# Patient Record
Sex: MALE | Race: WHITE | NOT HISPANIC OR LATINO | Employment: OTHER | URBAN - METROPOLITAN AREA
[De-identification: names, ages, dates, MRNs, and addresses within clinical notes are randomized per-mention and may not be internally consistent; named-entity substitution may affect disease eponyms.]

---

## 2017-01-30 ENCOUNTER — OFFICE VISIT (OUTPATIENT)
Dept: HEMATOLOGY/ONCOLOGY | Facility: CLINIC | Age: 47
End: 2017-01-30
Payer: OTHER GOVERNMENT

## 2017-01-30 ENCOUNTER — LAB VISIT (OUTPATIENT)
Dept: LAB | Facility: HOSPITAL | Age: 47
End: 2017-01-30
Attending: INTERNAL MEDICINE
Payer: OTHER GOVERNMENT

## 2017-01-30 VITALS
BODY MASS INDEX: 38.76 KG/M2 | HEIGHT: 71 IN | SYSTOLIC BLOOD PRESSURE: 121 MMHG | DIASTOLIC BLOOD PRESSURE: 75 MMHG | TEMPERATURE: 98 F | HEART RATE: 84 BPM | RESPIRATION RATE: 15 BRPM | WEIGHT: 276.88 LBS

## 2017-01-30 DIAGNOSIS — K76.0 FATTY LIVER: Primary | ICD-10-CM

## 2017-01-30 DIAGNOSIS — D69.6 THROMBOCYTOPENIA: ICD-10-CM

## 2017-01-30 DIAGNOSIS — D69.6 THROMBOCYTOPENIA: Primary | ICD-10-CM

## 2017-01-30 DIAGNOSIS — K76.0 FATTY LIVER: ICD-10-CM

## 2017-01-30 DIAGNOSIS — E06.3 HASHIMOTO'S THYROIDITIS: ICD-10-CM

## 2017-01-30 DIAGNOSIS — E03.9 HYPOTHYROIDISM (ACQUIRED): ICD-10-CM

## 2017-01-30 DIAGNOSIS — K51.211 CHRONIC ULCERATIVE PROCTITIS WITH RECTAL BLEEDING: ICD-10-CM

## 2017-01-30 LAB
ALBUMIN SERPL BCP-MCNC: 4.3 G/DL
ALP SERPL-CCNC: 62 U/L
ALT SERPL W/O P-5'-P-CCNC: 62 U/L
ANION GAP SERPL CALC-SCNC: 8 MMOL/L
AST SERPL-CCNC: 26 U/L
BASOPHILS # BLD AUTO: 0.1 K/UL
BASOPHILS NFR BLD: 1.1 %
BILIRUB SERPL-MCNC: 0.5 MG/DL
BUN SERPL-MCNC: 18 MG/DL
CALCIUM SERPL-MCNC: 9.5 MG/DL
CHLORIDE SERPL-SCNC: 107 MMOL/L
CO2 SERPL-SCNC: 23 MMOL/L
CREAT SERPL-MCNC: 1 MG/DL
DIFFERENTIAL METHOD: ABNORMAL
EOSINOPHIL # BLD AUTO: 0.1 K/UL
EOSINOPHIL NFR BLD: 2.1 %
ERYTHROCYTE [DISTWIDTH] IN BLOOD BY AUTOMATED COUNT: 13.6 %
EST. GFR  (AFRICAN AMERICAN): >60 ML/MIN/1.73 M^2
EST. GFR  (NON AFRICAN AMERICAN): >60 ML/MIN/1.73 M^2
GLUCOSE SERPL-MCNC: 137 MG/DL
HCT VFR BLD AUTO: 44.6 %
HGB BLD-MCNC: 15.1 G/DL
LYMPHOCYTES # BLD AUTO: 2.4 K/UL
LYMPHOCYTES NFR BLD: 40.8 %
MCH RBC QN AUTO: 31 PG
MCHC RBC AUTO-ENTMCNC: 33.7 %
MCV RBC AUTO: 92 FL
MONOCYTES # BLD AUTO: 0.6 K/UL
MONOCYTES NFR BLD: 9.5 %
NEUTROPHILS # BLD AUTO: 2.8 K/UL
NEUTROPHILS NFR BLD: 46.5 %
PLATELET # BLD AUTO: 110 K/UL
PMV BLD AUTO: 9.2 FL
POTASSIUM SERPL-SCNC: 4.5 MMOL/L
PROT SERPL-MCNC: 7.5 G/DL
RBC # BLD AUTO: 4.86 M/UL
SODIUM SERPL-SCNC: 138 MMOL/L
WBC # BLD AUTO: 6 K/UL

## 2017-01-30 PROCEDURE — 99213 OFFICE O/P EST LOW 20 MIN: CPT | Mod: PBBFAC,PO | Performed by: INTERNAL MEDICINE

## 2017-01-30 PROCEDURE — 85025 COMPLETE CBC W/AUTO DIFF WBC: CPT

## 2017-01-30 PROCEDURE — 99999 PR PBB SHADOW E&M-EST. PATIENT-LVL III: CPT | Mod: PBBFAC,,, | Performed by: INTERNAL MEDICINE

## 2017-01-30 PROCEDURE — 99213 OFFICE O/P EST LOW 20 MIN: CPT | Mod: S$PBB,,, | Performed by: INTERNAL MEDICINE

## 2017-01-30 PROCEDURE — 36415 COLL VENOUS BLD VENIPUNCTURE: CPT

## 2017-01-30 PROCEDURE — 80053 COMPREHEN METABOLIC PANEL: CPT

## 2017-01-30 RX ORDER — CHLORHEXIDINE GLUCONATE 1.2 MG/ML
RINSE BUCCAL
COMMUNITY
Start: 2016-11-17 | End: 2018-06-06

## 2017-01-30 NOTE — PROGRESS NOTES
A 45-year-old  male with chronic ITP, mild ITP, not treated previously,   coming in for followup.  The patient also has a fatty liver-induced   Thrombocytopenia.pt has been recently dx with hashimotos, sees DR. Ufaifo.   Has elevated LFT from fatty infilteration    PHYSICAL EXAM:  Wt Readings from Last 3 Encounters:   01/30/17 125.6 kg (276 lb 14.4 oz)   10/21/16 118.7 kg (261 lb 11 oz)   08/29/16 117.7 kg (259 lb 7.7 oz)     Temp Readings from Last 3 Encounters:   01/30/17 97.6 °F (36.4 °C)   08/29/16 98.3 °F (36.8 °C) (Oral)   07/27/16 97.9 °F (36.6 °C)     BP Readings from Last 3 Encounters:   01/30/17 121/75   10/21/16 113/76   08/29/16 128/72     Pulse Readings from Last 3 Encounters:   01/30/17 84   10/21/16 70   08/29/16 84     GENERAL:  No complaints.  Comfortable-looking patient.  Patient is in no   distress.  Awake, alert and oriented to time, person and place.  No anxiety or   agitation.    HEENT:  Normal conjunctivae and eyelids.  WNL.  PERRLA 3 to 4 mm.  No icterus,   no pallor, no congestion, and no discharge noted.     NECK:  Supple.  Trachea is central.  No crepitus.  No JVD or masses.    RESPIRATORY:  No intercostal retractions.  No dullness to percussion.  Chest is   clear to auscultation.  No rales, rhonchi or wheezes.  No crepitus.  Good air   entry bilaterally.    CARDIOVASCULAR:  S1 and S2 are normally heard without murmurs or gallops.  All   peripheral pulses are present.    ABDOMEN:  Normal abdomen.  No hepatosplenomegaly.  No free fluid.  Bowel sounds   are present.  No hernia noted. No masses.  No rebound or tenderness.  No   guarding or rigidity.  Umbilicus is midline.    LYMPHATICS:  No axillary, cervical, supraclavicular, submental, or inguinal   lymphadenopathy.    SKIN AND MUSCULOSKELETAL:  He had some blisters to his legs, but that does not   appear to be ecchymosis.  There is no evidence of excoriation marks or   ecchymosis.  No rashes.  No cyanosis.  No clubbing.  No joint  or skeletal   deformities noted.  Normal range of motion.    NEUROLOGIC:  Higher functions are appropriate.  No cranial nerve deficits.    Normal gait.  Normal strength.  Motor and sensory functions are normal.  Deep   tendon reflexes are normal.    GENITAL AND RECTAL:  Exams are deferred.  Lab Results   Component Value Date    WBC 6.00 01/30/2017    HGB 15.1 01/30/2017    HCT 44.6 01/30/2017    MCV 92 01/30/2017     (L) 01/30/2017     CMP  Sodium   Date Value Ref Range Status   01/30/2017 138 136 - 145 mmol/L Final     Potassium   Date Value Ref Range Status   01/30/2017 4.5 3.5 - 5.1 mmol/L Final     Chloride   Date Value Ref Range Status   01/30/2017 107 95 - 110 mmol/L Final     CO2   Date Value Ref Range Status   01/30/2017 23 23 - 29 mmol/L Final     Glucose   Date Value Ref Range Status   01/30/2017 137 (H) 70 - 110 mg/dL Final     BUN, Bld   Date Value Ref Range Status   01/30/2017 18 6 - 20 mg/dL Final     Creatinine   Date Value Ref Range Status   01/30/2017 1.0 0.5 - 1.4 mg/dL Final     Calcium   Date Value Ref Range Status   01/30/2017 9.5 8.7 - 10.5 mg/dL Final     Total Protein   Date Value Ref Range Status   01/30/2017 7.5 6.0 - 8.4 g/dL Final     Albumin   Date Value Ref Range Status   01/30/2017 4.3 3.5 - 5.2 g/dL Final     Total Bilirubin   Date Value Ref Range Status   01/30/2017 0.5 0.1 - 1.0 mg/dL Final     Comment:     For infants and newborns, interpretation of results should be based  on gestational age, weight and in agreement with clinical  observations.  Premature Infant recommended reference ranges:  Up to 24 hours.............<8.0 mg/dL  Up to 48 hours............<12.0 mg/dL  3-5 days..................<15.0 mg/dL  6-29 days.................<15.0 mg/dL       Alkaline Phosphatase   Date Value Ref Range Status   01/30/2017 62 55 - 135 U/L Final     AST   Date Value Ref Range Status   01/30/2017 26 10 - 40 U/L Final     ALT   Date Value Ref Range Status   01/30/2017 62 (H) 10 - 44 U/L  Final     Anion Gap   Date Value Ref Range Status   01/30/2017 8 8 - 16 mmol/L Final     eGFR if    Date Value Ref Range Status   01/30/2017 >60 >60 mL/min/1.73 m^2 Final     eGFR if non    Date Value Ref Range Status   01/30/2017 >60 >60 mL/min/1.73 m^2 Final     Comment:     Calculation used to obtain the estimated glomerular filtration  rate (eGFR) is the CKD-EPI equation. Since race is unknown   in our information system, the eGFR values for   -American and Non--American patients are given   for each creatinine result.       IMPRESSION:  ITP.  Ulcerative colitis          ITP:   rtc 4-5 months with cbc, cmp iron studies

## 2017-01-30 NOTE — MR AVS SNAPSHOT
[facility]  [address] [place] [address] Drive [address]  [place] [address]  Phone: [phone]                  Isidoro Singh   2017 10:00 AM   Office Visit    Description:  Male : 1970   Provider:  Gladys Mcintosh MD   Department:  [facility]           Reason for Visit     thrombocytopenia           Diagnoses this Visit        Comments    Fatty liver    -  Primary     Hashimoto's thyroiditis         Hypothyroidism (acquired)         Chronic ulcerative proctitis with rectal bleeding         Thrombocytopenia                To Do List           Future Appointments        Provider Department Dept Phone    2017 2:30 PM Valdo Lee MD [facility] [phone]    2017 10:00 AM LAB, [facility] [phone]    10/2/2017 10:00 AM Gladys Mcintosh MD [facility] [phone]      Goals (5 Years of Data)     None      [facility]     [facility] Nurse Care Line -  Assistance  Registered nurses in the [facility] provide clinical advisement, health education, appointment booking, and other advisory services.  Call for this free service at [phone].             Medications           Message regarding Medications     Verify the changes and/or additions to your medication regime listed below are the same as discussed with your clinician today.  If any of these changes or additions are incorrect, please notify your healthcare provider.             Verify that the below list of medications is an accurate representation of the medications you are currently taking.  If none reported, the list may be blank. If incorrect, please contact your healthcare provider. Carry this list with you in case of emergency.           Current Medications     citalopram (CELEXA) 20 MG tablet Take 20 mg by mouth once daily.    ibuprofen (ADVIL,MOTRIN) 200 MG tablet Take 400 mg by mouth every  "6 (six) hours as needed for Pain.    PERIOGARD 0.12 % solution     levothyroxine (SYNTHROID) 137 MCG Tab tablet Take 1 tablet (137 mcg total) by mouth once daily.    mesalamine (CANASA) 1000 MG Supp Place 1 suppository (1,000 mg total) rectally nightly.           Clinical Reference Information           Vital Signs - Last Recorded  Most recent update: 1/30/2017  9:57 AM by Kaitlin Mario LPN    BP Pulse Temp Resp Ht Wt    121/75 84 97.6 °F (36.4 °C) 15 5' 11" (1.803 m) 125.6 kg (276 lb 14.4 oz)    BMI                38.62 kg/m2          Blood Pressure          Most Recent Value    BP  121/75      Allergies as of 1/30/2017     No Known Allergies      Immunizations Administered on Date of Encounter - 1/30/2017     None      Orders Placed During Today's Visit     Future Labs/Procedures Expected by Expires    CBC w/ DIFF  1/30/2017 3/31/2018    FERRITIN  1/30/2017 3/31/2018    Iron and TIBC  1/30/2017 3/31/2018    Pathologist Interpretation Differential  1/30/2017 3/31/2018    STFR  1/30/2017 3/31/2018      "

## 2017-01-31 RX ORDER — LEVOTHYROXINE SODIUM 137 UG/1
137 TABLET ORAL DAILY
Qty: 90 TABLET | Refills: 3 | Status: SHIPPED | OUTPATIENT
Start: 2017-01-31 | End: 2017-04-08 | Stop reason: SDUPTHER

## 2017-04-07 ENCOUNTER — LAB VISIT (OUTPATIENT)
Dept: LAB | Facility: HOSPITAL | Age: 47
End: 2017-04-07
Attending: INTERNAL MEDICINE
Payer: OTHER GOVERNMENT

## 2017-04-07 ENCOUNTER — OFFICE VISIT (OUTPATIENT)
Dept: ENDOCRINOLOGY | Facility: CLINIC | Age: 47
End: 2017-04-07
Payer: OTHER GOVERNMENT

## 2017-04-07 VITALS
SYSTOLIC BLOOD PRESSURE: 125 MMHG | BODY MASS INDEX: 38.73 KG/M2 | RESPIRATION RATE: 18 BRPM | HEART RATE: 72 BPM | DIASTOLIC BLOOD PRESSURE: 80 MMHG | WEIGHT: 276.69 LBS | HEIGHT: 71 IN

## 2017-04-07 DIAGNOSIS — E04.9 GOITER: ICD-10-CM

## 2017-04-07 DIAGNOSIS — G47.33 OBSTRUCTIVE SLEEP APNEA: ICD-10-CM

## 2017-04-07 DIAGNOSIS — E88.810 DYSMETABOLIC SYNDROME: ICD-10-CM

## 2017-04-07 DIAGNOSIS — E03.9 HYPOTHYROIDISM (ACQUIRED): ICD-10-CM

## 2017-04-07 DIAGNOSIS — E06.3 HASHIMOTO'S THYROIDITIS: ICD-10-CM

## 2017-04-07 DIAGNOSIS — E66.9 OBESITY (BMI 35.0-39.9 WITHOUT COMORBIDITY): ICD-10-CM

## 2017-04-07 DIAGNOSIS — K76.0 NAFLD (NONALCOHOLIC FATTY LIVER DISEASE): ICD-10-CM

## 2017-04-07 DIAGNOSIS — D69.6 THROMBOCYTOPENIA: ICD-10-CM

## 2017-04-07 DIAGNOSIS — E06.3 HASHIMOTO'S THYROIDITIS: Primary | ICD-10-CM

## 2017-04-07 DIAGNOSIS — E04.1 NODULAR THYROID DISEASE: ICD-10-CM

## 2017-04-07 LAB
ALBUMIN SERPL BCP-MCNC: 4.3 G/DL
ALP SERPL-CCNC: 69 U/L
ALT SERPL W/O P-5'-P-CCNC: 95 U/L
ANION GAP SERPL CALC-SCNC: 9 MMOL/L
AST SERPL-CCNC: 41 U/L
BILIRUB SERPL-MCNC: 0.4 MG/DL
BUN SERPL-MCNC: 14 MG/DL
CALCIUM SERPL-MCNC: 9.7 MG/DL
CHLORIDE SERPL-SCNC: 104 MMOL/L
CHOLEST/HDLC SERPL: 5.5 {RATIO}
CHOLEST/HDLC SERPL: 5.5 {RATIO}
CO2 SERPL-SCNC: 28 MMOL/L
CREAT SERPL-MCNC: 1 MG/DL
EST. GFR  (AFRICAN AMERICAN): >60 ML/MIN/1.73 M^2
EST. GFR  (NON AFRICAN AMERICAN): >60 ML/MIN/1.73 M^2
GLUCOSE SERPL-MCNC: 85 MG/DL
HDL/CHOLESTEROL RATIO: 18 %
HDL/CHOLESTEROL RATIO: 18 %
HDLC SERPL-MCNC: 183 MG/DL
HDLC SERPL-MCNC: 183 MG/DL
HDLC SERPL-MCNC: 33 MG/DL
HDLC SERPL-MCNC: 33 MG/DL
IRON SERPL-MCNC: 63 UG/DL
LDLC SERPL CALC-MCNC: 96.6 MG/DL
LDLC SERPL CALC-MCNC: 96.6 MG/DL
NONHDLC SERPL-MCNC: 150 MG/DL
NONHDLC SERPL-MCNC: 150 MG/DL
POTASSIUM SERPL-SCNC: 4.5 MMOL/L
PROT SERPL-MCNC: 7.9 G/DL
SATURATED IRON: 17 %
SODIUM SERPL-SCNC: 141 MMOL/L
TOTAL IRON BINDING CAPACITY: 377 UG/DL
TRANSFERRIN SERPL-MCNC: 255 MG/DL
TRIGL SERPL-MCNC: 267 MG/DL
TRIGL SERPL-MCNC: 267 MG/DL
URATE SERPL-MCNC: 5.9 MG/DL

## 2017-04-07 PROCEDURE — 86800 THYROGLOBULIN ANTIBODY: CPT

## 2017-04-07 PROCEDURE — 82728 ASSAY OF FERRITIN: CPT

## 2017-04-07 PROCEDURE — 83018 HEAVY METAL QUAN EACH NES: CPT

## 2017-04-07 PROCEDURE — 84480 ASSAY TRIIODOTHYRONINE (T3): CPT

## 2017-04-07 PROCEDURE — 84439 ASSAY OF FREE THYROXINE: CPT

## 2017-04-07 PROCEDURE — 84443 ASSAY THYROID STIM HORMONE: CPT

## 2017-04-07 PROCEDURE — 80053 COMPREHEN METABOLIC PANEL: CPT

## 2017-04-07 PROCEDURE — 99999 PR PBB SHADOW E&M-EST. PATIENT-LVL III: CPT | Mod: PBBFAC,,, | Performed by: INTERNAL MEDICINE

## 2017-04-07 PROCEDURE — 99214 OFFICE O/P EST MOD 30 MIN: CPT | Mod: S$PBB,,, | Performed by: INTERNAL MEDICINE

## 2017-04-07 PROCEDURE — 80061 LIPID PANEL: CPT

## 2017-04-07 PROCEDURE — 84550 ASSAY OF BLOOD/URIC ACID: CPT

## 2017-04-07 PROCEDURE — 83036 HEMOGLOBIN GLYCOSYLATED A1C: CPT

## 2017-04-07 PROCEDURE — 83540 ASSAY OF IRON: CPT

## 2017-04-07 NOTE — PROGRESS NOTES
Subjective:      Patient ID: Isidoro Singh is a 47 y.o. male.    Chief Complaint:  Patient is a 47 yr old gentleman seen in [facility] on account of Hypothyroidism due to Hashimoto's thyroiditis and thyroid nodular disease.    History of Present Illness    Patient is a 46 yr old gentleman with Hypothyroidism due to Hashimotos thyroiditis seen in [facility] today. He is presently on LT4 137 mcg Daily. He has a background history of chronic ITP, ulcerative proctitis and NAFLD associated with grade 2 obesity. Screening thyroid USS from 07/16 showed single sub cm nodular thyroid disease for which he is to have follow up sonographic testing for ~ 07/17. He also has Grade 2 obesity with the metabolic syndrome.  Patients baseline [place] score is 11. Patient was diagnosed with OSAS last year and he uses a a CPAP mask.  Patient was apparently found to have an abnormal thyroid function test in the course of work up for symptoms suggestive of possible depression about 1 week ago.  Patient continues to have tiredness. And his weight continues to increase.  Patient has not had any local neck symptoms; no dysphagia, odynophagia not voice change.  No known family history of thyroid disease.  Mother of patient has type 2 diabetes but there dose not appear to be any family history of autoimmune disease as far as he knows.  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 He not sure of what environmental exposures he may have had in the course os these tours.  Patients diet does not include excess intake of soy products, sea food nor cabbage.  Patient stopped smoking ~ 2 yrs ago. He smoked 0.5PPD For ~ 25 yrs.  Patient drinks very occasionally; 2-3 drinks a week; beer.    Review of Systems   Constitutional: Positive for fatigue.   HENT: Negative for facial swelling and trouble swallowing.    Eyes: Negative for photophobia.   Respiratory: Negative for cough and shortness  "of breath.    Cardiovascular: Negative for chest pain and palpitations.   Gastrointestinal: Negative for abdominal distention, abdominal pain, nausea and vomiting.   Musculoskeletal: Negative for myalgias.   Skin: Negative for color change, pallor and rash.   Neurological: Negative for dizziness, numbness and headaches.   Hematological: Does not bruise/bleed easily.   Psychiatric/Behavioral: Negative for confusion and sleep disturbance.       Objective:  /80  Pulse 72  Resp 18  Ht 5' 11" (1.803 m)  Wt 125.5 kg (276 lb 10.8 oz)  BMI 38.59 kg/m2     Physical Exam   Constitutional: He is oriented to person, place, and time. He appears well-developed and well-nourished. No distress.   Pleasant middle aged gentleman. Clinically comfortable. Not in any apparent acute distress. Not pale, anicteric and afebrile. Well hydrated.   HENT:   Head: Normocephalic and atraumatic.   Eyes: Conjunctivae and EOM are normal. Pupils are equal, round, and reactive to light. No scleral icterus.   Neck: Normal range of motion. Neck supple. No JVD present.   Cardiovascular: Normal rate, regular rhythm and normal heart sounds.    Pulmonary/Chest: Effort normal and breath sounds normal. No respiratory distress. He has no wheezes.   Abdominal: Soft. There is no tenderness.   Musculoskeletal: Normal range of motion. He exhibits no tenderness.   Neurological: He is alert and oriented to person, place, and time. No cranial nerve deficit.   Skin: Skin is warm and dry. No rash noted. He is not diaphoretic. No erythema. No pallor.   Psychiatric: He has a normal mood and affect. His behavior is normal. Judgment and thought content normal.   Vitals reviewed.      Lab Review:     Results for ANNIE KOCH (MRN [id number]) as of 4/7/2017 14:55   Ref. Range 10/21/2016 12:57 1/30/2017 08:09   WBC Latest Ref Range: 3.90 - 12.70 K/uL  6.00   RBC Latest Ref Range: 4.60 - 6.20 M/uL  4.86   Hemoglobin Latest Ref Range: 14.0 - 18.0 g/dL  15.1 "   Hematocrit Latest Ref Range: 40.0 - 54.0 %  44.6   MCV Latest Ref Range: 82 - 98 fL  92   MCH Latest Ref Range: 27.0 - 31.0 pg  31.0   MCHC Latest Ref Range: 32.0 - 36.0 %  33.7   RDW Latest Ref Range: 11.5 - 14.5 %  13.6   Platelets Latest Ref Range: 150 - 350 K/uL  110 (L)   MPV Latest Ref Range: 9.2 - 12.9 fL  9.2   Gran% Latest Ref Range: 38.0 - 73.0 %  46.5   Gran # Latest Ref Range: 1.8 - 7.7 K/uL  2.8   Lymph% Latest Ref Range: 18.0 - 48.0 %  40.8   Lymph # Latest Ref Range: 1.0 - 4.8 K/uL  2.4   Mono% Latest Ref Range: 4.0 - 15.0 %  9.5   Mono # Latest Ref Range: 0.3 - 1.0 K/uL  0.6   Eosinophil% Latest Ref Range: 0.0 - 8.0 %  2.1   Eos # Latest Ref Range: 0.0 - 0.5 K/uL  0.1   Basophil% Latest Ref Range: 0.0 - 1.9 %  1.1   Baso # Latest Ref Range: 0.00 - 0.20 K/uL  0.10   Sodium Latest Ref Range: 136 - 145 mmol/L  138   Potassium Latest Ref Range: 3.5 - 5.1 mmol/L  4.5   Chloride Latest Ref Range: 95 - 110 mmol/L  107   CO2 Latest Ref Range: 23 - 29 mmol/L  23   Anion Gap Latest Ref Range: 8 - 16 mmol/L  8   BUN, Bld Latest Ref Range: 6 - 20 mg/dL  18   Creatinine Latest Ref Range: 0.5 - 1.4 mg/dL  1.0   eGFR if non African American Latest Ref Range: >60 mL/min/1.73 m^2  >60   eGFR if  Latest Ref Range: >60 mL/min/1.73 m^2  >60   Glucose Latest Ref Range: 70 - 110 mg/dL  137 (H)   Calcium Latest Ref Range: 8.7 - 10.5 mg/dL  9.5   Alkaline Phosphatase Latest Ref Range: 55 - 135 U/L  62   Total Protein Latest Ref Range: 6.0 - 8.4 g/dL  7.5   Albumin Latest Ref Range: 3.5 - 5.2 g/dL  4.3   Total Bilirubin Latest Ref Range: 0.1 - 1.0 mg/dL  0.5   AST Latest Ref Range: 10 - 40 U/L  26   ALT Latest Ref Range: 10 - 44 U/L  62 (H)   TSH Latest Ref Range: 0.400 - 4.000 uIU/mL 11.059 (H)    T3, Total Latest Ref Range: 60 - 180 ng/dL 95    Free T4 Latest Ref Range: 0.71 - 1.51 ng/dL 0.83        Assessment:     1. Hashimoto's thyroiditis  Uric acid   2. Hypothyroidism (acquired)  TSH    T4, free    T3     Lipid panel    Thyroglobulin    Iodine, Serum    Uric acid    Lipid panel    US Soft Tissue Head Neck Thyroid   3. NAFLD (nonalcoholic fatty liver disease)  Lipid panel    Iron and TIBC    Ferritin   4. Nodular thyroid disease  Thyroglobulin    US Soft Tissue Head Neck Thyroid   5. Obstructive sleep apnea     6. Dysmetabolic syndrome  Hemoglobin A1c    Comprehensive metabolic panel    Lipid panel   7. Goiter     8. Obesity (BMI 35.0-39.9 without comorbidity)  Ambulatory consult to Nutrition Services   9. Thrombocytopenia          1. Regarding hypothyroidism; likely due to Hashimotos disease. To obtain full TFTs as detailed above based on results may adjust LT4 dose..  2. Regarding thyroid nodular disease; for ffup thyroid USS for ~ 07/17.  2. Regarding OSAS; to continue CPAP therapy as before.  3. Regarding NAFLD; to track LFTserially and to limit ETOH intake to present levels.  4. Regarding Obesity and possible dysmetabolic syndrome; Encouraged efforts at calorie deficit plan along with increased daily physical activity and regular aerobic and resistance exercise routine. Referred to dietician for indepth dietary counselling    Plan:     FFup in ~ 4mths.

## 2017-04-08 PROBLEM — R73.03 PREDIABETES: Status: ACTIVE | Noted: 2017-04-08

## 2017-04-08 LAB
ESTIMATED AVG GLUCOSE: 117 MG/DL
FERRITIN SERPL-MCNC: 262 NG/ML
HBA1C MFR BLD HPLC: 5.7 %
T3 SERPL-MCNC: 104 NG/DL
T4 FREE SERPL-MCNC: 0.74 NG/DL
TSH SERPL DL<=0.005 MIU/L-ACNC: 7.85 UIU/ML

## 2017-04-08 RX ORDER — LEVOTHYROXINE SODIUM 150 UG/1
150 TABLET ORAL DAILY
Qty: 90 TABLET | Refills: 3 | Status: SHIPPED | OUTPATIENT
Start: 2017-04-08 | End: 2017-04-11 | Stop reason: SDUPTHER

## 2017-04-10 ENCOUNTER — PATIENT MESSAGE (OUTPATIENT)
Dept: ENDOCRINOLOGY | Facility: CLINIC | Age: 47
End: 2017-04-10

## 2017-04-10 LAB
THRYOGLOBULIN INTERPRETATION: ABNORMAL
THYROGLOB AB SERPL-ACNC: 12 IU/ML
THYROGLOB SERPL-MCNC: 3.7 NG/ML

## 2017-04-11 LAB — IODINE SERPL-MCNC: 47 NG/ML (ref 40–92)

## 2017-04-11 RX ORDER — LEVOTHYROXINE SODIUM 150 UG/1
150 TABLET ORAL DAILY
Qty: 90 TABLET | Refills: 3 | Status: SHIPPED | OUTPATIENT
Start: 2017-04-11 | End: 2017-07-06 | Stop reason: SDUPTHER

## 2017-07-06 ENCOUNTER — PATIENT MESSAGE (OUTPATIENT)
Dept: ENDOCRINOLOGY | Facility: CLINIC | Age: 47
End: 2017-07-06

## 2017-07-06 RX ORDER — LEVOTHYROXINE SODIUM 150 UG/1
150 TABLET ORAL DAILY
Qty: 90 TABLET | Refills: 3 | Status: SHIPPED | OUTPATIENT
Start: 2017-07-06 | End: 2017-10-07 | Stop reason: SDUPTHER

## 2017-07-07 ENCOUNTER — HOSPITAL ENCOUNTER (OUTPATIENT)
Dept: RADIOLOGY | Facility: CLINIC | Age: 47
Discharge: HOME | End: 2017-07-07
Attending: INTERNAL MEDICINE
Payer: OTHER GOVERNMENT

## 2017-07-07 DIAGNOSIS — E03.9 HYPOTHYROIDISM (ACQUIRED): ICD-10-CM

## 2017-07-07 DIAGNOSIS — E04.1 NODULAR THYROID DISEASE: ICD-10-CM

## 2017-07-07 PROCEDURE — 76536 US EXAM OF HEAD AND NECK: CPT | Mod: TC,PO

## 2017-07-07 PROCEDURE — 76536 US EXAM OF HEAD AND NECK: CPT | Mod: 26,,, | Performed by: RADIOLOGY

## 2017-09-27 ENCOUNTER — LAB VISIT (OUTPATIENT)
Dept: LAB | Facility: HOSPITAL | Age: 47
End: 2017-09-27
Attending: INTERNAL MEDICINE
Payer: OTHER GOVERNMENT

## 2017-09-27 DIAGNOSIS — K51.211 CHRONIC ULCERATIVE PROCTITIS WITH RECTAL BLEEDING: ICD-10-CM

## 2017-09-27 DIAGNOSIS — D69.6 THROMBOCYTOPENIA: ICD-10-CM

## 2017-09-27 LAB
ALBUMIN SERPL BCP-MCNC: 4.5 G/DL
ALP SERPL-CCNC: 71 U/L
ALT SERPL W/O P-5'-P-CCNC: 77 U/L
ANION GAP SERPL CALC-SCNC: 9 MMOL/L
AST SERPL-CCNC: 36 U/L
BASOPHILS # BLD AUTO: 0 K/UL
BASOPHILS NFR BLD: 0.2 %
BILIRUB SERPL-MCNC: 0.5 MG/DL
BUN SERPL-MCNC: 23 MG/DL
CALCIUM SERPL-MCNC: 10.1 MG/DL
CHLORIDE SERPL-SCNC: 103 MMOL/L
CO2 SERPL-SCNC: 25 MMOL/L
CREAT SERPL-MCNC: 1 MG/DL
DIFFERENTIAL METHOD: ABNORMAL
EOSINOPHIL # BLD AUTO: 0.1 K/UL
EOSINOPHIL NFR BLD: 1.7 %
ERYTHROCYTE [DISTWIDTH] IN BLOOD BY AUTOMATED COUNT: 13.2 %
EST. GFR  (AFRICAN AMERICAN): >60 ML/MIN/1.73 M^2
EST. GFR  (NON AFRICAN AMERICAN): >60 ML/MIN/1.73 M^2
FERRITIN SERPL-MCNC: 333 NG/ML
GLUCOSE SERPL-MCNC: 103 MG/DL
HCT VFR BLD AUTO: 44.8 %
HGB BLD-MCNC: 15.4 G/DL
IRON SERPL-MCNC: 80 UG/DL
LYMPHOCYTES # BLD AUTO: 2.3 K/UL
LYMPHOCYTES NFR BLD: 36.8 %
MCH RBC QN AUTO: 31.9 PG
MCHC RBC AUTO-ENTMCNC: 34.5 G/DL
MCV RBC AUTO: 92 FL
MONOCYTES # BLD AUTO: 0.5 K/UL
MONOCYTES NFR BLD: 8.4 %
NEUTROPHILS # BLD AUTO: 3.3 K/UL
NEUTROPHILS NFR BLD: 52.9 %
PATH REV BLD -IMP: NORMAL
PLATELET # BLD AUTO: 114 K/UL
PMV BLD AUTO: 9.9 FL
POTASSIUM SERPL-SCNC: 4.4 MMOL/L
PROT SERPL-MCNC: 8.2 G/DL
RBC # BLD AUTO: 4.85 M/UL
SATURATED IRON: 21 %
SODIUM SERPL-SCNC: 137 MMOL/L
TOTAL IRON BINDING CAPACITY: 379 UG/DL
TRANSFERRIN SERPL-MCNC: 256 MG/DL
WBC # BLD AUTO: 6.3 K/UL

## 2017-09-27 PROCEDURE — 85060 BLOOD SMEAR INTERPRETATION: CPT | Mod: ,,, | Performed by: PATHOLOGY

## 2017-09-27 PROCEDURE — 36415 COLL VENOUS BLD VENIPUNCTURE: CPT

## 2017-09-27 PROCEDURE — 85025 COMPLETE CBC W/AUTO DIFF WBC: CPT

## 2017-09-27 PROCEDURE — 82728 ASSAY OF FERRITIN: CPT

## 2017-09-27 PROCEDURE — 84238 ASSAY NONENDOCRINE RECEPTOR: CPT

## 2017-09-27 PROCEDURE — 83540 ASSAY OF IRON: CPT

## 2017-09-27 PROCEDURE — 80053 COMPREHEN METABOLIC PANEL: CPT

## 2017-09-28 LAB
PATH REV BLD -IMP: NORMAL
STFR SERPL-MCNC: 3.5 MG/L

## 2017-10-02 ENCOUNTER — OFFICE VISIT (OUTPATIENT)
Dept: HEMATOLOGY/ONCOLOGY | Facility: CLINIC | Age: 47
End: 2017-10-02
Payer: OTHER GOVERNMENT

## 2017-10-02 VITALS
SYSTOLIC BLOOD PRESSURE: 124 MMHG | BODY MASS INDEX: 38.7 KG/M2 | HEIGHT: 71 IN | RESPIRATION RATE: 20 BRPM | WEIGHT: 276.44 LBS | TEMPERATURE: 99 F | HEART RATE: 68 BPM | DIASTOLIC BLOOD PRESSURE: 76 MMHG

## 2017-10-02 DIAGNOSIS — D69.3 CHRONIC ITP (IDIOPATHIC THROMBOCYTOPENIA): Primary | ICD-10-CM

## 2017-10-02 DIAGNOSIS — E66.9 OBESITY (BMI 35.0-39.9 WITHOUT COMORBIDITY): ICD-10-CM

## 2017-10-02 DIAGNOSIS — K76.0 NAFLD (NONALCOHOLIC FATTY LIVER DISEASE): ICD-10-CM

## 2017-10-02 DIAGNOSIS — D69.6 THROMBOCYTOPENIA: ICD-10-CM

## 2017-10-02 DIAGNOSIS — E03.9 HYPOTHYROIDISM (ACQUIRED): ICD-10-CM

## 2017-10-02 PROCEDURE — 99213 OFFICE O/P EST LOW 20 MIN: CPT | Mod: PBBFAC,PO | Performed by: INTERNAL MEDICINE

## 2017-10-02 PROCEDURE — 99214 OFFICE O/P EST MOD 30 MIN: CPT | Mod: S$PBB,,, | Performed by: INTERNAL MEDICINE

## 2017-10-02 PROCEDURE — 99999 PR PBB SHADOW E&M-EST. PATIENT-LVL III: CPT | Mod: PBBFAC,,, | Performed by: INTERNAL MEDICINE

## 2017-10-02 PROCEDURE — 3008F BODY MASS INDEX DOCD: CPT | Mod: ,,, | Performed by: INTERNAL MEDICINE

## 2017-10-02 NOTE — PROGRESS NOTES
A 45-year-old  male with chronic ITP, mild ITP, not treated previously,   coming in for followup.  The patient also has a fatty liver-induced   Thrombocytopenia.pt has been recently dx with hashimotos, sees DR. Ufaifo.   Has elevated LFT from fatty infilteration    PHYSICAL EXAM:  Wt Readings from Last 3 Encounters:   04/07/17 125.5 kg (276 lb 10.8 oz)   01/30/17 125.6 kg (276 lb 14.4 oz)   10/21/16 118.7 kg (261 lb 11 oz)     Temp Readings from Last 3 Encounters:   01/30/17 97.6 °F (36.4 °C)   08/29/16 98.3 °F (36.8 °C) (Oral)   07/27/16 97.9 °F (36.6 °C)     BP Readings from Last 3 Encounters:   04/07/17 125/80   01/30/17 121/75   10/21/16 113/76     Pulse Readings from Last 3 Encounters:   04/07/17 72   01/30/17 84   10/21/16 70     GENERAL:  No complaints.  Comfortable-looking patient.  Patient is in no   distress.  Awake, alert and oriented to time, person and place.  No anxiety or   agitation.    HEENT:  Normal conjunctivae and eyelids.  WNL.  PERRLA 3 to 4 mm.  No icterus,   no pallor, no congestion, and no discharge noted.     NECK:  Supple.  Trachea is central.  No crepitus.  No JVD or masses.    RESPIRATORY:  No intercostal retractions.  No dullness to percussion.  Chest is   clear to auscultation.  No rales, rhonchi or wheezes.  No crepitus.  Good air   entry bilaterally.    CARDIOVASCULAR:  S1 and S2 are normally heard without murmurs or gallops.  All   peripheral pulses are present.    ABDOMEN:  Normal abdomen.  No hepatosplenomegaly.  No free fluid.  Bowel sounds   are present.  No hernia noted. No masses.  No rebound or tenderness.  No   guarding or rigidity.  Umbilicus is midline.    LYMPHATICS:  No axillary, cervical, supraclavicular, submental, or inguinal   lymphadenopathy.    SKIN AND MUSCULOSKELETAL:  He had some blisters to his legs, but that does not   appear to be ecchymosis.  There is no evidence of excoriation marks or   ecchymosis.  No rashes.  No cyanosis.  No clubbing.  No joint  or skeletal   deformities noted.  Normal range of motion.    NEUROLOGIC:  Higher functions are appropriate.  No cranial nerve deficits.    Normal gait.  Normal strength.  Motor and sensory functions are normal.  Deep   tendon reflexes are normal.    GENITAL AND RECTAL:  Exams are deferred.  Lab Results   Component Value Date    WBC 6.30 09/27/2017    HGB 15.4 09/27/2017    HCT 44.8 09/27/2017    MCV 92 09/27/2017     (L) 09/27/2017     CMP  Sodium   Date Value Ref Range Status   09/27/2017 137 136 - 145 mmol/L Final     Potassium   Date Value Ref Range Status   09/27/2017 4.4 3.5 - 5.1 mmol/L Final     Chloride   Date Value Ref Range Status   09/27/2017 103 95 - 110 mmol/L Final     CO2   Date Value Ref Range Status   09/27/2017 25 23 - 29 mmol/L Final     Glucose   Date Value Ref Range Status   09/27/2017 103 70 - 110 mg/dL Final     BUN, Bld   Date Value Ref Range Status   09/27/2017 23 (H) 6 - 20 mg/dL Final     Creatinine   Date Value Ref Range Status   09/27/2017 1.0 0.5 - 1.4 mg/dL Final     Calcium   Date Value Ref Range Status   09/27/2017 10.1 8.7 - 10.5 mg/dL Final     Total Protein   Date Value Ref Range Status   09/27/2017 8.2 6.0 - 8.4 g/dL Final     Albumin   Date Value Ref Range Status   09/27/2017 4.5 3.5 - 5.2 g/dL Final     Total Bilirubin   Date Value Ref Range Status   09/27/2017 0.5 0.1 - 1.0 mg/dL Final     Comment:     For infants and newborns, interpretation of results should be based  on gestational age, weight and in agreement with clinical  observations.  Premature Infant recommended reference ranges:  Up to 24 hours.............<8.0 mg/dL  Up to 48 hours............<12.0 mg/dL  3-5 days..................<15.0 mg/dL  6-29 days.................<15.0 mg/dL       Alkaline Phosphatase   Date Value Ref Range Status   09/27/2017 71 55 - 135 U/L Final     AST   Date Value Ref Range Status   09/27/2017 36 10 - 40 U/L Final     ALT   Date Value Ref Range Status   09/27/2017 77 (H) 10 - 44  U/L Final     Anion Gap   Date Value Ref Range Status   09/27/2017 9 8 - 16 mmol/L Final     eGFR if    Date Value Ref Range Status   09/27/2017 >60 >60 mL/min/1.73 m^2 Final     eGFR if non    Date Value Ref Range Status   09/27/2017 >60 >60 mL/min/1.73 m^2 Final     Comment:     Calculation used to obtain the estimated glomerular filtration  rate (eGFR) is the CKD-EPI equation. Since race is unknown   in our information system, the eGFR values for   -American and Non--American patients are given   for each creatinine result.       IMPRESSION:  ITP.  Ulcerative colitis          ITP:   rtc 8months with cbc, cmp iron studies   occ rectal bleeding from UC , cont to monito   cont fatty liver f/u with labs and pcpc

## 2017-10-06 ENCOUNTER — LAB VISIT (OUTPATIENT)
Dept: LAB | Facility: HOSPITAL | Age: 47
End: 2017-10-06
Attending: INTERNAL MEDICINE
Payer: OTHER GOVERNMENT

## 2017-10-06 ENCOUNTER — OFFICE VISIT (OUTPATIENT)
Dept: ENDOCRINOLOGY | Facility: CLINIC | Age: 47
End: 2017-10-06
Payer: OTHER GOVERNMENT

## 2017-10-06 VITALS
DIASTOLIC BLOOD PRESSURE: 76 MMHG | BODY MASS INDEX: 37.65 KG/M2 | WEIGHT: 268.94 LBS | HEIGHT: 71 IN | HEART RATE: 74 BPM | SYSTOLIC BLOOD PRESSURE: 115 MMHG

## 2017-10-06 DIAGNOSIS — E66.9 OBESITY (BMI 35.0-39.9 WITHOUT COMORBIDITY): ICD-10-CM

## 2017-10-06 DIAGNOSIS — E03.9 HYPOTHYROIDISM (ACQUIRED): Primary | ICD-10-CM

## 2017-10-06 DIAGNOSIS — E88.810 DYSMETABOLIC SYNDROME: ICD-10-CM

## 2017-10-06 DIAGNOSIS — R73.03 PREDIABETES: ICD-10-CM

## 2017-10-06 DIAGNOSIS — E04.9 GOITER: ICD-10-CM

## 2017-10-06 DIAGNOSIS — E04.1 NODULAR THYROID DISEASE: ICD-10-CM

## 2017-10-06 DIAGNOSIS — E06.3 HASHIMOTO'S THYROIDITIS: ICD-10-CM

## 2017-10-06 DIAGNOSIS — K76.0 NAFLD (NONALCOHOLIC FATTY LIVER DISEASE): ICD-10-CM

## 2017-10-06 DIAGNOSIS — E03.9 HYPOTHYROIDISM (ACQUIRED): ICD-10-CM

## 2017-10-06 DIAGNOSIS — G47.33 OBSTRUCTIVE SLEEP APNEA: ICD-10-CM

## 2017-10-06 LAB
25(OH)D3+25(OH)D2 SERPL-MCNC: 25 NG/ML
ESTIMATED AVG GLUCOSE: 111 MG/DL
HBA1C MFR BLD HPLC: 5.5 %

## 2017-10-06 PROCEDURE — 99999 PR PBB SHADOW E&M-EST. PATIENT-LVL III: CPT | Mod: PBBFAC,,, | Performed by: INTERNAL MEDICINE

## 2017-10-06 PROCEDURE — 82306 VITAMIN D 25 HYDROXY: CPT

## 2017-10-06 PROCEDURE — 84432 ASSAY OF THYROGLOBULIN: CPT

## 2017-10-06 PROCEDURE — 86316 IMMUNOASSAY TUMOR OTHER: CPT

## 2017-10-06 PROCEDURE — 82977 ASSAY OF GGT: CPT

## 2017-10-06 PROCEDURE — 84550 ASSAY OF BLOOD/URIC ACID: CPT

## 2017-10-06 PROCEDURE — 36415 COLL VENOUS BLD VENIPUNCTURE: CPT | Mod: PO

## 2017-10-06 PROCEDURE — 80053 COMPREHEN METABOLIC PANEL: CPT

## 2017-10-06 PROCEDURE — 84443 ASSAY THYROID STIM HORMONE: CPT

## 2017-10-06 PROCEDURE — 99214 OFFICE O/P EST MOD 30 MIN: CPT | Mod: S$PBB,,, | Performed by: INTERNAL MEDICINE

## 2017-10-06 PROCEDURE — 83690 ASSAY OF LIPASE: CPT

## 2017-10-06 PROCEDURE — 80061 LIPID PANEL: CPT

## 2017-10-06 PROCEDURE — 84480 ASSAY TRIIODOTHYRONINE (T3): CPT

## 2017-10-06 PROCEDURE — 83036 HEMOGLOBIN GLYCOSYLATED A1C: CPT

## 2017-10-06 PROCEDURE — 82308 ASSAY OF CALCITONIN: CPT

## 2017-10-06 PROCEDURE — 82150 ASSAY OF AMYLASE: CPT

## 2017-10-06 PROCEDURE — 84439 ASSAY OF FREE THYROXINE: CPT

## 2017-10-06 PROCEDURE — 99213 OFFICE O/P EST LOW 20 MIN: CPT | Mod: PBBFAC,PO | Performed by: INTERNAL MEDICINE

## 2017-10-06 NOTE — PROGRESS NOTES
Subjective:      Patient ID: Isidoro Singh is a 47 y.o. male.    Chief Complaint:      Patient is a 47 yr old gentleman seen in [facility] on account of Hypothyroidism due to Hashimoto's thyroiditis and thyroid nodular disease.    History of Present Illness    Patient is a 47 yr old gentleman with Hypothyroidism due to Hashimotos thyroiditis seen in [facility] today. He is presently on LT4 150 mcg Daily. He has a background history of chronic ITP, ulcerative proctitis and NAFLD associated with grade 2 obesity. Screening thyroid USS from 07/16 showed single sub cm nodular thyroid disease for which he had a  follow up sonographic test in 07/17 that showed no significant interval change of note.  His next scheduled thyroid ultrasound should be for ~ 07/18.    He also has Grade 2 obesity with the metabolic syndrome.  Patients baseline [place] score is 11. Patient was diagnosed with OSAS last year and he uses a a CPAP mask.  Patient was apparently found to have an abnormal thyroid function test in the course of work up for symptoms suggestive of possible depression about 1 week ago.  Patient continues to have tiredness and his weight continues to increase.  Patient has not had any local neck symptoms; no dysphagia, odynophagia not voice change.  No known family history of thyroid disease.  Mother of patient has type 2 diabetes but there dose not appear to be any family history of autoimmune disease as far as he knows.  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 He is not sure of what environmental exposures he may have had in the course os these tours.  Patients diet does not include excess intake of soy products, sea food nor cabbage.  Patient stopped smoking ~ 2 yrs ago. He smoked 0.5PPD For ~ 25 yrs.  Patient drinks very occasionally; 2-3 drinks a week; beer.    Vitals - 1 value per visit 1/30/2017 4/7/2017 10/2/2017   SYSTOLIC 121 125 124   DIASTOLIC  "75 80 76   PULSE 84 72 68   TEMPERATURE 97.6  98.7   RESPIRATIONS 15 18 20   SPO2      Weight (lb) 276.9 276.68 276.46   Weight (kg) 125.6 125.5 125.4   HEIGHT 5' 11" 5' 11" 5' 11"   BODY MASS INDEX 38.62 38.59 38.56     His weight has been stable essentially for this year thus far.    Review of Systems   Constitutional: Negative for diaphoresis and fatigue.   HENT: Negative for facial swelling and trouble swallowing.    Eyes: Negative for photophobia.   Respiratory: Negative for cough and shortness of breath.    Cardiovascular: Negative for chest pain and palpitations.   Gastrointestinal: Positive for diarrhea (intermittent with UC flares). Negative for abdominal distention, abdominal pain, nausea and vomiting.   Endocrine: Negative for polyphagia and polyuria.   Genitourinary: Negative for dysuria and frequency.   Musculoskeletal: Negative for myalgias.   Skin: Negative for color change, pallor and rash.   Neurological: Negative for dizziness, numbness and headaches.   Hematological: Does not bruise/bleed easily.   Psychiatric/Behavioral: Negative for confusion and sleep disturbance.       /76   Pulse 74   Ht 5' 11" (1.803 m)   Wt 122 kg (268 lb 15.4 oz)   BMI 37.51 kg/m²      Physical Exam   Constitutional: He is oriented to person, place, and time. He appears well-developed and well-nourished. No distress.   Pleasant middle aged gentleman. Clinically comfortable. Not in any apparent acute distress. Not pale, anicteric and afebrile. Well hydrated. Mesomorphic build.   HENT:   Head: Normocephalic and atraumatic.   Nose: Nose normal.   Eyes: Conjunctivae and EOM are normal. Pupils are equal, round, and reactive to light. No scleral icterus.   Neck: Normal range of motion. Neck supple. No JVD present. No thyromegaly present.   No visible nor palpable thyromegaly.   Cardiovascular: Normal rate, regular rhythm and normal heart sounds.    No murmur heard.  Pulmonary/Chest: Effort normal and breath sounds " normal. No respiratory distress. He has no wheezes.   Abdominal: Soft. There is no tenderness.   Musculoskeletal: Normal range of motion. He exhibits no tenderness.   Neurological: He is alert and oriented to person, place, and time. No cranial nerve deficit.   Skin: Skin is warm and dry. No rash noted. He is not diaphoretic. No erythema. No pallor.   Psychiatric: He has a normal mood and affect. His behavior is normal. Judgment and thought content normal.   Vitals reviewed.      Lab Review:     Results for ANNIE KOCH (MRN [id number]) as of 10/6/2017 14:33   Ref. Range 7/7/2017 15:47 9/27/2017 10:18   WBC Latest Ref Range: 3.90 - 12.70 K/uL  6.30   RBC Latest Ref Range: 4.60 - 6.20 M/uL  4.85   Hemoglobin Latest Ref Range: 14.0 - 18.0 g/dL  15.4   Hematocrit Latest Ref Range: 40.0 - 54.0 %  44.8   MCV Latest Ref Range: 82 - 98 fL  92   MCH Latest Ref Range: 27.0 - 31.0 pg  31.9 (H)   MCHC Latest Ref Range: 32.0 - 36.0 g/dL  34.5   RDW Latest Ref Range: 11.5 - 14.5 %  13.2   Platelets Latest Ref Range: 150 - 350 K/uL  114 (L)   MPV Latest Ref Range: 9.2 - 12.9 fL  9.9   Gran% Latest Ref Range: 38.0 - 73.0 %  52.9   Gran # Latest Ref Range: 1.8 - 7.7 K/uL  3.3   Lymph% Latest Ref Range: 18.0 - 48.0 %  36.8   Lymph # Latest Ref Range: 1.0 - 4.8 K/uL  2.3   Mono% Latest Ref Range: 4.0 - 15.0 %  8.4   Mono # Latest Ref Range: 0.3 - 1.0 K/uL  0.5   Eosinophil% Latest Ref Range: 0.0 - 8.0 %  1.7   Eos # Latest Ref Range: 0.0 - 0.5 K/uL  0.1   Basophil% Latest Ref Range: 0.0 - 1.9 %  0.2   Baso # Latest Ref Range: 0.00 - 0.20 K/uL  0.00   Iron Latest Ref Range: 45 - 160 ug/dL  80   TIBC Latest Ref Range: 250 - 450 ug/dL  379   Saturated Iron Latest Ref Range: 20 - 50 %  21   Transferrin Latest Ref Range: 200 - 375 mg/dL  256   Sol. Transferrin Receptor Latest Ref Range: 1.8 - 4.6 mg/L  3.5   Ferritin Latest Ref Range: 20.0 - 300.0 ng/mL  333 (H)   Sodium Latest Ref Range: 136 - 145 mmol/L  137   Potassium Latest Ref  Range: 3.5 - 5.1 mmol/L  4.4   Chloride Latest Ref Range: 95 - 110 mmol/L  103   CO2 Latest Ref Range: 23 - 29 mmol/L  25   Anion Gap Latest Ref Range: 8 - 16 mmol/L  9   BUN, Bld Latest Ref Range: 6 - 20 mg/dL  23 (H)   Creatinine Latest Ref Range: 0.5 - 1.4 mg/dL  1.0   eGFR if non African American Latest Ref Range: >60 mL/min/1.73 m^2  >60   eGFR if  Latest Ref Range: >60 mL/min/1.73 m^2  >60   Glucose Latest Ref Range: 70 - 110 mg/dL  103   Calcium Latest Ref Range: 8.7 - 10.5 mg/dL  10.1   Alkaline Phosphatase Latest Ref Range: 55 - 135 U/L  71   Total Protein Latest Ref Range: 6.0 - 8.4 g/dL  8.2   Albumin Latest Ref Range: 3.5 - 5.2 g/dL  4.5   Total Bilirubin Latest Ref Range: 0.1 - 1.0 mg/dL  0.5   AST Latest Ref Range: 10 - 40 U/L  36   ALT Latest Ref Range: 10 - 44 U/L  77 (H)   US SOFT TISSUE HEAD NECK THYROID Unknown Rpt    Differential Method Unknown  Automated   Pathologist Review Unknown  Review required   Pathologist Review Peripheral Smear Unknown  REVIEWED       Assessment:     1. Hypothyroidism (acquired)  Uric acid    Vitamin D    T4, free    T3    Thyroglobulin    TSH   2. Hashimoto's thyroiditis     3. Nodular thyroid disease  Calcitonin    Chromogranin A    TSH   4. Prediabetes  Hemoglobin A1c   5. Obesity (BMI 35.0-39.9 without comorbidity)  Hemoglobin A1c   6. Dysmetabolic syndrome  Hemoglobin A1c   7. Obstructive sleep apnea     8. NAFLD (nonalcoholic fatty liver disease)     9. Goiter          1. Regarding hypothyroidism; Due to Hashimotos disease. To obtain ffup TFTs and continue LT4 150mcg Qd.  2. Regarding thyroid nodular disease; for ffup thyroid USS for ~ 07/18.  2. Regarding OSAS; to continue CPAP therapy as before.  3. Regarding NAFLD; to track LFTserially and to limit ETOH intake to very low levels of low potency ETOH and to avoid use of tylenol unless neccesary.  4. Regarding Obesity and possible dysmetabolic syndrome; Encouraged to continue his efforts at calorie  deficit plan and  increased daily physical activity and regular aerobic and resistance exercise routine. Will check ffup HBA1c today.    Plan:     FFup in ~ 6mths

## 2017-10-07 DIAGNOSIS — E03.9 ACQUIRED HYPOTHYROIDISM: Primary | ICD-10-CM

## 2017-10-07 LAB
ALBUMIN SERPL BCP-MCNC: 4.4 G/DL
ALP SERPL-CCNC: 72 U/L
ALT SERPL W/O P-5'-P-CCNC: 56 U/L
AMYLASE SERPL-CCNC: 46 U/L
ANION GAP SERPL CALC-SCNC: 12 MMOL/L
AST SERPL-CCNC: 33 U/L
BILIRUB SERPL-MCNC: 0.4 MG/DL
BUN SERPL-MCNC: 19 MG/DL
CALCIUM SERPL-MCNC: 10.3 MG/DL
CHLORIDE SERPL-SCNC: 102 MMOL/L
CHOLEST SERPL-MCNC: 196 MG/DL
CHOLEST/HDLC SERPL: 6.5 {RATIO}
CO2 SERPL-SCNC: 26 MMOL/L
CREAT SERPL-MCNC: 1.1 MG/DL
EST. GFR  (AFRICAN AMERICAN): >60 ML/MIN/1.73 M^2
EST. GFR  (NON AFRICAN AMERICAN): >60 ML/MIN/1.73 M^2
GGT SERPL-CCNC: 36 U/L
GLUCOSE SERPL-MCNC: 87 MG/DL
HDLC SERPL-MCNC: 30 MG/DL
HDLC SERPL: 15.3 %
LDLC SERPL CALC-MCNC: 118.8 MG/DL
LIPASE SERPL-CCNC: 35 U/L
NONHDLC SERPL-MCNC: 166 MG/DL
POTASSIUM SERPL-SCNC: 4 MMOL/L
PROT SERPL-MCNC: 8.1 G/DL
SODIUM SERPL-SCNC: 140 MMOL/L
T3 SERPL-MCNC: 78 NG/DL
T4 FREE SERPL-MCNC: 0.97 NG/DL
TRIGL SERPL-MCNC: 236 MG/DL
TSH SERPL DL<=0.005 MIU/L-ACNC: 8.16 UIU/ML
URATE SERPL-MCNC: 7.2 MG/DL

## 2017-10-07 RX ORDER — LEVOTHYROXINE SODIUM 175 UG/1
175 TABLET ORAL DAILY
Qty: 90 TABLET | Refills: 3 | Status: SHIPPED | OUTPATIENT
Start: 2017-10-07 | End: 2018-04-16 | Stop reason: SDUPTHER

## 2017-10-09 LAB
THRYOGLOBULIN INTERPRETATION: ABNORMAL
THYROGLOB AB SERPL-ACNC: 20 IU/ML
THYROGLOB SERPL-MCNC: 1.6 NG/ML

## 2017-10-10 LAB
CALCIT SERPL-MCNC: <5 PG/ML
CGA SERPL-MCNC: 45 NG/ML (ref 0–95)

## 2018-04-16 ENCOUNTER — LAB VISIT (OUTPATIENT)
Dept: LAB | Facility: HOSPITAL | Age: 48
End: 2018-04-16
Attending: INTERNAL MEDICINE
Payer: OTHER GOVERNMENT

## 2018-04-16 ENCOUNTER — OFFICE VISIT (OUTPATIENT)
Dept: ENDOCRINOLOGY | Facility: CLINIC | Age: 48
End: 2018-04-16
Payer: OTHER GOVERNMENT

## 2018-04-16 VITALS
DIASTOLIC BLOOD PRESSURE: 73 MMHG | HEIGHT: 71 IN | SYSTOLIC BLOOD PRESSURE: 120 MMHG | WEIGHT: 282.63 LBS | HEART RATE: 84 BPM | BODY MASS INDEX: 39.57 KG/M2 | RESPIRATION RATE: 18 BRPM

## 2018-04-16 DIAGNOSIS — E04.9 GOITER: ICD-10-CM

## 2018-04-16 DIAGNOSIS — K76.0 NAFLD (NONALCOHOLIC FATTY LIVER DISEASE): ICD-10-CM

## 2018-04-16 DIAGNOSIS — E88.810 DYSMETABOLIC SYNDROME: ICD-10-CM

## 2018-04-16 DIAGNOSIS — E03.9 HYPOTHYROIDISM (ACQUIRED): Primary | ICD-10-CM

## 2018-04-16 DIAGNOSIS — G47.33 OBSTRUCTIVE SLEEP APNEA: ICD-10-CM

## 2018-04-16 DIAGNOSIS — E06.3 HASHIMOTO'S THYROIDITIS: ICD-10-CM

## 2018-04-16 DIAGNOSIS — E04.1 NODULAR THYROID DISEASE: ICD-10-CM

## 2018-04-16 DIAGNOSIS — R73.03 PREDIABETES: ICD-10-CM

## 2018-04-16 DIAGNOSIS — E03.9 HYPOTHYROIDISM (ACQUIRED): ICD-10-CM

## 2018-04-16 DIAGNOSIS — K51.211 CHRONIC ULCERATIVE PROCTITIS WITH RECTAL BLEEDING: ICD-10-CM

## 2018-04-16 LAB
25(OH)D3+25(OH)D2 SERPL-MCNC: 25 NG/ML
ALBUMIN SERPL BCP-MCNC: 4.2 G/DL
ALP SERPL-CCNC: 72 U/L
ALT SERPL W/O P-5'-P-CCNC: 66 U/L
ANION GAP SERPL CALC-SCNC: 9 MMOL/L
AST SERPL-CCNC: 34 U/L
BASOPHILS # BLD AUTO: 0.04 K/UL
BASOPHILS NFR BLD: 0.6 %
BILIRUB SERPL-MCNC: 0.2 MG/DL
BUN SERPL-MCNC: 21 MG/DL
CA-I BLDV-SCNC: 1.27 MMOL/L
CALCIUM SERPL-MCNC: 9.6 MG/DL
CHLORIDE SERPL-SCNC: 105 MMOL/L
CHOLEST SERPL-MCNC: 187 MG/DL
CHOLEST/HDLC SERPL: 6.9 {RATIO}
CO2 SERPL-SCNC: 25 MMOL/L
CREAT SERPL-MCNC: 1.1 MG/DL
DIFFERENTIAL METHOD: ABNORMAL
EOSINOPHIL # BLD AUTO: 0.2 K/UL
EOSINOPHIL NFR BLD: 2.3 %
ERYTHROCYTE [DISTWIDTH] IN BLOOD BY AUTOMATED COUNT: 12.9 %
EST. GFR  (AFRICAN AMERICAN): >60 ML/MIN/1.73 M^2
EST. GFR  (NON AFRICAN AMERICAN): >60 ML/MIN/1.73 M^2
GGT SERPL-CCNC: 46 U/L
GLUCOSE SERPL-MCNC: 89 MG/DL
HCT VFR BLD AUTO: 42.8 %
HDLC SERPL-MCNC: 27 MG/DL
HDLC SERPL: 14.4 %
HGB BLD-MCNC: 14.1 G/DL
IMM GRANULOCYTES # BLD AUTO: 0.02 K/UL
IMM GRANULOCYTES NFR BLD AUTO: 0.3 %
LDLC SERPL CALC-MCNC: ABNORMAL MG/DL
LYMPHOCYTES # BLD AUTO: 2.4 K/UL
LYMPHOCYTES NFR BLD: 32.4 %
MCH RBC QN AUTO: 31.8 PG
MCHC RBC AUTO-ENTMCNC: 32.9 G/DL
MCV RBC AUTO: 96 FL
MONOCYTES # BLD AUTO: 0.6 K/UL
MONOCYTES NFR BLD: 8 %
NEUTROPHILS # BLD AUTO: 4.1 K/UL
NEUTROPHILS NFR BLD: 56.4 %
NONHDLC SERPL-MCNC: 160 MG/DL
NRBC BLD-RTO: 0 /100 WBC
PLATELET # BLD AUTO: 155 K/UL
PMV BLD AUTO: 11.8 FL
POTASSIUM SERPL-SCNC: 4.1 MMOL/L
PROT SERPL-MCNC: 7.8 G/DL
RBC # BLD AUTO: 4.44 M/UL
SODIUM SERPL-SCNC: 139 MMOL/L
T3 SERPL-MCNC: 90 NG/DL
T4 FREE SERPL-MCNC: 0.91 NG/DL
TRIGL SERPL-MCNC: 566 MG/DL
TSH SERPL DL<=0.005 MIU/L-ACNC: 2.53 UIU/ML
URATE SERPL-MCNC: 4.8 MG/DL
WBC # BLD AUTO: 7.26 K/UL

## 2018-04-16 PROCEDURE — 82306 VITAMIN D 25 HYDROXY: CPT

## 2018-04-16 PROCEDURE — 99213 OFFICE O/P EST LOW 20 MIN: CPT | Mod: PBBFAC,PO | Performed by: INTERNAL MEDICINE

## 2018-04-16 PROCEDURE — 83036 HEMOGLOBIN GLYCOSYLATED A1C: CPT

## 2018-04-16 PROCEDURE — 80053 COMPREHEN METABOLIC PANEL: CPT

## 2018-04-16 PROCEDURE — 82308 ASSAY OF CALCITONIN: CPT

## 2018-04-16 PROCEDURE — 85025 COMPLETE CBC W/AUTO DIFF WBC: CPT

## 2018-04-16 PROCEDURE — 84550 ASSAY OF BLOOD/URIC ACID: CPT

## 2018-04-16 PROCEDURE — 36415 COLL VENOUS BLD VENIPUNCTURE: CPT | Mod: PO

## 2018-04-16 PROCEDURE — 82977 ASSAY OF GGT: CPT

## 2018-04-16 PROCEDURE — 82330 ASSAY OF CALCIUM: CPT

## 2018-04-16 PROCEDURE — 99214 OFFICE O/P EST MOD 30 MIN: CPT | Mod: S$PBB,,, | Performed by: INTERNAL MEDICINE

## 2018-04-16 PROCEDURE — 83970 ASSAY OF PARATHORMONE: CPT

## 2018-04-16 PROCEDURE — 99999 PR PBB SHADOW E&M-EST. PATIENT-LVL III: CPT | Mod: PBBFAC,,, | Performed by: INTERNAL MEDICINE

## 2018-04-16 PROCEDURE — 86316 IMMUNOASSAY TUMOR OTHER: CPT

## 2018-04-16 PROCEDURE — 82088 ASSAY OF ALDOSTERONE: CPT

## 2018-04-16 PROCEDURE — 84443 ASSAY THYROID STIM HORMONE: CPT

## 2018-04-16 PROCEDURE — 84244 ASSAY OF RENIN: CPT

## 2018-04-16 PROCEDURE — 80061 LIPID PANEL: CPT

## 2018-04-16 PROCEDURE — 84480 ASSAY TRIIODOTHYRONINE (T3): CPT

## 2018-04-16 PROCEDURE — 83018 HEAVY METAL QUAN EACH NES: CPT

## 2018-04-16 PROCEDURE — 84439 ASSAY OF FREE THYROXINE: CPT

## 2018-04-16 PROCEDURE — 84432 ASSAY OF THYROGLOBULIN: CPT

## 2018-04-16 RX ORDER — LEVOTHYROXINE SODIUM 175 UG/1
175 TABLET ORAL DAILY
Qty: 90 TABLET | Refills: 3 | Status: SHIPPED | OUTPATIENT
Start: 2018-04-16 | End: 2018-07-15

## 2018-04-16 NOTE — PROGRESS NOTES
Subjective:      Patient ID: Isidoro Singh is a 48 y.o. male.    Chief Complaint:      Patient is a 48 yr old gentleman seen in [facility] on account of Hypothyroidism due to Hashimoto's thyroiditis and thyroid nodular disease    History of Present Illness    Patient is a 48 yr old gentleman with Hypothyroidism due to Hashimotos thyroiditis seen in [facility] today. He is presently on LT4 175 mcg Daily. He has a background history of chronic ITP, ulcerative proctitis and NAFLD associated with grade 2 obesity. Screening thyroid USS from 07/16 showed single sub cm nodular thyroid disease for which he had a  follow up sonographic test in 07/17 that showed no significant interval change of note.  His next scheduled thyroid ultrasound should be for ~ 07/18.     He also has Grade 2 obesity with the metabolic syndrome.  Patients baseline [place] score is 11. Patient was diagnosed with OSAS last year and he uses a a CPAP mask.  Patient was apparently found to have an abnormal thyroid function test in the course of work up for symptoms suggestive of possible depression about 1 week ago.  Patient continues to have tiredness and his weight continues to increase.  Patient has not had any local neck symptoms; no dysphagia, odynophagia not voice change.  No known family history of thyroid disease.  Mother of patient has type 2 diabetes but there dose not appear to be any family history of autoimmune disease as far as he knows.  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 He is not sure of what environmental exposures he may have had in the course os these tours.  Patients diet does not include excess intake of soy products, sea food nor cabbage.  Patient stopped smoking ~ 2 yrs ago. He smoked 0.5PPD For ~ 25 yrs.  Patient drinks very occasionally; 2-3 drinks a week; beer.  Patient has recently had some domestic challenges recently with multiple family members  "having health problems and since he has been the primary care giver his dieta has been less than optimal.  The 10-year ASCVD risk score ([place] ZEUS Jr., et al., 2013) is: 4.3%    Values used to calculate the score:      Age: 48 years      Sex: Male      Is Non- : No      Diabetic: No      Tobacco smoker: No      Systolic Blood Pressure: 120 mmHg      Is BP treated: No      HDL Cholesterol: 30 mg/dL      Total Cholesterol: 196 mg/dL     Review of Systems   Constitutional: Negative for diaphoresis and fatigue.   HENT: Negative for facial swelling and trouble swallowing.    Eyes: Negative for photophobia.   Respiratory: Negative for cough and shortness of breath.    Cardiovascular: Negative for chest pain and palpitations.   Gastrointestinal: Positive for diarrhea (intermittent with UC flares). Negative for abdominal distention, abdominal pain, nausea and vomiting.   Endocrine: Negative for polyphagia and polyuria.   Genitourinary: Negative for dysuria and frequency.   Musculoskeletal: Negative for myalgias.   Skin: Negative for color change, pallor and rash.   Neurological: Negative for dizziness, numbness and headaches.   Hematological: Does not bruise/bleed easily.   Psychiatric/Behavioral: Negative for confusion and sleep disturbance.       Objective: Resp 18   Ht 5' 11" (1.803 m)   Wt 128.2 kg (282 lb 10.1 oz)   BMI 39.42 kg/m²    /73   Pulse 84   Resp 18   Ht 5' 11" (1.803 m)   Wt 128.2 kg (282 lb 10.1 oz)   BMI 39.42 kg/m²          Physical Exam   Constitutional: He is oriented to person, place, and time. He appears well-developed and well-nourished. No distress.   Pleasant middle aged gentleman. Clinically comfortable. Not in any apparent acute distress. Not pale, anicteric and afebrile. Well hydrated. Mesomorphic build.   HENT:   Head: Normocephalic and atraumatic.   Nose: Nose normal.   Eyes: Conjunctivae and EOM are normal. Pupils are equal, round, and reactive to light. No " scleral icterus.   Neck: Normal range of motion. Neck supple. No JVD present. No thyromegaly present.   No visible nor palpable thyromegaly. Has nuchal AN   Cardiovascular: Normal rate, regular rhythm and normal heart sounds.    No murmur heard.  Pulmonary/Chest: Effort normal and breath sounds normal. No respiratory distress. He has no wheezes.   Abdominal: Soft. There is no tenderness.   Musculoskeletal: Normal range of motion. He exhibits no tenderness.   Neurological: He is alert and oriented to person, place, and time. No cranial nerve deficit.   Skin: Skin is warm and dry. No rash noted. He is not diaphoretic. No erythema. No pallor.   Psychiatric: He has a normal mood and affect. His behavior is normal. Judgment and thought content normal.   Vitals reviewed.      Lab Review:     Results for ANNIE KOCH (MRN [id number]) as of 4/16/2018 16:00   Ref. Range 9/27/2017 10:18 10/6/2017 16:11   WBC Latest Ref Range: 3.90 - 12.70 K/uL 6.30    RBC Latest Ref Range: 4.60 - 6.20 M/uL 4.85    Hemoglobin Latest Ref Range: 14.0 - 18.0 g/dL 15.4    Hematocrit Latest Ref Range: 40.0 - 54.0 % 44.8    MCV Latest Ref Range: 82 - 98 fL 92    MCH Latest Ref Range: 27.0 - 31.0 pg 31.9 (H)    MCHC Latest Ref Range: 32.0 - 36.0 g/dL 34.5    RDW Latest Ref Range: 11.5 - 14.5 % 13.2    Platelets Latest Ref Range: 150 - 350 K/uL 114 (L)    MPV Latest Ref Range: 9.2 - 12.9 fL 9.9    Gran% Latest Ref Range: 38.0 - 73.0 % 52.9    Gran # (ANC) Latest Ref Range: 1.8 - 7.7 K/uL 3.3    Lymph% Latest Ref Range: 18.0 - 48.0 % 36.8    Lymph # Latest Ref Range: 1.0 - 4.8 K/uL 2.3    Mono% Latest Ref Range: 4.0 - 15.0 % 8.4    Mono # Latest Ref Range: 0.3 - 1.0 K/uL 0.5    Eosinophil% Latest Ref Range: 0.0 - 8.0 % 1.7    Eos # Latest Ref Range: 0.0 - 0.5 K/uL 0.1    Basophil% Latest Ref Range: 0.0 - 1.9 % 0.2    Baso # Latest Ref Range: 0.00 - 0.20 K/uL 0.00    Iron Latest Ref Range: 45 - 160 ug/dL 80    TIBC Latest Ref Range: 250 - 450 ug/dL  379    Saturated Iron Latest Ref Range: 20 - 50 % 21    Transferrin Latest Ref Range: 200 - 375 mg/dL 256    Sol. Transferrin Receptor Latest Ref Range: 1.8 - 4.6 mg/L 3.5    Ferritin Latest Ref Range: 20.0 - 300.0 ng/mL 333 (H)    Sodium Latest Ref Range: 136 - 145 mmol/L 137 140   Potassium Latest Ref Range: 3.5 - 5.1 mmol/L 4.4 4.0   Chloride Latest Ref Range: 95 - 110 mmol/L 103 102   CO2 Latest Ref Range: 23 - 29 mmol/L 25 26   Anion Gap Latest Ref Range: 8 - 16 mmol/L 9 12   BUN, Bld Latest Ref Range: 6 - 20 mg/dL 23 (H) 19   Creatinine Latest Ref Range: 0.5 - 1.4 mg/dL 1.0 1.1   eGFR if non African American Latest Ref Range: >60 mL/min/1.73 m^2 >60 >60.0   eGFR if African American Latest Ref Range: >60 mL/min/1.73 m^2 >60 >60.0   Glucose Latest Ref Range: 70 - 110 mg/dL 103 87   Calcium Latest Ref Range: 8.7 - 10.5 mg/dL 10.1 10.3   Alkaline Phosphatase Latest Ref Range: 55 - 135 U/L 71 72   Total Protein Latest Ref Range: 6.0 - 8.4 g/dL 8.2 8.1   Albumin Latest Ref Range: 3.5 - 5.2 g/dL 4.5 4.4   Uric Acid Latest Ref Range: 3.4 - 7.0 mg/dL  7.2 (H)   Total Bilirubin Latest Ref Range: 0.1 - 1.0 mg/dL 0.5 0.4   AST Latest Ref Range: 10 - 40 U/L 36 33   ALT Latest Ref Range: 10 - 44 U/L 77 (H) 56 (H)   GGT Latest Ref Range: 8 - 55 U/L  36   Amylase Latest Ref Range: 20 - 110 U/L  46   Lipase Latest Ref Range: 4 - 60 U/L  35   Triglycerides Latest Ref Range: 30 - 150 mg/dL  236 (H)   Cholesterol Latest Ref Range: 120 - 199 mg/dL  196   HDL Latest Ref Range: 40 - 75 mg/dL  30 (L)   LDL Cholesterol Latest Ref Range: 63.0 - 159.0 mg/dL  118.8   Total Cholesterol/HDL Ratio Latest Ref Range: 2.0 - 5.0   6.5 (H)   Vit D, 25-Hydroxy Latest Ref Range: 30 - 96 ng/mL  25 (L)   Hemoglobin A1C Latest Ref Range: 4.0 - 5.6 %  5.5   Estimated Avg Glucose Latest Ref Range: 68 - 131 mg/dL  111   TSH Latest Ref Range: 0.400 - 4.000 uIU/mL  8.165 (H)   T3, Total Latest Ref Range: 60 - 180 ng/dL  78   Free T4 Latest Ref Range: 0.71  - 1.51 ng/dL  0.97   Thyroglobulin Interpretation Unknown  SEE BELOW   Thyroglobulin Antibody Screen Latest Ref Range: <4.0 IU/mL  20 (H)   Thyroglobulin, Tumor Marker Latest Units: ng/mL  1.6 (H)   Calcitonin Latest Ref Range: <=14.3 pg/mL  <5.0   Chromogranin A Latest Ref Range: 0 - 95 ng/mL  45   Differential Method Unknown Automated    HDL/Chol Ratio Latest Ref Range: 20.0 - 50.0 %  15.3 (L)   Non-HDL Cholesterol Latest Units: mg/dL  166   Pathologist Review Unknown Review required    Pathologist Review Peripheral Smear Unknown REVIEWED        Assessment:     1. Hypothyroidism (acquired)  T4, free    Thyroglobulin    T3    TSH    CBC auto differential    Uric acid    Lipid panel   2. Hashimoto's thyroiditis  T4, free    Thyroglobulin    T3    TSH   3. Goiter  Iodine, Serum    Calcitonin   4. Dysmetabolic syndrome  Comprehensive metabolic panel    Urinalysis    Microalbumin/creatinine urine ratio   5. NAFLD (nonalcoholic fatty liver disease)  Comprehensive metabolic panel    Hemoglobin A1c    Gamma GT   6. Chronic ulcerative proctitis with rectal bleeding     7. Obstructive sleep apnea  Aldosterone    Renin   8. Prediabetes  Lipid panel    Microalbumin/creatinine urine ratio   9. Nodular thyroid disease  Calcitonin    Chromogranin A    Vitamin D    PTH, intact    Calcium, ionized        1. Regarding hypothyroidism; Due to Hashimotos disease. To obtain ffup TFTs and continue LT4 175mcg Qd.  2. Regarding thyroid nodular disease; for ffup thyroid USS for ~ 07/18.  2. Regarding OSAS; to continue CPAP therapy as before.  3. Regarding NAFLD; to track LFTserially and to limit ETOH intake to very low levels of low potency ETOH and to avoid use of tylenol unless neccesary.  4. Regarding Obesity and possible dysmetabolic syndrome; Encouraged to continue his efforts at calorie deficit plan and  increased daily physical activity and regular aerobic and resistance exercise routine. Will check ffup HBA1c today.    Plan:        FFup in ~ 4mths

## 2018-04-17 ENCOUNTER — PATIENT MESSAGE (OUTPATIENT)
Dept: ENDOCRINOLOGY | Facility: CLINIC | Age: 48
End: 2018-04-17

## 2018-04-17 ENCOUNTER — DOCUMENTATION ONLY (OUTPATIENT)
Dept: ENDOCRINOLOGY | Facility: CLINIC | Age: 48
End: 2018-04-17

## 2018-04-17 DIAGNOSIS — E78.5 DYSLIPIDEMIA: ICD-10-CM

## 2018-04-17 DIAGNOSIS — E88.810 METABOLIC SYNDROME: ICD-10-CM

## 2018-04-17 DIAGNOSIS — E78.1 HYPERTRIGLYCERIDEMIA: Primary | ICD-10-CM

## 2018-04-17 PROBLEM — E55.9 HYPOVITAMINOSIS D: Status: ACTIVE | Noted: 2018-04-17

## 2018-04-17 LAB
ESTIMATED AVG GLUCOSE: 111 MG/DL
HBA1C MFR BLD HPLC: 5.5 %
PTH-INTACT SERPL-MCNC: 38 PG/ML

## 2018-04-17 RX ORDER — OMEGA-3-ACID ETHYL ESTERS 1 G/1
1 CAPSULE, LIQUID FILLED ORAL 2 TIMES DAILY
Qty: 180 CAPSULE | Refills: 0 | Status: SHIPPED | OUTPATIENT
Start: 2018-04-17 | End: 2018-07-23 | Stop reason: SDUPTHER

## 2018-04-17 NOTE — PROGRESS NOTES
The 10-year ASCVD risk score ([place] SCHULZ Jr., et al., 2013) is: 4.5%    Values used to calculate the score:      Age: 48 years      Sex: Male      Is Non- : No      Diabetic: No      Tobacco smoker: No      Systolic Blood Pressure: 120 mmHg      Is BP treated: No      HDL Cholesterol: 27 mg/dL      Total Cholesterol: 187 mg/dL

## 2018-04-18 LAB
ALDOST SERPL-MCNC: 5.7 NG/DL
CALCIT SERPL-MCNC: <5 PG/ML
IODINE SERPL-MCNC: 53 NG/ML (ref 40–92)
THRYOGLOBULIN INTERPRETATION: ABNORMAL
THYROGLOB AB SERPL-ACNC: 20 IU/ML
THYROGLOB SERPL-MCNC: 0.7 NG/ML

## 2018-04-20 ENCOUNTER — LAB VISIT (OUTPATIENT)
Dept: LAB | Facility: HOSPITAL | Age: 48
End: 2018-04-20
Attending: INTERNAL MEDICINE
Payer: OTHER GOVERNMENT

## 2018-04-20 DIAGNOSIS — E78.1 HYPERTRIGLYCERIDEMIA: ICD-10-CM

## 2018-04-20 DIAGNOSIS — E78.5 DYSLIPIDEMIA: ICD-10-CM

## 2018-04-20 DIAGNOSIS — E88.810 METABOLIC SYNDROME: ICD-10-CM

## 2018-04-20 DIAGNOSIS — E03.9 ACQUIRED HYPOTHYROIDISM: ICD-10-CM

## 2018-04-20 LAB
CGA SERPL-MCNC: 45 NG/ML (ref 0–95)
CRP SERPL-MCNC: 3.17 MG/L
HCYS SERPL-SCNC: 8.7 UMOL/L
RENIN PLAS-CCNC: 2 NG/ML/H
T3 SERPL-MCNC: 92 NG/DL
T4 FREE SERPL-MCNC: 0.97 NG/DL
TSH SERPL DL<=0.005 MIU/L-ACNC: 3.35 UIU/ML

## 2018-04-20 PROCEDURE — 84443 ASSAY THYROID STIM HORMONE: CPT

## 2018-04-20 PROCEDURE — 84478 ASSAY OF TRIGLYCERIDES: CPT

## 2018-04-20 PROCEDURE — 84439 ASSAY OF FREE THYROXINE: CPT

## 2018-04-20 PROCEDURE — 86141 C-REACTIVE PROTEIN HS: CPT

## 2018-04-20 PROCEDURE — 84480 ASSAY TRIIODOTHYRONINE (T3): CPT

## 2018-04-20 PROCEDURE — 30000890 MISCELLANEOUS SENDOUT TEST: Mod: 91

## 2018-04-20 PROCEDURE — 83695 ASSAY OF LIPOPROTEIN(A): CPT

## 2018-04-20 PROCEDURE — 83090 ASSAY OF HOMOCYSTEINE: CPT

## 2018-04-20 PROCEDURE — 83698 ASSAY LIPOPROTEIN PLA2: CPT | Mod: 91

## 2018-04-20 PROCEDURE — 82172 ASSAY OF APOLIPOPROTEIN: CPT

## 2018-04-20 PROCEDURE — 83701 LIPOPROTEIN BLD HR FRACTION: CPT

## 2018-04-20 PROCEDURE — 36415 COLL VENOUS BLD VENIPUNCTURE: CPT | Mod: PO

## 2018-04-24 LAB
APO A-I SERPL-MCNC: 104 MG/DL
APO B SERPL-MCNC: 103 MG/DL
APOLIPOPROTEIN B/A1 RATIO: 1

## 2018-04-25 LAB — LDLC SERPL-MCNC: 119 MG/DL

## 2018-04-27 LAB
CHOLEST SERPL-MCNC: 177 MG/DL (ref 100–199)
HDL SERPL QN: 8.6 NM
HDL SERPL-SCNC: 19.2 UMOL/L
HDLC SERPL-MCNC: 28 MG/DL
HLD.LARGE SERPL-SCNC: <1.3 UMOL/L
LDL SERPL QN: 20.7 NM
LDL SERPL QN: 20.7 NM
LDL SERPL-SCNC: 1412 NMOL/L
LDL SMALL SERPL-SCNC: 474 NMOL/L
LDL SMALL SERPL-SCNC: 474 NMOL/L
LDLC SERPL CALC-MCNC: 107 MG/DL (ref 0–99)
LIPOPROTEIN (A): <5 MG/DL (ref 0–30)
LP-IR SCORE SERPL: 76
MISCELLANEOUS TEST NAME: NORMAL
REFERENCE LAB: NORMAL
SPECIMEN TYPE: NORMAL
TEST RESULT: NORMAL
TRIGL SERPL-MCNC: 208 MG/DL (ref 0–149)
VLDL LARGE SERPL-SCNC: 6.6 NMOL/L
VLDL SERPL QN: 49.3 NM

## 2018-05-06 ENCOUNTER — PATIENT MESSAGE (OUTPATIENT)
Dept: ENDOCRINOLOGY | Facility: CLINIC | Age: 48
End: 2018-05-06

## 2018-06-01 DIAGNOSIS — D69.3 CHRONIC ITP (IDIOPATHIC THROMBOCYTOPENIA): Primary | ICD-10-CM

## 2018-06-05 ENCOUNTER — LAB VISIT (OUTPATIENT)
Dept: LAB | Facility: HOSPITAL | Age: 48
End: 2018-06-05
Attending: INTERNAL MEDICINE
Payer: OTHER GOVERNMENT

## 2018-06-05 DIAGNOSIS — D69.3 CHRONIC ITP (IDIOPATHIC THROMBOCYTOPENIA): ICD-10-CM

## 2018-06-05 LAB
ALBUMIN SERPL BCP-MCNC: 4.4 G/DL
ALP SERPL-CCNC: 71 U/L
ALT SERPL W/O P-5'-P-CCNC: 37 U/L
ANION GAP SERPL CALC-SCNC: 9 MMOL/L
AST SERPL-CCNC: 23 U/L
BASOPHILS # BLD AUTO: 0 K/UL
BASOPHILS NFR BLD: 0.6 %
BILIRUB SERPL-MCNC: 0.4 MG/DL
BUN SERPL-MCNC: 21 MG/DL
CALCIUM SERPL-MCNC: 9.9 MG/DL
CHLORIDE SERPL-SCNC: 103 MMOL/L
CO2 SERPL-SCNC: 24 MMOL/L
CREAT SERPL-MCNC: 1.1 MG/DL
DIFFERENTIAL METHOD: ABNORMAL
EOSINOPHIL # BLD AUTO: 0.2 K/UL
EOSINOPHIL NFR BLD: 2.1 %
ERYTHROCYTE [DISTWIDTH] IN BLOOD BY AUTOMATED COUNT: 12.9 %
EST. GFR  (AFRICAN AMERICAN): >60 ML/MIN/1.73 M^2
EST. GFR  (NON AFRICAN AMERICAN): >60 ML/MIN/1.73 M^2
FERRITIN SERPL-MCNC: 217 NG/ML
GLUCOSE SERPL-MCNC: 99 MG/DL
HCT VFR BLD AUTO: 43 %
HGB BLD-MCNC: 14.8 G/DL
IRON SERPL-MCNC: 60 UG/DL
LYMPHOCYTES # BLD AUTO: 2.4 K/UL
LYMPHOCYTES NFR BLD: 31.4 %
MCH RBC QN AUTO: 31.5 PG
MCHC RBC AUTO-ENTMCNC: 34.3 G/DL
MCV RBC AUTO: 92 FL
MONOCYTES # BLD AUTO: 0.5 K/UL
MONOCYTES NFR BLD: 6.1 %
NEUTROPHILS # BLD AUTO: 4.6 K/UL
NEUTROPHILS NFR BLD: 59.8 %
PLATELET # BLD AUTO: 124 K/UL
PMV BLD AUTO: 9.4 FL
POTASSIUM SERPL-SCNC: 4.2 MMOL/L
PROT SERPL-MCNC: 7.9 G/DL
RBC # BLD AUTO: 4.69 M/UL
SATURATED IRON: 15 %
SODIUM SERPL-SCNC: 136 MMOL/L
TOTAL IRON BINDING CAPACITY: 397 UG/DL
TRANSFERRIN SERPL-MCNC: 268 MG/DL
WBC # BLD AUTO: 7.8 K/UL

## 2018-06-05 PROCEDURE — 82728 ASSAY OF FERRITIN: CPT

## 2018-06-05 PROCEDURE — 36415 COLL VENOUS BLD VENIPUNCTURE: CPT

## 2018-06-05 PROCEDURE — 84238 ASSAY NONENDOCRINE RECEPTOR: CPT

## 2018-06-05 PROCEDURE — 85025 COMPLETE CBC W/AUTO DIFF WBC: CPT

## 2018-06-05 PROCEDURE — 83540 ASSAY OF IRON: CPT

## 2018-06-05 PROCEDURE — 80053 COMPREHEN METABOLIC PANEL: CPT

## 2018-06-06 ENCOUNTER — OFFICE VISIT (OUTPATIENT)
Dept: HEMATOLOGY/ONCOLOGY | Facility: CLINIC | Age: 48
End: 2018-06-06
Payer: OTHER GOVERNMENT

## 2018-06-06 VITALS
TEMPERATURE: 98 F | WEIGHT: 284.81 LBS | HEART RATE: 66 BPM | HEIGHT: 71 IN | RESPIRATION RATE: 16 BRPM | SYSTOLIC BLOOD PRESSURE: 128 MMHG | BODY MASS INDEX: 39.87 KG/M2 | DIASTOLIC BLOOD PRESSURE: 72 MMHG

## 2018-06-06 DIAGNOSIS — D69.3 CHRONIC ITP (IDIOPATHIC THROMBOCYTOPENIC PURPURA): Primary | ICD-10-CM

## 2018-06-06 LAB — STFR SERPL-MCNC: 3.4 MG/L

## 2018-06-06 PROCEDURE — 99999 PR PBB SHADOW E&M-EST. PATIENT-LVL III: CPT | Mod: PBBFAC,,, | Performed by: INTERNAL MEDICINE

## 2018-06-06 PROCEDURE — 99214 OFFICE O/P EST MOD 30 MIN: CPT | Mod: S$PBB,,, | Performed by: INTERNAL MEDICINE

## 2018-06-06 PROCEDURE — 99213 OFFICE O/P EST LOW 20 MIN: CPT | Mod: PBBFAC,PO | Performed by: INTERNAL MEDICINE

## 2018-06-06 NOTE — PROGRESS NOTES
A 45-year-old  male with chronic ITP, mild ITP, not treated previously,   coming in for followup.  The patient also has a fatty liver-induced   Thrombocytopenia.pt has been recently dx with hashimotos, sees DR. Ufaifo.   Has elevated LFT from fatty infilteration    PHYSICAL EXAM:  Wt Readings from Last 3 Encounters:   04/16/18 128.2 kg (282 lb 10.1 oz)   10/06/17 122 kg (268 lb 15.4 oz)   10/02/17 125.4 kg (276 lb 7.3 oz)     Temp Readings from Last 3 Encounters:   10/02/17 98.7 °F (37.1 °C) (Oral)   01/30/17 97.6 °F (36.4 °C)   08/29/16 98.3 °F (36.8 °C) (Oral)     BP Readings from Last 3 Encounters:   04/16/18 120/73   10/06/17 115/76   10/02/17 124/76     Pulse Readings from Last 3 Encounters:   04/16/18 84   10/06/17 74   10/02/17 68     GENERAL:  No complaints.  Comfortable-looking patient.  Patient is in no   distress.  Awake, alert and oriented to time, person and place.  No anxiety or   agitation.    HEENT:  Normal conjunctivae and eyelids.  WNL.  PERRLA 3 to 4 mm.  No icterus,   no pallor, no congestion, and no discharge noted.     NECK:  Supple.  Trachea is central.  No crepitus.  No JVD or masses.    RESPIRATORY:  No intercostal retractions.  No dullness to percussion.  Chest is   clear to auscultation.  No rales, rhonchi or wheezes.  No crepitus.  Good air   entry bilaterally.    CARDIOVASCULAR:  S1 and S2 are normally heard without murmurs or gallops.  All   peripheral pulses are present.    ABDOMEN:  Normal abdomen.  No hepatosplenomegaly.  No free fluid.  Bowel sounds   are present.  No hernia noted. No masses.  No rebound or tenderness.  No   guarding or rigidity.  Umbilicus is midline.    LYMPHATICS:  No axillary, cervical, supraclavicular, submental, or inguinal   lymphadenopathy.    SKIN AND MUSCULOSKELETAL:  He had some blisters to his legs, but that does not   appear to be ecchymosis.  There is no evidence of excoriation marks or   ecchymosis.  No rashes.  No cyanosis.  No clubbing.  No  joint or skeletal   deformities noted.  Normal range of motion.    NEUROLOGIC:  Higher functions are appropriate.  No cranial nerve deficits.    Normal gait.  Normal strength.  Motor and sensory functions are normal.  Deep   tendon reflexes are normal.    GENITAL AND RECTAL:  Exams are deferred.  Lab Results   Component Value Date    WBC 7.80 06/05/2018    HGB 14.8 06/05/2018    HCT 43.0 06/05/2018    MCV 92 06/05/2018     (L) 06/05/2018     CMP  Sodium   Date Value Ref Range Status   06/05/2018 136 136 - 145 mmol/L Final     Potassium   Date Value Ref Range Status   06/05/2018 4.2 3.5 - 5.1 mmol/L Final     Chloride   Date Value Ref Range Status   06/05/2018 103 95 - 110 mmol/L Final     CO2   Date Value Ref Range Status   06/05/2018 24 23 - 29 mmol/L Final     Glucose   Date Value Ref Range Status   06/05/2018 99 70 - 110 mg/dL Final     BUN, Bld   Date Value Ref Range Status   06/05/2018 21 (H) 6 - 20 mg/dL Final     Creatinine   Date Value Ref Range Status   06/05/2018 1.1 0.5 - 1.4 mg/dL Final     Calcium   Date Value Ref Range Status   06/05/2018 9.9 8.7 - 10.5 mg/dL Final     Total Protein   Date Value Ref Range Status   06/05/2018 7.9 6.0 - 8.4 g/dL Final     Albumin   Date Value Ref Range Status   06/05/2018 4.4 3.5 - 5.2 g/dL Final     Total Bilirubin   Date Value Ref Range Status   06/05/2018 0.4 0.1 - 1.0 mg/dL Final     Comment:     For infants and newborns, interpretation of results should be based  on gestational age, weight and in agreement with clinical  observations.  Premature Infant recommended reference ranges:  Up to 24 hours.............<8.0 mg/dL  Up to 48 hours............<12.0 mg/dL  3-5 days..................<15.0 mg/dL  6-29 days.................<15.0 mg/dL       Alkaline Phosphatase   Date Value Ref Range Status   06/05/2018 71 55 - 135 U/L Final     AST   Date Value Ref Range Status   06/05/2018 23 10 - 40 U/L Final     ALT   Date Value Ref Range Status   06/05/2018 37 10 - 44  U/L Final     Anion Gap   Date Value Ref Range Status   06/05/2018 9 8 - 16 mmol/L Final     eGFR if    Date Value Ref Range Status   06/05/2018 >60 >60 mL/min/1.73 m^2 Final     eGFR if non    Date Value Ref Range Status   06/05/2018 >60 >60 mL/min/1.73 m^2 Final     Comment:     Calculation used to obtain the estimated glomerular filtration  rate (eGFR) is the CKD-EPI equation.        IMPRESSION:  ITP.  Ulcerative colitis          ITP:   rtc 8months-10 months with cbc, cmp iron studies   occ rectal bleeding from UC , cont to monito   cont fatty liver f/u with labs and pcpc

## 2018-07-23 DIAGNOSIS — E78.1 HYPERTRIGLYCERIDEMIA: ICD-10-CM

## 2018-07-23 RX ORDER — OMEGA-3-ACID ETHYL ESTERS 1 G/1
CAPSULE, LIQUID FILLED ORAL
Qty: 180 CAPSULE | Refills: 3 | Status: SHIPPED | OUTPATIENT
Start: 2018-07-23 | End: 2019-10-08 | Stop reason: SDUPTHER

## 2018-10-06 RX ORDER — LEVOTHYROXINE SODIUM 175 UG/1
TABLET ORAL
Qty: 90 TABLET | Refills: 3 | Status: SHIPPED | OUTPATIENT
Start: 2018-10-06 | End: 2019-10-11 | Stop reason: SDUPTHER

## 2019-03-04 ENCOUNTER — TELEPHONE (OUTPATIENT)
Dept: HEMATOLOGY/ONCOLOGY | Facility: CLINIC | Age: 49
End: 2019-03-04

## 2019-03-04 NOTE — TELEPHONE ENCOUNTER
Spoke with pt about rescheduling appt and labs. I was able to make new appts. F/u now at 3/25 and labs are at 3/21.Pt agreed with changes

## 2019-03-21 ENCOUNTER — LAB VISIT (OUTPATIENT)
Dept: LAB | Facility: HOSPITAL | Age: 49
End: 2019-03-21
Attending: INTERNAL MEDICINE
Payer: OTHER GOVERNMENT

## 2019-03-21 DIAGNOSIS — D69.3 CHRONIC ITP (IDIOPATHIC THROMBOCYTOPENIC PURPURA): ICD-10-CM

## 2019-03-21 LAB
ALBUMIN SERPL BCP-MCNC: 4.4 G/DL
ALP SERPL-CCNC: 59 U/L
ALT SERPL W/O P-5'-P-CCNC: 66 U/L
ANION GAP SERPL CALC-SCNC: 7 MMOL/L
AST SERPL-CCNC: 33 U/L
BASOPHILS # BLD AUTO: 0 K/UL
BASOPHILS NFR BLD: 0.4 %
BILIRUB SERPL-MCNC: 0.7 MG/DL
BUN SERPL-MCNC: 14 MG/DL
CALCIUM SERPL-MCNC: 9.6 MG/DL
CHLORIDE SERPL-SCNC: 102 MMOL/L
CO2 SERPL-SCNC: 27 MMOL/L
CREAT SERPL-MCNC: 1.1 MG/DL
DIFFERENTIAL METHOD: NORMAL
EOSINOPHIL # BLD AUTO: 0.1 K/UL
EOSINOPHIL NFR BLD: 1.8 %
ERYTHROCYTE [DISTWIDTH] IN BLOOD BY AUTOMATED COUNT: 13.6 %
EST. GFR  (AFRICAN AMERICAN): >60 ML/MIN/1.73 M^2
EST. GFR  (NON AFRICAN AMERICAN): >60 ML/MIN/1.73 M^2
GLUCOSE SERPL-MCNC: 127 MG/DL
HCT VFR BLD AUTO: 49.6 %
HGB BLD-MCNC: 16.5 G/DL
LYMPHOCYTES # BLD AUTO: 2.4 K/UL
LYMPHOCYTES NFR BLD: 35.8 %
MCH RBC QN AUTO: 30.8 PG
MCHC RBC AUTO-ENTMCNC: 33.4 G/DL
MCV RBC AUTO: 92 FL
MONOCYTES # BLD AUTO: 0.6 K/UL
MONOCYTES NFR BLD: 8.4 %
NEUTROPHILS # BLD AUTO: 3.5 K/UL
NEUTROPHILS NFR BLD: 53.6 %
PLATELET # BLD AUTO: 166 K/UL
PMV BLD AUTO: 9.6 FL
POTASSIUM SERPL-SCNC: 4 MMOL/L
PROT SERPL-MCNC: 7.9 G/DL
RBC # BLD AUTO: 5.37 M/UL
SODIUM SERPL-SCNC: 136 MMOL/L
WBC # BLD AUTO: 6.6 K/UL

## 2019-03-21 PROCEDURE — 85025 COMPLETE CBC W/AUTO DIFF WBC: CPT

## 2019-03-21 PROCEDURE — 36415 COLL VENOUS BLD VENIPUNCTURE: CPT

## 2019-03-21 PROCEDURE — 80053 COMPREHEN METABOLIC PANEL: CPT

## 2019-03-25 ENCOUNTER — OFFICE VISIT (OUTPATIENT)
Dept: HEMATOLOGY/ONCOLOGY | Facility: CLINIC | Age: 49
End: 2019-03-25
Payer: OTHER GOVERNMENT

## 2019-03-25 VITALS
DIASTOLIC BLOOD PRESSURE: 57 MMHG | WEIGHT: 291.44 LBS | RESPIRATION RATE: 20 BRPM | BODY MASS INDEX: 40.8 KG/M2 | SYSTOLIC BLOOD PRESSURE: 122 MMHG | OXYGEN SATURATION: 98 % | TEMPERATURE: 98 F | HEIGHT: 71 IN | HEART RATE: 73 BPM

## 2019-03-25 DIAGNOSIS — E78.00 PURE HYPERCHOLESTEROLEMIA: ICD-10-CM

## 2019-03-25 DIAGNOSIS — K76.0 NAFLD (NONALCOHOLIC FATTY LIVER DISEASE): ICD-10-CM

## 2019-03-25 DIAGNOSIS — E06.3 HASHIMOTO'S THYROIDITIS: ICD-10-CM

## 2019-03-25 DIAGNOSIS — D69.6 THROMBOCYTOPENIA: Primary | ICD-10-CM

## 2019-03-25 PROCEDURE — 99999 PR PBB SHADOW E&M-EST. PATIENT-LVL III: CPT | Mod: PBBFAC,,, | Performed by: INTERNAL MEDICINE

## 2019-03-25 PROCEDURE — 99213 OFFICE O/P EST LOW 20 MIN: CPT | Mod: S$PBB,,, | Performed by: INTERNAL MEDICINE

## 2019-03-25 PROCEDURE — 99213 OFFICE O/P EST LOW 20 MIN: CPT | Mod: PBBFAC,PO | Performed by: INTERNAL MEDICINE

## 2019-03-25 PROCEDURE — 99999 PR PBB SHADOW E&M-EST. PATIENT-LVL III: ICD-10-PCS | Mod: PBBFAC,,, | Performed by: INTERNAL MEDICINE

## 2019-03-25 PROCEDURE — 99213 PR OFFICE/OUTPT VISIT, EST, LEVL III, 20-29 MIN: ICD-10-PCS | Mod: S$PBB,,, | Performed by: INTERNAL MEDICINE

## 2019-03-25 NOTE — PROGRESS NOTES
A 49-year-old  male with chronic ITP, mild ITP, not treated previously,   coming in for followup.  The patient also has a fatty liver-induced   Thrombocytopenia. dx with hashortiz, sees DR. Valerio.   Has elevated LFT from fatty infilteration comes in for follow-up of ITP    PHYSICAL EXAM:  Wt Readings from Last 3 Encounters:   06/06/18 129.2 kg (284 lb 13.4 oz)   04/16/18 128.2 kg (282 lb 10.1 oz)   10/06/17 122 kg (268 lb 15.4 oz)     Temp Readings from Last 3 Encounters:   06/06/18 97.7 °F (36.5 °C) (Oral)   10/02/17 98.7 °F (37.1 °C) (Oral)   01/30/17 97.6 °F (36.4 °C)     BP Readings from Last 3 Encounters:   06/06/18 128/72   04/16/18 120/73   10/06/17 115/76     Pulse Readings from Last 3 Encounters:   06/06/18 66   04/16/18 84   10/06/17 74     GENERAL:  No complaints.  Comfortable-looking patient.  Patient is in no   distress.  Awake, alert and oriented to time, person and place.  No anxiety or   agitation.    HEENT:  Normal conjunctivae and eyelids.  WNL.  PERRLA 3 to 4 mm.  No icterus,   no pallor, no congestion, and no discharge noted.     NECK:  Supple.  Trachea is central.  No crepitus.  No JVD or masses.    RESPIRATORY:  No intercostal retractions.  No dullness to percussion.  Chest is   clear to auscultation.  No rales, rhonchi or wheezes.  No crepitus.  Good air   entry bilaterally.    CARDIOVASCULAR:  S1 and S2 are normally heard without murmurs or gallops.  All   peripheral pulses are present.    ABDOMEN:  Normal abdomen.  No hepatosplenomegaly.  No free fluid.  Bowel sounds   are present.  No hernia noted. No masses.  No rebound or tenderness.  No   guarding or rigidity.  Umbilicus is midline.    LYMPHATICS:  No axillary, cervical, supraclavicular, submental, or inguinal   lymphadenopathy.    SKIN AND MUSCULOSKELETAL:  He had some blisters to his legs, but that does not   appear to be ecchymosis.  There is no evidence of excoriation marks or   ecchymosis.  No rashes.  No cyanosis.  No  clubbing.  No joint or skeletal   deformities noted.  Normal range of motion.    NEUROLOGIC:  Higher functions are appropriate.  No cranial nerve deficits.    Normal gait.  Normal strength.  Motor and sensory functions are normal.  Deep   tendon reflexes are normal.    GENITAL AND RECTAL:  Exams are deferred.  Lab Results   Component Value Date    WBC 6.60 03/21/2019    HGB 16.5 03/21/2019    HCT 49.6 03/21/2019    MCV 92 03/21/2019     03/21/2019     CMP  Sodium   Date Value Ref Range Status   03/21/2019 136 136 - 145 mmol/L Final     Potassium   Date Value Ref Range Status   03/21/2019 4.0 3.5 - 5.1 mmol/L Final     Chloride   Date Value Ref Range Status   03/21/2019 102 95 - 110 mmol/L Final     CO2   Date Value Ref Range Status   03/21/2019 27 23 - 29 mmol/L Final     Glucose   Date Value Ref Range Status   03/21/2019 127 (H) 70 - 110 mg/dL Final     BUN, Bld   Date Value Ref Range Status   03/21/2019 14 6 - 20 mg/dL Final     Creatinine   Date Value Ref Range Status   03/21/2019 1.1 0.5 - 1.4 mg/dL Final     Calcium   Date Value Ref Range Status   03/21/2019 9.6 8.7 - 10.5 mg/dL Final     Total Protein   Date Value Ref Range Status   03/21/2019 7.9 6.0 - 8.4 g/dL Final     Albumin   Date Value Ref Range Status   03/21/2019 4.4 3.5 - 5.2 g/dL Final     Total Bilirubin   Date Value Ref Range Status   03/21/2019 0.7 0.1 - 1.0 mg/dL Final     Comment:     For infants and newborns, interpretation of results should be based  on gestational age, weight and in agreement with clinical  observations.  Premature Infant recommended reference ranges:  Up to 24 hours.............<8.0 mg/dL  Up to 48 hours............<12.0 mg/dL  3-5 days..................<15.0 mg/dL  6-29 days.................<15.0 mg/dL       Alkaline Phosphatase   Date Value Ref Range Status   03/21/2019 59 55 - 135 U/L Final     AST   Date Value Ref Range Status   03/21/2019 33 10 - 40 U/L Final     ALT   Date Value Ref Range Status   03/21/2019 66  (H) 10 - 44 U/L Final     Anion Gap   Date Value Ref Range Status   03/21/2019 7 (L) 8 - 16 mmol/L Final     eGFR if    Date Value Ref Range Status   03/21/2019 >60 >60 mL/min/1.73 m^2 Final     eGFR if non    Date Value Ref Range Status   03/21/2019 >60 >60 mL/min/1.73 m^2 Final     Comment:     Calculation used to obtain the estimated glomerular filtration  rate (eGFR) is the CKD-EPI equation.        IMPRESSION:  ITP.  Ulcerative colitis          ITP:   rtc 12 months with cbc, cmp iron studies   occ rectal bleeding from UC , cont to monito   cont fatty liver f/u with labs and pcpc

## 2019-10-02 ENCOUNTER — OFFICE VISIT (OUTPATIENT)
Dept: PHYSICAL MEDICINE AND REHAB | Facility: CLINIC | Age: 49
End: 2019-10-02
Payer: OTHER GOVERNMENT

## 2019-10-02 VITALS
BODY MASS INDEX: 34.92 KG/M2 | SYSTOLIC BLOOD PRESSURE: 141 MMHG | HEIGHT: 71 IN | WEIGHT: 249.44 LBS | HEART RATE: 65 BPM | DIASTOLIC BLOOD PRESSURE: 76 MMHG

## 2019-10-02 DIAGNOSIS — M17.0 PRIMARY OSTEOARTHRITIS OF BOTH KNEES: Primary | ICD-10-CM

## 2019-10-02 PROCEDURE — 99243 OFF/OP CNSLTJ NEW/EST LOW 30: CPT | Mod: S$PBB,,, | Performed by: PHYSICAL MEDICINE & REHABILITATION

## 2019-10-02 PROCEDURE — 99213 OFFICE O/P EST LOW 20 MIN: CPT | Mod: PBBFAC,PN | Performed by: PHYSICAL MEDICINE & REHABILITATION

## 2019-10-02 PROCEDURE — 99999 PR PBB SHADOW E&M-EST. PATIENT-LVL III: CPT | Mod: PBBFAC,,, | Performed by: PHYSICAL MEDICINE & REHABILITATION

## 2019-10-02 PROCEDURE — 99999 PR PBB SHADOW E&M-EST. PATIENT-LVL III: ICD-10-PCS | Mod: PBBFAC,,, | Performed by: PHYSICAL MEDICINE & REHABILITATION

## 2019-10-02 PROCEDURE — 99243 PR OFFICE CONSULTATION,LEVEL III: ICD-10-PCS | Mod: S$PBB,,, | Performed by: PHYSICAL MEDICINE & REHABILITATION

## 2019-10-02 RX ORDER — TESTOSTERONE CYPIONATE 200 MG/ML
100 INJECTION, SOLUTION INTRAMUSCULAR
COMMUNITY

## 2019-10-02 NOTE — LETTER
October 2, 2019      Julio Young MD  [address]  [address]  [facility] [facility] [place] [address]           [facility]  [address] [place] [address]  Phone: [phone]  Fax: [phone]          Patient: Isidoro Singh   MR Number: [id number]   YOB: 1970   Date of Visit: 10/2/2019       Dear Dr. Julio Young:    Thank you for referring Isidoro Singh to me for evaluation. Attached you will find relevant portions of my assessment and plan of care.    If you have questions, please do not hesitate to call me. I look forward to following Isidoro Singh along with you.    Sincerely,    Kai Fenton MD    Enclosure  CC:  No Recipients    If you would like to receive this communication electronically, please contact [email or web address] or [phone] to request more information on [facility] access.    For providers and/or their staff who would like to refer a patient to [facility], please contact us through our one-stop-shop provider referral line, [facility], at [phone].    If you feel you have received this communication in error or would no longer like to receive these types of communications, please e-mail [email or web address]

## 2019-10-08 DIAGNOSIS — E78.1 HYPERTRIGLYCERIDEMIA: ICD-10-CM

## 2019-10-08 RX ORDER — OMEGA-3-ACID ETHYL ESTERS 1 G/1
CAPSULE, LIQUID FILLED ORAL
Qty: 180 CAPSULE | Refills: 3 | Status: SHIPPED | OUTPATIENT
Start: 2019-10-08 | End: 2021-01-14 | Stop reason: SDUPTHER

## 2019-10-11 DIAGNOSIS — E03.9 HYPOTHYROIDISM (ACQUIRED): Primary | ICD-10-CM

## 2019-10-11 DIAGNOSIS — F41.9 ANXIETY: ICD-10-CM

## 2019-10-11 RX ORDER — CITALOPRAM 20 MG/1
20 TABLET, FILM COATED ORAL DAILY
Qty: 90 TABLET | Refills: 1 | Status: SHIPPED | OUTPATIENT
Start: 2019-10-11 | End: 2020-04-13 | Stop reason: SDUPTHER

## 2019-10-11 RX ORDER — LEVOTHYROXINE SODIUM 175 UG/1
175 TABLET ORAL DAILY
Qty: 90 TABLET | Refills: 1 | Status: SHIPPED | OUTPATIENT
Start: 2019-10-11 | End: 2020-04-13 | Stop reason: SDUPTHER

## 2019-10-11 NOTE — TELEPHONE ENCOUNTER
----- Message from Kevin Wharton sent at 10/11/2019 12:01 PM CDT -----  Levothyroxine and citalopram   [facility]   Pt [phone]

## 2020-02-05 ENCOUNTER — PATIENT MESSAGE (OUTPATIENT)
Dept: FAMILY MEDICINE | Facility: CLINIC | Age: 50
End: 2020-02-05

## 2020-02-05 DIAGNOSIS — E29.1 HYPOGONADISM IN MALE: Primary | ICD-10-CM

## 2020-02-06 ENCOUNTER — TELEPHONE (OUTPATIENT)
Dept: FAMILY MEDICINE | Facility: CLINIC | Age: 50
End: 2020-02-06

## 2020-02-06 NOTE — TELEPHONE ENCOUNTER
----- Message from Noy Gómez sent at 2/6/2020 11:49 AM CST -----  Contact: tyrel Singh  Pt needs a  referral for dr. Butts. Please give pt a call back   Pt# [phone]

## 2020-03-16 NOTE — MR AVS SNAPSHOT
[facility]  [address]  [place] [address]  Phone: [phone]                  Isidoro Singh   2017 2:30 PM   Office Visit    Description:  Male : 1970   Provider:  Valdo Lee MD   Department:  [facility]           Diagnoses this Visit        Comments    Hashimoto's thyroiditis    -  Primary     Hypothyroidism (acquired)         NAFLD (nonalcoholic fatty liver disease)         Nodular thyroid disease         Obstructive sleep apnea         Dysmetabolic syndrome         Goiter         Obesity (BMI 35.0-39.9 without comorbidity)         Thrombocytopenia                To Do List           Future Appointments        Provider Department Dept Phone    2017 3:30 PM [facility] US[facility] Ul[facility]ound [phone]    2017 10:00 AM LAB, [facility] [phone]    10/2/2017 10:00 AM Gladys Mcintosh MD [facility] Hematology Oncology [phone]      Goals (5 Years of Data)     None      [facility]     [facility] Nurse Care Line -  Assistance  Unless otherwise directed by your provider, please contact [facility], our nurse care line that is available for  assistance.     Registered nurses in the [facility] provide: appointment scheduling, clinical advisement, health education, and other advisory services.  Call: [phone] (toll free)               Medications           Message regarding Medications     Verify the changes and/or additions to your medication regime listed below are the same as discussed with your clinician today.  If any of these changes or additions are incorrect, please notify your healthcare provider.             Verify that the below list of medications is an accurate representation of the medications you are currently taking.  If none reported, the list may be blank. If incorrect, please contact your healthcare provider. Carry this list with  Patient continues on Sotalol 160 mg BID and warfarin  EF is stable at 67% by Echo in Feb 2019    He is in AF since 3:30 am this morning per device interrogation - rates are well controlled and patient is asymptomatic.   He feels well on current medications and denies side effects on sotalol  Continue with sotalol and warfarin  Follow up in 6 months    "you in case of emergency.           Current Medications     citalopram (CELEXA) 20 MG tablet Take 20 mg by mouth once daily.    ibuprofen (ADVIL,MOTRIN) 200 MG tablet Take 400 mg by mouth every 6 (six) hours as needed for Pain.    levothyroxine (SYNTHROID) 137 MCG Tab tablet Take 1 tablet (137 mcg total) by mouth once daily.    PERIOGARD 0.12 % solution     mesalamine (CANASA) 1000 MG Supp Place 1 suppository (1,000 mg total) rectally nightly.           Clinical Reference Information           Your Vitals Were     BP Pulse Resp Height Weight BMI    125/80 72 18 5' 11" (1.803 m) 125.5 kg (276 lb 10.8 oz) 38.59 kg/m2      Blood Pressure          Most Recent Value    BP  125/80      Allergies as of 4/7/2017     No Known Allergies      Immunizations Administered on Date of Encounter - 4/7/2017     None      Orders Placed During Today's Visit      Normal Orders This Visit    Ambulatory consult to Nutrition Services     Future Labs/Procedures Expected by Expires    Comprehensive metabolic panel  4/7/2017 6/6/2018    Ferritin  4/7/2017 6/6/2018    Hemoglobin A1c  4/7/2017 6/6/2018    Iodine, Serum  4/7/2017 6/6/2018    Iron and TIBC  4/7/2017 6/6/2018    Lipid panel  4/7/2017 6/6/2018    Lipid panel  4/7/2017 6/6/2018    T3  4/7/2017 6/6/2018    T4, free  4/7/2017 6/6/2018    Thyroglobulin  4/7/2017 6/6/2018    TSH  4/7/2017 6/6/2018    Uric acid  4/7/2017 6/6/2018    US Soft Tissue Head Neck Thyroid  7/7/2017 (Approximate) 4/7/2018      Language Assistance Services     ATTENTION: Language assistance services are available, free of charge. Please call [phone].      ATENCIÓN: Si adrianola desi, tiene a hodge disposición servicios gratuitos de asistencia lingüística. Llame al [phone].     CHÚ Ý: N?u b?n nói Ti?ng Vi?t, có các d?ch v? h? tr? ngôn ng? mi?n phí dành cho b?n. G?i s? [phone].         [facility] complies with applicable Federal civil rights laws and does not discriminate on the basis " of race, color, national origin, age, disability, or sex.

## 2020-03-23 ENCOUNTER — LAB VISIT (OUTPATIENT)
Dept: LAB | Facility: HOSPITAL | Age: 50
End: 2020-03-23
Attending: INTERNAL MEDICINE
Payer: OTHER GOVERNMENT

## 2020-03-23 ENCOUNTER — PATIENT MESSAGE (OUTPATIENT)
Dept: HEMATOLOGY/ONCOLOGY | Facility: CLINIC | Age: 50
End: 2020-03-23

## 2020-03-23 DIAGNOSIS — D69.6 THROMBOCYTOPENIA: ICD-10-CM

## 2020-03-23 LAB
ALBUMIN SERPL BCP-MCNC: 4.5 G/DL (ref 3.5–5.2)
ALP SERPL-CCNC: 54 U/L (ref 55–135)
ALT SERPL W/O P-5'-P-CCNC: 40 U/L (ref 10–44)
ANION GAP SERPL CALC-SCNC: 9 MMOL/L (ref 8–16)
AST SERPL-CCNC: 27 U/L (ref 10–40)
BASOPHILS # BLD AUTO: 0.04 K/UL (ref 0–0.2)
BASOPHILS NFR BLD: 0.7 % (ref 0–1.9)
BILIRUB SERPL-MCNC: 0.5 MG/DL (ref 0.1–1)
BUN SERPL-MCNC: 18 MG/DL (ref 6–20)
CALCIUM SERPL-MCNC: 9.4 MG/DL (ref 8.7–10.5)
CHLORIDE SERPL-SCNC: 102 MMOL/L (ref 95–110)
CO2 SERPL-SCNC: 25 MMOL/L (ref 23–29)
CREAT SERPL-MCNC: 1.1 MG/DL (ref 0.5–1.4)
DIFFERENTIAL METHOD: ABNORMAL
EOSINOPHIL # BLD AUTO: 0.2 K/UL (ref 0–0.5)
EOSINOPHIL NFR BLD: 3.2 % (ref 0–8)
ERYTHROCYTE [DISTWIDTH] IN BLOOD BY AUTOMATED COUNT: 12.6 % (ref 11.5–14.5)
EST. GFR  (AFRICAN AMERICAN): >60 ML/MIN/1.73 M^2
EST. GFR  (NON AFRICAN AMERICAN): >60 ML/MIN/1.73 M^2
GLUCOSE SERPL-MCNC: 91 MG/DL (ref 70–110)
HCT VFR BLD AUTO: 48.5 % (ref 40–54)
HGB BLD-MCNC: 16.1 G/DL (ref 14–18)
IMM GRANULOCYTES # BLD AUTO: 0.01 K/UL (ref 0–0.04)
IMM GRANULOCYTES NFR BLD AUTO: 0.2 % (ref 0–0.5)
LYMPHOCYTES # BLD AUTO: 2.4 K/UL (ref 1–4.8)
LYMPHOCYTES NFR BLD: 40.6 % (ref 18–48)
MCH RBC QN AUTO: 31.6 PG (ref 27–31)
MCHC RBC AUTO-ENTMCNC: 33.2 G/DL (ref 32–36)
MCV RBC AUTO: 95 FL (ref 82–98)
MONOCYTES # BLD AUTO: 0.5 K/UL (ref 0.3–1)
MONOCYTES NFR BLD: 7.6 % (ref 4–15)
NEUTROPHILS # BLD AUTO: 2.8 K/UL (ref 1.8–7.7)
NEUTROPHILS NFR BLD: 47.7 % (ref 38–73)
NRBC BLD-RTO: 0 /100 WBC
PLATELET # BLD AUTO: 152 K/UL (ref 150–350)
PMV BLD AUTO: 11.1 FL (ref 9.2–12.9)
POTASSIUM SERPL-SCNC: 4.5 MMOL/L (ref 3.5–5.1)
PROT SERPL-MCNC: 7.9 G/DL (ref 6–8.4)
RBC # BLD AUTO: 5.09 M/UL (ref 4.6–6.2)
SODIUM SERPL-SCNC: 136 MMOL/L (ref 136–145)
WBC # BLD AUTO: 5.91 K/UL (ref 3.9–12.7)

## 2020-03-23 PROCEDURE — 80053 COMPREHEN METABOLIC PANEL: CPT

## 2020-03-23 PROCEDURE — 36415 COLL VENOUS BLD VENIPUNCTURE: CPT

## 2020-03-23 PROCEDURE — 85025 COMPLETE CBC W/AUTO DIFF WBC: CPT

## 2020-03-24 ENCOUNTER — TELEPHONE (OUTPATIENT)
Dept: HEMATOLOGY/ONCOLOGY | Facility: CLINIC | Age: 50
End: 2020-03-24

## 2020-03-24 NOTE — TELEPHONE ENCOUNTER
Pt apt r/s to virtual visit due to covid19 precautions. Pt confirmed apt date and time of visit. Pt verbalized understanding.

## 2020-03-25 ENCOUNTER — OFFICE VISIT (OUTPATIENT)
Dept: HEMATOLOGY/ONCOLOGY | Facility: CLINIC | Age: 50
End: 2020-03-25
Payer: OTHER GOVERNMENT

## 2020-03-25 DIAGNOSIS — E78.00 PURE HYPERCHOLESTEROLEMIA: ICD-10-CM

## 2020-03-25 DIAGNOSIS — D69.6 THROMBOCYTOPENIA: ICD-10-CM

## 2020-03-25 DIAGNOSIS — K76.0 FATTY LIVER: ICD-10-CM

## 2020-03-25 DIAGNOSIS — G47.33 OBSTRUCTIVE SLEEP APNEA: ICD-10-CM

## 2020-03-25 DIAGNOSIS — K76.0 NAFLD (NONALCOHOLIC FATTY LIVER DISEASE): Primary | ICD-10-CM

## 2020-03-25 PROCEDURE — 99214 PR OFFICE/OUTPT VISIT, EST, LEVL IV, 30-39 MIN: ICD-10-PCS | Mod: 95,,, | Performed by: INTERNAL MEDICINE

## 2020-03-25 PROCEDURE — 99214 OFFICE O/P EST MOD 30 MIN: CPT | Mod: 95,,, | Performed by: INTERNAL MEDICINE

## 2020-03-25 NOTE — PROGRESS NOTES
FOLLOW-UP VISIT    Patient name: Isidoro Singh  Date: 3/25/20    TELEMEDICINE  The patient location is: home  The chief complaint leading to consultation is: chronic ITP  Visit type: Virtual visit with synchronous audio and video  Total time spent with patient: 20 minutes  Each patient to whom he or she provides medical services by telemedicine is:  (1) informed of the relationship between the physician and patient and the respective role of any other health care provider with respect to management of the patient; and (2) notified that he or she may decline to receive medical services by telemedicine and may withdraw from such care at any time.        Subjective:       Patient ID: Isidoro Singh is a 50 y.o. male.      HPI history of chronic ITP not treated previously also has history of Hashimoto's  Sees endocrinology  fatty liver infiltration      I have reviewed my initial consult note with detailed PMH/ROS from initial encounter and all following progress notes.   Past medical, surgical, family, and social history were reviewed today and there are no changes of note unless mentioned in HPI.     MEDS and ALLERGIES were reviewed with patient and meds reconciled.     Fatigue  none   Weight/appetite  Good    Constitutional  Denies F/C    Pain  Denies    Sleep  Ok    Mucositis  Denies    Cardiovascular  Denies CP    Respiratory  Denies SOB    Nausea  Denies    BMs  Normal    GI  Denies abdominal pain    Bleeding  Denies epistaxis, hemoptysis, BRBPR, melena, hematuria or any other bleeding    Extremities  Denies edema    Skin/nail/hair  Denies toxicity    Neuropathy  Denies    Psych  In good spirits    Misc  n/a    Exercise  Excellent    pt has lost a large amount of weight with diet and exercise         Objective:      There were no vitals taken for this visit.  Wt Readings from Last 30 Encounters:   10/02/19 113.2 kg (249 lb 7.2 oz)   03/25/19 132.2 kg (291 lb 7.2 oz)   06/06/18 129.2 kg (284 lb 13.4 oz)    04/16/18 128.2 kg (282 lb 10.1 oz)   10/06/17 122 kg (268 lb 15.4 oz)   10/02/17 125.4 kg (276 lb 7.3 oz)   04/07/17 125.5 kg (276 lb 10.8 oz)   01/30/17 125.6 kg (276 lb 14.4 oz)   10/21/16 118.7 kg (261 lb 11 oz)   08/29/16 117.7 kg (259 lb 7.7 oz)   07/27/16 117 kg (257 lb 15 oz)   07/05/16 117.1 kg (258 lb 2.5 oz)   01/28/16 115 kg (253 lb 8.5 oz)   07/22/15 111.4 kg (245 lb 8 oz)   04/22/15 110.1 kg (242 lb 11.2 oz)   03/18/15 112.7 kg (248 lb 8 oz)   03/11/15 111 kg (244 lb 11.2 oz)   12/08/14 115.3 kg (254 lb 4.8 oz)   11/24/14 116.3 kg (256 lb 8 oz)   11/03/14 115.8 kg (255 lb 4.8 oz)   09/08/14 117 kg (258 lb)   08/14/14 118.8 kg (261 lb 14.4 oz)       Constitutional: patient is oriented to person, place, and time. No distress.   Eyes: Conjunctivae and EOM are normal.   Pulmonary/Chest: normal respiratory rate and symmetrical chest movement inhalation and exhalation  Lymphadenopathy:   no visible LAD   Neurological: She is alert and oriented to person, place, and time. No cranial nerve deficit.  Skin:. No rash noted. No erythema.   Psychiatric: She has a normal mood and affect. Her speech is normal.       CBC, CMP reviewed, noted are   Lab Results   Component Value Date    WBC 5.91 03/23/2020    HGB 16.1 03/23/2020    HCT 48.5 03/23/2020    MCV 95 03/23/2020     03/23/2020     CMP  Sodium   Date Value Ref Range Status   03/23/2020 136 136 - 145 mmol/L Final     Potassium   Date Value Ref Range Status   03/23/2020 4.5 3.5 - 5.1 mmol/L Final     Chloride   Date Value Ref Range Status   03/23/2020 102 95 - 110 mmol/L Final     CO2   Date Value Ref Range Status   03/23/2020 25 23 - 29 mmol/L Final     Glucose   Date Value Ref Range Status   03/23/2020 91 70 - 110 mg/dL Final     BUN, Bld   Date Value Ref Range Status   03/23/2020 18 6 - 20 mg/dL Final     Creatinine   Date Value Ref Range Status   03/23/2020 1.1 0.5 - 1.4 mg/dL Final     Calcium   Date Value Ref Range Status   03/23/2020 9.4 8.7 - 10.5  mg/dL Final     Total Protein   Date Value Ref Range Status   03/23/2020 7.9 6.0 - 8.4 g/dL Final     Albumin   Date Value Ref Range Status   03/23/2020 4.5 3.5 - 5.2 g/dL Final     Total Bilirubin   Date Value Ref Range Status   03/23/2020 0.5 0.1 - 1.0 mg/dL Final     Comment:     For infants and newborns, interpretation of results should be based  on gestational age, weight and in agreement with clinical  observations.  Premature Infant recommended reference ranges:  Up to 24 hours.............<8.0 mg/dL  Up to 48 hours............<12.0 mg/dL  3-5 days..................<15.0 mg/dL  6-29 days.................<15.0 mg/dL       Alkaline Phosphatase   Date Value Ref Range Status   03/23/2020 54 (L) 55 - 135 U/L Final     AST   Date Value Ref Range Status   03/23/2020 27 10 - 40 U/L Final     ALT   Date Value Ref Range Status   03/23/2020 40 10 - 44 U/L Final     Anion Gap   Date Value Ref Range Status   03/23/2020 9 8 - 16 mmol/L Final     eGFR if    Date Value Ref Range Status   03/23/2020 >60 >60 mL/min/1.73 m^2 Final     eGFR if non    Date Value Ref Range Status   03/23/2020 >60 >60 mL/min/1.73 m^2 Final     Comment:     Calculation used to obtain the estimated glomerular filtration  rate (eGFR) is the CKD-EPI equation.        Assessment:     chronic ITP , stable and normal   rtc 1 year with cbc, cmp   cont with pcp for lipid issues and fatty liverwhich should be much better now that he has lost weight and is exercising regularly   still needs CPAP for sleep apnea   cotn with endocrinology for hashimotos  Discussed COVID-19 and social distancing in great detail, avoid all non-essential visits out of the home if possible and avoid sick contacts.      Continue current medications, reviewed.    Important to keep follow-up with PCP.   Discussed plan above in great detail with patient and all questions answered to their satisfaction. Proceed with plan above.      Gladys reyes

## 2020-04-10 ENCOUNTER — PATIENT MESSAGE (OUTPATIENT)
Dept: FAMILY MEDICINE | Facility: CLINIC | Age: 50
End: 2020-04-10

## 2020-04-10 DIAGNOSIS — F41.9 ANXIETY: ICD-10-CM

## 2020-04-10 DIAGNOSIS — E03.9 HYPOTHYROIDISM (ACQUIRED): ICD-10-CM

## 2020-04-14 RX ORDER — LEVOTHYROXINE SODIUM 175 UG/1
175 TABLET ORAL DAILY
Qty: 90 TABLET | Refills: 1 | Status: SHIPPED | OUTPATIENT
Start: 2020-04-14 | End: 2020-10-08 | Stop reason: SDUPTHER

## 2020-04-14 RX ORDER — CITALOPRAM 20 MG/1
20 TABLET, FILM COATED ORAL DAILY
Qty: 90 TABLET | Refills: 1 | Status: SHIPPED | OUTPATIENT
Start: 2020-04-14 | End: 2020-10-08 | Stop reason: SDUPTHER

## 2020-10-08 ENCOUNTER — PATIENT MESSAGE (OUTPATIENT)
Dept: FAMILY MEDICINE | Facility: CLINIC | Age: 50
End: 2020-10-08

## 2020-10-08 DIAGNOSIS — E03.9 HYPOTHYROIDISM (ACQUIRED): ICD-10-CM

## 2020-10-08 DIAGNOSIS — F41.9 ANXIETY: ICD-10-CM

## 2020-10-08 RX ORDER — CITALOPRAM 20 MG/1
20 TABLET, FILM COATED ORAL DAILY
Qty: 30 TABLET | Refills: 1 | Status: SHIPPED | OUTPATIENT
Start: 2020-10-08 | End: 2020-11-02 | Stop reason: SDUPTHER

## 2020-10-08 RX ORDER — LEVOTHYROXINE SODIUM 175 UG/1
175 TABLET ORAL DAILY
Qty: 30 TABLET | Refills: 1 | Status: SHIPPED | OUTPATIENT
Start: 2020-10-08 | End: 2020-11-02 | Stop reason: SDUPTHER

## 2020-10-08 NOTE — TELEPHONE ENCOUNTER
Short supply can be set up if okay to send. Pt refused to schedule, says he will schedule next week, cancelled last appt, has not been seen in over a year.

## 2020-11-02 ENCOUNTER — OFFICE VISIT (OUTPATIENT)
Dept: FAMILY MEDICINE | Facility: CLINIC | Age: 50
End: 2020-11-02
Payer: OTHER GOVERNMENT

## 2020-11-02 ENCOUNTER — HOSPITAL ENCOUNTER (OUTPATIENT)
Dept: RADIOLOGY | Facility: HOSPITAL | Age: 50
Discharge: HOME | End: 2020-11-02
Attending: PHYSICIAN ASSISTANT
Payer: OTHER GOVERNMENT

## 2020-11-02 VITALS
DIASTOLIC BLOOD PRESSURE: 82 MMHG | SYSTOLIC BLOOD PRESSURE: 118 MMHG | HEART RATE: 77 BPM | WEIGHT: 281.13 LBS | TEMPERATURE: 98 F | BODY MASS INDEX: 39.36 KG/M2 | HEIGHT: 71 IN

## 2020-11-02 DIAGNOSIS — M54.9 DORSALGIA, UNSPECIFIED: ICD-10-CM

## 2020-11-02 DIAGNOSIS — F41.9 ANXIETY: ICD-10-CM

## 2020-11-02 DIAGNOSIS — Z12.11 SCREENING FOR COLON CANCER: ICD-10-CM

## 2020-11-02 DIAGNOSIS — Z23 NEED FOR INFLUENZA VACCINATION: Primary | ICD-10-CM

## 2020-11-02 DIAGNOSIS — Z79.899 ENCOUNTER FOR LONG-TERM (CURRENT) USE OF OTHER MEDICATIONS: ICD-10-CM

## 2020-11-02 DIAGNOSIS — E03.9 HYPOTHYROIDISM (ACQUIRED): ICD-10-CM

## 2020-11-02 DIAGNOSIS — M54.41 RIGHT-SIDED LOW BACK PAIN WITH RIGHT-SIDED SCIATICA, UNSPECIFIED CHRONICITY: ICD-10-CM

## 2020-11-02 PROCEDURE — 72100 X-RAY EXAM L-S SPINE 2/3 VWS: CPT | Mod: TC,PO

## 2020-11-02 PROCEDURE — 90682 RIV4 VACC RECOMBINANT DNA IM: CPT | Mod: S$GLB,,, | Performed by: PHYSICIAN ASSISTANT

## 2020-11-02 PROCEDURE — 90682 FLU VACCINE - QUADRIVALENT (RECOMBINANT) PRESERVATIVE FREE: ICD-10-PCS | Mod: S$GLB,,, | Performed by: PHYSICIAN ASSISTANT

## 2020-11-02 PROCEDURE — 99214 PR OFFICE/OUTPT VISIT, EST, LEVL IV, 30-39 MIN: ICD-10-PCS | Mod: 25,S$GLB,, | Performed by: PHYSICIAN ASSISTANT

## 2020-11-02 PROCEDURE — 99214 OFFICE O/P EST MOD 30 MIN: CPT | Mod: 25,S$GLB,, | Performed by: PHYSICIAN ASSISTANT

## 2020-11-02 PROCEDURE — 90471 IMMUNIZATION ADMIN: CPT | Mod: S$GLB,,, | Performed by: PHYSICIAN ASSISTANT

## 2020-11-02 PROCEDURE — 90471 FLU VACCINE - QUADRIVALENT (RECOMBINANT) PRESERVATIVE FREE: ICD-10-PCS | Mod: S$GLB,,, | Performed by: PHYSICIAN ASSISTANT

## 2020-11-02 RX ORDER — CITALOPRAM 20 MG/1
20 TABLET, FILM COATED ORAL DAILY
Qty: 90 TABLET | Refills: 1 | Status: SHIPPED | OUTPATIENT
Start: 2020-11-02 | End: 2021-05-05 | Stop reason: SDUPTHER

## 2020-11-02 RX ORDER — LEVOTHYROXINE SODIUM 175 UG/1
175 TABLET ORAL DAILY
Qty: 90 TABLET | Refills: 1 | Status: SHIPPED | OUTPATIENT
Start: 2020-11-02 | End: 2021-05-05 | Stop reason: SDUPTHER

## 2020-11-02 NOTE — PROGRESS NOTES
SUBJECTIVE:    Patient ID: Isidoro Singh is a 50 y.o. male.    Chief Complaint: Annual Exam (medication refills, wants flu shot. brought bottles. -MJ) and Pain (lower right side pain that will radiate to right testicle. happens when tieign shoe or sittling. Happening for a month. )    49 y/o male who presents c/o of pain in lower right back that radiates to right testicle that suddenly began about 1.5 month ago. The pain is throbbing and with intermittent episodes of sharp pain. He reports worsening pain with sitting and bending forward.  He has gotten temporary relief with lidocaine patches. Denies radiation down right leg. Otherwise doing well on current medication regimen.      No visits with results within 6 Month(s) from this visit.   Latest known visit with results is:   Lab Visit on 03/23/2020   Component Date Value Ref Range Status    WBC 03/23/2020 5.91  3.90 - 12.70 K/uL Final    RBC 03/23/2020 5.09  4.60 - 6.20 M/uL Final    Hemoglobin 03/23/2020 16.1  14.0 - 18.0 g/dL Final    Hematocrit 03/23/2020 48.5  40.0 - 54.0 % Final    MCV 03/23/2020 95  82 - 98 fL Final    MCH 03/23/2020 31.6* 27.0 - 31.0 pg Final    MCHC 03/23/2020 33.2  32.0 - 36.0 g/dL Final    RDW 03/23/2020 12.6  11.5 - 14.5 % Final    Platelets 03/23/2020 152  150 - 350 K/uL Final    MPV 03/23/2020 11.1  9.2 - 12.9 fL Final    Immature Granulocytes 03/23/2020 0.2  0.0 - 0.5 % Final    Gran # (ANC) 03/23/2020 2.8  1.8 - 7.7 K/uL Final    Immature Grans (Abs) 03/23/2020 0.01  0.00 - 0.04 K/uL Final    Lymph # 03/23/2020 2.4  1.0 - 4.8 K/uL Final    Mono # 03/23/2020 0.5  0.3 - 1.0 K/uL Final    Eos # 03/23/2020 0.2  0.0 - 0.5 K/uL Final    Baso # 03/23/2020 0.04  0.00 - 0.20 K/uL Final    nRBC 03/23/2020 0  0 /100 WBC Final    Gran % 03/23/2020 47.7  38.0 - 73.0 % Final    Lymph % 03/23/2020 40.6  18.0 - 48.0 % Final    Mono % 03/23/2020 7.6  4.0 - 15.0 % Final    Eosinophil % 03/23/2020 3.2  0.0 - 8.0 % Final     Basophil % 03/23/2020 0.7  0.0 - 1.9 % Final    Differential Method 03/23/2020 Automated   Final    Sodium 03/23/2020 136  136 - 145 mmol/L Final    Potassium 03/23/2020 4.5  3.5 - 5.1 mmol/L Final    Chloride 03/23/2020 102  95 - 110 mmol/L Final    CO2 03/23/2020 25  23 - 29 mmol/L Final    Glucose 03/23/2020 91  70 - 110 mg/dL Final    BUN 03/23/2020 18  6 - 20 mg/dL Final    Creatinine 03/23/2020 1.1  0.5 - 1.4 mg/dL Final    Calcium 03/23/2020 9.4  8.7 - 10.5 mg/dL Final    Total Protein 03/23/2020 7.9  6.0 - 8.4 g/dL Final    Albumin 03/23/2020 4.5  3.5 - 5.2 g/dL Final    Total Bilirubin 03/23/2020 0.5  0.1 - 1.0 mg/dL Final    Alkaline Phosphatase 03/23/2020 54* 55 - 135 U/L Final    AST 03/23/2020 27  10 - 40 U/L Final    ALT 03/23/2020 40  10 - 44 U/L Final    Anion Gap 03/23/2020 9  8 - 16 mmol/L Final    eGFR if African American 03/23/2020 >60  >60 mL/min/1.73 m^2 Final    eGFR if non African American 03/23/2020 >60  >60 mL/min/1.73 m^2 Final       Past Medical History:   Diagnosis Date    Hypothyroid 07/05/2016    Thrombocytopenia      Past Surgical History:   Procedure Laterality Date    FOOT SURGERY      left   cyst removed    HAND SURGERY      right   tendon repair     History reviewed. No pertinent family history.    Marital Status:   Alcohol History:  reports current alcohol use.  Tobacco History:  reports that he has quit smoking. His smoking use included cigarettes. He has a 30.00 pack-year smoking history. His smokeless tobacco use includes chew.  Drug History:  reports no history of drug use.    Review of patient's allergies indicates:  No Known Allergies    Current Outpatient Medications:     citalopram (CELEXA) 20 MG tablet, Take 1 tablet (20 mg total) by mouth once daily., Disp: 90 tablet, Rfl: 1    ibuprofen (ADVIL,MOTRIN) 200 MG tablet, Take 400 mg by mouth every 6 (six) hours as needed for Pain., Disp: , Rfl:     levothyroxine (SYNTHROID, LEVOTHROID) 175  "MCG tablet, Take 1 tablet (175 mcg total) by mouth once daily., Disp: 90 tablet, Rfl: 1    omega-3 acid ethyl esters (LOVAZA) 1 gram capsule, TAKE 1 CAPSULE BY MOUTH TWICE DAILY, Disp: 180 capsule, Rfl: 3    testosterone cypionate (DEPOTESTOTERONE CYPIONATE) 200 mg/mL injection, Inject into the muscle every 14 (fourteen) days., Disp: , Rfl:     Review of Systems   Constitutional: Negative for activity change, fatigue, fever and unexpected weight change.   HENT: Negative for congestion.    Respiratory: Negative for apnea, cough, chest tightness and shortness of breath.    Cardiovascular: Negative for chest pain and palpitations.   Gastrointestinal: Negative for abdominal distention and abdominal pain.   Genitourinary: Positive for testicular pain. Negative for difficulty urinating and dysuria.   Musculoskeletal: Positive for back pain and myalgias. Negative for arthralgias.   Neurological: Negative for dizziness and weakness.          Objective:      Vitals:    11/02/20 0838   BP: 118/82   Pulse: 77   Temp: 97.7 °F (36.5 °C)   Weight: 127.5 kg (281 lb 1.6 oz)   Height: 5' 11" (1.803 m)     Physical Exam  Constitutional:       General: He is not in acute distress.     Appearance: He is well-developed.   HENT:      Head: Normocephalic and atraumatic.   Eyes:      Pupils: Pupils are equal, round, and reactive to light.   Neck:      Musculoskeletal: Normal range of motion and neck supple.      Thyroid: No thyromegaly.   Cardiovascular:      Rate and Rhythm: Normal rate and regular rhythm.      Heart sounds: Normal heart sounds.   Pulmonary:      Effort: Pulmonary effort is normal.      Breath sounds: Normal breath sounds.   Abdominal:      General: Bowel sounds are normal. There is no distension.      Palpations: Abdomen is soft.      Tenderness: There is no abdominal tenderness.   Musculoskeletal:      Lumbar back: He exhibits pain. He exhibits normal range of motion, no tenderness, no bony tenderness and no swelling. "      Comments: Positive straight leg test on right side   Skin:     General: Skin is warm and dry.      Findings: No erythema or rash.   Neurological:      Mental Status: He is alert and oriented to person, place, and time.      Cranial Nerves: No cranial nerve deficit.           Assessment:       1. Need for influenza vaccination    2. Anxiety    3. Hypothyroidism (acquired)    4. Right-sided low back pain with right-sided sciatica, unspecified chronicity    5. Encounter for long-term (current) use of other medications    6. Dorsalgia, unspecified    7. Screening for colon cancer         Plan:       Need for influenza vaccination  -     Influenza (FLUBLOK) - Quadrivalent (Recombinant) *Preferred* (PF) - egg allergy    Anxiety  Comments:  Doing well on current regimen. continue as is  Orders:  -     citalopram (CELEXA) 20 MG tablet; Take 1 tablet (20 mg total) by mouth once daily.  Dispense: 90 tablet; Refill: 1    Hypothyroidism (acquired)  Comments:  continue on current medication regimen. check TSH  Orders:  -     levothyroxine (SYNTHROID, LEVOTHROID) 175 MCG tablet; Take 1 tablet (175 mcg total) by mouth once daily.  Dispense: 90 tablet; Refill: 1  -     TSH w/reflex to FT4; Future; Expected date: 11/02/2020    Right-sided low back pain with right-sided sciatica, unspecified chronicity  Comments:  xray lumbar spine.  Consider physical therapy/conservative measures depending on x-ray results    Encounter for long-term (current) use of other medications  -     PSA, Screening; Future; Expected date: 11/02/2020  -     Lipid Panel; Future; Expected date: 11/02/2020  -     CBC Auto Differential; Future; Expected date: 11/02/2020  -     Comprehensive Metabolic Panel; Future; Expected date: 11/02/2020    Dorsalgia, unspecified  -     X-Ray Lumbar Spine 2 Or 3 Views; Future; Expected date: 11/02/2020    Screening for colon cancer  -     Ambulatory referral/consult to Gastroenterology; Future; Expected date:  11/09/2020      Follow up in about 6 months (around 5/2/2021), or if symptoms worsen or fail to improve, for Pending imaging.        11/2/2020 Vinh Noyola PA-C

## 2020-11-03 ENCOUNTER — TELEPHONE (OUTPATIENT)
Dept: GASTROENTEROLOGY | Facility: CLINIC | Age: 50
End: 2020-11-03

## 2020-11-03 ENCOUNTER — PATIENT MESSAGE (OUTPATIENT)
Dept: GASTROENTEROLOGY | Facility: CLINIC | Age: 50
End: 2020-11-03

## 2020-11-03 DIAGNOSIS — Z86.010 HISTORY OF COLON POLYPS: Primary | ICD-10-CM

## 2020-11-03 DIAGNOSIS — Z01.818 PREOP TESTING: ICD-10-CM

## 2020-11-03 LAB
ALBUMIN SERPL-MCNC: 4.7 G/DL (ref 3.6–5.1)
ALBUMIN/GLOB SERPL: 1.7 (CALC) (ref 1–2.5)
ALP SERPL-CCNC: 51 U/L (ref 35–144)
ALT SERPL-CCNC: 64 U/L (ref 9–46)
AST SERPL-CCNC: 26 U/L (ref 10–35)
BASOPHILS # BLD AUTO: 51 CELLS/UL (ref 0–200)
BASOPHILS NFR BLD AUTO: 0.9 %
BILIRUB SERPL-MCNC: 0.6 MG/DL (ref 0.2–1.2)
BUN SERPL-MCNC: 14 MG/DL (ref 7–25)
BUN/CREAT SERPL: ABNORMAL (CALC) (ref 6–22)
CALCIUM SERPL-MCNC: 10 MG/DL (ref 8.6–10.3)
CHLORIDE SERPL-SCNC: 100 MMOL/L (ref 98–110)
CHOLEST SERPL-MCNC: 223 MG/DL
CHOLEST/HDLC SERPL: 7 (CALC)
CO2 SERPL-SCNC: 29 MMOL/L (ref 20–32)
CREAT SERPL-MCNC: 1.01 MG/DL (ref 0.7–1.33)
EOSINOPHIL # BLD AUTO: 148 CELLS/UL (ref 15–500)
EOSINOPHIL NFR BLD AUTO: 2.6 %
ERYTHROCYTE [DISTWIDTH] IN BLOOD BY AUTOMATED COUNT: 13 % (ref 11–15)
GFRSERPLBLD MDRD-ARVRAT: 86 ML/MIN/1.73M2
GLOBULIN SER CALC-MCNC: 2.8 G/DL (CALC) (ref 1.9–3.7)
GLUCOSE SERPL-MCNC: 101 MG/DL (ref 65–99)
HCT VFR BLD AUTO: 51.3 % (ref 38.5–50)
HDLC SERPL-MCNC: 32 MG/DL
HGB BLD-MCNC: 17.1 G/DL (ref 13.2–17.1)
LDLC SERPL CALC-MCNC: 157 MG/DL (CALC)
LYMPHOCYTES # BLD AUTO: 2086 CELLS/UL (ref 850–3900)
LYMPHOCYTES NFR BLD AUTO: 36.6 %
MCH RBC QN AUTO: 31.5 PG (ref 27–33)
MCHC RBC AUTO-ENTMCNC: 33.3 G/DL (ref 32–36)
MCV RBC AUTO: 94.6 FL (ref 80–100)
MONOCYTES # BLD AUTO: 433 CELLS/UL (ref 200–950)
MONOCYTES NFR BLD AUTO: 7.6 %
NEUTROPHILS # BLD AUTO: 2981 CELLS/UL (ref 1500–7800)
NEUTROPHILS NFR BLD AUTO: 52.3 %
NONHDLC SERPL-MCNC: 191 MG/DL (CALC)
PLATELET # BLD AUTO: 167 THOUSAND/UL (ref 140–400)
PMV BLD REES-ECKER: 11.4 FL (ref 7.5–12.5)
POTASSIUM SERPL-SCNC: 5.4 MMOL/L (ref 3.5–5.3)
PROT SERPL-MCNC: 7.5 G/DL (ref 6.1–8.1)
PSA SERPL-MCNC: 0.6 NG/ML
RBC # BLD AUTO: 5.42 MILLION/UL (ref 4.2–5.8)
SODIUM SERPL-SCNC: 136 MMOL/L (ref 135–146)
T4 FREE SERPL-MCNC: 1.1 NG/DL (ref 0.8–1.8)
TRIGL SERPL-MCNC: 188 MG/DL
TSH SERPL-ACNC: 5.86 MIU/L (ref 0.4–4.5)
WBC # BLD AUTO: 5.7 THOUSAND/UL (ref 3.8–10.8)

## 2020-11-03 NOTE — TELEPHONE ENCOUNTER
Colonoscopy scheduled 12/4 at 1pm arrive for 12pm with covid screening 12/1 instructions reviewed and patient states understanding. Copy to portal

## 2020-12-01 ENCOUNTER — LAB VISIT (OUTPATIENT)
Dept: PRIMARY CARE CLINIC | Facility: CLINIC | Age: 50
End: 2020-12-01
Payer: OTHER GOVERNMENT

## 2020-12-01 DIAGNOSIS — Z01.818 PREOP TESTING: ICD-10-CM

## 2020-12-01 PROCEDURE — U0003 INFECTIOUS AGENT DETECTION BY NUCLEIC ACID (DNA OR RNA); SEVERE ACUTE RESPIRATORY SYNDROME CORONAVIRUS 2 (SARS-COV-2) (CORONAVIRUS DISEASE [COVID-19]), AMPLIFIED PROBE TECHNIQUE, MAKING USE OF HIGH THROUGHPUT TECHNOLOGIES AS DESCRIBED BY CMS-2020-01-R: HCPCS

## 2020-12-02 LAB — SARS-COV-2 RNA RESP QL NAA+PROBE: NOT DETECTED

## 2020-12-04 ENCOUNTER — HOSPITAL ENCOUNTER (OUTPATIENT)
Facility: HOSPITAL | Age: 50
Discharge: HOME | End: 2020-12-04
Attending: INTERNAL MEDICINE | Admitting: INTERNAL MEDICINE
Payer: OTHER GOVERNMENT

## 2020-12-04 ENCOUNTER — ANESTHESIA (OUTPATIENT)
Dept: ENDOSCOPY | Facility: HOSPITAL | Age: 50
End: 2020-12-04
Payer: OTHER GOVERNMENT

## 2020-12-04 ENCOUNTER — ANESTHESIA EVENT (OUTPATIENT)
Dept: ENDOSCOPY | Facility: HOSPITAL | Age: 50
End: 2020-12-04
Payer: OTHER GOVERNMENT

## 2020-12-04 DIAGNOSIS — Z86.010 HISTORY OF COLON POLYPS: ICD-10-CM

## 2020-12-04 PROBLEM — Z86.0100 HISTORY OF COLON POLYPS: Status: ACTIVE | Noted: 2020-12-04

## 2020-12-04 PROCEDURE — D9220A PRA ANESTHESIA: ICD-10-PCS | Mod: 33,ANES,, | Performed by: ANESTHESIOLOGY

## 2020-12-04 PROCEDURE — D9220A PRA ANESTHESIA: Mod: 33,ANES,, | Performed by: ANESTHESIOLOGY

## 2020-12-04 PROCEDURE — 45380 COLONOSCOPY AND BIOPSY: CPT | Mod: 33,,, | Performed by: INTERNAL MEDICINE

## 2020-12-04 PROCEDURE — 45380 COLONOSCOPY AND BIOPSY: CPT | Performed by: INTERNAL MEDICINE

## 2020-12-04 PROCEDURE — 63600175 PHARM REV CODE 636 W HCPCS: Performed by: NURSE ANESTHETIST, CERTIFIED REGISTERED

## 2020-12-04 PROCEDURE — 00811 ANES LWR INTST NDSC NOS: CPT | Performed by: INTERNAL MEDICINE

## 2020-12-04 PROCEDURE — 88305 TISSUE EXAM BY PATHOLOGIST: CPT | Performed by: PATHOLOGY

## 2020-12-04 PROCEDURE — 27201012 HC FORCEPS, HOT/COLD, DISP: Performed by: INTERNAL MEDICINE

## 2020-12-04 PROCEDURE — D9220A PRA ANESTHESIA: Mod: 33,CRNA,, | Performed by: NURSE ANESTHETIST, CERTIFIED REGISTERED

## 2020-12-04 PROCEDURE — D9220A PRA ANESTHESIA: ICD-10-PCS | Mod: 33,CRNA,, | Performed by: NURSE ANESTHETIST, CERTIFIED REGISTERED

## 2020-12-04 PROCEDURE — 25000003 PHARM REV CODE 250: Performed by: INTERNAL MEDICINE

## 2020-12-04 PROCEDURE — 45380 PR COLONOSCOPY,BIOPSY: ICD-10-PCS | Mod: 33,,, | Performed by: INTERNAL MEDICINE

## 2020-12-04 PROCEDURE — 25000003 PHARM REV CODE 250: Performed by: NURSE ANESTHETIST, CERTIFIED REGISTERED

## 2020-12-04 PROCEDURE — 88305 TISSUE EXAM BY PATHOLOGIST: ICD-10-PCS | Mod: 26,,, | Performed by: PATHOLOGY

## 2020-12-04 PROCEDURE — 37000008 HC ANESTHESIA 1ST 15 MINUTES: Performed by: INTERNAL MEDICINE

## 2020-12-04 PROCEDURE — 37000009 HC ANESTHESIA EA ADD 15 MINS: Performed by: INTERNAL MEDICINE

## 2020-12-04 PROCEDURE — 88305 TISSUE EXAM BY PATHOLOGIST: CPT | Mod: 26,,, | Performed by: PATHOLOGY

## 2020-12-04 RX ORDER — MESALAMINE 1000 MG/1
1000 SUPPOSITORY RECTAL NIGHTLY
Qty: 90 SUPPOSITORY | Refills: 3 | Status: SHIPPED | OUTPATIENT
Start: 2020-12-04 | End: 2021-12-04

## 2020-12-04 RX ORDER — LIDOCAINE HYDROCHLORIDE 20 MG/ML
INJECTION INTRAVENOUS
Status: DISCONTINUED | OUTPATIENT
Start: 2020-12-04 | End: 2020-12-04

## 2020-12-04 RX ORDER — PROPOFOL 10 MG/ML
VIAL (ML) INTRAVENOUS
Status: DISCONTINUED | OUTPATIENT
Start: 2020-12-04 | End: 2020-12-04

## 2020-12-04 RX ORDER — SODIUM CHLORIDE 9 MG/ML
INJECTION, SOLUTION INTRAVENOUS CONTINUOUS
Status: DISCONTINUED | OUTPATIENT
Start: 2020-12-04 | End: 2020-12-04 | Stop reason: HOSPADM

## 2020-12-04 RX ADMIN — PROPOFOL 50 MG: 10 INJECTION, EMULSION INTRAVENOUS at 11:12

## 2020-12-04 RX ADMIN — SODIUM CHLORIDE 10 ML/HR: 0.9 INJECTION, SOLUTION INTRAVENOUS at 10:12

## 2020-12-04 RX ADMIN — PROPOFOL 100 MG: 10 INJECTION, EMULSION INTRAVENOUS at 11:12

## 2020-12-04 RX ADMIN — LIDOCAINE HYDROCHLORIDE 50 MG: 20 INJECTION, SOLUTION INTRAVENOUS at 11:12

## 2020-12-04 NOTE — DISCHARGE INSTRUCTIONS

## 2020-12-04 NOTE — TRANSFER OF CARE
"Anesthesia Transfer of Care Note    Patient: Isidoro Singh    Procedure(s) Performed: Procedure(s) (LRB):  COLONOSCOPY (N/A)    Patient location: PACU    Anesthesia Type: general    Transport from OR: Transported from OR on 2-3 L/min O2 by NC with adequate spontaneous ventilation    Post pain: adequate analgesia    Post assessment: no apparent anesthetic complications and tolerated procedure well    Post vital signs: stable    Level of consciousness: sedated    Nausea/Vomiting: no nausea/vomiting    Complications: none    Transfer of care protocol was followed      Last vitals:   Visit Vitals  BP (!) 148/85 (BP Location: Left arm, Patient Position: Lying)   Pulse 71   Temp 36.8 °C (98.2 °F) (Skin)   Resp 16   Ht 5' 11" (1.803 m)   Wt 129.3 kg (285 lb)   SpO2 96%   BMI 39.75 kg/m²     "

## 2020-12-04 NOTE — ANESTHESIA PREPROCEDURE EVALUATION
12/04/2020  Isidoro Singh is a 50 y.o., male.    Anesthesia Evaluation    I have reviewed the Patient Summary Reports.    I have reviewed the Nursing Notes. I have reviewed the NPO Status.   I have reviewed the Medications.     Review of Systems  Anesthesia Hx:  No problems with previous Anesthesia Denies Hx of Anesthetic complications    Social:  Non-Smoker    Cardiovascular:   Denies Hypertension.  Denies MI.  Denies CAD.    Denies CABG/stent.   Denies Angina.    Pulmonary:   Denies COPD.  Denies Asthma.  Denies Recent URI.    Renal/:   Denies Chronic Renal Disease.     Hepatic/GI:   Denies GERD. Denies Liver Disease.    Neurological:   Denies TIA. Denies CVA. Denies Seizures.    Endocrine:   Denies Diabetes. Denies Hypothyroidism.    Psych:   Denies Psychiatric History.          Physical Exam  General:  Well nourished    Airway/Jaw/Neck:  Airway Findings: Mouth Opening: Normal Tongue: Normal  General Airway Assessment: Adult, Good  Mallampati: II  Improves to II with phonation.  TM Distance: 4-6 cm      Dental:  Dental Findings: In tact   Chest/Lungs:  Chest/Lungs Findings: Clear to auscultation, Normal Respiratory Rate     Heart/Vascular:  Heart Findings: Rate: Normal  Rhythm: Regular Rhythm  Sounds: Normal  Heart murmur: negative       Mental Status:  Mental Status Findings:  Cooperative, Alert and Oriented         Anesthesia Plan  Type of Anesthesia, risks & benefits discussed:  Anesthesia Type:  general  Patient's Preference:   Intra-op Monitoring Plan: standard ASA monitors  Intra-op Monitoring Plan Comments:   Post Op Pain Control Plan:   Post Op Pain Control Plan Comments:   Induction:   IV  Beta Blocker:  Patient is not currently on a Beta-Blocker (No further documentation required).       Informed Consent: Patient understands risks and agrees with Anesthesia plan.  Questions answered.  Anesthesia consent signed with patient.  ASA Score: 2     Day of Surgery Review of History & Physical: I have interviewed and examined the patient. I have reviewed the patient's H&P dated:  There are no significant changes.  H&P update referred to the surgeon.  H&P completed by Anesthesiologist.       Ready For Surgery From Anesthesia Perspective.

## 2020-12-04 NOTE — ANESTHESIA POSTPROCEDURE EVALUATION
Anesthesia Post Evaluation    Patient: Isidoro Singh    Procedure(s) Performed: Procedure(s) (LRB):  COLONOSCOPY (N/A)    Final Anesthesia Type: general    Patient location during evaluation: PACU  Patient participation: Yes- Able to Participate  Level of consciousness: awake and alert and oriented  Post-procedure vital signs: reviewed and stable  Pain management: adequate  Airway patency: patent    PONV status at discharge: No PONV  Anesthetic complications: no      Cardiovascular status: blood pressure returned to baseline  Respiratory status: unassisted, spontaneous ventilation and room air  Hydration status: euvolemic  Follow-up not needed.          Vitals Value Taken Time   /85 12/04/20 1041   Temp 36.8 °C (98.2 °F) 12/04/20 1041   Pulse 71 12/04/20 1041   Resp 16 12/04/20 1041   SpO2 96 % 12/04/20 1041         No case tracking events are documented in the log.      Pain/Flores Score: No data recorded

## 2020-12-04 NOTE — H&P
CC: History of colon polyps - last scope 2014    50 year old male with above. States that symptoms are absent, no alleviating/exacerbating factors. No family history of CA. Positive personal history of polyps. No bleeding or weight loss.     ROS:  No headache, no fever/chills, no chest pain/SOB, no nausea/vomiting/diarrhea/constipation/GI bleeding/abdominal pain, no dysuria/hematuria.    VSSAF   Exam:   Alert and oriented x 3; no apparent distress   PERRLA, sclera anicteric  CV: Regular rate/rhythm, normal PMI   Lungs: Clear bilaterally with no wheeze/rales   Abdomen: Soft, NT/ND, normal bowel sounds   Ext: No cyanosis, clubbing     Impression:   As above    Plan:   Proceed with endoscopy. Further recs to follow.

## 2020-12-04 NOTE — [employer] PATIENT INSTRUCTIONS
Discharge Summary/Instructions after an Endoscopic Procedure  Patient Name: Isidoro Singh  Patient MRN: [id number]  Patient YOB: 1970  Friday, December 4, 2020  Ventura Parra MD  RESTRICTIONS:  During your procedure today, you received medications for sedation.  These   medications may affect your judgment, balance and coordination.  Therefore,   for 24 hours, you have the following restrictions:   - DO NOT drive a car, operate machinery, make legal/financial decisions,   sign important papers or drink alcohol.    ACTIVITY:  Today: no heavy lifting, straining or running due to procedural   sedation/anesthesia.  The following day: return to full activity including work.  DIET:  Eat and drink normally unless instructed otherwise.     TREATMENT FOR COMMON SIDE EFFECTS:  - Mild abdominal pain, nausea, belching, bloating or excessive gas:  rest,   eat lightly and use a heating pad.  - Sore Throat: treat with throat lozenges and/or gargle with warm salt   water.  - Because air was used during the procedure, expelling large amounts of air   from your rectum or belching is normal.  - If a bowel prep was taken, you may not have a bowel movement for 1-3 days.    This is normal.  SYMPTOMS TO WATCH FOR AND REPORT TO YOUR PHYSICIAN:  1. Abdominal pain or bloating, other than gas cramps.  2. Chest pain.  3. Back pain.  4. Signs of infection such as: chills or fever occurring within 24 hours   after the procedure.  5. Rectal bleeding, which would show as bright red, maroon, or black stools.   (A tablespoon of blood from the rectum is not serious, especially if   hemorrhoids are present.)  6. Vomiting.  7. Weakness or dizziness.  GO DIRECTLY TO THE NEAREST EMERGENCY ROOM IF YOU HAVE ANY OF THE FOLLOWING:      Difficulty breathing              Chills and/or fever over 101 F   Persistent vomiting and/or vomiting blood   Severe abdominal pain   Severe chest pain   Black, tarry stools   Bleeding- more than one  tablespoon   Any other symptom or condition that you feel may need urgent attention  Your doctor recommends these additional instructions:  If any biopsies were taken, your doctors clinic will contact you in 1 to 2   weeks with any results.  - Patient has a contact number available for emergencies.  The signs and   symptoms of potential delayed complications were discussed with the   patient.  Return to normal activities tomorrow.  Written discharge   instructions were provided to the patient.   - High fiber diet.   - Continue present medications.   - Await pathology results.   - Repeat colonoscopy in 5 years for surveillance.   - Discharge patient to home (ambulatory).   - Return to my office PRN.  For questions, problems or results please call your physician - Ventura Parra MD at Work:  [phone].  [facility], EMERGENCY ROOM PHONE NUMBER: [phone]  IF A COMPLICATION OR EMERGENCY SITUATION ARISES AND YOU ARE UNABLE TO REACH   YOUR PHYSICIAN - GO DIRECTLY TO THE EMERGENCY ROOM.  Ventura Parra MD  12/4/2020 11:48:41 AM  This report has been verified and signed electronically.  [employer]

## 2020-12-07 VITALS
OXYGEN SATURATION: 95 % | TEMPERATURE: 98 F | HEART RATE: 68 BPM | RESPIRATION RATE: 16 BRPM | HEIGHT: 71 IN | WEIGHT: 285 LBS | DIASTOLIC BLOOD PRESSURE: 70 MMHG | SYSTOLIC BLOOD PRESSURE: 123 MMHG | BODY MASS INDEX: 39.9 KG/M2

## 2020-12-10 LAB
FINAL PATHOLOGIC DIAGNOSIS: NORMAL
GROSS: NORMAL
Lab: NORMAL

## 2020-12-14 ENCOUNTER — PATIENT MESSAGE (OUTPATIENT)
Dept: GASTROENTEROLOGY | Facility: CLINIC | Age: 50
End: 2020-12-14

## 2020-12-14 DIAGNOSIS — K51.919 ULCERATIVE COLITIS WITH COMPLICATION, UNSPECIFIED LOCATION: Primary | ICD-10-CM

## 2021-01-14 ENCOUNTER — PATIENT MESSAGE (OUTPATIENT)
Dept: FAMILY MEDICINE | Facility: CLINIC | Age: 51
End: 2021-01-14

## 2021-01-14 DIAGNOSIS — E78.1 HYPERTRIGLYCERIDEMIA: ICD-10-CM

## 2021-01-14 RX ORDER — OMEGA-3-ACID ETHYL ESTERS 1 G/1
1 CAPSULE, LIQUID FILLED ORAL 2 TIMES DAILY
Qty: 180 CAPSULE | Refills: 3 | Status: SHIPPED | OUTPATIENT
Start: 2021-01-14 | End: 2022-02-07 | Stop reason: SDUPTHER

## 2021-01-25 ENCOUNTER — OFFICE VISIT (OUTPATIENT)
Dept: GASTROENTEROLOGY | Facility: CLINIC | Age: 51
End: 2021-01-25
Payer: OTHER GOVERNMENT

## 2021-01-25 VITALS — HEIGHT: 71 IN | BODY MASS INDEX: 40.09 KG/M2 | WEIGHT: 286.38 LBS

## 2021-01-25 DIAGNOSIS — K51.919 ULCERATIVE COLITIS WITH COMPLICATION, UNSPECIFIED LOCATION: Primary | ICD-10-CM

## 2021-01-25 DIAGNOSIS — K62.89 PROCTITIS: ICD-10-CM

## 2021-01-25 PROCEDURE — 99999 PR PBB SHADOW E&M-EST. PATIENT-LVL III: ICD-10-PCS | Mod: PBBFAC,,, | Performed by: INTERNAL MEDICINE

## 2021-01-25 PROCEDURE — 99214 PR OFFICE/OUTPT VISIT, EST, LEVL IV, 30-39 MIN: ICD-10-PCS | Mod: S$PBB,,, | Performed by: INTERNAL MEDICINE

## 2021-01-25 PROCEDURE — 99999 PR PBB SHADOW E&M-EST. PATIENT-LVL III: CPT | Mod: PBBFAC,,, | Performed by: INTERNAL MEDICINE

## 2021-01-25 PROCEDURE — 99214 OFFICE O/P EST MOD 30 MIN: CPT | Mod: S$PBB,,, | Performed by: INTERNAL MEDICINE

## 2021-01-25 PROCEDURE — 99213 OFFICE O/P EST LOW 20 MIN: CPT | Mod: PBBFAC,PN | Performed by: INTERNAL MEDICINE

## 2021-01-26 ENCOUNTER — LAB VISIT (OUTPATIENT)
Dept: LAB | Facility: HOSPITAL | Age: 51
End: 2021-01-26
Attending: INTERNAL MEDICINE
Payer: OTHER GOVERNMENT

## 2021-01-26 DIAGNOSIS — K62.89 PROCTITIS: ICD-10-CM

## 2021-01-26 DIAGNOSIS — K51.919 ULCERATIVE COLITIS WITH COMPLICATION, UNSPECIFIED LOCATION: ICD-10-CM

## 2021-01-26 LAB
25(OH)D3+25(OH)D2 SERPL-MCNC: 25 NG/ML (ref 30–96)
ALBUMIN SERPL BCP-MCNC: 4.3 G/DL (ref 3.5–5.2)
ALP SERPL-CCNC: 53 U/L (ref 55–135)
ALT SERPL W/O P-5'-P-CCNC: 58 U/L (ref 10–44)
ANION GAP SERPL CALC-SCNC: 8 MMOL/L (ref 8–16)
AST SERPL-CCNC: 29 U/L (ref 10–40)
BASOPHILS # BLD AUTO: 0.05 K/UL (ref 0–0.2)
BASOPHILS NFR BLD: 0.9 % (ref 0–1.9)
BILIRUB SERPL-MCNC: 0.5 MG/DL (ref 0.1–1)
BUN SERPL-MCNC: 14 MG/DL (ref 6–20)
CALCIUM SERPL-MCNC: 9 MG/DL (ref 8.7–10.5)
CHLORIDE SERPL-SCNC: 103 MMOL/L (ref 95–110)
CO2 SERPL-SCNC: 25 MMOL/L (ref 23–29)
CREAT SERPL-MCNC: 1 MG/DL (ref 0.5–1.4)
CRP SERPL-MCNC: 1.3 MG/L (ref 0–8.2)
DIFFERENTIAL METHOD: ABNORMAL
EOSINOPHIL # BLD AUTO: 0.2 K/UL (ref 0–0.5)
EOSINOPHIL NFR BLD: 3.4 % (ref 0–8)
ERYTHROCYTE [DISTWIDTH] IN BLOOD BY AUTOMATED COUNT: 13.1 % (ref 11.5–14.5)
EST. GFR  (AFRICAN AMERICAN): >60 ML/MIN/1.73 M^2
EST. GFR  (NON AFRICAN AMERICAN): >60 ML/MIN/1.73 M^2
FERRITIN SERPL-MCNC: 301 NG/ML (ref 20–300)
FOLATE SERPL-MCNC: 6.2 NG/ML (ref 4–24)
GLUCOSE SERPL-MCNC: 128 MG/DL (ref 70–110)
HCT VFR BLD AUTO: 47.3 % (ref 40–54)
HGB BLD-MCNC: 15.5 G/DL (ref 14–18)
IMM GRANULOCYTES # BLD AUTO: 0.01 K/UL (ref 0–0.04)
IMM GRANULOCYTES NFR BLD AUTO: 0.2 % (ref 0–0.5)
IRON SERPL-MCNC: 85 UG/DL (ref 45–160)
LYMPHOCYTES # BLD AUTO: 2.3 K/UL (ref 1–4.8)
LYMPHOCYTES NFR BLD: 40.1 % (ref 18–48)
MCH RBC QN AUTO: 31.4 PG (ref 27–31)
MCHC RBC AUTO-ENTMCNC: 32.8 G/DL (ref 32–36)
MCV RBC AUTO: 96 FL (ref 82–98)
MONOCYTES # BLD AUTO: 0.6 K/UL (ref 0.3–1)
MONOCYTES NFR BLD: 10.3 % (ref 4–15)
NEUTROPHILS # BLD AUTO: 2.5 K/UL (ref 1.8–7.7)
NEUTROPHILS NFR BLD: 45.1 % (ref 38–73)
NRBC BLD-RTO: 0 /100 WBC
PLATELET # BLD AUTO: 168 K/UL (ref 150–350)
PMV BLD AUTO: 10.9 FL (ref 9.2–12.9)
POTASSIUM SERPL-SCNC: 4.5 MMOL/L (ref 3.5–5.1)
PROT SERPL-MCNC: 7.3 G/DL (ref 6–8.4)
RBC # BLD AUTO: 4.93 M/UL (ref 4.6–6.2)
SATURATED IRON: 22 % (ref 20–50)
SODIUM SERPL-SCNC: 136 MMOL/L (ref 136–145)
TOTAL IRON BINDING CAPACITY: 383 UG/DL (ref 250–450)
TRANSFERRIN SERPL-MCNC: 259 MG/DL (ref 200–375)
VIT B12 SERPL-MCNC: 419 PG/ML (ref 210–950)
WBC # BLD AUTO: 5.63 K/UL (ref 3.9–12.7)

## 2021-01-26 PROCEDURE — 86140 C-REACTIVE PROTEIN: CPT

## 2021-01-26 PROCEDURE — 86790 VIRUS ANTIBODY NOS: CPT

## 2021-01-26 PROCEDURE — 82746 ASSAY OF FOLIC ACID SERUM: CPT

## 2021-01-26 PROCEDURE — 87340 HEPATITIS B SURFACE AG IA: CPT

## 2021-01-26 PROCEDURE — 82728 ASSAY OF FERRITIN: CPT

## 2021-01-26 PROCEDURE — 80053 COMPREHEN METABOLIC PANEL: CPT

## 2021-01-26 PROCEDURE — 36415 COLL VENOUS BLD VENIPUNCTURE: CPT

## 2021-01-26 PROCEDURE — 83540 ASSAY OF IRON: CPT

## 2021-01-26 PROCEDURE — 82306 VITAMIN D 25 HYDROXY: CPT

## 2021-01-26 PROCEDURE — 85025 COMPLETE CBC W/AUTO DIFF WBC: CPT

## 2021-01-26 PROCEDURE — 83993 ASSAY FOR CALPROTECTIN FECAL: CPT

## 2021-01-26 PROCEDURE — 86803 HEPATITIS C AB TEST: CPT

## 2021-01-26 PROCEDURE — 82607 VITAMIN B-12: CPT

## 2021-01-26 PROCEDURE — 86706 HEP B SURFACE ANTIBODY: CPT

## 2021-01-27 LAB
HBV SURFACE AB SER-ACNC: POSITIVE M[IU]/ML
HBV SURFACE AG SERPL QL IA: NEGATIVE
HCV AB SERPL QL IA: NEGATIVE
HEPATITIS A ANTIBODY, IGG: POSITIVE

## 2021-01-29 ENCOUNTER — PATIENT MESSAGE (OUTPATIENT)
Dept: GASTROENTEROLOGY | Facility: CLINIC | Age: 51
End: 2021-01-29

## 2021-01-29 LAB — CALPROTECTIN STL-MCNT: 121.9 MCG/G

## 2021-02-09 RX ORDER — FOLIC ACID 1 MG/1
1 TABLET ORAL DAILY
Qty: 30 TABLET | Refills: 3 | Status: SHIPPED | OUTPATIENT
Start: 2021-02-09 | End: 2021-06-13

## 2021-02-09 RX ORDER — ERGOCALCIFEROL 1.25 MG/1
50000 CAPSULE ORAL
Qty: 12 CAPSULE | Refills: 3 | Status: SHIPPED | OUTPATIENT
Start: 2021-02-09 | End: 2021-04-24 | Stop reason: SDUPTHER

## 2021-03-01 ENCOUNTER — PATIENT MESSAGE (OUTPATIENT)
Dept: FAMILY MEDICINE | Facility: CLINIC | Age: 51
End: 2021-03-01

## 2021-03-01 DIAGNOSIS — R79.89 LOW TESTOSTERONE IN MALE: Primary | ICD-10-CM

## 2021-04-16 ENCOUNTER — PATIENT MESSAGE (OUTPATIENT)
Dept: FAMILY MEDICINE | Facility: CLINIC | Age: 51
End: 2021-04-16

## 2021-04-19 ENCOUNTER — PATIENT MESSAGE (OUTPATIENT)
Dept: FAMILY MEDICINE | Facility: CLINIC | Age: 51
End: 2021-04-19

## 2021-04-19 DIAGNOSIS — R79.89 LOW TESTOSTERONE IN MALE: Primary | ICD-10-CM

## 2021-04-20 ENCOUNTER — PATIENT MESSAGE (OUTPATIENT)
Dept: GASTROENTEROLOGY | Facility: CLINIC | Age: 51
End: 2021-04-20

## 2021-04-29 RX ORDER — ERGOCALCIFEROL 1.25 MG/1
50000 CAPSULE ORAL
Qty: 12 CAPSULE | Refills: 3 | Status: SHIPPED | OUTPATIENT
Start: 2021-04-29 | End: 2022-04-04

## 2021-05-03 ENCOUNTER — OFFICE VISIT (OUTPATIENT)
Dept: FAMILY MEDICINE | Facility: CLINIC | Age: 51
End: 2021-05-03
Payer: OTHER GOVERNMENT

## 2021-05-03 VITALS
WEIGHT: 280 LBS | BODY MASS INDEX: 39.2 KG/M2 | HEIGHT: 71 IN | SYSTOLIC BLOOD PRESSURE: 128 MMHG | HEART RATE: 62 BPM | DIASTOLIC BLOOD PRESSURE: 86 MMHG

## 2021-05-03 DIAGNOSIS — K76.0 NAFLD (NONALCOHOLIC FATTY LIVER DISEASE): ICD-10-CM

## 2021-05-03 DIAGNOSIS — K51.211 CHRONIC ULCERATIVE PROCTITIS WITH RECTAL BLEEDING: Primary | ICD-10-CM

## 2021-05-03 DIAGNOSIS — E03.9 HYPOTHYROIDISM (ACQUIRED): ICD-10-CM

## 2021-05-03 PROCEDURE — 99214 OFFICE O/P EST MOD 30 MIN: CPT | Mod: S$GLB,,, | Performed by: PHYSICIAN ASSISTANT

## 2021-05-03 PROCEDURE — 99214 PR OFFICE/OUTPT VISIT, EST, LEVL IV, 30-39 MIN: ICD-10-PCS | Mod: S$GLB,,, | Performed by: PHYSICIAN ASSISTANT

## 2021-05-04 LAB
ALBUMIN SERPL-MCNC: 4.9 G/DL (ref 3.6–5.1)
ALBUMIN/GLOB SERPL: 1.6 (CALC) (ref 1–2.5)
ALP SERPL-CCNC: 53 U/L (ref 35–144)
ALT SERPL-CCNC: 45 U/L (ref 9–46)
AST SERPL-CCNC: 23 U/L (ref 10–35)
BASOPHILS # BLD AUTO: 49 CELLS/UL (ref 0–200)
BASOPHILS NFR BLD AUTO: 0.9 %
BILIRUB SERPL-MCNC: 0.6 MG/DL (ref 0.2–1.2)
BUN SERPL-MCNC: 14 MG/DL (ref 7–25)
BUN/CREAT SERPL: ABNORMAL (CALC) (ref 6–22)
CALCIUM SERPL-MCNC: 9.6 MG/DL (ref 8.6–10.3)
CHLORIDE SERPL-SCNC: 99 MMOL/L (ref 98–110)
CHOLEST SERPL-MCNC: 206 MG/DL
CHOLEST/HDLC SERPL: 6.1 (CALC)
CO2 SERPL-SCNC: 28 MMOL/L (ref 20–32)
CREAT SERPL-MCNC: 0.89 MG/DL (ref 0.7–1.33)
EOSINOPHIL # BLD AUTO: 119 CELLS/UL (ref 15–500)
EOSINOPHIL NFR BLD AUTO: 2.2 %
ERYTHROCYTE [DISTWIDTH] IN BLOOD BY AUTOMATED COUNT: 12.9 % (ref 11–15)
GLOBULIN SER CALC-MCNC: 3.1 G/DL (CALC) (ref 1.9–3.7)
GLUCOSE SERPL-MCNC: 113 MG/DL (ref 65–99)
HCT VFR BLD AUTO: 50.4 % (ref 38.5–50)
HDLC SERPL-MCNC: 34 MG/DL
HGB BLD-MCNC: 16.9 G/DL (ref 13.2–17.1)
LDLC SERPL CALC-MCNC: 138 MG/DL (CALC)
LYMPHOCYTES # BLD AUTO: 1858 CELLS/UL (ref 850–3900)
LYMPHOCYTES NFR BLD AUTO: 34.4 %
MCH RBC QN AUTO: 31.9 PG (ref 27–33)
MCHC RBC AUTO-ENTMCNC: 33.5 G/DL (ref 32–36)
MCV RBC AUTO: 95.1 FL (ref 80–100)
MONOCYTES # BLD AUTO: 448 CELLS/UL (ref 200–950)
MONOCYTES NFR BLD AUTO: 8.3 %
NEUTROPHILS # BLD AUTO: 2927 CELLS/UL (ref 1500–7800)
NEUTROPHILS NFR BLD AUTO: 54.2 %
NONHDLC SERPL-MCNC: 172 MG/DL (CALC)
PLATELET # BLD AUTO: 167 THOUSAND/UL (ref 140–400)
PMV BLD REES-ECKER: 11.9 FL (ref 7.5–12.5)
POTASSIUM SERPL-SCNC: 4.2 MMOL/L (ref 3.5–5.3)
PROT SERPL-MCNC: 8 G/DL (ref 6.1–8.1)
RBC # BLD AUTO: 5.3 MILLION/UL (ref 4.2–5.8)
SODIUM SERPL-SCNC: 138 MMOL/L (ref 135–146)
TRIGL SERPL-MCNC: 205 MG/DL
TSH SERPL-ACNC: 4.07 MIU/L (ref 0.4–4.5)
WBC # BLD AUTO: 5.4 THOUSAND/UL (ref 3.8–10.8)

## 2021-05-05 DIAGNOSIS — E03.9 HYPOTHYROIDISM (ACQUIRED): ICD-10-CM

## 2021-05-05 DIAGNOSIS — F41.9 ANXIETY: ICD-10-CM

## 2021-05-05 RX ORDER — LEVOTHYROXINE SODIUM 175 UG/1
175 TABLET ORAL DAILY
Qty: 90 TABLET | Refills: 1 | Status: SHIPPED | OUTPATIENT
Start: 2021-05-05 | End: 2021-11-09 | Stop reason: SDUPTHER

## 2021-05-05 RX ORDER — CITALOPRAM 20 MG/1
20 TABLET, FILM COATED ORAL DAILY
Qty: 90 TABLET | Refills: 1 | Status: SHIPPED | OUTPATIENT
Start: 2021-05-05 | End: 2021-11-09 | Stop reason: SDUPTHER

## 2021-05-10 ENCOUNTER — OFFICE VISIT (OUTPATIENT)
Dept: GASTROENTEROLOGY | Facility: CLINIC | Age: 51
End: 2021-05-10
Payer: OTHER GOVERNMENT

## 2021-05-10 ENCOUNTER — PATIENT MESSAGE (OUTPATIENT)
Dept: GASTROENTEROLOGY | Facility: CLINIC | Age: 51
End: 2021-05-10

## 2021-05-10 ENCOUNTER — TELEPHONE (OUTPATIENT)
Dept: GASTROENTEROLOGY | Facility: CLINIC | Age: 51
End: 2021-05-10

## 2021-05-10 DIAGNOSIS — K51.211 ULCERATIVE PROCTITIS WITH RECTAL BLEEDING: Primary | ICD-10-CM

## 2021-05-10 DIAGNOSIS — Z01.818 PRE-OP TESTING: ICD-10-CM

## 2021-05-10 PROCEDURE — 99214 PR OFFICE/OUTPT VISIT, EST, LEVL IV, 30-39 MIN: ICD-10-PCS | Mod: 95,,, | Performed by: INTERNAL MEDICINE

## 2021-05-10 PROCEDURE — 99214 OFFICE O/P EST MOD 30 MIN: CPT | Mod: 95,,, | Performed by: INTERNAL MEDICINE

## 2021-05-26 ENCOUNTER — TELEPHONE (OUTPATIENT)
Dept: GASTROENTEROLOGY | Facility: CLINIC | Age: 51
End: 2021-05-26

## 2021-05-27 ENCOUNTER — PATIENT MESSAGE (OUTPATIENT)
Dept: ENDOSCOPY | Facility: HOSPITAL | Age: 51
End: 2021-05-27

## 2021-05-27 DIAGNOSIS — Z01.818 PRE-OP TESTING: ICD-10-CM

## 2021-07-16 ENCOUNTER — LAB VISIT (OUTPATIENT)
Dept: PRIMARY CARE CLINIC | Facility: CLINIC | Age: 51
End: 2021-07-16
Payer: OTHER GOVERNMENT

## 2021-07-16 DIAGNOSIS — Z01.818 PRE-OP TESTING: ICD-10-CM

## 2021-07-16 PROCEDURE — U0003 INFECTIOUS AGENT DETECTION BY NUCLEIC ACID (DNA OR RNA); SEVERE ACUTE RESPIRATORY SYNDROME CORONAVIRUS 2 (SARS-COV-2) (CORONAVIRUS DISEASE [COVID-19]), AMPLIFIED PROBE TECHNIQUE, MAKING USE OF HIGH THROUGHPUT TECHNOLOGIES AS DESCRIBED BY CMS-2020-01-R: HCPCS | Performed by: INTERNAL MEDICINE

## 2021-07-16 PROCEDURE — U0005 INFEC AGEN DETEC AMPLI PROBE: HCPCS | Performed by: INTERNAL MEDICINE

## 2021-07-17 LAB
SARS-COV-2 RNA RESP QL NAA+PROBE: NOT DETECTED
SARS-COV-2- CYCLE NUMBER: -1

## 2021-07-19 ENCOUNTER — ANESTHESIA (OUTPATIENT)
Dept: ENDOSCOPY | Facility: HOSPITAL | Age: 51
End: 2021-07-19
Payer: OTHER GOVERNMENT

## 2021-07-19 ENCOUNTER — ANESTHESIA EVENT (OUTPATIENT)
Dept: ENDOSCOPY | Facility: HOSPITAL | Age: 51
End: 2021-07-19
Payer: OTHER GOVERNMENT

## 2021-07-19 ENCOUNTER — HOSPITAL ENCOUNTER (OUTPATIENT)
Facility: HOSPITAL | Age: 51
Discharge: HOME | End: 2021-07-19
Attending: INTERNAL MEDICINE | Admitting: INTERNAL MEDICINE
Payer: OTHER GOVERNMENT

## 2021-07-19 VITALS
SYSTOLIC BLOOD PRESSURE: 138 MMHG | TEMPERATURE: 98 F | HEART RATE: 66 BPM | RESPIRATION RATE: 18 BRPM | BODY MASS INDEX: 39.05 KG/M2 | WEIGHT: 280 LBS | DIASTOLIC BLOOD PRESSURE: 86 MMHG | OXYGEN SATURATION: 100 %

## 2021-07-19 DIAGNOSIS — Z86.010 HISTORY OF COLON POLYPS: ICD-10-CM

## 2021-07-19 DIAGNOSIS — K51.90 ULCERATIVE COLITIS: Primary | ICD-10-CM

## 2021-07-19 PROCEDURE — D9220A PRA ANESTHESIA: Mod: ANES,,, | Performed by: ANESTHESIOLOGY

## 2021-07-19 PROCEDURE — 88305 TISSUE EXAM BY PATHOLOGIST: CPT | Mod: 59 | Performed by: PATHOLOGY

## 2021-07-19 PROCEDURE — 88305 TISSUE EXAM BY PATHOLOGIST: ICD-10-PCS | Mod: 26,,, | Performed by: PATHOLOGY

## 2021-07-19 PROCEDURE — 27201089 HC SNARE, DISP (ANY): Performed by: INTERNAL MEDICINE

## 2021-07-19 PROCEDURE — 88342 IMHCHEM/IMCYTCHM 1ST ANTB: CPT | Performed by: PATHOLOGY

## 2021-07-19 PROCEDURE — 88305 TISSUE EXAM BY PATHOLOGIST: CPT | Mod: 26,,, | Performed by: PATHOLOGY

## 2021-07-19 PROCEDURE — 37000009 HC ANESTHESIA EA ADD 15 MINS: Performed by: INTERNAL MEDICINE

## 2021-07-19 PROCEDURE — 00813 ANES UPR LWR GI NDSC PX: CPT | Performed by: INTERNAL MEDICINE

## 2021-07-19 PROCEDURE — 45380 COLONOSCOPY AND BIOPSY: CPT | Mod: 59,,, | Performed by: INTERNAL MEDICINE

## 2021-07-19 PROCEDURE — D9220A PRA ANESTHESIA: ICD-10-PCS | Mod: CRNA,,, | Performed by: NURSE ANESTHETIST, CERTIFIED REGISTERED

## 2021-07-19 PROCEDURE — D9220A PRA ANESTHESIA: Mod: CRNA,,, | Performed by: NURSE ANESTHETIST, CERTIFIED REGISTERED

## 2021-07-19 PROCEDURE — 25000003 PHARM REV CODE 250: Performed by: NURSE ANESTHETIST, CERTIFIED REGISTERED

## 2021-07-19 PROCEDURE — 25000003 PHARM REV CODE 250: Performed by: INTERNAL MEDICINE

## 2021-07-19 PROCEDURE — 45385 COLONOSCOPY W/LESION REMOVAL: CPT | Performed by: INTERNAL MEDICINE

## 2021-07-19 PROCEDURE — D9220A PRA ANESTHESIA: ICD-10-PCS | Mod: ANES,,, | Performed by: ANESTHESIOLOGY

## 2021-07-19 PROCEDURE — 45385 COLONOSCOPY W/LESION REMOVAL: CPT | Mod: ,,, | Performed by: INTERNAL MEDICINE

## 2021-07-19 PROCEDURE — 63600175 PHARM REV CODE 636 W HCPCS: Performed by: NURSE ANESTHETIST, CERTIFIED REGISTERED

## 2021-07-19 PROCEDURE — 45380 COLONOSCOPY AND BIOPSY: CPT | Performed by: INTERNAL MEDICINE

## 2021-07-19 PROCEDURE — 45385 PR COLONOSCOPY,REMV LESN,SNARE: ICD-10-PCS | Mod: ,,, | Performed by: INTERNAL MEDICINE

## 2021-07-19 PROCEDURE — 37000008 HC ANESTHESIA 1ST 15 MINUTES: Performed by: INTERNAL MEDICINE

## 2021-07-19 PROCEDURE — 88342 IMHCHEM/IMCYTCHM 1ST ANTB: CPT | Mod: 26,,, | Performed by: PATHOLOGY

## 2021-07-19 PROCEDURE — 45380 PR COLONOSCOPY,BIOPSY: ICD-10-PCS | Mod: 59,,, | Performed by: INTERNAL MEDICINE

## 2021-07-19 PROCEDURE — 88342 CHG IMMUNOCYTOCHEMISTRY: ICD-10-PCS | Mod: 26,,, | Performed by: PATHOLOGY

## 2021-07-19 PROCEDURE — 27201012 HC FORCEPS, HOT/COLD, DISP: Performed by: INTERNAL MEDICINE

## 2021-07-19 RX ORDER — SODIUM CHLORIDE 9 MG/ML
INJECTION, SOLUTION INTRAVENOUS CONTINUOUS
Status: DISCONTINUED | OUTPATIENT
Start: 2021-07-19 | End: 2021-07-19 | Stop reason: HOSPADM

## 2021-07-19 RX ORDER — LIDOCAINE HCL/PF 100 MG/5ML
SYRINGE (ML) INTRAVENOUS
Status: DISCONTINUED | OUTPATIENT
Start: 2021-07-19 | End: 2021-07-19

## 2021-07-19 RX ORDER — PROPOFOL 10 MG/ML
VIAL (ML) INTRAVENOUS
Status: DISCONTINUED | OUTPATIENT
Start: 2021-07-19 | End: 2021-07-19

## 2021-07-19 RX ADMIN — PROPOFOL 40 MG: 10 INJECTION, EMULSION INTRAVENOUS at 08:07

## 2021-07-19 RX ADMIN — PROPOFOL 80 MG: 10 INJECTION, EMULSION INTRAVENOUS at 08:07

## 2021-07-19 RX ADMIN — LIDOCAINE HYDROCHLORIDE 50 MG: 20 INJECTION INTRAVENOUS at 08:07

## 2021-07-19 RX ADMIN — SODIUM CHLORIDE: 0.9 INJECTION, SOLUTION INTRAVENOUS at 08:07

## 2021-07-26 LAB
FINAL PATHOLOGIC DIAGNOSIS: NORMAL
GROSS: NORMAL
Lab: NORMAL
SUPPLEMENTAL DIAGNOSIS: NORMAL

## 2021-08-03 ENCOUNTER — PATIENT MESSAGE (OUTPATIENT)
Dept: FAMILY MEDICINE | Facility: CLINIC | Age: 51
End: 2021-08-03

## 2021-08-12 RX ORDER — MESALAMINE 0.38 G/1
1.5 CAPSULE, EXTENDED RELEASE ORAL DAILY
Qty: 120 CAPSULE | Refills: 11 | Status: SHIPPED | OUTPATIENT
Start: 2021-08-12 | End: 2021-11-17 | Stop reason: SDUPTHER

## 2021-11-08 ENCOUNTER — PATIENT MESSAGE (OUTPATIENT)
Dept: FAMILY MEDICINE | Facility: CLINIC | Age: 51
End: 2021-11-08
Payer: OTHER GOVERNMENT

## 2021-11-09 ENCOUNTER — OFFICE VISIT (OUTPATIENT)
Dept: FAMILY MEDICINE | Facility: CLINIC | Age: 51
End: 2021-11-09
Payer: OTHER GOVERNMENT

## 2021-11-09 VITALS
WEIGHT: 286 LBS | HEIGHT: 71 IN | TEMPERATURE: 98 F | SYSTOLIC BLOOD PRESSURE: 120 MMHG | HEART RATE: 78 BPM | DIASTOLIC BLOOD PRESSURE: 78 MMHG | BODY MASS INDEX: 40.04 KG/M2

## 2021-11-09 DIAGNOSIS — Z23 NEED FOR DIPHTHERIA-TETANUS-PERTUSSIS (TDAP) VACCINE: ICD-10-CM

## 2021-11-09 DIAGNOSIS — E78.1 HYPERTRIGLYCERIDEMIA: ICD-10-CM

## 2021-11-09 DIAGNOSIS — F41.9 ANXIETY: ICD-10-CM

## 2021-11-09 DIAGNOSIS — K76.0 NAFLD (NONALCOHOLIC FATTY LIVER DISEASE): ICD-10-CM

## 2021-11-09 DIAGNOSIS — E03.9 HYPOTHYROIDISM (ACQUIRED): Primary | ICD-10-CM

## 2021-11-09 DIAGNOSIS — E03.9 HYPOTHYROIDISM (ACQUIRED): ICD-10-CM

## 2021-11-09 DIAGNOSIS — E29.1 HYPOGONADISM IN MALE: ICD-10-CM

## 2021-11-09 PROCEDURE — 99214 PR OFFICE/OUTPT VISIT, EST, LEVL IV, 30-39 MIN: ICD-10-PCS | Mod: 25,S$GLB,, | Performed by: PHYSICIAN ASSISTANT

## 2021-11-09 PROCEDURE — 90471 TDAP VACCINE GREATER THAN OR EQUAL TO 7YO IM: ICD-10-PCS | Mod: S$GLB,,, | Performed by: PHYSICIAN ASSISTANT

## 2021-11-09 PROCEDURE — 90471 IMMUNIZATION ADMIN: CPT | Mod: S$GLB,,, | Performed by: PHYSICIAN ASSISTANT

## 2021-11-09 PROCEDURE — 90715 TDAP VACCINE GREATER THAN OR EQUAL TO 7YO IM: ICD-10-PCS | Mod: S$GLB,,, | Performed by: PHYSICIAN ASSISTANT

## 2021-11-09 PROCEDURE — 90715 TDAP VACCINE 7 YRS/> IM: CPT | Mod: S$GLB,,, | Performed by: PHYSICIAN ASSISTANT

## 2021-11-09 PROCEDURE — 99214 OFFICE O/P EST MOD 30 MIN: CPT | Mod: 25,S$GLB,, | Performed by: PHYSICIAN ASSISTANT

## 2021-11-09 RX ORDER — LEVOTHYROXINE SODIUM 175 UG/1
175 TABLET ORAL DAILY
Qty: 90 TABLET | Refills: 1 | Status: SHIPPED | OUTPATIENT
Start: 2021-11-09 | End: 2022-05-03 | Stop reason: SDUPTHER

## 2021-11-09 RX ORDER — CITALOPRAM 40 MG/1
40 TABLET, FILM COATED ORAL DAILY
Qty: 90 TABLET | Refills: 1 | Status: SHIPPED | OUTPATIENT
Start: 2021-11-09 | End: 2022-05-03 | Stop reason: SDUPTHER

## 2021-11-09 RX ORDER — SILDENAFIL 100 MG/1
100 TABLET, FILM COATED ORAL DAILY PRN
Qty: 30 TABLET | Refills: 0 | Status: SHIPPED | OUTPATIENT
Start: 2021-11-09 | End: 2022-11-10 | Stop reason: SDUPTHER

## 2021-11-10 LAB
ALBUMIN SERPL-MCNC: 4.6 G/DL (ref 3.6–5.1)
ALBUMIN/GLOB SERPL: 1.6 (CALC) (ref 1–2.5)
ALP SERPL-CCNC: 54 U/L (ref 35–144)
ALT SERPL-CCNC: 47 U/L (ref 9–46)
AST SERPL-CCNC: 25 U/L (ref 10–35)
BASOPHILS # BLD AUTO: 48 CELLS/UL (ref 0–200)
BASOPHILS NFR BLD AUTO: 0.9 %
BILIRUB SERPL-MCNC: 0.5 MG/DL (ref 0.2–1.2)
BUN SERPL-MCNC: 18 MG/DL (ref 7–25)
BUN/CREAT SERPL: ABNORMAL (CALC) (ref 6–22)
CALCIUM SERPL-MCNC: 9.5 MG/DL (ref 8.6–10.3)
CHLORIDE SERPL-SCNC: 102 MMOL/L (ref 98–110)
CHOLEST SERPL-MCNC: 206 MG/DL
CHOLEST/HDLC SERPL: 6.9 (CALC)
CO2 SERPL-SCNC: 28 MMOL/L (ref 20–32)
CREAT SERPL-MCNC: 1.04 MG/DL (ref 0.7–1.33)
EOSINOPHIL # BLD AUTO: 138 CELLS/UL (ref 15–500)
EOSINOPHIL NFR BLD AUTO: 2.6 %
ERYTHROCYTE [DISTWIDTH] IN BLOOD BY AUTOMATED COUNT: 13.2 % (ref 11–15)
GLOBULIN SER CALC-MCNC: 2.9 G/DL (CALC) (ref 1.9–3.7)
GLUCOSE SERPL-MCNC: 124 MG/DL (ref 65–99)
HCT VFR BLD AUTO: 50.2 % (ref 38.5–50)
HDLC SERPL-MCNC: 30 MG/DL
HGB BLD-MCNC: 17 G/DL (ref 13.2–17.1)
LDLC SERPL CALC-MCNC: 145 MG/DL (CALC)
LYMPHOCYTES # BLD AUTO: 1744 CELLS/UL (ref 850–3900)
LYMPHOCYTES NFR BLD AUTO: 32.9 %
MCH RBC QN AUTO: 32.7 PG (ref 27–33)
MCHC RBC AUTO-ENTMCNC: 33.9 G/DL (ref 32–36)
MCV RBC AUTO: 96.5 FL (ref 80–100)
MONOCYTES # BLD AUTO: 472 CELLS/UL (ref 200–950)
MONOCYTES NFR BLD AUTO: 8.9 %
NEUTROPHILS # BLD AUTO: 2899 CELLS/UL (ref 1500–7800)
NEUTROPHILS NFR BLD AUTO: 54.7 %
NONHDLC SERPL-MCNC: 176 MG/DL (CALC)
PLATELET # BLD AUTO: 159 THOUSAND/UL (ref 140–400)
PMV BLD REES-ECKER: 11.1 FL (ref 7.5–12.5)
POTASSIUM SERPL-SCNC: 5 MMOL/L (ref 3.5–5.3)
PROT SERPL-MCNC: 7.5 G/DL (ref 6.1–8.1)
RBC # BLD AUTO: 5.2 MILLION/UL (ref 4.2–5.8)
SODIUM SERPL-SCNC: 137 MMOL/L (ref 135–146)
T4 FREE SERPL-MCNC: 1.3 NG/DL (ref 0.8–1.8)
TRIGL SERPL-MCNC: 170 MG/DL
TSH SERPL-ACNC: 4.51 MIU/L (ref 0.4–4.5)
WBC # BLD AUTO: 5.3 THOUSAND/UL (ref 3.8–10.8)

## 2021-11-18 RX ORDER — MESALAMINE 0.38 G/1
1.5 CAPSULE, EXTENDED RELEASE ORAL DAILY
Qty: 120 CAPSULE | Refills: 11 | Status: SHIPPED | OUTPATIENT
Start: 2021-11-18 | End: 2022-11-08 | Stop reason: SDUPTHER

## 2022-02-03 ENCOUNTER — PATIENT MESSAGE (OUTPATIENT)
Dept: FAMILY MEDICINE | Facility: CLINIC | Age: 52
End: 2022-02-03
Payer: OTHER GOVERNMENT

## 2022-02-03 DIAGNOSIS — H53.8 BLURRED VISION: Primary | ICD-10-CM

## 2022-03-25 ENCOUNTER — OFFICE VISIT (OUTPATIENT)
Dept: FAMILY MEDICINE | Facility: CLINIC | Age: 52
End: 2022-03-25
Payer: OTHER GOVERNMENT

## 2022-03-25 VITALS
HEIGHT: 71 IN | BODY MASS INDEX: 39.62 KG/M2 | TEMPERATURE: 98 F | SYSTOLIC BLOOD PRESSURE: 120 MMHG | HEART RATE: 84 BPM | DIASTOLIC BLOOD PRESSURE: 66 MMHG | OXYGEN SATURATION: 95 % | WEIGHT: 283 LBS

## 2022-03-25 DIAGNOSIS — J01.40 ACUTE NON-RECURRENT PANSINUSITIS: Primary | ICD-10-CM

## 2022-03-25 PROCEDURE — 99213 OFFICE O/P EST LOW 20 MIN: CPT | Mod: 25,S$GLB,, | Performed by: NURSE PRACTITIONER

## 2022-03-25 PROCEDURE — 99213 PR OFFICE/OUTPT VISIT, EST, LEVL III, 20-29 MIN: ICD-10-PCS | Mod: 25,S$GLB,, | Performed by: NURSE PRACTITIONER

## 2022-03-25 PROCEDURE — 96372 THER/PROPH/DIAG INJ SC/IM: CPT | Mod: S$GLB,,, | Performed by: NURSE PRACTITIONER

## 2022-03-25 PROCEDURE — 96372 PR INJECTION,THERAP/PROPH/DIAG2ST, IM OR SUBCUT: ICD-10-PCS | Mod: S$GLB,,, | Performed by: NURSE PRACTITIONER

## 2022-03-25 RX ORDER — DEXAMETHASONE SODIUM PHOSPHATE 4 MG/ML
8 INJECTION, SOLUTION INTRA-ARTICULAR; INTRALESIONAL; INTRAMUSCULAR; INTRAVENOUS; SOFT TISSUE ONCE
Status: COMPLETED | OUTPATIENT
Start: 2022-03-25 | End: 2022-03-25

## 2022-03-25 RX ORDER — AZITHROMYCIN 250 MG/1
TABLET, FILM COATED ORAL
Qty: 6 TABLET | Refills: 0 | Status: SHIPPED | OUTPATIENT
Start: 2022-03-25 | End: 2022-03-30

## 2022-03-25 RX ADMIN — DEXAMETHASONE SODIUM PHOSPHATE 8 MG: 4 INJECTION, SOLUTION INTRA-ARTICULAR; INTRALESIONAL; INTRAMUSCULAR; INTRAVENOUS; SOFT TISSUE at 11:03

## 2022-03-25 NOTE — PROGRESS NOTES
SUBJECTIVE:    Patient ID: Isidoro Singh is a 52 y.o. male.    Chief Complaint: Chest Congestion (Nasal, cough all clear mucus and sputum since Wednesday, muscle aches and chiolls last night no fever, has taken mucinex and sudafed, no bottles// SW)    Pt presents with c/o sinus symptoms. Started on Wednesday with cinus congestion, sore throat, chills, body aches, and cough that is worse at night. No fever. Taking Sudafed during the day and Mucinex at night. Using Flonase in the morning. Wife also has similar illness.         Office Visit on 11/09/2021   Component Date Value Ref Range Status    TSH w/reflex to FT4 11/09/2021 4.51 (A) 0.40 - 4.50 mIU/L Final    T4, Free 11/09/2021 1.3  0.8 - 1.8 ng/dL Final    WBC 11/09/2021 5.3  3.8 - 10.8 Thousand/uL Final    RBC 11/09/2021 5.20  4.20 - 5.80 Million/uL Final    Hemoglobin 11/09/2021 17.0  13.2 - 17.1 g/dL Final    Hematocrit 11/09/2021 50.2 (A) 38.5 - 50.0 % Final    MCV 11/09/2021 96.5  80.0 - 100.0 fL Final    MCH 11/09/2021 32.7  27.0 - 33.0 pg Final    MCHC 11/09/2021 33.9  32.0 - 36.0 g/dL Final    RDW 11/09/2021 13.2  11.0 - 15.0 % Final    Platelets 11/09/2021 159  140 - 400 Thousand/uL Final    MPV 11/09/2021 11.1  7.5 - 12.5 fL Final    Neutrophils, Abs 11/09/2021 2,899  1,500 - 7,800 cells/uL Final    Lymph # 11/09/2021 1,744  850 - 3,900 cells/uL Final    Mono # 11/09/2021 472  200 - 950 cells/uL Final    Eos # 11/09/2021 138  15 - 500 cells/uL Final    Baso # 11/09/2021 48  0 - 200 cells/uL Final    Neutrophils Relative 11/09/2021 54.7  % Final    Lymph % 11/09/2021 32.9  % Final    Mono % 11/09/2021 8.9  % Final    Eosinophil % 11/09/2021 2.6  % Final    Basophil % 11/09/2021 0.9  % Final    Glucose 11/09/2021 124 (A) 65 - 99 mg/dL Final    BUN 11/09/2021 18  7 - 25 mg/dL Final    Creatinine 11/09/2021 1.04  0.70 - 1.33 mg/dL Final    eGFR if non  11/09/2021 83  > OR = 60 mL/min/1.73m2 Final    eGFR if   11/09/2021 96  > OR = 60 mL/min/1.73m2 Final    BUN/Creatinine Ratio 11/09/2021 NOT APPLICABLE  6 - 22 (calc) Final    Sodium 11/09/2021 137  135 - 146 mmol/L Final    Potassium 11/09/2021 5.0  3.5 - 5.3 mmol/L Final    Chloride 11/09/2021 102  98 - 110 mmol/L Final    CO2 11/09/2021 28  20 - 32 mmol/L Final    Calcium 11/09/2021 9.5  8.6 - 10.3 mg/dL Final    Total Protein 11/09/2021 7.5  6.1 - 8.1 g/dL Final    Albumin 11/09/2021 4.6  3.6 - 5.1 g/dL Final    Globulin, Total 11/09/2021 2.9  1.9 - 3.7 g/dL (calc) Final    Albumin/Globulin Ratio 11/09/2021 1.6  1.0 - 2.5 (calc) Final    Total Bilirubin 11/09/2021 0.5  0.2 - 1.2 mg/dL Final    Alkaline Phosphatase 11/09/2021 54  35 - 144 U/L Final    AST 11/09/2021 25  10 - 35 U/L Final    ALT 11/09/2021 47 (A) 9 - 46 U/L Final    Cholesterol 11/09/2021 206 (A) <200 mg/dL Final    HDL 11/09/2021 30 (A) > OR = 40 mg/dL Final    Triglycerides 11/09/2021 170 (A) <150 mg/dL Final    LDL Cholesterol 11/09/2021 145 (A) mg/dL (calc) Final    HDL/Cholesterol Ratio 11/09/2021 6.9 (A) <5.0 (calc) Final    Non HDL Chol. (LDL+VLDL) 11/09/2021 176 (A) <130 mg/dL (calc) Final       Past Medical History:   Diagnosis Date    Hypothyroid 07/05/2016    Sleep apnea     Thrombocytopenia     Ulcerative colitis      Past Surgical History:   Procedure Laterality Date    COLONOSCOPY N/A 12/4/2020    Procedure: COLONOSCOPY;  Surgeon: Ventura Warren MD;  Location: [facility];  Service: Endoscopy;  Laterality: N/A;    COLONOSCOPY N/A 7/19/2021    Procedure: COLONOSCOPY;  Surgeon: Clarence Ogden MD;  Location: [facility];  Service: Endoscopy;  Laterality: N/A;    FOOT SURGERY      left   cyst removed    HAND SURGERY      right   tendon repair     History reviewed. No pertinent family history.    Marital Status:   Alcohol History:  reports current alcohol use.  Tobacco History:  reports that he has quit smoking. His smoking use included  "cigarettes. He has a 30.00 pack-year smoking history. His smokeless tobacco use includes snuff.  Drug History:  reports no history of drug use.    Review of patient's allergies indicates:  No Known Allergies    Current Outpatient Medications:     azithromycin (Z-KIM) 250 MG tablet, Take 2 tablets by mouth on day 1; Take 1 tablet by mouth on days 2-5, Disp: 6 tablet, Rfl: 0    citalopram (CELEXA) 40 MG tablet, Take 1 tablet (40 mg total) by mouth once daily., Disp: 90 tablet, Rfl: 1    folic acid (FOLVITE) 1 MG tablet, TAKE 1 TABLET(1 MG) BY MOUTH EVERY DAY, Disp: 90 tablet, Rfl: 3    levothyroxine (SYNTHROID, LEVOTHROID) 175 MCG tablet, Take 1 tablet (175 mcg total) by mouth once daily., Disp: 90 tablet, Rfl: 1    mesalamine (APRISO) 0.375 gram Cp24, Take 4 capsules (1.5 g total) by mouth once daily., Disp: 120 capsule, Rfl: 11    omega-3 acid ethyl esters (LOVAZA) 1 gram capsule, Take 1 capsule (1 g total) by mouth 2 (two) times daily., Disp: 180 capsule, Rfl: 3    sildenafiL (VIAGRA) 100 MG tablet, Take 1 tablet (100 mg total) by mouth daily as needed for Erectile Dysfunction., Disp: 30 tablet, Rfl: 0    testosterone cypionate (DEPOTESTOTERONE CYPIONATE) 200 mg/mL injection, Inject into the muscle every 14 (fourteen) days., Disp: , Rfl:     Review of Systems   Constitutional: Positive for chills and fatigue. Negative for activity change and fever.   HENT: Positive for congestion, postnasal drip, sinus pressure and sore throat.    Respiratory: Positive for cough. Negative for chest tightness and shortness of breath.    Cardiovascular: Negative for chest pain and palpitations.   Musculoskeletal: Positive for myalgias.   Neurological: Positive for headaches.          Objective:      Vitals:    03/25/22 1038   BP: 120/66   Pulse: 84   Temp: 98.4 °F (36.9 °C)   SpO2: 95%   Weight: 128.4 kg (283 lb)   Height: 5' 11" (1.803 m)     Body mass index is 39.47 kg/m².  Physical Exam  Constitutional:       Appearance: " Normal appearance.   HENT:      Head: Normocephalic and atraumatic.      Nose: Mucosal edema and congestion present.      Mouth/Throat:      Pharynx: Posterior oropharyngeal erythema present.   Cardiovascular:      Rate and Rhythm: Normal rate.      Heart sounds: No murmur heard.  Pulmonary:      Effort: Pulmonary effort is normal. No respiratory distress.      Breath sounds: Normal breath sounds.   Skin:     General: Skin is warm.   Neurological:      Mental Status: He is alert and oriented to person, place, and time.   Psychiatric:         Mood and Affect: Mood normal.           Assessment:       1. Acute non-recurrent pansinusitis         Plan:       Acute non-recurrent pansinusitis  -     dexamethasone injection 8 mg  -     azithromycin (Z-KIM) 250 MG tablet; Take 2 tablets by mouth on day 1; Take 1 tablet by mouth on days 2-5  Dispense: 6 tablet; Refill: 0    Rest, push fluids. Recommend Robitussin DM at night.    Follow up if symptoms worsen or fail to improve.

## 2022-05-03 ENCOUNTER — PATIENT MESSAGE (OUTPATIENT)
Dept: FAMILY MEDICINE | Facility: CLINIC | Age: 52
End: 2022-05-03

## 2022-05-03 DIAGNOSIS — E29.1 HYPOGONADISM IN MALE: Primary | ICD-10-CM

## 2022-05-10 ENCOUNTER — OFFICE VISIT (OUTPATIENT)
Dept: FAMILY MEDICINE | Facility: CLINIC | Age: 52
End: 2022-05-10
Payer: OTHER GOVERNMENT

## 2022-05-10 VITALS
BODY MASS INDEX: 40.88 KG/M2 | HEIGHT: 71 IN | OXYGEN SATURATION: 96 % | HEART RATE: 80 BPM | SYSTOLIC BLOOD PRESSURE: 130 MMHG | WEIGHT: 292 LBS | DIASTOLIC BLOOD PRESSURE: 80 MMHG

## 2022-05-10 DIAGNOSIS — F41.9 ANXIETY: ICD-10-CM

## 2022-05-10 DIAGNOSIS — E29.1 HYPOGONADISM IN MALE: Primary | ICD-10-CM

## 2022-05-10 DIAGNOSIS — E03.9 HYPOTHYROIDISM (ACQUIRED): ICD-10-CM

## 2022-05-10 DIAGNOSIS — Z79.899 ENCOUNTER FOR LONG-TERM (CURRENT) USE OF OTHER MEDICATIONS: ICD-10-CM

## 2022-05-10 PROCEDURE — 99213 PR OFFICE/OUTPT VISIT, EST, LEVL III, 20-29 MIN: ICD-10-PCS | Mod: S$GLB,,, | Performed by: PHYSICIAN ASSISTANT

## 2022-05-10 PROCEDURE — 99213 OFFICE O/P EST LOW 20 MIN: CPT | Mod: S$GLB,,, | Performed by: PHYSICIAN ASSISTANT

## 2022-05-10 RX ORDER — FINASTERIDE 5 MG/1
5 TABLET, FILM COATED ORAL DAILY
COMMUNITY
End: 2023-05-08

## 2022-05-10 NOTE — PROGRESS NOTES
SUBJECTIVE:    Patient ID: Isidoro Singh is a 52 y.o. male.    Chief Complaint: Follow-up (6 mo f/u//no med bottles//tc)    This is a 52-year-old male who presents today for six-month follow-up. Reports that he has been doing pretty well overall. Does report having to see retina specialist recently. Following up in 3 mos and will recheck. Dr. Townsend follows with the hypogonadal tx. He does report improvement since trying the sildenafil for ED. SE at the 100mg dose. We discussed using the lower dose of 50mg and seeing how he does. Had labs completed in Nov. Will plan to get them again this fall.      No visits with results within 6 Month(s) from this visit.   Latest known visit with results is:   Office Visit on 11/09/2021   Component Date Value Ref Range Status    TSH w/reflex to FT4 11/09/2021 4.51 (A) 0.40 - 4.50 mIU/L Final    T4, Free 11/09/2021 1.3  0.8 - 1.8 ng/dL Final    WBC 11/09/2021 5.3  3.8 - 10.8 Thousand/uL Final    RBC 11/09/2021 5.20  4.20 - 5.80 Million/uL Final    Hemoglobin 11/09/2021 17.0  13.2 - 17.1 g/dL Final    Hematocrit 11/09/2021 50.2 (A) 38.5 - 50.0 % Final    MCV 11/09/2021 96.5  80.0 - 100.0 fL Final    MCH 11/09/2021 32.7  27.0 - 33.0 pg Final    MCHC 11/09/2021 33.9  32.0 - 36.0 g/dL Final    RDW 11/09/2021 13.2  11.0 - 15.0 % Final    Platelets 11/09/2021 159  140 - 400 Thousand/uL Final    MPV 11/09/2021 11.1  7.5 - 12.5 fL Final    Neutrophils, Abs 11/09/2021 2,899  1,500 - 7,800 cells/uL Final    Lymph # 11/09/2021 1,744  850 - 3,900 cells/uL Final    Mono # 11/09/2021 472  200 - 950 cells/uL Final    Eos # 11/09/2021 138  15 - 500 cells/uL Final    Baso # 11/09/2021 48  0 - 200 cells/uL Final    Neutrophils Relative 11/09/2021 54.7  % Final    Lymph % 11/09/2021 32.9  % Final    Mono % 11/09/2021 8.9  % Final    Eosinophil % 11/09/2021 2.6  % Final    Basophil % 11/09/2021 0.9  % Final    Glucose 11/09/2021 124 (A) 65 - 99 mg/dL Final    BUN  11/09/2021 18  7 - 25 mg/dL Final    Creatinine 11/09/2021 1.04  0.70 - 1.33 mg/dL Final    eGFR if non  11/09/2021 83  > OR = 60 mL/min/1.73m2 Final    eGFR if  11/09/2021 96  > OR = 60 mL/min/1.73m2 Final    BUN/Creatinine Ratio 11/09/2021 NOT APPLICABLE  6 - 22 (calc) Final    Sodium 11/09/2021 137  135 - 146 mmol/L Final    Potassium 11/09/2021 5.0  3.5 - 5.3 mmol/L Final    Chloride 11/09/2021 102  98 - 110 mmol/L Final    CO2 11/09/2021 28  20 - 32 mmol/L Final    Calcium 11/09/2021 9.5  8.6 - 10.3 mg/dL Final    Total Protein 11/09/2021 7.5  6.1 - 8.1 g/dL Final    Albumin 11/09/2021 4.6  3.6 - 5.1 g/dL Final    Globulin, Total 11/09/2021 2.9  1.9 - 3.7 g/dL (calc) Final    Albumin/Globulin Ratio 11/09/2021 1.6  1.0 - 2.5 (calc) Final    Total Bilirubin 11/09/2021 0.5  0.2 - 1.2 mg/dL Final    Alkaline Phosphatase 11/09/2021 54  35 - 144 U/L Final    AST 11/09/2021 25  10 - 35 U/L Final    ALT 11/09/2021 47 (A) 9 - 46 U/L Final    Cholesterol 11/09/2021 206 (A) <200 mg/dL Final    HDL 11/09/2021 30 (A) > OR = 40 mg/dL Final    Triglycerides 11/09/2021 170 (A) <150 mg/dL Final    LDL Cholesterol 11/09/2021 145 (A) mg/dL (calc) Final    HDL/Cholesterol Ratio 11/09/2021 6.9 (A) <5.0 (calc) Final    Non HDL Chol. (LDL+VLDL) 11/09/2021 176 (A) <130 mg/dL (calc) Final       Past Medical History:   Diagnosis Date    Hypothyroid 07/05/2016    Sleep apnea     Thrombocytopenia     Ulcerative colitis      Past Surgical History:   Procedure Laterality Date    COLONOSCOPY N/A 12/4/2020    Procedure: COLONOSCOPY;  Surgeon: Ventura Warren MD;  Location: [facility] ENDO;  Service: Endoscopy;  Laterality: N/A;    COLONOSCOPY N/A 7/19/2021    Procedure: COLONOSCOPY;  Surgeon: Clarence Ogden MD;  Location: [facility] ENDO;  Service: Endoscopy;  Laterality: N/A;    FOOT SURGERY      left   cyst removed    HAND SURGERY      right   tendon repair     No family history on  file.    Marital Status:   Alcohol History:  reports current alcohol use.  Tobacco History:  reports that he has quit smoking. His smoking use included cigarettes. He has a 30.00 pack-year smoking history. His smokeless tobacco use includes snuff.  Drug History:  reports no history of drug use.    Review of patient's allergies indicates:  No Known Allergies    Current Outpatient Medications:     citalopram (CELEXA) 40 MG tablet, Take 1 tablet (40 mg total) by mouth once daily., Disp: 90 tablet, Rfl: 1    ergocalciferol (ERGOCALCIFEROL) 50,000 unit Cap, TAKE 1 CAPSULE BY MOUTH EVERY 7 DAYS, Disp: 12 capsule, Rfl: 3    finasteride (PROSCAR) 5 mg tablet, Take 5 mg by mouth once daily., Disp: , Rfl:     folic acid (FOLVITE) 1 MG tablet, TAKE 1 TABLET(1 MG) BY MOUTH EVERY DAY, Disp: 90 tablet, Rfl: 3    levothyroxine (SYNTHROID, LEVOTHROID) 175 MCG tablet, Take 1 tablet (175 mcg total) by mouth once daily., Disp: 90 tablet, Rfl: 1    mesalamine (APRISO) 0.375 gram Cp24, Take 4 capsules (1.5 g total) by mouth once daily., Disp: 120 capsule, Rfl: 11    omega-3 acid ethyl esters (LOVAZA) 1 gram capsule, Take 1 capsule (1 g total) by mouth 2 (two) times daily., Disp: 180 capsule, Rfl: 3    sildenafiL (VIAGRA) 100 MG tablet, Take 1 tablet (100 mg total) by mouth daily as needed for Erectile Dysfunction., Disp: 30 tablet, Rfl: 0    testosterone cypionate (DEPOTESTOTERONE CYPIONATE) 200 mg/mL injection, Inject into the muscle every 14 (fourteen) days., Disp: , Rfl:     Review of Systems   Constitutional: Negative for activity change, fatigue, fever and unexpected weight change.   HENT: Negative for congestion.    Respiratory: Negative for apnea, cough, chest tightness and shortness of breath.    Cardiovascular: Negative for chest pain and palpitations.   Gastrointestinal: Negative for abdominal distention and abdominal pain.   Genitourinary: Negative for difficulty urinating and dysuria.   Musculoskeletal:  "Negative for arthralgias and back pain.   Neurological: Negative for dizziness and weakness.          Objective:      Vitals:    05/10/22 1523   BP: 130/80   Pulse: 80   SpO2: 96%   Weight: 132.5 kg (292 lb)   Height: 5' 11" (1.803 m)     Physical Exam  Vitals and nursing note reviewed.   Constitutional:       General: He is awake. He is not in acute distress.     Appearance: Normal appearance. He is overweight. He is not ill-appearing.   HENT:      Head: Normocephalic and atraumatic.      Right Ear: External ear normal.      Left Ear: External ear normal.      Nose: Nose normal.      Mouth/Throat:      Lips: Pink.      Mouth: Mucous membranes are moist.      Dentition: Normal dentition.      Pharynx: Oropharynx is clear. Uvula midline.      Tonsils: No tonsillar exudate.   Eyes:      General: Vision grossly intact. Gaze aligned appropriately.      Extraocular Movements: Extraocular movements intact.      Conjunctiva/sclera: Conjunctivae normal.   Neck:      Thyroid: No thyroid mass.   Cardiovascular:      Rate and Rhythm: Normal rate and regular rhythm.      Pulses: Normal pulses.           Radial pulses are 2+ on the right side and 2+ on the left side.        Dorsalis pedis pulses are 2+ on the right side and 2+ on the left side.      Heart sounds: Normal heart sounds, S1 normal and S2 normal. No murmur heard.  Pulmonary:      Effort: Pulmonary effort is normal.      Breath sounds: Normal breath sounds and air entry. No wheezing or rales.   Abdominal:      General: Abdomen is flat. Bowel sounds are normal. There is no distension.      Tenderness: There is no abdominal tenderness.   Musculoskeletal:      Right lower leg: No edema.      Left lower leg: No edema.   Skin:     General: Skin is warm and dry.      Capillary Refill: Capillary refill takes less than 2 seconds.   Neurological:      General: No focal deficit present.      Mental Status: He is alert and oriented to person, place, and time.   Psychiatric:    "      Attention and Perception: Attention normal.         Mood and Affect: Mood normal.         Behavior: Behavior is cooperative.           Assessment:       1. Hypogonadism in male    2. Hypothyroidism (acquired)    3. Anxiety    4. Encounter for long-term (current) use of other medications         Plan:       Hypogonadism in male  Comments:  monitored by Dr. Townsend. Checks labs for him annually. Reports that the trestosterone and PSA has been within range. Doing well with sildenafil    Hypothyroidism (acquired)  Comments:  Stable tsh. maintain synthroid dose as is.    Anxiety  Comments:  mood and anxiety all stable at this time. continue as is.    Encounter for long-term (current) use of other medications  -     CBC Auto Differential; Future; Expected date: 05/10/2022  -     Comprehensive Metabolic Panel; Future; Expected date: 05/10/2022  -     Lipid Panel; Future; Expected date: 05/10/2022  -     TSH w/reflex to FT4; Future; Expected date: 05/10/2022  -     Urinalysis, Reflex to Urine Culture Urine, Clean Catch  -     HIV 1/2 Ag/Ab (4th Gen); Future; Expected date: 05/10/2022      Follow up in about 6 months (around 11/10/2022) for labs prior to visit.        5/10/2022 Vinh Noyola PA-C

## 2022-08-22 ENCOUNTER — OCCUPATIONAL HEALTH (OUTPATIENT)
Dept: URGENT CARE | Facility: CLINIC | Age: 52
End: 2022-08-22

## 2022-08-22 DIAGNOSIS — Z02.1 PRE-EMPLOYMENT EXAMINATION: Primary | ICD-10-CM

## 2022-08-22 LAB
COLLECTION ONLY: NORMAL
COLLECTION ONLY: NORMAL
CTP QC/QA: YES
POC 10 PANEL DRUG SCREEN: NEGATIVE

## 2022-08-22 PROCEDURE — 80305 POCT RAPID DRUG SCREEN 10 PANEL: ICD-10-PCS | Mod: S$GLB,,, | Performed by: NURSE PRACTITIONER

## 2022-08-22 PROCEDURE — 99499 UNLISTED E&M SERVICE: CPT | Mod: S$GLB,,, | Performed by: NURSE PRACTITIONER

## 2022-08-22 PROCEDURE — 99499 PHYSICAL, BASIC COMPLEXITY: ICD-10-PCS | Mod: S$GLB,,, | Performed by: NURSE PRACTITIONER

## 2022-08-22 PROCEDURE — 80305 DRUG TEST PRSMV DIR OPT OBS: CPT | Mod: S$GLB,,, | Performed by: NURSE PRACTITIONER

## 2022-10-25 ENCOUNTER — PATIENT MESSAGE (OUTPATIENT)
Dept: FAMILY MEDICINE | Facility: CLINIC | Age: 52
End: 2022-10-25

## 2022-10-25 ENCOUNTER — PATIENT MESSAGE (OUTPATIENT)
Dept: GASTROENTEROLOGY | Facility: CLINIC | Age: 52
End: 2022-10-25
Payer: OTHER GOVERNMENT

## 2022-10-25 RX ORDER — MESALAMINE 1000 MG/1
1000 SUPPOSITORY RECTAL NIGHTLY
Qty: 90 SUPPOSITORY | Refills: 1 | OUTPATIENT
Start: 2022-10-25 | End: 2023-10-25

## 2022-10-26 ENCOUNTER — TELEPHONE (OUTPATIENT)
Dept: FAMILY MEDICINE | Facility: CLINIC | Age: 52
End: 2022-10-26

## 2022-10-26 RX ORDER — FOLIC ACID 1 MG/1
1 TABLET ORAL DAILY
Qty: 90 TABLET | Refills: 3 | Status: SHIPPED | OUTPATIENT
Start: 2022-10-26 | End: 2023-05-08 | Stop reason: SDUPTHER

## 2022-10-26 NOTE — TELEPHONE ENCOUNTER
Call placed to Mr. Singh, offered him an appt on 11/8 at 1030 with CONSTANTINO Velasquez NP. He accepted. No further issues noted.

## 2022-10-26 NOTE — TELEPHONE ENCOUNTER
----- Message from Clarence Ogden MD sent at 10/26/2022  9:46 AM CDT -----  Needs to be seen for routine f/u and refills

## 2022-11-08 ENCOUNTER — LAB VISIT (OUTPATIENT)
Dept: LAB | Facility: HOSPITAL | Age: 52
End: 2022-11-08
Payer: OTHER GOVERNMENT

## 2022-11-08 ENCOUNTER — OFFICE VISIT (OUTPATIENT)
Dept: GASTROENTEROLOGY | Facility: CLINIC | Age: 52
End: 2022-11-08
Payer: OTHER GOVERNMENT

## 2022-11-08 ENCOUNTER — PATIENT MESSAGE (OUTPATIENT)
Dept: GASTROENTEROLOGY | Facility: CLINIC | Age: 52
End: 2022-11-08

## 2022-11-08 VITALS
BODY MASS INDEX: 39.35 KG/M2 | SYSTOLIC BLOOD PRESSURE: 133 MMHG | WEIGHT: 281.06 LBS | HEIGHT: 71 IN | DIASTOLIC BLOOD PRESSURE: 94 MMHG

## 2022-11-08 DIAGNOSIS — K51.911 ULCERATIVE COLITIS WITH RECTAL BLEEDING, UNSPECIFIED LOCATION: ICD-10-CM

## 2022-11-08 DIAGNOSIS — K51.211 ULCERATIVE PROCTITIS WITH RECTAL BLEEDING: Primary | ICD-10-CM

## 2022-11-08 DIAGNOSIS — R14.1 ABDOMINAL GAS PAIN: ICD-10-CM

## 2022-11-08 DIAGNOSIS — R14.0 BLOATING: ICD-10-CM

## 2022-11-08 DIAGNOSIS — K51.911 ULCERATIVE COLITIS WITH RECTAL BLEEDING, UNSPECIFIED LOCATION: Primary | ICD-10-CM

## 2022-11-08 DIAGNOSIS — Z86.010 HISTORY OF COLON POLYPS: ICD-10-CM

## 2022-11-08 DIAGNOSIS — R53.83 FATIGUE, UNSPECIFIED TYPE: ICD-10-CM

## 2022-11-08 DIAGNOSIS — K92.1 HEMATOCHEZIA: ICD-10-CM

## 2022-11-08 DIAGNOSIS — R19.8 IRREGULAR BOWEL HABITS: ICD-10-CM

## 2022-11-08 DIAGNOSIS — K59.00 CONSTIPATION, UNSPECIFIED CONSTIPATION TYPE: ICD-10-CM

## 2022-11-08 DIAGNOSIS — K63.5 DYSPLASTIC POLYP OF COLON: ICD-10-CM

## 2022-11-08 LAB
CRP SERPL-MCNC: 1.8 MG/L (ref 0–8.2)
IGA SERPL-MCNC: 150 MG/DL (ref 40–350)

## 2022-11-08 PROCEDURE — 82784 ASSAY IGA/IGD/IGG/IGM EACH: CPT

## 2022-11-08 PROCEDURE — 99999 PR PBB SHADOW E&M-EST. PATIENT-LVL IV: CPT | Mod: PBBFAC,,,

## 2022-11-08 PROCEDURE — 86140 C-REACTIVE PROTEIN: CPT

## 2022-11-08 PROCEDURE — 86364 TISS TRNSGLTMNASE EA IG CLAS: CPT

## 2022-11-08 PROCEDURE — 99214 OFFICE O/P EST MOD 30 MIN: CPT | Mod: PBBFAC,PN

## 2022-11-08 PROCEDURE — 99999 PR PBB SHADOW E&M-EST. PATIENT-LVL IV: ICD-10-PCS | Mod: PBBFAC,,,

## 2022-11-08 PROCEDURE — 99214 PR OFFICE/OUTPT VISIT, EST, LEVL IV, 30-39 MIN: ICD-10-PCS | Mod: S$PBB,,,

## 2022-11-08 PROCEDURE — 99214 OFFICE O/P EST MOD 30 MIN: CPT | Mod: S$PBB,,,

## 2022-11-08 RX ORDER — MESALAMINE 0.38 G/1
1.5 CAPSULE, EXTENDED RELEASE ORAL DAILY
Qty: 120 CAPSULE | Refills: 2 | Status: SHIPPED | OUTPATIENT
Start: 2022-11-08 | End: 2023-02-06

## 2022-11-08 RX ORDER — MESALAMINE 4 G/60ML
4 SUSPENSION RECTAL NIGHTLY
Qty: 1800 ML | Refills: 2 | Status: SHIPPED | OUTPATIENT
Start: 2022-11-08 | End: 2023-05-08

## 2022-11-08 NOTE — PROGRESS NOTES
Subjective:       Patient ID: Isidoro Singh is a 52 y.o. male Body mass index is 39.2 kg/m².    Chief Complaint: Follow-up    This patient is new to me.  Established patient of Dr. Ogden.     GI Problem  The primary symptoms include weight loss (intentional; reports he has been regularly exercising), fatigue (chronic), abdominal pain (denies currently), diarrhea (resolved) and hematochezia. Primary symptoms do not include fever, nausea, vomiting, melena, hematemesis, jaundice or dysuria.   The abdominal pain began more than 2 days ago (chronic - attributes to gas pain). The abdominal pain has been resolved since its onset. The abdominal pain is located in the RLQ. The abdominal pain does not radiate. The severity of the abdominal pain is 0/10 (denies pain currently; denies specific trigger). The abdominal pain is relieved by passing flatus.   The hematochezia began more than 1 week ago (chronic). Frequency: occurs daily if he does not use Canasa suppositories - small to moderate amount of BRBPR on tissue paper, in toilet bowel, on/in stool with BMs; denies rectal pain or bleeding between BMs. The hematochezia is a chronic problem.   The illness is also significant for bloating and constipation (reports having 3-4 small volume BMs daily to feel empty; rated stool 5 on bristol scale - taking fiber with mild improvement; reports stool changes occur intermittently after eating certain foods like bread and pasta). The illness does not include chills, anorexia, dysphagia or odynophagia. Associated symptoms comments: Colonoscopy 07/19/21 - The examined portion of the ileum was normal. The descending colon, transverse colon and ascending colon are normal. Biopsied. One 5 mm polyp in the sigmoid colon, removed with a cold snare. Erythematous mucosa in the sigmoid colon. Ulcerated mucosa in the rectum. Ulcerated mucosa in the rectum consistent with Banks 3 endoscopic disease activity. History of ulcerative proctitis  "on canasa suppositories nightly for a few months here for repeat colonoscopy but with persistent disease activity; segmental biopsies taken for evaluation; patho: polypoid low-grade dysplasia. Significant associated medical issues include inflammatory bowel disease (H/O ulcerative proctitis originally diagnosed in 2014 - treated with mesalamine orally (discontinued because he thought the medication was ineffective) & Canasa; restarted Canasa suppositories daily to every few days ~2 months ago after "flare"). Associated medical issues do not include GERD, gallstones, liver disease, alcohol abuse, PUD, gastric bypass, bowel resection, irritable bowel syndrome, hemorrhoids or diverticulitis.     Review of Systems   Constitutional:  Positive for fatigue (chronic) and weight loss (intentional; reports he has been regularly exercising). Negative for activity change, appetite change, chills, diaphoresis, fever and unexpected weight change.   HENT:  Negative for sore throat and trouble swallowing.    Respiratory:  Negative for cough, choking and shortness of breath.    Cardiovascular:  Negative for chest pain.   Gastrointestinal:  Positive for abdominal pain (denies currently), anal bleeding, bloating, blood in stool, constipation (reports having 3-4 small volume BMs daily to feel empty; rated stool 5 on bristol scale - taking fiber with mild improvement; reports stool changes occur intermittently after eating certain foods like bread and pasta), diarrhea (resolved) and hematochezia. Negative for abdominal distention, anorexia, dysphagia, hematemesis, jaundice, melena, nausea, rectal pain and vomiting.   Genitourinary:  Negative for dysuria.       No LMP for male patient.  Past Medical History:   Diagnosis Date    Colon polyp     Hypothyroid 07/05/2016    Sleep apnea     Thrombocytopenia     Ulcerative colitis      Past Surgical History:   Procedure Laterality Date    COLONOSCOPY N/A 12/04/2020    Procedure: COLONOSCOPY;  " Surgeon: Ventura Warren MD;  Location: [facility] ENDO;  Service: Endoscopy;  Laterality: N/A;    COLONOSCOPY N/A 07/19/2021    Procedure: COLONOSCOPY;  Surgeon: Clarence Ogden MD;  Location: [facility] ENDO;  Service: Endoscopy;  Laterality: N/A;    FOOT SURGERY      left   cyst removed    HAND SURGERY      right   tendon repair     Family History   Problem Relation Age of Onset    Ulcerative colitis Mother         in 70s    Colon cancer Neg Hx     Colon polyps Neg Hx     Crohn's disease Neg Hx      Social History     Tobacco Use    Smoking status: Former     Packs/day: 1.00     Years: 30.00     Pack years: 30.00     Types: Cigarettes    Smokeless tobacco: Former     Types: Snuff    Tobacco comments:     quit 2013   Substance Use Topics    Alcohol use: Yes     Comment: occasional    Drug use: No     Wt Readings from Last 10 Encounters:   11/08/22 127.5 kg (281 lb 1.4 oz)   05/10/22 132.5 kg (292 lb)   03/25/22 128.4 kg (283 lb)   11/09/21 129.7 kg (286 lb)   07/19/21 127 kg (280 lb)   05/03/21 127 kg (280 lb)   01/25/21 129.9 kg (286 lb 6 oz)   12/04/20 129.3 kg (285 lb)   11/02/20 127.5 kg (281 lb 1.6 oz)   10/02/19 113.2 kg (249 lb 7.2 oz)     Lab Results   Component Value Date    WBC 5.3 11/09/2021    HGB 17.0 11/09/2021    HCT 50.2 (H) 11/09/2021    MCV 96.5 11/09/2021     11/09/2021     CMP  Sodium   Date Value Ref Range Status   11/08/2022 138 135 - 146 mmol/L Final     Potassium   Date Value Ref Range Status   11/08/2022 4.4 3.5 - 5.3 mmol/L Final     Chloride   Date Value Ref Range Status   11/08/2022 101 98 - 110 mmol/L Final     CO2   Date Value Ref Range Status   11/08/2022 28 20 - 32 mmol/L Final     Glucose   Date Value Ref Range Status   11/08/2022 91 65 - 99 mg/dL Final     Comment:                   Fasting reference interval          BUN   Date Value Ref Range Status   11/08/2022 13 7 - 25 mg/dL Final     Creatinine   Date Value Ref Range Status   11/08/2022 0.89 0.70 - 1.30 mg/dL Final      Calcium   Date Value Ref Range Status   11/08/2022 9.7 8.6 - 10.3 mg/dL Final     Total Protein   Date Value Ref Range Status   11/08/2022 7.5 6.1 - 8.1 g/dL Final     Albumin   Date Value Ref Range Status   11/08/2022 4.5 3.6 - 5.1 g/dL Final     Total Bilirubin   Date Value Ref Range Status   11/08/2022 0.6 0.2 - 1.2 mg/dL Final     Alkaline Phosphatase   Date Value Ref Range Status   01/26/2021 53 (L) 55 - 135 U/L Final     AST   Date Value Ref Range Status   11/08/2022 25 10 - 35 U/L Final     ALT   Date Value Ref Range Status   11/08/2022 49 (H) 9 - 46 U/L Final     Anion Gap   Date Value Ref Range Status   01/26/2021 8 8 - 16 mmol/L Final     eGFR if    Date Value Ref Range Status   11/09/2021 96 > OR = 60 mL/min/1.73m2 Final     eGFR if non    Date Value Ref Range Status   11/09/2021 83 > OR = 60 mL/min/1.73m2 Final     Lab Results   Component Value Date    AMYLASE 46 10/06/2017     Lab Results   Component Value Date    LIPASE 35 10/06/2017     Lab Results   Component Value Date    TSH 3.347 04/20/2018       Reviewed prior medical records including radiology report of abdominal US 11/25/14 & endoscopy history (see surgical history).    Objective:      Physical Exam  Vitals and nursing note reviewed.   Constitutional:       General: He is not in acute distress.     Appearance: Normal appearance. He is obese. He is not ill-appearing.   HENT:      Mouth/Throat:      Lips: Pink. No lesions.   Cardiovascular:      Rate and Rhythm: Normal rate and regular rhythm.      Pulses: Normal pulses.   Pulmonary:      Effort: Pulmonary effort is normal. No respiratory distress.      Breath sounds: Normal breath sounds.   Abdominal:      General: Abdomen is protuberant. Bowel sounds are normal. There is no distension or abdominal bruit. There are no signs of injury.      Palpations: Abdomen is soft. There is no shifting dullness, fluid wave, hepatomegaly, splenomegaly or mass.       Tenderness: There is no abdominal tenderness. There is no guarding or rebound. Negative signs include Ruiz's sign, Rovsing's sign and McBurney's sign.      Hernia: No hernia is present.   Skin:     General: Skin is warm and dry.      Coloration: Skin is not jaundiced or pale.   Neurological:      Mental Status: He is alert and oriented to person, place, and time.   Psychiatric:         Attention and Perception: Attention normal.         Mood and Affect: Mood normal.         Speech: Speech normal.         Behavior: Behavior normal.       Assessment:       1. Ulcerative colitis with rectal bleeding, unspecified location    2. Hematochezia    3. Constipation, unspecified constipation type    4. Irregular bowel habits    5. Bloating    6. Abdominal gas pain    7. Fatigue, unspecified type    8. Dysplastic polyp of colon    9. History of colon polyps        Plan:       Discussed plan and case with Dr. Ogden.    Ulcerative colitis with rectal bleeding, unspecified location  - schedule Colonoscopy, discussed procedure with the patient, including risks and benefits, patient verbalized understanding  -CBC & CMP will be completed at [facility] - patient agreed to send results from blood work after collection  -     C-reactive protein; Future; Expected date: 11/08/2022  -     Calprotectin, Stool; Future; Expected date: 11/08/2022  -     TISSUE TRANSGLUTAMINASE (TTG), IGA; Future; Expected date: 11/08/2022  -     IGA; Future; Expected date: 11/08/2022  - START: mesalamine (APRISO) 0.375 gram Cp24; Take 4 capsules (1.5 g total) by mouth once daily.  Dispense: 120 capsule; Refill: 2  -START: mesalamine (ROWASA) 4 gram/60 mL Enem; Place 60 mLs (4 g total) rectally every evening.  Dispense: 1800 mL; Refill: 2  -Stop canasa suppositories after starting rowasa enema    Hematochezia  - schedule Colonoscopy, discussed procedure with the patient, including risks and benefits, patient verbalized understanding  - discussed about  different etiologies that can cause rectal bleeding, such as but not limited to diverticulosis, polyps, colon mass, colon inflammation or infection, anal fissure or hemorrhoids.   - You may resume normal activity as long as you feel well.  - Avoid/minimize NSAIDs such as ibuprofen (Advil, Motrin) and naproxen (Aleve and Naprosyn).  - Avoid alcohol.    Constipation, unspecified constipation type, Bloating, & Abdominal gas pain & Irregular bowel habits  - schedule Colonoscopy, discussed procedure with the patient, including risks and benefits, patient verbalized understanding  -Recommend daily exercise as tolerated, adequate water intake (six 8-oz glasses of water daily), and high fiber diet. OTC fiber supplements are recommended if diet does not reach daily fiber goal (20-30 grams daily), such as Metamucil, Citrucel, or FiberCon (take as directed, separate from other oral medications by >2 hours).  -Recommend taking an OTC stool softener such as Colace as directed to avoid hard stools and straining with bowel movements PRN  -Recommend trying OTC MiraLax once daily (17g PO) as directed  - recommend OTC simethicone as directed, such as Phazyme or Gas-x  - recommend low gas diet: Reduce or eliminate these foods from your diet: Broccoli, Cauliflower, [place] sprouts, Cabbage, Cooked dried beans, Carbonated beverages (sparkling water, soda, beer, champagne)    Fatigue, unspecified type  -Continue with blood work including CBC  -Follow-up with PCP for continued evaluation and management    Dysplastic polyp of colon & History of colon polyps  - schedule Colonoscopy, discussed procedure with the patient, including risks and benefits, patient verbalized understanding    Follow up in about 4 weeks (around 12/6/2022), or if symptoms worsen or fail to improve.      If no improvement in symptoms or symptoms worsen, call/follow-up at clinic or go to ER.        45 minutes of total time spent on the encounter, which includes face  to face time and non-face to face time preparing to see the patient (eg, review of tests), Obtaining and/or reviewing separately obtained history, Documenting clinical information in the electronic or other health record, Independently interpreting results (not separately reported) and communicating results to the patient/family/caregiver, or Care coordination (not separately reported).

## 2022-11-08 NOTE — H&P (VIEW-ONLY)
Subjective:       Patient ID: Isidoro Singh is a 52 y.o. male Body mass index is 39.2 kg/m².    Chief Complaint: Follow-up    This patient is new to me.  Established patient of Dr. Ogden.     GI Problem  The primary symptoms include weight loss (intentional; reports he has been regularly exercising), fatigue (chronic), abdominal pain (denies currently), diarrhea (resolved) and hematochezia. Primary symptoms do not include fever, nausea, vomiting, melena, hematemesis, jaundice or dysuria.   The abdominal pain began more than 2 days ago (chronic - attributes to gas pain). The abdominal pain has been resolved since its onset. The abdominal pain is located in the RLQ. The abdominal pain does not radiate. The severity of the abdominal pain is 0/10 (denies pain currently; denies specific trigger). The abdominal pain is relieved by passing flatus.   The hematochezia began more than 1 week ago (chronic). Frequency: occurs daily if he does not use Canasa suppositories - small to moderate amount of BRBPR on tissue paper, in toilet bowel, on/in stool with BMs; denies rectal pain or bleeding between BMs. The hematochezia is a chronic problem.   The illness is also significant for bloating and constipation (reports having 3-4 small volume BMs daily to feel empty; rated stool 5 on bristol scale - taking fiber with mild improvement; reports stool changes occur intermittently after eating certain foods like bread and pasta). The illness does not include chills, anorexia, dysphagia or odynophagia. Associated symptoms comments: Colonoscopy 07/19/21 - The examined portion of the ileum was normal. The descending colon, transverse colon and ascending colon are normal. Biopsied. One 5 mm polyp in the sigmoid colon, removed with a cold snare. Erythematous mucosa in the sigmoid colon. Ulcerated mucosa in the rectum. Ulcerated mucosa in the rectum consistent with Banks 3 endoscopic disease activity. History of ulcerative proctitis  "on canasa suppositories nightly for a few months here for repeat colonoscopy but with persistent disease activity; segmental biopsies taken for evaluation; patho: polypoid low-grade dysplasia. Significant associated medical issues include inflammatory bowel disease (H/O ulcerative proctitis originally diagnosed in 2014 - treated with mesalamine orally (discontinued because he thought the medication was ineffective) & Canasa; restarted Canasa suppositories daily to every few days ~2 months ago after "flare"). Associated medical issues do not include GERD, gallstones, liver disease, alcohol abuse, PUD, gastric bypass, bowel resection, irritable bowel syndrome, hemorrhoids or diverticulitis.     Review of Systems   Constitutional:  Positive for fatigue (chronic) and weight loss (intentional; reports he has been regularly exercising). Negative for activity change, appetite change, chills, diaphoresis, fever and unexpected weight change.   HENT:  Negative for sore throat and trouble swallowing.    Respiratory:  Negative for cough, choking and shortness of breath.    Cardiovascular:  Negative for chest pain.   Gastrointestinal:  Positive for abdominal pain (denies currently), anal bleeding, bloating, blood in stool, constipation (reports having 3-4 small volume BMs daily to feel empty; rated stool 5 on bristol scale - taking fiber with mild improvement; reports stool changes occur intermittently after eating certain foods like bread and pasta), diarrhea (resolved) and hematochezia. Negative for abdominal distention, anorexia, dysphagia, hematemesis, jaundice, melena, nausea, rectal pain and vomiting.   Genitourinary:  Negative for dysuria.       No LMP for male patient.  Past Medical History:   Diagnosis Date    Colon polyp     Hypothyroid 07/05/2016    Sleep apnea     Thrombocytopenia     Ulcerative colitis      Past Surgical History:   Procedure Laterality Date    COLONOSCOPY N/A 12/04/2020    Procedure: COLONOSCOPY;  " Surgeon: Ventura Warren MD;  Location: [facility] ENDO;  Service: Endoscopy;  Laterality: N/A;    COLONOSCOPY N/A 07/19/2021    Procedure: COLONOSCOPY;  Surgeon: Clarence Ogden MD;  Location: [facility] ENDO;  Service: Endoscopy;  Laterality: N/A;    FOOT SURGERY      left   cyst removed    HAND SURGERY      right   tendon repair     Family History   Problem Relation Age of Onset    Ulcerative colitis Mother         in 70s    Colon cancer Neg Hx     Colon polyps Neg Hx     Crohn's disease Neg Hx      Social History     Tobacco Use    Smoking status: Former     Packs/day: 1.00     Years: 30.00     Pack years: 30.00     Types: Cigarettes    Smokeless tobacco: Former     Types: Snuff    Tobacco comments:     quit 2013   Substance Use Topics    Alcohol use: Yes     Comment: occasional    Drug use: No     Wt Readings from Last 10 Encounters:   11/08/22 127.5 kg (281 lb 1.4 oz)   05/10/22 132.5 kg (292 lb)   03/25/22 128.4 kg (283 lb)   11/09/21 129.7 kg (286 lb)   07/19/21 127 kg (280 lb)   05/03/21 127 kg (280 lb)   01/25/21 129.9 kg (286 lb 6 oz)   12/04/20 129.3 kg (285 lb)   11/02/20 127.5 kg (281 lb 1.6 oz)   10/02/19 113.2 kg (249 lb 7.2 oz)     Lab Results   Component Value Date    WBC 5.3 11/09/2021    HGB 17.0 11/09/2021    HCT 50.2 (H) 11/09/2021    MCV 96.5 11/09/2021     11/09/2021     CMP  Sodium   Date Value Ref Range Status   11/08/2022 138 135 - 146 mmol/L Final     Potassium   Date Value Ref Range Status   11/08/2022 4.4 3.5 - 5.3 mmol/L Final     Chloride   Date Value Ref Range Status   11/08/2022 101 98 - 110 mmol/L Final     CO2   Date Value Ref Range Status   11/08/2022 28 20 - 32 mmol/L Final     Glucose   Date Value Ref Range Status   11/08/2022 91 65 - 99 mg/dL Final     Comment:                   Fasting reference interval          BUN   Date Value Ref Range Status   11/08/2022 13 7 - 25 mg/dL Final     Creatinine   Date Value Ref Range Status   11/08/2022 0.89 0.70 - 1.30 mg/dL Final      Calcium   Date Value Ref Range Status   11/08/2022 9.7 8.6 - 10.3 mg/dL Final     Total Protein   Date Value Ref Range Status   11/08/2022 7.5 6.1 - 8.1 g/dL Final     Albumin   Date Value Ref Range Status   11/08/2022 4.5 3.6 - 5.1 g/dL Final     Total Bilirubin   Date Value Ref Range Status   11/08/2022 0.6 0.2 - 1.2 mg/dL Final     Alkaline Phosphatase   Date Value Ref Range Status   01/26/2021 53 (L) 55 - 135 U/L Final     AST   Date Value Ref Range Status   11/08/2022 25 10 - 35 U/L Final     ALT   Date Value Ref Range Status   11/08/2022 49 (H) 9 - 46 U/L Final     Anion Gap   Date Value Ref Range Status   01/26/2021 8 8 - 16 mmol/L Final     eGFR if    Date Value Ref Range Status   11/09/2021 96 > OR = 60 mL/min/1.73m2 Final     eGFR if non    Date Value Ref Range Status   11/09/2021 83 > OR = 60 mL/min/1.73m2 Final     Lab Results   Component Value Date    AMYLASE 46 10/06/2017     Lab Results   Component Value Date    LIPASE 35 10/06/2017     Lab Results   Component Value Date    TSH 3.347 04/20/2018       Reviewed prior medical records including radiology report of abdominal US 11/25/14 & endoscopy history (see surgical history).    Objective:      Physical Exam  Vitals and nursing note reviewed.   Constitutional:       General: He is not in acute distress.     Appearance: Normal appearance. He is obese. He is not ill-appearing.   HENT:      Mouth/Throat:      Lips: Pink. No lesions.   Cardiovascular:      Rate and Rhythm: Normal rate and regular rhythm.      Pulses: Normal pulses.   Pulmonary:      Effort: Pulmonary effort is normal. No respiratory distress.      Breath sounds: Normal breath sounds.   Abdominal:      General: Abdomen is protuberant. Bowel sounds are normal. There is no distension or abdominal bruit. There are no signs of injury.      Palpations: Abdomen is soft. There is no shifting dullness, fluid wave, hepatomegaly, splenomegaly or mass.       Tenderness: There is no abdominal tenderness. There is no guarding or rebound. Negative signs include Ruiz's sign, Rovsing's sign and McBurney's sign.      Hernia: No hernia is present.   Skin:     General: Skin is warm and dry.      Coloration: Skin is not jaundiced or pale.   Neurological:      Mental Status: He is alert and oriented to person, place, and time.   Psychiatric:         Attention and Perception: Attention normal.         Mood and Affect: Mood normal.         Speech: Speech normal.         Behavior: Behavior normal.       Assessment:       1. Ulcerative colitis with rectal bleeding, unspecified location    2. Hematochezia    3. Constipation, unspecified constipation type    4. Irregular bowel habits    5. Bloating    6. Abdominal gas pain    7. Fatigue, unspecified type    8. Dysplastic polyp of colon    9. History of colon polyps        Plan:       Discussed plan and case with Dr. Ogden.    Ulcerative colitis with rectal bleeding, unspecified location  - schedule Colonoscopy, discussed procedure with the patient, including risks and benefits, patient verbalized understanding  -CBC & CMP will be completed at [facility] - patient agreed to send results from blood work after collection  -     C-reactive protein; Future; Expected date: 11/08/2022  -     Calprotectin, Stool; Future; Expected date: 11/08/2022  -     TISSUE TRANSGLUTAMINASE (TTG), IGA; Future; Expected date: 11/08/2022  -     IGA; Future; Expected date: 11/08/2022  - START: mesalamine (APRISO) 0.375 gram Cp24; Take 4 capsules (1.5 g total) by mouth once daily.  Dispense: 120 capsule; Refill: 2  -START: mesalamine (ROWASA) 4 gram/60 mL Enem; Place 60 mLs (4 g total) rectally every evening.  Dispense: 1800 mL; Refill: 2  -Stop canasa suppositories after starting rowasa enema    Hematochezia  - schedule Colonoscopy, discussed procedure with the patient, including risks and benefits, patient verbalized understanding  - discussed about  different etiologies that can cause rectal bleeding, such as but not limited to diverticulosis, polyps, colon mass, colon inflammation or infection, anal fissure or hemorrhoids.   - You may resume normal activity as long as you feel well.  - Avoid/minimize NSAIDs such as ibuprofen (Advil, Motrin) and naproxen (Aleve and Naprosyn).  - Avoid alcohol.    Constipation, unspecified constipation type, Bloating, & Abdominal gas pain & Irregular bowel habits  - schedule Colonoscopy, discussed procedure with the patient, including risks and benefits, patient verbalized understanding  -Recommend daily exercise as tolerated, adequate water intake (six 8-oz glasses of water daily), and high fiber diet. OTC fiber supplements are recommended if diet does not reach daily fiber goal (20-30 grams daily), such as Metamucil, Citrucel, or FiberCon (take as directed, separate from other oral medications by >2 hours).  -Recommend taking an OTC stool softener such as Colace as directed to avoid hard stools and straining with bowel movements PRN  -Recommend trying OTC MiraLax once daily (17g PO) as directed  - recommend OTC simethicone as directed, such as Phazyme or Gas-x  - recommend low gas diet: Reduce or eliminate these foods from your diet: Broccoli, Cauliflower, [place] sprouts, Cabbage, Cooked dried beans, Carbonated beverages (sparkling water, soda, beer, champagne)    Fatigue, unspecified type  -Continue with blood work including CBC  -Follow-up with PCP for continued evaluation and management    Dysplastic polyp of colon & History of colon polyps  - schedule Colonoscopy, discussed procedure with the patient, including risks and benefits, patient verbalized understanding    Follow up in about 4 weeks (around 12/6/2022), or if symptoms worsen or fail to improve.      If no improvement in symptoms or symptoms worsen, call/follow-up at clinic or go to ER.        45 minutes of total time spent on the encounter, which includes face  to face time and non-face to face time preparing to see the patient (eg, review of tests), Obtaining and/or reviewing separately obtained history, Documenting clinical information in the electronic or other health record, Independently interpreting results (not separately reported) and communicating results to the patient/family/caregiver, or Care coordination (not separately reported).

## 2022-11-09 ENCOUNTER — LAB VISIT (OUTPATIENT)
Dept: LAB | Facility: HOSPITAL | Age: 52
End: 2022-11-09
Payer: OTHER GOVERNMENT

## 2022-11-09 ENCOUNTER — PATIENT MESSAGE (OUTPATIENT)
Dept: GASTROENTEROLOGY | Facility: CLINIC | Age: 52
End: 2022-11-09
Payer: OTHER GOVERNMENT

## 2022-11-09 DIAGNOSIS — K51.911 ULCERATIVE COLITIS WITH RECTAL BLEEDING, UNSPECIFIED LOCATION: ICD-10-CM

## 2022-11-09 LAB
ALBUMIN SERPL-MCNC: 4.5 G/DL (ref 3.6–5.1)
ALBUMIN/GLOB SERPL: 1.5 (CALC) (ref 1–2.5)
ALP SERPL-CCNC: 53 U/L (ref 35–144)
ALT SERPL-CCNC: 49 U/L (ref 9–46)
AST SERPL-CCNC: 25 U/L (ref 10–35)
BASOPHILS # BLD AUTO: 51 CELLS/UL (ref 0–200)
BASOPHILS NFR BLD AUTO: 0.9 %
BILIRUB SERPL-MCNC: 0.6 MG/DL (ref 0.2–1.2)
BUN SERPL-MCNC: 13 MG/DL (ref 7–25)
BUN/CREAT SERPL: ABNORMAL (CALC) (ref 6–22)
CALCIUM SERPL-MCNC: 9.7 MG/DL (ref 8.6–10.3)
CHLORIDE SERPL-SCNC: 101 MMOL/L (ref 98–110)
CHOLEST SERPL-MCNC: 187 MG/DL
CHOLEST/HDLC SERPL: 5.5 (CALC)
CO2 SERPL-SCNC: 28 MMOL/L (ref 20–32)
CREAT SERPL-MCNC: 0.89 MG/DL (ref 0.7–1.3)
EGFR: 103 ML/MIN/1.73M2
EOSINOPHIL # BLD AUTO: 103 CELLS/UL (ref 15–500)
EOSINOPHIL NFR BLD AUTO: 1.8 %
ERYTHROCYTE [DISTWIDTH] IN BLOOD BY AUTOMATED COUNT: 12.4 % (ref 11–15)
GLOBULIN SER CALC-MCNC: 3 G/DL (CALC) (ref 1.9–3.7)
GLUCOSE SERPL-MCNC: 91 MG/DL (ref 65–99)
HCT VFR BLD AUTO: 47.6 % (ref 38.5–50)
HDLC SERPL-MCNC: 34 MG/DL
HGB BLD-MCNC: 16.3 G/DL (ref 13.2–17.1)
HIV 1+2 AB+HIV1 P24 AG SERPL QL IA: NORMAL
LDLC SERPL CALC-MCNC: 127 MG/DL (CALC)
LYMPHOCYTES # BLD AUTO: 2246 CELLS/UL (ref 850–3900)
LYMPHOCYTES NFR BLD AUTO: 39.4 %
MCH RBC QN AUTO: 32.5 PG (ref 27–33)
MCHC RBC AUTO-ENTMCNC: 34.2 G/DL (ref 32–36)
MCV RBC AUTO: 94.8 FL (ref 80–100)
MONOCYTES # BLD AUTO: 422 CELLS/UL (ref 200–950)
MONOCYTES NFR BLD AUTO: 7.4 %
NEUTROPHILS # BLD AUTO: 2879 CELLS/UL (ref 1500–7800)
NEUTROPHILS NFR BLD AUTO: 50.5 %
NONHDLC SERPL-MCNC: 153 MG/DL (CALC)
PLATELET # BLD AUTO: 165 THOUSAND/UL (ref 140–400)
PMV BLD REES-ECKER: 11.2 FL (ref 7.5–12.5)
POTASSIUM SERPL-SCNC: 4.4 MMOL/L (ref 3.5–5.3)
PROT SERPL-MCNC: 7.5 G/DL (ref 6.1–8.1)
RBC # BLD AUTO: 5.02 MILLION/UL (ref 4.2–5.8)
SODIUM SERPL-SCNC: 138 MMOL/L (ref 135–146)
TRIGL SERPL-MCNC: 138 MG/DL
TSH SERPL-ACNC: 3.2 MIU/L (ref 0.4–4.5)
WBC # BLD AUTO: 5.7 THOUSAND/UL (ref 3.8–10.8)

## 2022-11-09 PROCEDURE — 83993 ASSAY FOR CALPROTECTIN FECAL: CPT

## 2022-11-10 ENCOUNTER — ANESTHESIA (OUTPATIENT)
Dept: ENDOSCOPY | Facility: HOSPITAL | Age: 52
End: 2022-11-10
Payer: OTHER GOVERNMENT

## 2022-11-10 ENCOUNTER — HOSPITAL ENCOUNTER (OUTPATIENT)
Facility: HOSPITAL | Age: 52
Discharge: HOME | End: 2022-11-10
Attending: INTERNAL MEDICINE | Admitting: INTERNAL MEDICINE
Payer: OTHER GOVERNMENT

## 2022-11-10 ENCOUNTER — ANESTHESIA EVENT (OUTPATIENT)
Dept: ENDOSCOPY | Facility: HOSPITAL | Age: 52
End: 2022-11-10
Payer: OTHER GOVERNMENT

## 2022-11-10 ENCOUNTER — OFFICE VISIT (OUTPATIENT)
Dept: FAMILY MEDICINE | Facility: CLINIC | Age: 52
End: 2022-11-10
Payer: OTHER GOVERNMENT

## 2022-11-10 VITALS
HEIGHT: 71 IN | DIASTOLIC BLOOD PRESSURE: 77 MMHG | HEART RATE: 65 BPM | BODY MASS INDEX: 39.48 KG/M2 | WEIGHT: 282 LBS | SYSTOLIC BLOOD PRESSURE: 125 MMHG

## 2022-11-10 VITALS
BODY MASS INDEX: 39.48 KG/M2 | RESPIRATION RATE: 16 BRPM | HEIGHT: 71 IN | TEMPERATURE: 98 F | DIASTOLIC BLOOD PRESSURE: 82 MMHG | SYSTOLIC BLOOD PRESSURE: 121 MMHG | OXYGEN SATURATION: 93 % | HEART RATE: 64 BPM | WEIGHT: 282 LBS

## 2022-11-10 DIAGNOSIS — K51.20 ULCERATIVE PROCTITIS: ICD-10-CM

## 2022-11-10 DIAGNOSIS — E78.1 HYPERTRIGLYCERIDEMIA: ICD-10-CM

## 2022-11-10 DIAGNOSIS — F41.9 ANXIETY: ICD-10-CM

## 2022-11-10 DIAGNOSIS — E29.1 HYPOGONADISM IN MALE: ICD-10-CM

## 2022-11-10 DIAGNOSIS — K51.911 ULCERATIVE COLITIS WITH RECTAL BLEEDING, UNSPECIFIED LOCATION: Primary | ICD-10-CM

## 2022-11-10 DIAGNOSIS — K51.211 ULCERATIVE PROCTITIS WITH RECTAL BLEEDING: Primary | ICD-10-CM

## 2022-11-10 PROCEDURE — 99396 PR PREVENTIVE VISIT,EST,40-64: ICD-10-PCS | Mod: S$GLB,,, | Performed by: PHYSICIAN ASSISTANT

## 2022-11-10 PROCEDURE — 88342 CHG IMMUNOCYTOCHEMISTRY: ICD-10-PCS | Mod: 26,,, | Performed by: PATHOLOGY

## 2022-11-10 PROCEDURE — 88305 TISSUE EXAM BY PATHOLOGIST: CPT | Mod: 26,,, | Performed by: PATHOLOGY

## 2022-11-10 PROCEDURE — D9220A PRA ANESTHESIA: ICD-10-PCS | Mod: CRNA,,, | Performed by: STUDENT IN AN ORGANIZED HEALTH CARE EDUCATION/TRAINING PROGRAM

## 2022-11-10 PROCEDURE — 37000008 HC ANESTHESIA 1ST 15 MINUTES: Performed by: INTERNAL MEDICINE

## 2022-11-10 PROCEDURE — 45380 COLONOSCOPY AND BIOPSY: CPT | Performed by: INTERNAL MEDICINE

## 2022-11-10 PROCEDURE — D9220A PRA ANESTHESIA: Mod: ANES,,, | Performed by: ANESTHESIOLOGY

## 2022-11-10 PROCEDURE — 99396 PREV VISIT EST AGE 40-64: CPT | Mod: S$GLB,,, | Performed by: PHYSICIAN ASSISTANT

## 2022-11-10 PROCEDURE — 45380 COLONOSCOPY AND BIOPSY: CPT | Mod: ,,, | Performed by: INTERNAL MEDICINE

## 2022-11-10 PROCEDURE — 88305 TISSUE EXAM BY PATHOLOGIST: CPT | Performed by: PATHOLOGY

## 2022-11-10 PROCEDURE — 37000009 HC ANESTHESIA EA ADD 15 MINS: Performed by: INTERNAL MEDICINE

## 2022-11-10 PROCEDURE — 27201012 HC FORCEPS, HOT/COLD, DISP: Performed by: INTERNAL MEDICINE

## 2022-11-10 PROCEDURE — 63600175 PHARM REV CODE 636 W HCPCS: Performed by: STUDENT IN AN ORGANIZED HEALTH CARE EDUCATION/TRAINING PROGRAM

## 2022-11-10 PROCEDURE — 25000003 PHARM REV CODE 250: Performed by: STUDENT IN AN ORGANIZED HEALTH CARE EDUCATION/TRAINING PROGRAM

## 2022-11-10 PROCEDURE — 25000003 PHARM REV CODE 250: Performed by: INTERNAL MEDICINE

## 2022-11-10 PROCEDURE — 88342 IMHCHEM/IMCYTCHM 1ST ANTB: CPT | Mod: 26,,, | Performed by: PATHOLOGY

## 2022-11-10 PROCEDURE — D9220A PRA ANESTHESIA: Mod: CRNA,,, | Performed by: STUDENT IN AN ORGANIZED HEALTH CARE EDUCATION/TRAINING PROGRAM

## 2022-11-10 PROCEDURE — 88305 TISSUE EXAM BY PATHOLOGIST: ICD-10-PCS | Mod: 26,,, | Performed by: PATHOLOGY

## 2022-11-10 PROCEDURE — 45380 PR COLONOSCOPY,BIOPSY: ICD-10-PCS | Mod: ,,, | Performed by: INTERNAL MEDICINE

## 2022-11-10 PROCEDURE — 88342 IMHCHEM/IMCYTCHM 1ST ANTB: CPT | Performed by: PATHOLOGY

## 2022-11-10 PROCEDURE — D9220A PRA ANESTHESIA: ICD-10-PCS | Mod: ANES,,, | Performed by: ANESTHESIOLOGY

## 2022-11-10 RX ORDER — OMEGA-3-ACID ETHYL ESTERS 1 G/1
1 CAPSULE, LIQUID FILLED ORAL 2 TIMES DAILY
Qty: 180 CAPSULE | Refills: 3 | Status: SHIPPED | OUTPATIENT
Start: 2022-11-10 | End: 2023-05-08 | Stop reason: SDUPTHER

## 2022-11-10 RX ORDER — SODIUM CHLORIDE 9 MG/ML
INJECTION, SOLUTION INTRAVENOUS CONTINUOUS
Status: DISCONTINUED | OUTPATIENT
Start: 2022-11-10 | End: 2022-11-10 | Stop reason: HOSPADM

## 2022-11-10 RX ORDER — CITALOPRAM 40 MG/1
40 TABLET, FILM COATED ORAL DAILY
Qty: 90 TABLET | Refills: 1 | Status: SHIPPED | OUTPATIENT
Start: 2022-11-10 | End: 2023-05-08 | Stop reason: SDUPTHER

## 2022-11-10 RX ORDER — PROPOFOL 10 MG/ML
VIAL (ML) INTRAVENOUS
Status: DISCONTINUED | OUTPATIENT
Start: 2022-11-10 | End: 2022-11-10

## 2022-11-10 RX ORDER — SILDENAFIL 100 MG/1
100 TABLET, FILM COATED ORAL DAILY PRN
Qty: 10 TABLET | Refills: 6 | Status: SHIPPED | OUTPATIENT
Start: 2022-11-10 | End: 2023-04-14 | Stop reason: SDUPTHER

## 2022-11-10 RX ORDER — LIDOCAINE HYDROCHLORIDE 20 MG/ML
INJECTION INTRAVENOUS
Status: DISCONTINUED | OUTPATIENT
Start: 2022-11-10 | End: 2022-11-10

## 2022-11-10 RX ADMIN — SODIUM CHLORIDE: 0.9 INJECTION, SOLUTION INTRAVENOUS at 10:11

## 2022-11-10 RX ADMIN — PROPOFOL 20 MG: 10 INJECTION, EMULSION INTRAVENOUS at 10:11

## 2022-11-10 RX ADMIN — PROPOFOL 100 MG: 10 INJECTION, EMULSION INTRAVENOUS at 10:11

## 2022-11-10 RX ADMIN — LIDOCAINE HYDROCHLORIDE 100 MG: 20 INJECTION, SOLUTION INTRAVENOUS at 10:11

## 2022-11-10 NOTE — ANESTHESIA POSTPROCEDURE EVALUATION
Anesthesia Post Evaluation    Patient: Isidoro Singh    Procedure(s) Performed: Procedure(s) (LRB):  COLONOSCOPY (N/A)    Final Anesthesia Type: general      Patient location during evaluation: PACU  Patient participation: Yes- Able to Participate  Level of consciousness: awake and alert  Post-procedure vital signs: reviewed and stable  Pain management: adequate  Airway patency: patent    PONV status at discharge: No PONV  Anesthetic complications: no      Cardiovascular status: hemodynamically stable  Respiratory status: unassisted and room air  Hydration status: euvolemic  Follow-up not needed.          Vitals Value Taken Time   /82 11/10/22 1126     11/10/22 1217   Pulse 64 11/10/22 1126   Resp 20 11/10/22 1217   SpO2 93 % 11/10/22 1126         Event Time   Out of Recovery 11:54:00         Pain/Flores Score: No data recorded

## 2022-11-10 NOTE — INTERVAL H&P NOTE
3 try for prior auth    I did let pt know about free resources but that is more effort to access. so we can try that if still denied after prior auth    Thank you  Eliane Quinn MD   The patient has been examined and the H&P has been reviewed:    I concur with the findings and no changes have occurred since H&P was written.    Anesthesia/Surgery risks, benefits and alternative options discussed and understood by patient/family.          There are no hospital problems to display for this patient.

## 2022-11-10 NOTE — PLAN OF CARE
Discharged home with spouse post explanation by Dr. Hernandez. All valuables were returned, IV removed, denies any needs.

## 2022-11-10 NOTE — PROGRESS NOTES
SUBJECTIVE:    Patient ID: Isidoro Singh is a 52 y.o. male.    Chief Complaint: Follow-up (6 mo f/u//no med bottles//declined flu vac//tc)    This is a 52-year-old male who presents today for 6 month follow-up.  Treated for hypothyroid, sleep apnea and ulcerative colitis.  Does have colonoscopy scheduled for today.  Recent labs completed for me are all stable.  Patient reports Bad flare up of UC about a month ago. Still on mesalamine. Was considering a medicated enema. Just hoping to get better control.      Lab Visit on 11/08/2022   Component Date Value Ref Range Status    CRP 11/08/2022 1.8  0.0 - 8.2 mg/L Final    IgA 11/08/2022 150  40 - 350 mg/dL Final   Occupational Health on 08/22/2022   Component Date Value Ref Range Status    POC 10 Panel Drug Screen 08/22/2022 Negative  Negative Final     Acceptable 08/22/2022 Yes   Final       Past Medical History:   Diagnosis Date    Colon polyp     Hypothyroid 07/05/2016    Sleep apnea     Thrombocytopenia     Ulcerative colitis      Past Surgical History:   Procedure Laterality Date    COLONOSCOPY N/A 12/04/2020    Procedure: COLONOSCOPY;  Surgeon: Ventura Warren MD;  Location: [facility] ENDO;  Service: Endoscopy;  Laterality: N/A;    COLONOSCOPY N/A 07/19/2021    Procedure: COLONOSCOPY;  Surgeon: Clarence Ogden MD;  Location: [facility];  Service: Endoscopy;  Laterality: N/A;    FOOT SURGERY      left   cyst removed    HAND SURGERY      right   tendon repair     Family History   Problem Relation Age of Onset    Ulcerative colitis Mother         in 70s    Colon cancer Neg Hx     Colon polyps Neg Hx     Crohn's disease Neg Hx        Marital Status:   Alcohol History:  reports current alcohol use.  Tobacco History:  reports that he has quit smoking. His smoking use included cigarettes. He has a 30.00 pack-year smoking history. He has quit using smokeless tobacco.  His smokeless tobacco use included snuff.  Drug History:  reports no history of  drug use.    Review of patient's allergies indicates:  No Known Allergies    Current Outpatient Medications:     citalopram (CELEXA) 40 MG tablet, Take 1 tablet (40 mg total) by mouth once daily., Disp: 90 tablet, Rfl: 1    ergocalciferol (ERGOCALCIFEROL) 50,000 unit Cap, TAKE 1 CAPSULE BY MOUTH EVERY 7 DAYS, Disp: 12 capsule, Rfl: 3    finasteride (PROSCAR) 5 mg tablet, Take 5 mg by mouth once daily., Disp: , Rfl:     folic acid (FOLVITE) 1 MG tablet, Take 1 tablet (1 mg total) by mouth once daily., Disp: 90 tablet, Rfl: 3    levothyroxine (SYNTHROID, LEVOTHROID) 175 MCG tablet, Take 1 tablet (175 mcg total) by mouth once daily., Disp: 90 tablet, Rfl: 1    mesalamine (APRISO) 0.375 gram Cp24, Take 4 capsules (1.5 g total) by mouth once daily., Disp: 120 capsule, Rfl: 2    mesalamine (ROWASA) 4 gram/60 mL Enem, Place 60 mLs (4 g total) rectally every evening., Disp: 1800 mL, Rfl: 2    omega-3 acid ethyl esters (LOVAZA) 1 gram capsule, Take 1 capsule (1 g total) by mouth 2 (two) times daily., Disp: 180 capsule, Rfl: 3    sildenafiL (VIAGRA) 100 MG tablet, Take 1 tablet (100 mg total) by mouth daily as needed for Erectile Dysfunction., Disp: 10 tablet, Rfl: 6    testosterone cypionate (DEPOTESTOTERONE CYPIONATE) 200 mg/mL injection, Inject into the muscle every 14 (fourteen) days., Disp: , Rfl:     Review of Systems   Constitutional:  Negative for activity change, fatigue, fever and unexpected weight change.   HENT:  Negative for congestion.    Respiratory:  Negative for apnea, cough, chest tightness and shortness of breath.    Cardiovascular:  Negative for chest pain and palpitations.   Gastrointestinal:  Positive for abdominal pain, blood in stool (with UC flares) and diarrhea. Negative for abdominal distention.   Genitourinary:  Negative for difficulty urinating and dysuria.   Musculoskeletal:  Negative for arthralgias and back pain.   Neurological:  Negative for dizziness and weakness.        Objective:     "  Vitals:    11/10/22 0831   BP: 125/77   Pulse: 65   Weight: 127.9 kg (282 lb)   Height: 5' 11" (1.803 m)     Physical Exam  Vitals and nursing note reviewed.   Constitutional:       General: He is awake. He is not in acute distress.     Appearance: Normal appearance. He is overweight. He is not ill-appearing.   HENT:      Head: Normocephalic and atraumatic.      Right Ear: External ear normal.      Left Ear: External ear normal.      Nose: Nose normal.      Mouth/Throat:      Lips: Pink.      Mouth: Mucous membranes are moist.      Dentition: Normal dentition.      Pharynx: Oropharynx is clear. Uvula midline.      Tonsils: No tonsillar exudate.   Eyes:      General: Vision grossly intact. Gaze aligned appropriately.      Extraocular Movements: Extraocular movements intact.      Conjunctiva/sclera: Conjunctivae normal.   Neck:      Thyroid: No thyroid mass.   Cardiovascular:      Rate and Rhythm: Normal rate and regular rhythm.      Pulses: Normal pulses.           Radial pulses are 2+ on the right side and 2+ on the left side.        Dorsalis pedis pulses are 2+ on the right side and 2+ on the left side.      Heart sounds: Normal heart sounds, S1 normal and S2 normal. No murmur heard.  Pulmonary:      Effort: Pulmonary effort is normal.      Breath sounds: Normal breath sounds and air entry. No wheezing or rales.   Abdominal:      General: Abdomen is flat. Bowel sounds are normal. There is no distension.      Tenderness: There is no abdominal tenderness.   Musculoskeletal:      Right lower leg: No edema.      Left lower leg: No edema.   Skin:     General: Skin is warm and dry.      Capillary Refill: Capillary refill takes less than 2 seconds.   Neurological:      General: No focal deficit present.      Mental Status: He is alert and oriented to person, place, and time.   Psychiatric:         Attention and Perception: Attention normal.         Mood and Affect: Mood normal.         Behavior: Behavior is cooperative. "         Assessment:       1. Ulcerative colitis with rectal bleeding, unspecified location    2. Hypogonadism in male    3. Anxiety    4. Hypertriglyceridemia         Plan:       Ulcerative colitis with rectal bleeding, unspecified location  Comments:  planning for c-scope today. Hope to get better control.    Hypogonadism in male  -     sildenafiL (VIAGRA) 100 MG tablet; Take 1 tablet (100 mg total) by mouth daily as needed for Erectile Dysfunction.  Dispense: 10 tablet; Refill: 6    Anxiety  Comments:  Increase from 20 to 40 mg daily, mood and anxiety is doing better. maintain as is  Orders:  -     citalopram (CELEXA) 40 MG tablet; Take 1 tablet (40 mg total) by mouth once daily.  Dispense: 90 tablet; Refill: 1    Hypertriglyceridemia  Comments:  refills as needed. lipid panel looks good.  Orders:  -     omega-3 acid ethyl esters (LOVAZA) 1 gram capsule; Take 1 capsule (1 g total) by mouth 2 (two) times daily.  Dispense: 180 capsule; Refill: 3    Follow up in about 6 months (around 5/10/2023) for followup.        11/10/2022 Vinh Noyola PA-C

## 2022-11-10 NOTE — PROVATION PATIENT INSTRUCTIONS
Discharge Summary/Instructions after an Endoscopic Procedure  Patient Name: Isidoro Singh  Patient MRN: [id number]  Patient YOB: 1970  Thursday, November 10, 2022  Kath Hernandez MD  Dear patient,  As a result of recent federal legislation (The Federal Cures Act), you may   receive lab or pathology results from your procedure in your [facility]   account before your physician is able to contact you. Your physician or   their representative will relay the results to you with their   recommendations at their soonest availability.  Thank you,  RESTRICTIONS:  During your procedure today, you received medications for sedation.  These   medications may affect your judgment, balance and coordination.  Therefore,   for 24 hours, you have the following restrictions:   - DO NOT drive a car, operate machinery, make legal/financial decisions,   sign important papers or drink alcohol.    ACTIVITY:  Today: no heavy lifting, straining or running due to procedural   sedation/anesthesia.  The following day: return to full activity including work.  DIET:  Eat and drink normally unless instructed otherwise.     TREATMENT FOR COMMON SIDE EFFECTS:  - Mild abdominal pain, nausea, belching, bloating or excessive gas:  rest,   eat lightly and use a heating pad.  - Sore Throat: treat with throat lozenges and/or gargle with warm salt   water.  - Because air was used during the procedure, expelling large amounts of air   from your rectum or belching is normal.  - If a bowel prep was taken, you may not have a bowel movement for 1-3 days.    This is normal.  SYMPTOMS TO WATCH FOR AND REPORT TO YOUR PHYSICIAN:  1. Abdominal pain or bloating, other than gas cramps.  2. Chest pain.  3. Back pain.  4. Signs of infection such as: chills or fever occurring within 24 hours   after the procedure.  5. Rectal bleeding, which would show as bright red, maroon, or black stools.   (A tablespoon of blood from the rectum is not serious,  especially if   hemorrhoids are present.)  6. Vomiting.  7. Weakness or dizziness.  GO DIRECTLY TO THE NEAREST EMERGENCY ROOM IF YOU HAVE ANY OF THE FOLLOWING:      Difficulty breathing              Chills and/or fever over 101 F   Persistent vomiting and/or vomiting blood   Severe abdominal pain   Severe chest pain   Black, tarry stools   Bleeding- more than one tablespoon   Any other symptom or condition that you feel may need urgent attention  Your doctor recommends these additional instructions:  If any biopsies were taken, your doctors clinic will contact you in 1 to 2   weeks with any results.  - Discharge patient to home (with escort).   - Patient has a contact number available for emergencies.  The signs and   symptoms of potential delayed complications were discussed with the   patient.  Return to normal activities tomorrow.  Written discharge   instructions were provided to the patient.   - Resume previous diet.   - Continue present medications.   - Await pathology results.   - Flexible sigmoidoscopy in 3 months to evaluate the response to therapy.   -Restart oral Mesalamine and begin nightly Rowasa enemas   - Return to my office PRN.  For questions, problems or results please call your physician - Kath Hernandez MD at Work:  [phone].  [facility], EMERGENCY ROOM PHONE NUMBER: [phone]  IF A COMPLICATION OR EMERGENCY SITUATION ARISES AND YOU ARE UNABLE TO REACH   YOUR PHYSICIAN - GO DIRECTLY TO THE EMERGENCY ROOM.  Kath Hernandez MD  11/10/2022 11:19:01 AM  This report has been verified and signed electronically.  Dear patient,  As a result of recent federal legislation (The Federal Cures Act), you may   receive lab or pathology results from your procedure in your [facility]   account before your physician is able to contact you. Your physician or   their representative will relay the results to you with their   recommendations at their soonest  availability.  Thank you,  PROVATION

## 2022-11-10 NOTE — TRANSFER OF CARE
"Anesthesia Transfer of Care Note    Patient: Isidoro Singh    Procedure(s) Performed: Procedure(s) (LRB):  COLONOSCOPY (N/A)    Patient location: GI    Anesthesia Type: general    Transport from OR: Transported from OR on room air with adequate spontaneous ventilation    Post pain: adequate analgesia    Post assessment: no apparent anesthetic complications and tolerated procedure well    Post vital signs: stable    Level of consciousness: awake    Nausea/Vomiting: no nausea/vomiting    Complications: none    Transfer of care protocol was followed      Last vitals:   Visit Vitals  /81 (BP Location: Left arm, Patient Position: Lying)   Pulse 62   Temp 36.8 °C (98.2 °F) (Skin)   Resp 16   Ht 5' 11" (1.803 m)   Wt 127.9 kg (282 lb)   SpO2 98%   BMI 39.33 kg/m²     "

## 2022-11-10 NOTE — ANESTHESIA PREPROCEDURE EVALUATION
11/10/2022  Isidoro Singh is a 52 y.o., male.      Pre-op Assessment    I have reviewed the Patient Summary Reports.     I have reviewed the Nursing Notes. I have reviewed the NPO Status.   I have reviewed the Medications.     Review of Systems  Anesthesia Hx:  No problems with previous Anesthesia    Social:  Former Smoker    Hematology/Oncology:  Hematology Normal   Oncology Normal     EENT/Dental:EENT/Dental Normal   Cardiovascular:   hyperlipidemia    Pulmonary:   Sleep Apnea    Hepatic/GI:   PUD, Liver Disease,    Musculoskeletal:  Musculoskeletal Normal    Neurological:  Neurology Normal    Endocrine:   Hypothyroidism  Obesity / BMI > 30  Dermatological:  Skin Normal    Psych:  Psychiatric Normal           Physical Exam  General: Well nourished, Cooperative, Alert and Oriented    Airway:  Mallampati: II   Mouth Opening: Normal  TM Distance: Normal  Neck ROM: Normal ROM    Dental:  Intact        Anesthesia Plan  Type of Anesthesia, risks & benefits discussed:    Anesthesia Type: Gen Natural Airway  Intra-op Monitoring Plan: Standard ASA Monitors  Induction:  IV  Informed Consent: Informed consent signed with the Patient and all parties understand the risks and agree with anesthesia plan.  All questions answered.   ASA Score: 3  Day of Surgery Review of History & Physical: H&P Update referred to the surgeon/provider.    Ready For Surgery From Anesthesia Perspective.     .

## 2022-11-11 LAB — TTG IGA SER-ACNC: 8 UNITS

## 2022-11-14 LAB — CALPROTECTIN STL-MCNT: 762.1 MCG/G

## 2022-11-18 LAB
FINAL PATHOLOGIC DIAGNOSIS: NORMAL
GROSS: NORMAL
Lab: NORMAL

## 2023-04-14 ENCOUNTER — PATIENT MESSAGE (OUTPATIENT)
Dept: GASTROENTEROLOGY | Facility: CLINIC | Age: 53
End: 2023-04-14
Payer: OTHER GOVERNMENT

## 2023-04-14 ENCOUNTER — PATIENT MESSAGE (OUTPATIENT)
Dept: FAMILY MEDICINE | Facility: CLINIC | Age: 53
End: 2023-04-14

## 2023-04-14 DIAGNOSIS — E29.1 HYPOGONADISM IN MALE: ICD-10-CM

## 2023-04-14 DIAGNOSIS — E03.9 HYPOTHYROIDISM (ACQUIRED): ICD-10-CM

## 2023-04-14 RX ORDER — LEVOTHYROXINE SODIUM 175 UG/1
175 TABLET ORAL DAILY
Qty: 90 TABLET | Refills: 1 | Status: SHIPPED | OUTPATIENT
Start: 2023-04-14 | End: 2023-05-08 | Stop reason: SDUPTHER

## 2023-04-14 RX ORDER — SILDENAFIL 100 MG/1
100 TABLET, FILM COATED ORAL DAILY PRN
Qty: 10 TABLET | Refills: 6 | Status: SHIPPED | OUTPATIENT
Start: 2023-04-14 | End: 2023-05-08 | Stop reason: SDUPTHER

## 2023-05-08 ENCOUNTER — OFFICE VISIT (OUTPATIENT)
Dept: FAMILY MEDICINE | Facility: CLINIC | Age: 53
End: 2023-05-08
Payer: OTHER GOVERNMENT

## 2023-05-08 VITALS
HEIGHT: 71 IN | HEART RATE: 84 BPM | WEIGHT: 295 LBS | DIASTOLIC BLOOD PRESSURE: 70 MMHG | BODY MASS INDEX: 41.3 KG/M2 | SYSTOLIC BLOOD PRESSURE: 128 MMHG

## 2023-05-08 DIAGNOSIS — F41.9 ANXIETY: ICD-10-CM

## 2023-05-08 DIAGNOSIS — E03.9 HYPOTHYROIDISM (ACQUIRED): ICD-10-CM

## 2023-05-08 DIAGNOSIS — G47.33 OSA (OBSTRUCTIVE SLEEP APNEA): Primary | ICD-10-CM

## 2023-05-08 DIAGNOSIS — E29.1 HYPOGONADISM IN MALE: ICD-10-CM

## 2023-05-08 DIAGNOSIS — E78.1 HYPERTRIGLYCERIDEMIA: ICD-10-CM

## 2023-05-08 DIAGNOSIS — K51.911 ULCERATIVE COLITIS WITH RECTAL BLEEDING, UNSPECIFIED LOCATION: ICD-10-CM

## 2023-05-08 PROCEDURE — 99214 OFFICE O/P EST MOD 30 MIN: CPT | Mod: S$GLB,,, | Performed by: PHYSICIAN ASSISTANT

## 2023-05-08 PROCEDURE — 99214 PR OFFICE/OUTPT VISIT, EST, LEVL IV, 30-39 MIN: ICD-10-PCS | Mod: S$GLB,,, | Performed by: PHYSICIAN ASSISTANT

## 2023-05-08 RX ORDER — FOLIC ACID 1 MG/1
1 TABLET ORAL DAILY
Qty: 90 TABLET | Refills: 3 | Status: SHIPPED | OUTPATIENT
Start: 2023-05-08

## 2023-05-08 RX ORDER — MESALAMINE 4 G/60ML
4 SUSPENSION RECTAL NIGHTLY
Qty: 1800 ML | Refills: 2 | Status: CANCELLED | OUTPATIENT
Start: 2023-05-08 | End: 2023-08-06

## 2023-05-08 RX ORDER — CITALOPRAM 40 MG/1
40 TABLET, FILM COATED ORAL DAILY
Qty: 90 TABLET | Refills: 1 | Status: SHIPPED | OUTPATIENT
Start: 2023-05-08 | End: 2023-11-13 | Stop reason: SDUPTHER

## 2023-05-08 RX ORDER — SILDENAFIL 100 MG/1
100 TABLET, FILM COATED ORAL DAILY PRN
Qty: 10 TABLET | Refills: 6 | Status: SHIPPED | OUTPATIENT
Start: 2023-05-08 | End: 2024-05-07

## 2023-05-08 RX ORDER — LEVOTHYROXINE SODIUM 175 UG/1
175 TABLET ORAL DAILY
Qty: 90 TABLET | Refills: 1 | Status: SHIPPED | OUTPATIENT
Start: 2023-05-08 | End: 2023-11-13 | Stop reason: SDUPTHER

## 2023-05-08 RX ORDER — OMEGA-3-ACID ETHYL ESTERS 1 G/1
1 CAPSULE, LIQUID FILLED ORAL 2 TIMES DAILY
Qty: 180 CAPSULE | Refills: 3 | Status: SHIPPED | OUTPATIENT
Start: 2023-05-08

## 2023-05-08 NOTE — PROGRESS NOTES
"  SUBJECTIVE:    Patient ID: Isidoro Singh is a 53 y.o. male.    Chief Complaint: Follow-up (6mth, went over meds verbally// SW)    This is a 53-year-old male who presents today for checkup. Reports that he has still had some difficulty controlling the UC. PO mesalamine helps but still has some good days and bad. Still dealing with stress. Changing jobs has been somewhat challenging. Lots of issues to deal with now..Finanical too. Pre teens.     He does bring up heavy snoring here lately. Cpap machine not as effective as it once was. Reports prior sleep study was through the [employer] years ago. Would like to update this. Wakes up unrefreshed, sleepy throughout the day. Lacking motivation. Denies any morning headaches.      Admission on 11/10/2022, Discharged on 11/10/2022   Component Date Value Ref Range Status    Final Pathologic Diagnosis 11/10/2022    Final                    Value:1. Colon, ascending, biopsy:  - Colonic mucosa with no significant histologic abnormality.  - Negative for activity and dysplasia.  2. Colon, transverse, biopsy:  - Colonic mucosa with no significant histologic abnormality.  - Negative for activity and dysplasia.  3. Colon, descending, biopsy:  - Colonic mucosa with no significant histologic abnormality.  - Negative for activity and dysplasia.  4. Colon, sigmoid, biopsy:  - Colonic mucosa with no significant histologic abnormality.  - Negative for activity and dysplasia.  5. Rectum, biopsy:  - Moderate active chronic proctitis.  - Negative for dysplasia.  - Immunostain for cytomegalovirus is negative, see comment.  COMMENT:  Immunostain for cytomegalovirus reviewed by Dr. BUCK Chamberlain who  agrees negative.  Appropriate positive controls are examined.      Gross 11/10/2022    Final                    Value:Number of containers:  5  Part 1  Container Label: Clinic Number/AP Number:  [id number] /  [id number], and "  ascending colon biopsy evaluate UC"  Received in formalin are 4 soft, tan-pink " "tissue fragments ranging from 4-10  mm in greatest dimension.  Entirely submitted in ECO--1-A.  Part 2  Container Label: Clinic Number/AP Number:  [id number] /  [id number], and "  transverse colon biopsy evaluate UC"  Received in formalin is an 8 mm soft, tan tissue fragment.  Entirely  submitted in ZND--2-A.  Part 3  Container Label: Clinic Number/AP Number:  [id number] /  [id number], and "  descending colon biopsy evaluate UC"  Received in formalin is an 8 x 6 x 1 mm aggregate of soft, tan tissue  fragments.  Entirely submitted in KDJ--3-A.  Part 4  Container Label: Clinic Number/AP Number:  [id number] / [id number], and "sigmoid  colon biopsy evaluate UC"  Received in formalin are 4 soft, tan-pink tissue fragments ranging from 3-9  mm in greatest dimension.  Entirely submitted in SAZ--4-A.  Par                          t 5  Container Label: Clinic Number/AP Number:  [id number] /  [id number], and " rectal  biopsy evaluate UC"  Received in formalin are 5 soft, tan-pink tissue fragments ranging from 2-6  mm in greatest dimension.  Entirely submitted in KAN--5-A.  CAROLYN Valentine.      Disclaimer 11/10/2022    Final                    Value:Unless the case is a 'gross only' or additional testing only, the final  diagnosis for each specimen is based on a microscopic examination of  appropriate tissue sections.  This test was developed and its performance characteristics determined by  [facility], Department of Pathology and Laboratory Medicine. It  has not been cleared or approved by the US Food and Drug Administration. The  FDA has determined that such clearance or approval is not necessary. This  test is used for clinical purposes. It should not be regarded as  investigational or for research. This laboratory is certified under the  Clinical Laboratory Improvement Admendments (CLIA) as qualified to perform  such high complexity clinical laboratory testing     Lab Visit on 11/09/2022   Component " Date Value Ref Range Status    Calprotectin 11/09/2022 762.1 (H)  <50 mcg/g Final       Past Medical History:   Diagnosis Date    Colon polyp     Hypothyroid 07/05/2016    Sleep apnea     Thrombocytopenia     Ulcerative colitis      Past Surgical History:   Procedure Laterality Date    COLONOSCOPY N/A 12/04/2020    Procedure: COLONOSCOPY;  Surgeon: Ventura Warren MD;  Location: [facility] ENDO;  Service: Endoscopy;  Laterality: N/A;    COLONOSCOPY N/A 07/19/2021    Procedure: COLONOSCOPY;  Surgeon: Clarence Ogden MD;  Location: [facility] ENDO;  Service: Endoscopy;  Laterality: N/A;    COLONOSCOPY N/A 11/10/2022    Procedure: COLONOSCOPY;  Surgeon: Kath Hernandez MD;  Location: [facility] ENDO;  Service: Endoscopy;  Laterality: N/A;    FOOT SURGERY      left   cyst removed    HAND SURGERY      right   tendon repair     Family History   Problem Relation Age of Onset    Ulcerative colitis Mother         in 70s    Colon cancer Neg Hx     Colon polyps Neg Hx     Crohn's disease Neg Hx        Marital Status:   Alcohol History:  reports current alcohol use.  Tobacco History:  reports that he quit smoking about 9 years ago. His smoking use included cigarettes. He has a 30.00 pack-year smoking history. He has been exposed to tobacco smoke. He has quit using smokeless tobacco.  His smokeless tobacco use included snuff.  Drug History:  reports no history of drug use.    Review of patient's allergies indicates:  No Known Allergies    Current Outpatient Medications:     ergocalciferol (ERGOCALCIFEROL) 50,000 unit Cap, TAKE 1 CAPSULE BY MOUTH EVERY 7 DAYS, Disp: 12 capsule, Rfl: 3    mesalamine (ROWASA) 4 gram/60 mL Enem, Place 60 mLs (4 g total) rectally every evening., Disp: 1800 mL, Rfl: 2    testosterone cypionate (DEPOTESTOTERONE CYPIONATE) 200 mg/mL injection, Inject into the muscle every 14 (fourteen) days., Disp: , Rfl:     citalopram (CELEXA) 40 MG tablet, Take 1 tablet (40 mg total) by mouth once daily., Disp: 90  "tablet, Rfl: 1    folic acid (FOLVITE) 1 MG tablet, Take 1 tablet (1 mg total) by mouth once daily., Disp: 90 tablet, Rfl: 3    levothyroxine (SYNTHROID, LEVOTHROID) 175 MCG tablet, Take 1 tablet (175 mcg total) by mouth once daily., Disp: 90 tablet, Rfl: 1    omega-3 acid ethyl esters (LOVAZA) 1 gram capsule, Take 1 capsule (1 g total) by mouth 2 (two) times daily., Disp: 180 capsule, Rfl: 3    sildenafiL (VIAGRA) 100 MG tablet, Take 1 tablet (100 mg total) by mouth daily as needed for Erectile Dysfunction., Disp: 10 tablet, Rfl: 6    Review of Systems   Constitutional:  Negative for activity change, fatigue, fever and unexpected weight change.   HENT:  Negative for congestion.    Respiratory:  Negative for apnea, cough, chest tightness and shortness of breath.    Cardiovascular:  Negative for chest pain and palpitations.   Gastrointestinal:  Positive for abdominal pain, blood in stool (with UC flares) and diarrhea. Negative for abdominal distention.   Genitourinary:  Negative for difficulty urinating and dysuria.   Musculoskeletal:  Negative for arthralgias and back pain.   Neurological:  Negative for dizziness and weakness.        Objective:      Vitals:    05/08/23 1516   BP: 128/70   Pulse: 84   Weight: 133.8 kg (295 lb)   Height: 5' 11" (1.803 m)     Physical Exam  Vitals and nursing note reviewed.   Constitutional:       General: He is awake. He is not in acute distress.     Appearance: Normal appearance. He is overweight. He is not ill-appearing.   HENT:      Head: Normocephalic and atraumatic.      Right Ear: External ear normal.      Left Ear: External ear normal.      Nose: Nose normal.      Mouth/Throat:      Lips: Pink.      Mouth: Mucous membranes are moist.      Dentition: Normal dentition.      Pharynx: Oropharynx is clear. Uvula midline.      Tonsils: No tonsillar exudate.   Eyes:      General: Vision grossly intact. Gaze aligned appropriately.      Extraocular Movements: Extraocular movements " intact.      Conjunctiva/sclera: Conjunctivae normal.   Neck:      Thyroid: No thyroid mass.   Cardiovascular:      Rate and Rhythm: Normal rate and regular rhythm.      Pulses: Normal pulses.           Radial pulses are 2+ on the right side and 2+ on the left side.        Dorsalis pedis pulses are 2+ on the right side and 2+ on the left side.      Heart sounds: Normal heart sounds, S1 normal and S2 normal. No murmur heard.  Pulmonary:      Effort: Pulmonary effort is normal.      Breath sounds: Normal breath sounds and air entry. No wheezing or rales.   Abdominal:      General: Abdomen is flat. Bowel sounds are normal. There is no distension.      Tenderness: There is no abdominal tenderness.   Musculoskeletal:      Right lower leg: No edema.      Left lower leg: No edema.   Skin:     General: Skin is warm and dry.      Capillary Refill: Capillary refill takes less than 2 seconds.   Neurological:      General: No focal deficit present.      Mental Status: He is alert and oriented to person, place, and time.   Psychiatric:         Attention and Perception: Attention normal.         Mood and Affect: Mood normal.         Behavior: Behavior is cooperative.         Assessment:       1. ANTONY (obstructive sleep apnea)    2. Anxiety    3. Hypothyroidism (acquired)    4. Ulcerative colitis with rectal bleeding, unspecified location    5. Hypertriglyceridemia    6. Hypogonadism in male         Plan:       ANTONY (obstructive sleep apnea)  Comments:  wishes to updated sleep study and then possibly new equipment.   Orders:  -     Ambulatory referral/consult to Pulmonology; Future; Expected date: 05/08/2023    Anxiety  Comments:  Some stress now with new job. wishes to keep dose of celexa the same now and consider intensifying at the next visit if still not at goal.  Orders:  -     citalopram (CELEXA) 40 MG tablet; Take 1 tablet (40 mg total) by mouth once daily.  Dispense: 90 tablet; Refill: 1    Hypothyroidism  (acquired)  Comments:  TSH previously at goal. continue as is.  Orders:  -     levothyroxine (SYNTHROID, LEVOTHROID) 175 MCG tablet; Take 1 tablet (175 mcg total) by mouth once daily.  Dispense: 90 tablet; Refill: 1    Ulcerative colitis with rectal bleeding, unspecified location  Comments:  still follows with GI. maintain mesalamine as is.    Hypertriglyceridemia  Comments:  refills as needed. lipid panel looks good.  Orders:  -     omega-3 acid ethyl esters (LOVAZA) 1 gram capsule; Take 1 capsule (1 g total) by mouth 2 (two) times daily.  Dispense: 180 capsule; Refill: 3    Hypogonadism in male  -     sildenafiL (VIAGRA) 100 MG tablet; Take 1 tablet (100 mg total) by mouth daily as needed for Erectile Dysfunction.  Dispense: 10 tablet; Refill: 6    Other orders  -     folic acid (FOLVITE) 1 MG tablet; Take 1 tablet (1 mg total) by mouth once daily.  Dispense: 90 tablet; Refill: 3      Follow up in about 6 months (around 11/8/2023).        5/8/2023 Vinh Noyola PA-C

## 2023-05-26 ENCOUNTER — PATIENT MESSAGE (OUTPATIENT)
Dept: FAMILY MEDICINE | Facility: CLINIC | Age: 53
End: 2023-05-26

## 2023-05-26 DIAGNOSIS — E29.1 HYPOGONADISM IN MALE: Primary | ICD-10-CM

## 2023-06-12 ENCOUNTER — OFFICE VISIT (OUTPATIENT)
Dept: GASTROENTEROLOGY | Facility: CLINIC | Age: 53
End: 2023-06-12
Payer: OTHER GOVERNMENT

## 2023-06-12 ENCOUNTER — PATIENT MESSAGE (OUTPATIENT)
Dept: GASTROENTEROLOGY | Facility: CLINIC | Age: 53
End: 2023-06-12

## 2023-06-12 ENCOUNTER — PATIENT MESSAGE (OUTPATIENT)
Dept: GASTROENTEROLOGY | Facility: CLINIC | Age: 53
End: 2023-06-12
Payer: OTHER GOVERNMENT

## 2023-06-12 ENCOUNTER — TELEPHONE (OUTPATIENT)
Dept: GASTROENTEROLOGY | Facility: CLINIC | Age: 53
End: 2023-06-12
Payer: OTHER GOVERNMENT

## 2023-06-12 VITALS — WEIGHT: 288.38 LBS | BODY MASS INDEX: 40.37 KG/M2 | HEIGHT: 71 IN

## 2023-06-12 DIAGNOSIS — T50.905A ADVERSE EFFECT OF DRUG, INITIAL ENCOUNTER: Primary | ICD-10-CM

## 2023-06-12 PROCEDURE — 99215 OFFICE O/P EST HI 40 MIN: CPT | Mod: S$PBB,,, | Performed by: INTERNAL MEDICINE

## 2023-06-12 PROCEDURE — 99999 PR PBB SHADOW E&M-EST. PATIENT-LVL III: ICD-10-PCS | Mod: PBBFAC,,, | Performed by: INTERNAL MEDICINE

## 2023-06-12 PROCEDURE — 99215 PR OFFICE/OUTPT VISIT, EST, LEVL V, 40-54 MIN: ICD-10-PCS | Mod: S$PBB,,, | Performed by: INTERNAL MEDICINE

## 2023-06-12 PROCEDURE — 99999 PR PBB SHADOW E&M-EST. PATIENT-LVL III: CPT | Mod: PBBFAC,,, | Performed by: INTERNAL MEDICINE

## 2023-06-12 PROCEDURE — 99213 OFFICE O/P EST LOW 20 MIN: CPT | Mod: PBBFAC,PN | Performed by: INTERNAL MEDICINE

## 2023-06-12 RX ORDER — MESALAMINE 1000 MG/1
1000 SUPPOSITORY RECTAL NIGHTLY
Qty: 90 SUPPOSITORY | Refills: 3 | Status: SHIPPED | OUTPATIENT
Start: 2023-06-12 | End: 2024-06-11

## 2023-06-12 RX ORDER — MESALAMINE 0.38 G/1
1.5 CAPSULE, EXTENDED RELEASE ORAL DAILY
Qty: 120 CAPSULE | Refills: 11 | Status: SHIPPED | OUTPATIENT
Start: 2023-06-12 | End: 2023-06-27 | Stop reason: SDUPTHER

## 2023-06-12 NOTE — PROGRESS NOTES
Chief Complaint: ulcerative proctitis    HPI: 53 y.o. male h/o ulcerative proctitis who was originally diagnosed in 2014 when he was complaining of bright red blood in stool associated with increased frequency of bowel movements and mucus in stool. At that time he had colonoscopy that showed a segmental area of severely congested, erythematous, friable (with contact bleeding), granular, hemorrhagic and inflamed mucosa in the rectum. This extended from 0-5 cm from the anal verge. Biopsies were taken with a cold forceps for histology and showed mild chronic active colitis in rectum. No evidence of microscopic colitis. Focal acute colitis was also reported on pathology in transverse colon. Two polyps removed were adenomatous.     Repeat colonoscopy performed in 2020 for surveillance of polyps showed localized congested, erythematous, eroded, granular mucosa in distal rectum. Pathology was consistent with rectal mucosa with focal crypt architectural distortion, increased lamina propria chronic inflammation and focal acute cryptitis consistent with chronic mildly active proctitis.     Colonoscopy in 2022 with severe (Banks Score 3) proctitis ulcerative colitis, unchanged since the last examination. Segmental biopsies taken. Ileum normal. Otherwise unchanged severe UC proctitis. At the time he denied taking oral or rectal mesalamine.    Interval hx:  He ran out of canasa and oral mesalamine. Intermittently has mucous and rectal bleeding.    IBD History:   IBD disease: Ulcerative proctitis  Disease location: Rectum (pathology with transverse colon inflammation)  Disease behavior: Inflammatory  Current therapy: Canasa nightly and Apriso 1.5 g daily  Prior therapy: Canasa intermmittently  Surgeries: None  Complications:None  Extraintestinal manifestations: None    Review of Systems:   General ROS: negative for chills, fever, or weight loss  Ophthalmic ROS: negative for blurry vision, photophobia, or eye pain  ENT ROS:  negative for oral ulcers, sore throat or rhinorrhea  Respiratory ROS: no cough, shortness of breath, or wheezing  Cardiovascular ROS: no chest pain, palpitations, or dyspnea on exertion  Gastrointestinal ROS: per HPI  Musculoskeletal ROS: no arthralgias, myalgias, joint swelling or erythema  Dermatological ROS: negative for pruritis, rash, ulcers, nodules    Past Medical History:  NAFLD  Obesity  Hashimoto's thyroiditis  ANTONY    Allergies and Medications reviewed    Physical Examination:  Ht: 5'11 , Wt: 130.8 kg  General appearance: alert, cooperative, no distress  HENT: Normocephalic, atraumatic, neck symmetrical  Eyes: conjunctivae clear  Lungs: No labored breathing  Skin: No jaundice  Neurologic: Alert and oriented X 3    Most recent Labs/Stool studies:    Lab Results   Component Value Date    WBC 5.7 11/08/2022    HGB 16.3 11/08/2022    HCT 47.6 11/08/2022    MCV 94.8 11/08/2022     11/08/2022     CMP  Sodium   Date Value Ref Range Status   11/08/2022 138 135 - 146 mmol/L Final     Potassium   Date Value Ref Range Status   11/08/2022 4.4 3.5 - 5.3 mmol/L Final     Chloride   Date Value Ref Range Status   11/08/2022 101 98 - 110 mmol/L Final     CO2   Date Value Ref Range Status   11/08/2022 28 20 - 32 mmol/L Final     Glucose   Date Value Ref Range Status   11/08/2022 91 65 - 99 mg/dL Final     Comment:                   Fasting reference interval          BUN   Date Value Ref Range Status   11/08/2022 13 7 - 25 mg/dL Final     Creatinine   Date Value Ref Range Status   11/08/2022 0.89 0.70 - 1.30 mg/dL Final     Calcium   Date Value Ref Range Status   11/08/2022 9.7 8.6 - 10.3 mg/dL Final     Total Protein   Date Value Ref Range Status   11/08/2022 7.5 6.1 - 8.1 g/dL Final     Albumin   Date Value Ref Range Status   11/08/2022 4.5 3.6 - 5.1 g/dL Final     Total Bilirubin   Date Value Ref Range Status   11/08/2022 0.6 0.2 - 1.2 mg/dL Final     Alkaline Phosphatase   Date Value Ref Range Status   01/26/2021  53 (L) 55 - 135 U/L Final     AST   Date Value Ref Range Status   11/08/2022 25 10 - 35 U/L Final     ALT   Date Value Ref Range Status   11/08/2022 49 (H) 9 - 46 U/L Final     Anion Gap   Date Value Ref Range Status   01/26/2021 8 8 - 16 mmol/L Final     eGFR   Date Value Ref Range Status   11/08/2022 103 > OR = 60 mL/min/1.73m2 Final     Comment:     The eGFR is based on the CKD-EPI 2021 equation. To calculate   the new eGFR from a previous Creatinine or Cystatin C  result, go to https://www.kidney.org/professionals/  kdoqi/[email or web address]         Most recent Endoscopy:     Colonoscopy in 2022 with severe (Banks Score 3) proctitis ulcerative colitis, unchanged since the last examination. Segmental biopsies taken. Ileum normal. Otherwise unchanged severe UC proctitis.     Path:  1. Colon, ascending, biopsy:   - Colonic mucosa with no significant histologic abnormality.   - Negative for activity and dysplasia.   2. Colon, transverse, biopsy:   - Colonic mucosa with no significant histologic abnormality.   - Negative for activity and dysplasia.   3. Colon, descending, biopsy:   - Colonic mucosa with no significant histologic abnormality.   - Negative for activity and dysplasia.   4. Colon, sigmoid, biopsy:   - Colonic mucosa with no significant histologic abnormality.   - Negative for activity and dysplasia.   5. Rectum, biopsy:   - Moderate active chronic proctitis.   - Negative for dysplasia.   - Immunostain for cytomegalovirus is negative, see comment.      Colonoscopy: 12/4/20:  - Congested, erythematous, eroded and granular mucosa in the distal rectum. Biopsied.   - The sigmoid colon, descending colon, transverse colon, ascending colon, cecum, appendiceal orifice and ileocecal valve are normal.   - The examined portion of the ileum was normal.     PATH:  1. Rectum, biopsy:   Rectal mucosa with focal crypt architectural distortion, increased lamina   propria chronic inflammation, and focal acute cryptitis,  consistent with   chronic mildly active proctitis.   See Note:   Note: The rectal biopsy shows mild features of chronicity and rare acute   cryptitis, consistent with a chronic mildly active proctitis. The   differential includes medication effect, infection, and inflammatory bowel   disease. Clinical correlation is suggested.     Colonoscopy 9/8/14:  The perianal examination was normal. A segmental area of severely congested, erythematous, friable (with contact bleeding), granular, hemorrhagic and inflamed mucosa was found in the rectum. This extended from 0-5 cm from the anal verge. Biopsies were taken with a cold forceps for histology. Localized severe inflammation characterized by congestion (edema), erosions, erythema, friability, granularity, mucus and shallow ulcerations was found in the rectum. Biopsies were taken with a cold forceps for histology. Four sessile polyps were found in the transverse colon, in the distal transverse colon and in the cecum. The polyps were 4 to 6 mm in size. These polyps were removed with a cold snare. Resection and retrieval were complete. A sessile polyp was found in the distal transverse colon. The polyp was 8 mm in size. The polyp was removed with a hot snare. The polyp was removed with a saline injection-lift technique using a hot snare. Resection and retrieval were complete. The sigmoid colon, descending colon, ascending colon, appendiceal orifice and ileocecal valve appeared normal. Biopsies were taken with a cold forceps from the cecum, ascending colon, transverse colon, descending colon and sigmoid colon for evaluation of microscopic colitis. The terminal ileum appeared normal.    PATH:  1. Cecum; biopsy:  Tubular adenoma  2. Cecum and ascending colon; biopsy:  Unremarkable colonic mucosa  Negative for adenoma  3. Transverse; biopsy:  Hyperplastic polyp  4. Transverse random biopsy colon  Very focal acute colitis  Considerations include self-limited infectious cause  versus bowel prep effects  Negative for microscopic colitis  5. Distal transverse; biopsy:  Tubular adenoma  6. Descending and sigmoid random colon biopsy:  Unremarkable colonic mucosa  No evidence of microscopic colitis  7. Rectal biopsy:  Mild chronic active colitis  Negative for adenoma  Considerations include ulcerative proctitis  versus subacute infectious cause.  Correlate with clinical and endoscopic findings  Assessment and Plan: 53 y.o. male with    1. Ulcerative proctitis  2. On Canasa suppositories intermittently and Apriso 1.5 g daily  3. Health maintenance    Mr. Singh has ulcerative proctitis and has intermittent compliance with oral and rectal mesalamine. Every colonoscopy in past has shown torres 3 proctitis, likely from non-compliance.   Will plan to try tacrolimus compounded suppositories nightly for 3 months. He will submit fecal calprotectin in 3 months to ensure it is coming down. Then will re-start canasa suppositories nightly.    Repeat colonoscopy to be performed in 2024.      We discussed health maintenance.  ----------------------------------------------------------------------------------------------------------------------  Health Maintenance:   -Prevnar 13: Recommend   -Pneumovax 23: Recommend 8 weeks after Prevnar 13  -Flu Shot: Recommend annually  -Shingrix: Recommend    -Hepatitis A/Hepatitis B: Immune  -Annual skin exam: Recommend   -Colon cancer screening: does not involve greater than 1/3 of colon, Distribution of colonic disease: rectum   -DEXA scan: Recommend  -Tobacco: Avoid  -Should avoid NSAIDs and narcotics, use only Tylenol for pain relief.     Follow up: 6 months in  clinic    Clarence Ogden MD  [facility]  [address] [facility]  [place] [address]  Office: [phone]  Fax: [phone]

## 2023-06-13 ENCOUNTER — PATIENT MESSAGE (OUTPATIENT)
Dept: GASTROENTEROLOGY | Facility: CLINIC | Age: 53
End: 2023-06-13
Payer: OTHER GOVERNMENT

## 2023-06-14 ENCOUNTER — TELEPHONE (OUTPATIENT)
Dept: GASTROENTEROLOGY | Facility: CLINIC | Age: 53
End: 2023-06-14
Payer: OTHER GOVERNMENT

## 2023-06-14 NOTE — TELEPHONE ENCOUNTER
Per Dr. Ogden:  Tacrolimus suppository inset 1 rectally every night dispense 30 with 3 refills called into [facility].

## 2023-06-14 NOTE — TELEPHONE ENCOUNTER
----- Message from Brisa Lee sent at 6/14/2023  2:06 PM CDT -----  Contact: maciej  Type: Needs Medical Advice  Who Called:  maciej         Best Call Back Number: [phone]  Additional Information: maciej states you all forgot the strength for Tacrolimus suppository . Please advise.

## 2023-06-22 ENCOUNTER — OFFICE VISIT (OUTPATIENT)
Dept: PULMONOLOGY | Facility: CLINIC | Age: 53
End: 2023-06-22
Payer: OTHER GOVERNMENT

## 2023-06-22 VITALS
SYSTOLIC BLOOD PRESSURE: 130 MMHG | OXYGEN SATURATION: 96 % | DIASTOLIC BLOOD PRESSURE: 80 MMHG | HEART RATE: 73 BPM | HEIGHT: 71 IN | WEIGHT: 291 LBS | BODY MASS INDEX: 40.74 KG/M2

## 2023-06-22 DIAGNOSIS — G47.33 OSA (OBSTRUCTIVE SLEEP APNEA): ICD-10-CM

## 2023-06-22 PROCEDURE — 99214 OFFICE O/P EST MOD 30 MIN: CPT | Mod: S$GLB,,, | Performed by: NURSE PRACTITIONER

## 2023-06-22 PROCEDURE — 99214 PR OFFICE/OUTPT VISIT, EST, LEVL IV, 30-39 MIN: ICD-10-PCS | Mod: S$GLB,,, | Performed by: NURSE PRACTITIONER

## 2023-06-22 NOTE — PATIENT INSTRUCTIONS
Send me your old sleep study  Will order a cpap titration  Will order new machine after   Follow up in about 3 months (around 9/22/2023).

## 2023-06-22 NOTE — PROGRESS NOTES
SUBJECTIVE:    Patient ID: Isidoro Singh is a 53 y.o. male.    Chief Complaint: Apnea and Establish Care    HPI  Patient here today to establish care for his ANTONY. He states he has had his machine for approximately 7 years. The machine does not blow as well as it used to. He feels he is having more and more sleep apnea symptoms despite sleeping on his CPAP faithfully. He is having more headaches, brain fog and forgetfulness as well as fatigued during the day. His compliance report shows he is 99% compliant sleeps an average of 7 hours a day on his device with an AHI of 2.4.  He will send me a copy of his old sleep study. He has gained 20-30 pounds since his last study.   Past Medical History:   Diagnosis Date    Colon polyp     Hypothyroid 07/05/2016    Sleep apnea     Thrombocytopenia     Ulcerative colitis      Past Surgical History:   Procedure Laterality Date    COLONOSCOPY N/A 12/04/2020    Procedure: COLONOSCOPY;  Surgeon: Ventura Warren MD;  Location: [facility] ENDO;  Service: Endoscopy;  Laterality: N/A;    COLONOSCOPY N/A 07/19/2021    Procedure: COLONOSCOPY;  Surgeon: Clarence Ogden MD;  Location: [facility] ENDO;  Service: Endoscopy;  Laterality: N/A;    COLONOSCOPY N/A 11/10/2022    Procedure: COLONOSCOPY;  Surgeon: Kath Hernandez MD;  Location: [facility] ENDO;  Service: Endoscopy;  Laterality: N/A;    FOOT SURGERY      left   cyst removed    HAND SURGERY      right   tendon repair     Family History   Problem Relation Age of Onset    Ulcerative colitis Mother         in 70s    Colon cancer Neg Hx     Colon polyps Neg Hx     Crohn's disease Neg Hx         Social History:   Marital Status:   Occupation:    Alcohol History:  reports current alcohol use.  Tobacco History:  reports that he quit smoking about 9 years ago. His smoking use included cigarettes. He started smoking about 39 years ago. He has a 30.00 pack-year smoking history. He has been exposed to tobacco smoke. He quit  smokeless tobacco use about 3 years ago.  His smokeless tobacco use included snuff.  Drug History:  reports no history of drug use.    Review of patient's allergies indicates:  No Known Allergies    Current Outpatient Medications   Medication Sig Dispense Refill    citalopram (CELEXA) 40 MG tablet Take 1 tablet (40 mg total) by mouth once daily. 90 tablet 1    ergocalciferol (ERGOCALCIFEROL) 50,000 unit Cap TAKE 1 CAPSULE BY MOUTH EVERY 7 DAYS 12 capsule 3    folic acid (FOLVITE) 1 MG tablet Take 1 tablet (1 mg total) by mouth once daily. 90 tablet 3    levothyroxine (SYNTHROID, LEVOTHROID) 175 MCG tablet Take 1 tablet (175 mcg total) by mouth once daily. 90 tablet 1    mesalamine (APRISO) 0.375 gram Cp24 Take 4 capsules (1.5 g total) by mouth once daily. 120 capsule 11    mesalamine (CANASA) 1000 MG Supp Place 1 suppository (1,000 mg total) rectally nightly. 90 suppository 3    omega-3 acid ethyl esters (LOVAZA) 1 gram capsule Take 1 capsule (1 g total) by mouth 2 (two) times daily. 180 capsule 3    sildenafiL (VIAGRA) 100 MG tablet Take 1 tablet (100 mg total) by mouth daily as needed for Erectile Dysfunction. 10 tablet 6    testosterone cypionate (DEPOTESTOTERONE CYPIONATE) 200 mg/mL injection Inject into the muscle every 14 (fourteen) days.       No current facility-administered medications for this visit.           Review of Systems  General: Feeling Well. Chronically fatigued, naps  Eyes: Vision is good.  ENT:  No sinusitis or pharyngitis.   Heart:: No chest pain or palpitations.  Lungs: No cough, sputum, or wheezing.  GI: No Nausea, vomiting, constipation, diarrhea, or reflux.  : No dysuria, hesitancy, or nocturia.  Musculoskeletal: No joint pain or myalgias.  Skin: No lesions or rashes.  Neuro: headaches, brain fog and forgetfulness for a  couple of months .  Lymph: No edema or adenopathy.  Psych: No anxiety or depression.  Endo: weight gain 20-30 pounds since last study     OBJECTIVE:      /80 (BP  "Location: Left arm, Patient Position: Sitting, BP Method: Large (Manual))   Pulse 73   Ht 5' 11" (1.803 m)   Wt 132 kg (291 lb)   SpO2 96%   BMI 40.59 kg/m²     Physical Exam  GENERAL:middle aged patient in no distress.  HEENT: Pupils equal and reactive. Extraocular movements intact. Nose intact.      Pharynx moist. Malampatti 3   NECK: Supple. 18 inches   HEART: Regular rate and rhythm. No murmur or gallop auscultated.  LUNGS: Clear to auscultation and percussion. Lung excursion symmetrical. No change in fremitus. No adventitial noises.  ABDOMEN: Bowel sounds present. Non-tender, no masses palpated.  EXTREMITIES: Normal muscle tone and joint movement, no cyanosis or clubbing.   LYMPHATICS: No adenopathy palpated, no edema.  SKIN: Dry, intact, no lesions.   NEURO: Cranial nerves II-XII intact. Motor strength 5/5 bilaterally, upper and lower extremities.  PSYCH: Appropriate affect.    Assessment:       1. ANTONY (obstructive sleep apnea)        [place] score is 12  Plan:       Send me your old sleep study  Will order a cpap titration  Will order new machine after   Follow up in about 3 months (around 9/22/2023).          "

## 2023-07-02 ENCOUNTER — PATIENT MESSAGE (OUTPATIENT)
Dept: PULMONOLOGY | Facility: CLINIC | Age: 53
End: 2023-07-02

## 2023-07-03 RX ORDER — MESALAMINE 0.38 G/1
1.5 CAPSULE, EXTENDED RELEASE ORAL DAILY
Qty: 120 CAPSULE | Refills: 5 | Status: SHIPPED | OUTPATIENT
Start: 2023-07-03 | End: 2023-11-13

## 2023-07-26 ENCOUNTER — TELEPHONE (OUTPATIENT)
Dept: FAMILY MEDICINE | Facility: CLINIC | Age: 53
End: 2023-07-26

## 2023-07-26 DIAGNOSIS — H35.713 CENTRAL SEROUS CHORIORETINOPATHY OF BOTH EYES: Primary | ICD-10-CM

## 2023-07-27 ENCOUNTER — PROCEDURE VISIT (OUTPATIENT)
Dept: SLEEP MEDICINE | Facility: HOSPITAL | Age: 53
End: 2023-07-27
Attending: NURSE PRACTITIONER
Payer: OTHER GOVERNMENT

## 2023-07-27 DIAGNOSIS — G47.33 OSA (OBSTRUCTIVE SLEEP APNEA): ICD-10-CM

## 2023-07-27 PROCEDURE — 95811 POLYSOM 6/>YRS CPAP 4/> PARM: CPT

## 2023-08-01 ENCOUNTER — TELEPHONE (OUTPATIENT)
Dept: PULMONOLOGY | Facility: CLINIC | Age: 53
End: 2023-08-01

## 2023-08-01 DIAGNOSIS — G47.33 OSA (OBSTRUCTIVE SLEEP APNEA): Primary | ICD-10-CM

## 2023-08-01 NOTE — TELEPHONE ENCOUNTER
Called patient told him [facility] If you do not hear from [facility] within the next two weeks please give us a call @ [phone].  He verbalized an understanding.

## 2023-08-02 ENCOUNTER — TELEPHONE (OUTPATIENT)
Dept: PULMONOLOGY | Facility: CLINIC | Age: 53
End: 2023-08-02

## 2023-08-02 NOTE — TELEPHONE ENCOUNTER
----- Message from Jessica Harris sent at 8/2/2023  4:12 PM CDT -----  Regarding: Cpap  Contact: [phone]  Good afternoon! Order is pending a copy of patient's PSG sleep study, looks like which was done back in 2016. Thanks! Gabby

## 2023-08-03 ENCOUNTER — PATIENT MESSAGE (OUTPATIENT)
Dept: PULMONOLOGY | Facility: CLINIC | Age: 53
End: 2023-08-03

## 2023-09-21 ENCOUNTER — OFFICE VISIT (OUTPATIENT)
Dept: PULMONOLOGY | Facility: CLINIC | Age: 53
End: 2023-09-21
Payer: OTHER GOVERNMENT

## 2023-09-21 VITALS
WEIGHT: 299 LBS | HEIGHT: 71 IN | DIASTOLIC BLOOD PRESSURE: 75 MMHG | HEART RATE: 73 BPM | OXYGEN SATURATION: 95 % | SYSTOLIC BLOOD PRESSURE: 140 MMHG | BODY MASS INDEX: 41.86 KG/M2

## 2023-09-21 DIAGNOSIS — G47.33 OSA (OBSTRUCTIVE SLEEP APNEA): Primary | ICD-10-CM

## 2023-09-21 PROCEDURE — 99213 OFFICE O/P EST LOW 20 MIN: CPT | Mod: S$GLB,,, | Performed by: NURSE PRACTITIONER

## 2023-09-21 PROCEDURE — 99213 PR OFFICE/OUTPT VISIT, EST, LEVL III, 20-29 MIN: ICD-10-PCS | Mod: S$GLB,,, | Performed by: NURSE PRACTITIONER

## 2023-09-21 NOTE — PROGRESS NOTES
SUBJECTIVE:    Patient ID: Isidoro Singh is a 53 y.o. male.    Chief Complaint: Apnea    HPI    Patient here today feeling well.  He is sleeping on his CPAP every night. His compliance report shows he is 93% compliant, sleeps an average of 7 hours and 26 minutes with an AHI of 1.2.   Past Medical History:   Diagnosis Date    Colon polyp     Hypothyroid 07/05/2016    Sleep apnea     Thrombocytopenia     Ulcerative colitis      Past Surgical History:   Procedure Laterality Date    COLONOSCOPY N/A 12/04/2020    Procedure: COLONOSCOPY;  Surgeon: Ventura Warren MD;  Location: [facility] ENDO;  Service: Endoscopy;  Laterality: N/A;    COLONOSCOPY N/A 07/19/2021    Procedure: COLONOSCOPY;  Surgeon: Clarence Ogden MD;  Location: [facility] ENDO;  Service: Endoscopy;  Laterality: N/A;    COLONOSCOPY N/A 11/10/2022    Procedure: COLONOSCOPY;  Surgeon: Kath Hernandez MD;  Location: [facility] ENDO;  Service: Endoscopy;  Laterality: N/A;    FOOT SURGERY      left   cyst removed    HAND SURGERY      right   tendon repair     Family History   Problem Relation Age of Onset    Ulcerative colitis Mother         in 70s    Colon cancer Neg Hx     Colon polyps Neg Hx     Crohn's disease Neg Hx         Social History:   Marital Status:   Occupation:    Alcohol History:  reports current alcohol use.  Tobacco History:  reports that he quit smoking about 9 years ago. His smoking use included cigarettes. He started smoking about 39 years ago. He has a 30.0 pack-year smoking history. He has been exposed to tobacco smoke. He quit smokeless tobacco use about 3 years ago.  His smokeless tobacco use included snuff.  Drug History:  reports no history of drug use.    Review of patient's allergies indicates:  No Known Allergies    Current Outpatient Medications   Medication Sig Dispense Refill    citalopram (CELEXA) 40 MG tablet Take 1 tablet (40 mg total) by mouth once daily. 90 tablet 1    ergocalciferol (ERGOCALCIFEROL)  "50,000 unit Cap TAKE 1 CAPSULE BY MOUTH EVERY 7 DAYS 12 capsule 3    folic acid (FOLVITE) 1 MG tablet Take 1 tablet (1 mg total) by mouth once daily. 90 tablet 3    levothyroxine (SYNTHROID, LEVOTHROID) 175 MCG tablet Take 1 tablet (175 mcg total) by mouth once daily. 90 tablet 1    mesalamine (APRISO) 0.375 gram Cp24 Take 4 capsules (1.5 g total) by mouth once daily. 120 capsule 5    mesalamine (CANASA) 1000 MG Supp Place 1 suppository (1,000 mg total) rectally nightly. 90 suppository 3    omega-3 acid ethyl esters (LOVAZA) 1 gram capsule Take 1 capsule (1 g total) by mouth 2 (two) times daily. 180 capsule 3    sildenafiL (VIAGRA) 100 MG tablet Take 1 tablet (100 mg total) by mouth daily as needed for Erectile Dysfunction. 10 tablet 6    testosterone cypionate (DEPOTESTOTERONE CYPIONATE) 200 mg/mL injection Inject into the muscle every 14 (fourteen) days.       No current facility-administered medications for this visit.           Review of Systems  General: Feeling Well. No fatigue, no napping   Eyes: Vision is good.  ENT:  No sinusitis or pharyngitis.   Heart:: No chest pain or palpitations.  Lungs: No cough, sputum, or wheezing.  GI: No Nausea, vomiting, constipation, diarrhea, or reflux.  : No dysuria, hesitancy, or nocturia.  Musculoskeletal: No joint pain or myalgias.  Skin: No lesions or rashes.  Neuro:brain fog and headaches gone, .  Lymph: No edema or adenopathy.  Psych: No anxiety or depression.  Endo: weight gain    OBJECTIVE:      BP (!) 140/75 (BP Location: Left arm, Patient Position: Sitting, BP Method: Large (Manual))   Pulse 73   Ht 5' 11" (1.803 m)   Wt 135.6 kg (299 lb)   SpO2 (!) 94%   BMI 41.70 kg/m²     Physical Exam  GENERAL:middle aged patient in no distress.  HEENT: Pupils equal and reactive. Extraocular movements intact. Nose intact.      Pharynx moist.   NECK: Supple.   HEART: Regular rate and rhythm. No murmur or gallop auscultated.  LUNGS: Clear to auscultation and percussion. " Lung excursion symmetrical. No change in fremitus. No adventitial noises.  ABDOMEN: Bowel sounds present. Non-tender, no masses palpated.  EXTREMITIES: Normal muscle tone and joint movement, no cyanosis or clubbing.   LYMPHATICS: No adenopathy palpated, no edema.  SKIN: Dry, intact, no lesions.   NEURO: Cranial nerves II-XII intact. Motor strength 5/5 bilaterally, upper and lower extremities.  PSYCH: Appropriate affect.    Assessment:       1. ANTONY (obstructive sleep apnea)            Plan:       Keep sleeping on your cpap  Call if you need anything  Follow up in about 1 year (around 9/21/2024).

## 2023-11-13 ENCOUNTER — OFFICE VISIT (OUTPATIENT)
Dept: FAMILY MEDICINE | Facility: CLINIC | Age: 53
End: 2023-11-13
Payer: OTHER GOVERNMENT

## 2023-11-13 VITALS
DIASTOLIC BLOOD PRESSURE: 84 MMHG | SYSTOLIC BLOOD PRESSURE: 136 MMHG | WEIGHT: 301 LBS | HEART RATE: 76 BPM | OXYGEN SATURATION: 95 % | BODY MASS INDEX: 41.98 KG/M2

## 2023-11-13 DIAGNOSIS — F41.9 ANXIETY: ICD-10-CM

## 2023-11-13 DIAGNOSIS — E03.9 HYPOTHYROIDISM (ACQUIRED): ICD-10-CM

## 2023-11-13 DIAGNOSIS — K51.911 ULCERATIVE COLITIS WITH RECTAL BLEEDING, UNSPECIFIED LOCATION: Primary | ICD-10-CM

## 2023-11-13 DIAGNOSIS — R07.9 CHEST PAIN, UNSPECIFIED TYPE: ICD-10-CM

## 2023-11-13 PROCEDURE — 99214 OFFICE O/P EST MOD 30 MIN: CPT | Mod: S$GLB,,, | Performed by: PHYSICIAN ASSISTANT

## 2023-11-13 PROCEDURE — 99214 PR OFFICE/OUTPT VISIT, EST, LEVL IV, 30-39 MIN: ICD-10-PCS | Mod: S$GLB,,, | Performed by: PHYSICIAN ASSISTANT

## 2023-11-13 RX ORDER — CITALOPRAM 40 MG/1
40 TABLET, FILM COATED ORAL DAILY
Qty: 90 TABLET | Refills: 1 | Status: SHIPPED | OUTPATIENT
Start: 2023-11-13 | End: 2024-05-11

## 2023-11-13 RX ORDER — LEVOTHYROXINE SODIUM 175 UG/1
175 TABLET ORAL DAILY
Qty: 90 TABLET | Refills: 1 | Status: SHIPPED | OUTPATIENT
Start: 2023-11-13 | End: 2023-11-16 | Stop reason: ALTCHOICE

## 2023-11-13 NOTE — PROGRESS NOTES
SUBJECTIVE:    Patient ID: Isidoro Singh is a 53 y.o. male.    Chief Complaint: Follow-up (No bottles, Pt stated he sister was diagnosed with MS years ago and would like to talk about it, Aches and pain in right abdomen started 2 months ago, weight gain, declined flu  //BA )    Isidoro Singh is a 53 y.o. male presenting for a 6 month follow up. He reports that he has been ok. He works as a  which he reports has been stressful. He states his anxiety and depression has been ok, with some days better than others. He feels stable on the Celexa he his on now, uninterested in changing medications. He has noticed he has gained weight. Causing him to have more back and knee pain. States that his diet is not the best. He has started going to the gym to do yoga recently with his wife.     He is currently suffering for a UC flare. Is experiencing right sided abdominal pain for the past 3 months. Still following with GI, has an appointment 12/4. Reports still taking PO Mesalamine, which has been helpful.    He sporadic stabbing chest pain that happens every now and then, lasting a few seconds. Denies associated shortness of breath, palpitations, or lightheadedness.       Follow-up  Associated symptoms include abdominal pain, arthralgias, chest pain and headaches. Pertinent negatives include no chills, congestion, coughing, fever, sore throat or vomiting.       No visits with results within 6 Month(s) from this visit.   Latest known visit with results is:   Admission on 11/10/2022, Discharged on 11/10/2022   Component Date Value Ref Range Status    Final Pathologic Diagnosis 11/10/2022    Final                    Value:1. Colon, ascending, biopsy:  - Colonic mucosa with no significant histologic abnormality.  - Negative for activity and dysplasia.  2. Colon, transverse, biopsy:  - Colonic mucosa with no significant histologic abnormality.  - Negative for activity and dysplasia.  3. Colon, descending,  "biopsy:  - Colonic mucosa with no significant histologic abnormality.  - Negative for activity and dysplasia.  4. Colon, sigmoid, biopsy:  - Colonic mucosa with no significant histologic abnormality.  - Negative for activity and dysplasia.  5. Rectum, biopsy:  - Moderate active chronic proctitis.  - Negative for dysplasia.  - Immunostain for cytomegalovirus is negative, see comment.  COMMENT:  Immunostain for cytomegalovirus reviewed by Dr. BUCK Chamberlain who  agrees negative.  Appropriate positive controls are examined.      Gross 11/10/2022    Final                    Value:Number of containers:  5  Part 1  Container Label: Clinic Number/AP Number:  [id number] /  [id number], and "  ascending colon biopsy evaluate UC"  Received in formalin are 4 soft, tan-pink tissue fragments ranging from 4-10  mm in greatest dimension.  Entirely submitted in YQF--1-A.  Part 2  Container Label: Clinic Number/AP Number:  [id number] /  [id number], and "  transverse colon biopsy evaluate UC"  Received in formalin is an 8 mm soft, tan tissue fragment.  Entirely  submitted in VRW--2-A.  Part 3  Container Label: Clinic Number/AP Number:  [id number] /  [id number], and "  descending colon biopsy evaluate UC"  Received in formalin is an 8 x 6 x 1 mm aggregate of soft, tan tissue  fragments.  Entirely submitted in SKR--3-A.  Part 4  Container Label: Clinic Number/AP Number:  [id number] / [id number], and "sigmoid  colon biopsy evaluate UC"  Received in formalin are 4 soft, tan-pink tissue fragments ranging from 3-9  mm in greatest dimension.  Entirely submitted in AFF--4-A.  Par                          t 5  Container Label: Clinic Number/AP Number:  [id number] /  [id number], and " rectal  biopsy evaluate UC"  Received in formalin are 5 soft, tan-pink tissue fragments ranging from 2-6  mm in greatest dimension.  Entirely submitted in TYX--5-A.  CAROLYN Valentine.      Disclaimer 11/10/2022    Final                    Value:Unless the case is a " 'gross only' or additional testing only, the final  diagnosis for each specimen is based on a microscopic examination of  appropriate tissue sections.  This test was developed and its performance characteristics determined by  [facility], Department of Pathology and Laboratory Medicine. It  has not been cleared or approved by the US Food and Drug Administration. The  FDA has determined that such clearance or approval is not necessary. This  test is used for clinical purposes. It should not be regarded as  investigational or for research. This laboratory is certified under the  Clinical Laboratory Improvement Admendments (CLIA) as qualified to perform  such high complexity clinical laboratory testing         Past Medical History:   Diagnosis Date    Colon polyp     Hypothyroid 07/05/2016    Sleep apnea     Thrombocytopenia     Ulcerative colitis      Past Surgical History:   Procedure Laterality Date    COLONOSCOPY N/A 12/04/2020    Procedure: COLONOSCOPY;  Surgeon: Ventura Warren MD;  Location: [facility] ENDO;  Service: Endoscopy;  Laterality: N/A;    COLONOSCOPY N/A 07/19/2021    Procedure: COLONOSCOPY;  Surgeon: Clarence Ogden MD;  Location: [facility] ENDO;  Service: Endoscopy;  Laterality: N/A;    COLONOSCOPY N/A 11/10/2022    Procedure: COLONOSCOPY;  Surgeon: Kath Hernandez MD;  Location: [facility] ENDO;  Service: Endoscopy;  Laterality: N/A;    FOOT SURGERY      left   cyst removed    HAND SURGERY      right   tendon repair     Family History   Problem Relation Age of Onset    Ulcerative colitis Mother         in 70s    Colon cancer Neg Hx     Colon polyps Neg Hx     Crohn's disease Neg Hx        Marital Status:   Alcohol History:  reports current alcohol use.  Tobacco History:  reports that he quit smoking about 9 years ago. His smoking use included cigarettes. He started smoking about 39 years ago. He has a 30.0 pack-year smoking history. He has been exposed to tobacco smoke. He quit smokeless  tobacco use about 3 years ago.  His smokeless tobacco use included snuff.  Drug History:  reports no history of drug use.    Review of patient's allergies indicates:  No Known Allergies    Current Outpatient Medications:     ergocalciferol (ERGOCALCIFEROL) 50,000 unit Cap, TAKE 1 CAPSULE BY MOUTH EVERY 7 DAYS, Disp: 12 capsule, Rfl: 3    folic acid (FOLVITE) 1 MG tablet, Take 1 tablet (1 mg total) by mouth once daily., Disp: 90 tablet, Rfl: 3    mesalamine (CANASA) 1000 MG Supp, Place 1 suppository (1,000 mg total) rectally nightly., Disp: 90 suppository, Rfl: 3    omega-3 acid ethyl esters (LOVAZA) 1 gram capsule, Take 1 capsule (1 g total) by mouth 2 (two) times daily., Disp: 180 capsule, Rfl: 3    sildenafiL (VIAGRA) 100 MG tablet, Take 1 tablet (100 mg total) by mouth daily as needed for Erectile Dysfunction., Disp: 10 tablet, Rfl: 6    testosterone cypionate (DEPOTESTOTERONE CYPIONATE) 200 mg/mL injection, Inject into the muscle every 14 (fourteen) days., Disp: , Rfl:     citalopram (CELEXA) 40 MG tablet, Take 1 tablet (40 mg total) by mouth once daily., Disp: 90 tablet, Rfl: 1    levothyroxine (SYNTHROID, LEVOTHROID) 175 MCG tablet, Take 1 tablet (175 mcg total) by mouth once daily., Disp: 90 tablet, Rfl: 1    Review of Systems   Constitutional:  Negative for chills and fever.   HENT:  Negative for congestion and sore throat.    Eyes:  Negative for discharge and redness.   Respiratory:  Negative for cough and shortness of breath.    Cardiovascular:  Positive for chest pain. Negative for palpitations and leg swelling.   Gastrointestinal:  Positive for abdominal pain. Negative for diarrhea and vomiting.   Genitourinary:  Negative for dysuria.   Musculoskeletal:  Positive for arthralgias and back pain.   Skin:  Negative for color change.   Neurological:  Positive for headaches. Negative for dizziness and light-headedness.   Psychiatric/Behavioral:  Negative for confusion.           Objective:      Vitals:     11/13/23 1549 11/13/23 1646   BP: (!) 142/94 136/84   Pulse: 77 76   SpO2: 95%    Weight: (!) 136.5 kg (301 lb)      Physical Exam  Constitutional:       General: He is not in acute distress.     Appearance: He is not toxic-appearing.   HENT:      Head: Normocephalic and atraumatic.      Nose: Nose normal.      Mouth/Throat:      Mouth: Mucous membranes are moist.      Pharynx: Oropharynx is clear.   Eyes:      Extraocular Movements: Extraocular movements intact.      Conjunctiva/sclera: Conjunctivae normal.      Pupils: Pupils are equal, round, and reactive to light.   Cardiovascular:      Rate and Rhythm: Normal rate and regular rhythm.      Pulses: Normal pulses.      Heart sounds: Normal heart sounds.   Pulmonary:      Effort: Pulmonary effort is normal. No respiratory distress.      Breath sounds: Normal breath sounds. No stridor. No wheezing, rhonchi or rales.   Abdominal:      General: There is no distension.      Palpations: Abdomen is soft.   Musculoskeletal:      Right lower leg: No edema.      Left lower leg: No edema.   Skin:     General: Skin is warm and dry.   Neurological:      General: No focal deficit present.      Mental Status: He is alert and oriented to person, place, and time.           Assessment:       1. Ulcerative colitis with rectal bleeding, unspecified location    2. Hypothyroidism (acquired)    3. Anxiety    4. Hypothyroidism (acquired)    5. Chest pain, unspecified type         Plan:       Ulcerative colitis with rectal bleeding, unspecified location  Comments:  Currently following with GI. Continue as is    Hypothyroidism (acquired)  Comments:  Labs ordered. Continue as is.  Orders:  -     TSH w/reflex to FT4; Future; Expected date: 11/13/2023  -     levothyroxine (SYNTHROID, LEVOTHROID) 175 MCG tablet; Take 1 tablet (175 mcg total) by mouth once daily.  Dispense: 90 tablet; Refill: 1    Anxiety  Comments:  Some stress now with new job. wishes to keep dose of celexa the same now and  consider intensifying at the next visit if still not at goal.  Orders:  -     citalopram (CELEXA) 40 MG tablet; Take 1 tablet (40 mg total) by mouth once daily.  Dispense: 90 tablet; Refill: 1    Hypothyroidism (acquired)  Comments:  TSH previously at goal. continue as is.  Orders:  -     TSH w/reflex to FT4; Future; Expected date: 11/13/2023  -     levothyroxine (SYNTHROID, LEVOTHROID) 175 MCG tablet; Take 1 tablet (175 mcg total) by mouth once daily.  Dispense: 90 tablet; Refill: 1    Chest pain, unspecified type  Comments:  unclear etiology. possibly GI related. will check Stress test now for further eval  Orders:  -     Hemoglobin A1C; Future; Expected date: 11/13/2023  -     CBC Auto Differential; Future; Expected date: 11/13/2023  -     Comprehensive Metabolic Panel; Future; Expected date: 11/13/2023  -     Exercise Stress - EKG; Future      No follow-ups on file.        11/13/2023 Vinh Noyola PA-C

## 2023-11-15 ENCOUNTER — TELEPHONE (OUTPATIENT)
Dept: FAMILY MEDICINE | Facility: CLINIC | Age: 53
End: 2023-11-15

## 2023-11-15 DIAGNOSIS — E03.9 HYPOTHYROIDISM (ACQUIRED): ICD-10-CM

## 2023-11-15 DIAGNOSIS — E78.1 HYPERTRIGLYCERIDEMIA: ICD-10-CM

## 2023-11-15 DIAGNOSIS — E29.1 HYPOGONADISM IN MALE: ICD-10-CM

## 2023-11-15 DIAGNOSIS — Z79.899 ENCOUNTER FOR LONG-TERM (CURRENT) USE OF OTHER MEDICATIONS: ICD-10-CM

## 2023-11-15 DIAGNOSIS — Z12.5 SCREENING FOR PROSTATE CANCER: ICD-10-CM

## 2023-11-15 DIAGNOSIS — Z00.00 GENERAL MEDICAL EXAM: Primary | ICD-10-CM

## 2023-11-16 ENCOUNTER — TELEPHONE (OUTPATIENT)
Dept: FAMILY MEDICINE | Facility: CLINIC | Age: 53
End: 2023-11-16

## 2023-11-16 ENCOUNTER — TELEPHONE (OUTPATIENT)
Dept: CARDIOLOGY | Facility: HOSPITAL | Age: 53
End: 2023-11-16

## 2023-11-16 DIAGNOSIS — E03.9 HYPOTHYROIDISM (ACQUIRED): Primary | ICD-10-CM

## 2023-11-16 LAB
ALBUMIN SERPL-MCNC: 4.7 G/DL (ref 3.6–5.1)
ALBUMIN/GLOB SERPL: 1.6 (CALC) (ref 1–2.5)
ALP SERPL-CCNC: 51 U/L (ref 35–144)
ALT SERPL-CCNC: 41 U/L (ref 9–46)
AST SERPL-CCNC: 24 U/L (ref 10–35)
BASOPHILS # BLD AUTO: 84 CELLS/UL (ref 0–200)
BASOPHILS NFR BLD AUTO: 1.1 %
BILIRUB SERPL-MCNC: 0.4 MG/DL (ref 0.2–1.2)
BUN SERPL-MCNC: 13 MG/DL (ref 7–25)
BUN/CREAT SERPL: ABNORMAL (CALC) (ref 6–22)
CALCIUM SERPL-MCNC: 9.4 MG/DL (ref 8.6–10.3)
CHLORIDE SERPL-SCNC: 102 MMOL/L (ref 98–110)
CHOLEST SERPL-MCNC: 217 MG/DL
CHOLEST/HDLC SERPL: 7 (CALC)
CO2 SERPL-SCNC: 25 MMOL/L (ref 20–32)
CREAT SERPL-MCNC: 1.05 MG/DL (ref 0.7–1.3)
EGFR: 85 ML/MIN/1.73M2
EOSINOPHIL # BLD AUTO: 327 CELLS/UL (ref 15–500)
EOSINOPHIL NFR BLD AUTO: 4.3 %
ERYTHROCYTE [DISTWIDTH] IN BLOOD BY AUTOMATED COUNT: 14.3 % (ref 11–15)
GLOBULIN SER CALC-MCNC: 3 G/DL (CALC) (ref 1.9–3.7)
GLUCOSE SERPL-MCNC: 108 MG/DL (ref 65–99)
HBA1C MFR BLD: 7.1 % OF TOTAL HGB
HCT VFR BLD AUTO: 40.6 % (ref 38.5–50)
HDLC SERPL-MCNC: 31 MG/DL
HGB BLD-MCNC: 12.7 G/DL (ref 13.2–17.1)
LDLC SERPL CALC-MCNC: 152 MG/DL (CALC)
LYMPHOCYTES # BLD AUTO: 2067 CELLS/UL (ref 850–3900)
LYMPHOCYTES NFR BLD AUTO: 27.2 %
MCH RBC QN AUTO: 27.5 PG (ref 27–33)
MCHC RBC AUTO-ENTMCNC: 31.3 G/DL (ref 32–36)
MCV RBC AUTO: 88.1 FL (ref 80–100)
MONOCYTES # BLD AUTO: 631 CELLS/UL (ref 200–950)
MONOCYTES NFR BLD AUTO: 8.3 %
NEUTROPHILS # BLD AUTO: 4492 CELLS/UL (ref 1500–7800)
NEUTROPHILS NFR BLD AUTO: 59.1 %
NONHDLC SERPL-MCNC: 186 MG/DL (CALC)
PLATELET # BLD AUTO: 242 THOUSAND/UL (ref 140–400)
PMV BLD REES-ECKER: 10.9 FL (ref 7.5–12.5)
POTASSIUM SERPL-SCNC: 4.6 MMOL/L (ref 3.5–5.3)
PROT SERPL-MCNC: 7.7 G/DL (ref 6.1–8.1)
PSA SERPL-MCNC: 0.59 NG/ML
RBC # BLD AUTO: 4.61 MILLION/UL (ref 4.2–5.8)
SODIUM SERPL-SCNC: 137 MMOL/L (ref 135–146)
T4 FREE SERPL-MCNC: 0.9 NG/DL (ref 0.8–1.8)
TRIGL SERPL-MCNC: 196 MG/DL
TSH SERPL-ACNC: 22.71 MIU/L (ref 0.4–4.5)
WBC # BLD AUTO: 7.6 THOUSAND/UL (ref 3.8–10.8)

## 2023-11-16 RX ORDER — LEVOTHYROXINE SODIUM 200 UG/1
200 TABLET ORAL
Qty: 30 TABLET | Refills: 11 | Status: SHIPPED | OUTPATIENT
Start: 2023-11-16 | End: 2024-11-15

## 2023-11-16 NOTE — TELEPHONE ENCOUNTER
Need to confirm that he has been taking 175 mcg daily 1st thing in the morning on an empty stomach for absorptive purposes.  His thyroid is still very low.  Need to increase to 200 mcg daily and recheck in 6 weeks, TSH

## 2023-11-16 NOTE — TELEPHONE ENCOUNTER
Patient advised, test will be at [facility] ([address]   Will need to register on the first floor at the main entrance.   Patient advised that arrival time is 8:45am.  Patient advised, may take his medications prior to testing if you need to.     Advised if he needs to eat to take his medications, please keep it light, like toast and juice.    Patient advised to avoid all caffeine 12 hours prior to testing.  This includes decaf tea and coffee.    Wear comfortable clothing.   No lotions, oils, or powders to the upper chest area. May wear deodorant.    Patient verbalizes understanding of instructions.

## 2023-11-16 NOTE — TELEPHONE ENCOUNTER
Spoke with pt verbatim below per Mendoza. Pt voiced understanding.     Rx order set up. Remind me created.

## 2023-11-17 ENCOUNTER — HOSPITAL ENCOUNTER (OUTPATIENT)
Dept: CARDIOLOGY | Facility: HOSPITAL | Age: 53
Discharge: HOME | End: 2023-11-17
Attending: PHYSICIAN ASSISTANT
Payer: OTHER GOVERNMENT

## 2023-11-17 DIAGNOSIS — R07.9 CHEST PAIN, UNSPECIFIED TYPE: ICD-10-CM

## 2023-11-17 LAB
CV STRESS BASE HR: 74 BPM
DIASTOLIC BLOOD PRESSURE: 70 MMHG
OHS CV CPX 1 MINUTE RECOVERY HEART RATE: 125 BPM
OHS CV CPX 85 PERCENT MAX PREDICTED HEART RATE MALE: 142
OHS CV CPX ESTIMATED METS: 9
OHS CV CPX MAX PREDICTED HEART RATE: 167
OHS CV CPX PATIENT IS FEMALE: 0
OHS CV CPX PATIENT IS MALE: 1
OHS CV CPX PEAK DIASTOLIC BLOOD PRESSURE: 82 MMHG
OHS CV CPX PEAK HEAR RATE: 142 BPM
OHS CV CPX PEAK RATE PRESSURE PRODUCT: NORMAL
OHS CV CPX PEAK SYSTOLIC BLOOD PRESSURE: 164 MMHG
OHS CV CPX PERCENT MAX PREDICTED HEART RATE ACHIEVED: 85
OHS CV CPX RATE PRESSURE PRODUCT PRESENTING: 9768
STRESS ECHO POST EXERCISE DUR MIN: 6 MINUTES
STRESS ECHO POST EXERCISE DUR SEC: 45 SECONDS
SYSTOLIC BLOOD PRESSURE: 132 MMHG

## 2023-11-17 PROCEDURE — 93016 CV STRESS TEST SUPVJ ONLY: CPT | Mod: ,,,

## 2023-11-17 PROCEDURE — 93018 EXERCISE STRESS - EKG (CUPID ONLY): ICD-10-PCS | Mod: ,,, | Performed by: INTERNAL MEDICINE

## 2023-11-17 PROCEDURE — 93018 CV STRESS TEST I&R ONLY: CPT | Mod: ,,, | Performed by: INTERNAL MEDICINE

## 2023-11-17 PROCEDURE — 93017 CV STRESS TEST TRACING ONLY: CPT

## 2023-11-17 PROCEDURE — 93016 EXERCISE STRESS - EKG (CUPID ONLY): ICD-10-PCS | Mod: ,,,

## 2023-11-29 NOTE — TELEPHONE ENCOUNTER
Spoke to pt and let pt know per vibha verbatim below. Pt verbalized understanding . Pt stated he takes levothyroxine around 11 pm every night and eats dinner around 6 pm. Pt is okay with starting jardiance. Pt appointment rescheduled for 1/5     Remind me created

## 2023-11-29 NOTE — TELEPHONE ENCOUNTER
----- Message from Vinh Noyola PA-C sent at 11/29/2023  3:58 PM CST -----  A few things here and we really need to get him back in sooner to discuss some of this. A1c is now fully diabetic at 7.1%  previously was 5.5. We need to get started on medication for this now and really watch the diet closely in regards to carbs, sugars, etc. Given ulcerative colitis I dont want to use metformin first and will start med called jardiance 25mg once daily. Disp # 30 with 3 refills.want to recheck A1c in 3 mos.    Also, TSH markedly elevated indicating underactive thyroid. You are already on such a high dose need to confirm that you are taking this daily on empty stomach and not missing doses. May need endocrine to weight in on this if we need to go higher on this. Cholesterol should improve some with diet and once we get the thyroid under better control.     Nurses please load medication if he agrees.

## 2023-12-12 ENCOUNTER — OFFICE VISIT (OUTPATIENT)
Dept: GASTROENTEROLOGY | Facility: CLINIC | Age: 53
End: 2023-12-12
Payer: OTHER GOVERNMENT

## 2023-12-12 VITALS
WEIGHT: 286.19 LBS | HEART RATE: 73 BPM | SYSTOLIC BLOOD PRESSURE: 113 MMHG | BODY MASS INDEX: 40.06 KG/M2 | DIASTOLIC BLOOD PRESSURE: 72 MMHG | HEIGHT: 71 IN

## 2023-12-12 DIAGNOSIS — R19.8 ALTERNATING CONSTIPATION AND DIARRHEA: ICD-10-CM

## 2023-12-12 DIAGNOSIS — K63.5 DYSPLASTIC POLYP OF COLON: ICD-10-CM

## 2023-12-12 DIAGNOSIS — K51.218 ULCERATIVE PROCTITIS WITH OTHER COMPLICATION: Primary | ICD-10-CM

## 2023-12-12 DIAGNOSIS — R10.11 RUQ PAIN: ICD-10-CM

## 2023-12-12 DIAGNOSIS — Z86.010 HISTORY OF COLON POLYPS: ICD-10-CM

## 2023-12-12 DIAGNOSIS — R19.5 STOOL MUCUS: ICD-10-CM

## 2023-12-12 DIAGNOSIS — R14.0 ABDOMINAL BLOATING: ICD-10-CM

## 2023-12-12 PROCEDURE — 99214 PR OFFICE/OUTPT VISIT, EST, LEVL IV, 30-39 MIN: ICD-10-PCS | Mod: S$PBB,,,

## 2023-12-12 PROCEDURE — 99999 PR PBB SHADOW E&M-EST. PATIENT-LVL IV: ICD-10-PCS | Mod: PBBFAC,,,

## 2023-12-12 PROCEDURE — 99214 OFFICE O/P EST MOD 30 MIN: CPT | Mod: S$PBB,,,

## 2023-12-12 PROCEDURE — 99214 OFFICE O/P EST MOD 30 MIN: CPT | Mod: PBBFAC,PN

## 2023-12-12 PROCEDURE — 99999 PR PBB SHADOW E&M-EST. PATIENT-LVL IV: CPT | Mod: PBBFAC,,,

## 2023-12-12 NOTE — PROGRESS NOTES
Subjective:       Patient ID: Isidoro Singh is a 53 y.o. male Body mass index is 39.91 kg/m².    Chief Complaint: Follow-up    Established patient of Dr. Ogden, Dr. Warren, Dr. Hernandez, and myself.     GI Problem  The primary symptoms include weight loss (Documented weight loss of 15 lb since 11/13/2023; he attributes weight loss eating smaller portions & watching carbohydrate and sugar intake; recently diagnosed with diabetes and was started on Jardiance), fatigue (chronic), abdominal pain and diarrhea. Primary symptoms do not include fever, nausea, vomiting, melena, hematemesis, jaundice, hematochezia or dysuria.   The abdominal pain began more than 2 days ago (chronic; reports the past and item hit him in the RUQ area and is concerned as to whether or not injury is a cause of intermittent abdominal pain). The abdominal pain has been unchanged (waxes and wanes) since its onset. The abdominal pain is located in the RUQ. The abdominal pain does not radiate. The severity of the abdominal pain is 0/10 (Denies pain currently; reports pain typically occurs when he is bloated about once monthly and lasts 1-2 days; he describes pain as an ache; denies any specific trigger). The abdominal pain is relieved by passing flatus.   The diarrhea began more than 1 week ago. The diarrhea is semi-solid (rated 6 on [place] scale intermittently; diet dependent). Daily occurrences: Denies daily BMs; reports having small stools every 3 days. Risk factors for illness producing diarrhea include new medications (Recently started on Jardiance for diagnosis of diabetes and increase Synthroid due to elevated TSH of recent antibiotics or foreign travel).   The illness is also significant for bloating (Improves after BMs; passing gas/mucus when he gets the urge to have BM intermittently) and constipation (Currently having multiple small BM every 3 days rated stool 4-6 on [place] scale; triggers of bowel fluctuations: certain foods like  bread and pasta (TTG IgA normal 11/08/22); fiber supplements improve constipation when present). The illness does not include chills, anorexia, dysphagia or odynophagia. Significant associated medical issues include inflammatory bowel disease (H/o ulcerative proctitis diagnosed in 2014; past treatments: Apriso 1.5 g daily (ineffective) & tacrolimus compounded suppositories nightly for 3 months; currently using Canasa suppositories 2-3 times a week if he sees mucus or rectal bleeding). Associated medical issues do not include GERD, gallstones, liver disease, alcohol abuse, PUD, gastric bypass, bowel resection, irritable bowel syndrome, hemorrhoids or diverticulitis.     IBD History:   IBD disease: Ulcerative proctitis  Disease location: Rectum (pathology with transverse colon inflammation)  Disease behavior: Inflammatory  Current therapy: Canasa nightly and Apriso 1.5 g daily  Prior therapy: Canasa intermmittently  Surgeries: None  Complications:None  Extraintestinal manifestations: None    Review of Systems   Constitutional:  Positive for fatigue (chronic) and weight loss (Documented weight loss of 15 lb since 11/13/2023; he attributes weight loss eating smaller portions & watching carbohydrate and sugar intake; recently diagnosed with diabetes and was started on Jardiance). Negative for activity change, appetite change, chills, diaphoresis, fever and unexpected weight change.   HENT:  Negative for sore throat and trouble swallowing.    Respiratory:  Negative for cough, choking and shortness of breath.    Cardiovascular:  Negative for chest pain.   Gastrointestinal:  Positive for abdominal pain, bloating (Improves after BMs; passing gas/mucus when he gets the urge to have BM intermittently), constipation (Currently having multiple small BM every 3 days rated stool 4-6 on [place] scale; triggers of bowel fluctuations: certain foods like bread and pasta (TTG IgA normal 11/08/22); fiber supplements improve constipation  when present) and diarrhea. Negative for abdominal distention, anal bleeding, anorexia, blood in stool, dysphagia, hematemesis, hematochezia, jaundice, melena, nausea, rectal pain and vomiting.   Genitourinary:  Negative for dysuria.         No LMP for male patient.  Past Medical History:   Diagnosis Date    Colon polyp     Hypothyroid 07/05/2016    Sleep apnea     Thrombocytopenia     Ulcerative colitis      Past Surgical History:   Procedure Laterality Date    COLONOSCOPY N/A 12/04/2020    Procedure: COLONOSCOPY;  Surgeon: Ventura Warren MD;  Location: [facility];  Service: Endoscopy;  Laterality: N/A;    COLONOSCOPY N/A 07/19/2021    Procedure: COLONOSCOPY;  Surgeon: Clarence Ogden MD;  Location: [facility];  Service: Endoscopy;  Laterality: N/A;    COLONOSCOPY N/A 11/10/2022    Procedure: COLONOSCOPY;  Surgeon: Kath Hernandez MD;  Location: [facility];  Service: Endoscopy;  Laterality: N/A;    FOOT SURGERY      left   cyst removed    HAND SURGERY      right   tendon repair     Family History   Problem Relation Age of Onset    Ulcerative colitis Mother         in 70s    Colon cancer Neg Hx     Colon polyps Neg Hx     Crohn's disease Neg Hx      Social History     Tobacco Use    Smoking status: Former     Current packs/day: 0.00     Average packs/day: 1 pack/day for 30.0 years (30.0 ttl pk-yrs)     Types: Cigarettes     Start date: 1984     Quit date: 12/2013     Years since quitting: 10.0     Passive exposure: Past    Smokeless tobacco: Former     Types: Snuff     Quit date: 2020   Substance Use Topics    Alcohol use: Yes     Comment: occasional    Drug use: No     Wt Readings from Last 10 Encounters:   12/12/23 129.8 kg (286 lb 2.5 oz)   11/13/23 (!) 136.5 kg (301 lb)   09/21/23 135.6 kg (299 lb)   06/22/23 132 kg (291 lb)   06/12/23 130.8 kg (288 lb 5.8 oz)   05/08/23 133.8 kg (295 lb)   11/10/22 127.9 kg (282 lb)   11/10/22 127.9 kg (282 lb)   11/08/22 127.5 kg (281 lb 1.4 oz)   05/10/22 132.5 kg (292  lb)     Lab Results   Component Value Date    WBC 7.6 11/15/2023    HGB 12.7 (L) 11/15/2023    HCT 40.6 11/15/2023    MCV 88.1 11/15/2023     11/15/2023     CMP  Sodium   Date Value Ref Range Status   11/15/2023 137 135 - 146 mmol/L Final     Potassium   Date Value Ref Range Status   11/15/2023 4.6 3.5 - 5.3 mmol/L Final     Chloride   Date Value Ref Range Status   11/15/2023 102 98 - 110 mmol/L Final     CO2   Date Value Ref Range Status   11/15/2023 25 20 - 32 mmol/L Final     Glucose   Date Value Ref Range Status   11/15/2023 108 (H) 65 - 99 mg/dL Final     Comment:                   Fasting reference interval     For someone without known diabetes, a glucose value  between 100 and 125 mg/dL is consistent with  prediabetes and should be confirmed with a  follow-up test.          BUN   Date Value Ref Range Status   11/15/2023 13 7 - 25 mg/dL Final     Creatinine   Date Value Ref Range Status   11/15/2023 1.05 0.70 - 1.30 mg/dL Final     Calcium   Date Value Ref Range Status   11/15/2023 9.4 8.6 - 10.3 mg/dL Final     Total Protein   Date Value Ref Range Status   11/15/2023 7.7 6.1 - 8.1 g/dL Final     Albumin   Date Value Ref Range Status   11/15/2023 4.7 3.6 - 5.1 g/dL Final     Total Bilirubin   Date Value Ref Range Status   11/15/2023 0.4 0.2 - 1.2 mg/dL Final     Alkaline Phosphatase   Date Value Ref Range Status   01/26/2021 53 (L) 55 - 135 U/L Final     AST   Date Value Ref Range Status   11/15/2023 24 10 - 35 U/L Final     ALT   Date Value Ref Range Status   11/15/2023 41 9 - 46 U/L Final     Anion Gap   Date Value Ref Range Status   01/26/2021 8 8 - 16 mmol/L Final     eGFR if    Date Value Ref Range Status   11/09/2021 96 > OR = 60 mL/min/1.73m2 Final     eGFR if non    Date Value Ref Range Status   11/09/2021 83 > OR = 60 mL/min/1.73m2 Final     Lab Results   Component Value Date    AMYLASE 46 10/06/2017     Lab Results   Component Value Date    LIPASE 35 10/06/2017      Lab Results   Component Value Date    TSH 3.347 04/20/2018     Reviewed prior medical records including office visit with Dr. Ogden 06/12/2023, office visit Dr. Ogden 05/10/2021 & radiology report of abdominal US 11/25/14 & endoscopy history (see surgical history).    Objective:      Physical Exam  Vitals and nursing note reviewed.   Constitutional:       General: He is not in acute distress.     Appearance: Normal appearance. He is obese. He is not ill-appearing.   HENT:      Mouth/Throat:      Lips: Pink. No lesions.   Cardiovascular:      Rate and Rhythm: Normal rate and regular rhythm.      Pulses: Normal pulses.   Pulmonary:      Effort: Pulmonary effort is normal. No respiratory distress.      Breath sounds: Normal breath sounds.   Abdominal:      General: Abdomen is protuberant. Bowel sounds are normal. There is no distension or abdominal bruit. There are no signs of injury.      Palpations: Abdomen is soft. There is no shifting dullness, fluid wave, hepatomegaly, splenomegaly or mass.      Tenderness: There is no abdominal tenderness. There is no guarding or rebound. Negative signs include Ruiz's sign, Rovsing's sign and McBurney's sign.      Hernia: No hernia is present.   Skin:     General: Skin is warm and dry.      Coloration: Skin is not jaundiced or pale.   Neurological:      Mental Status: He is alert and oriented to person, place, and time.   Psychiatric:         Attention and Perception: Attention normal.         Mood and Affect: Mood normal.         Speech: Speech normal.         Behavior: Behavior normal.         Assessment:       1. Ulcerative proctitis with other complication    2. Alternating constipation and diarrhea    3. Abdominal bloating    4. RUQ pain    5. Stool mucus        Plan:       Addendum: Discussed case with Dr. Warren - he did not recommend any changes in plan, but did recommend patient take Canasa suppositories as prescribed nightly.    Will discuss case with  Dr. Hernandez and will notify patient of any changes in plan after discussion.    Ulcerative proctitis with other complication  - schedule Colonoscopy, discussed procedure with the patient, including risks and benefits, patient verbalized understanding  -     Calprotectin, Stool; Future; Expected date: 12/12/2023  -     H. pylori antigen, stool; Future; Expected date: 12/12/2023  -     C-reactive protein; Future; Expected date: 12/12/2023  -     US Abdomen Limited (LIVER); Future; Expected date: 12/12/2023  -     Lipase; Future; Expected date: 12/12/2023  -     Clostridium difficile EIA; Future; Expected date: 12/12/2023  -     Case Request Endoscopy: COLONOSCOPY  -continue Canasa suppositories as prescribed    Alternating constipation and diarrhea  - schedule Colonoscopy, discussed procedure with the patient, including risks and benefits, patient verbalized understanding  -     Calprotectin, Stool; Future; Expected date: 12/12/2023  -     H. pylori antigen, stool; Future; Expected date: 12/12/2023  -     Clostridium difficile EIA; Future; Expected date: 12/12/2023  -     Case Request Endoscopy: COLONOSCOPY  -Recommend daily exercise as tolerated, adequate water intake (six 8-oz glasses of water daily), and high fiber diet. OTC fiber supplements are recommended if diet does not reach daily fiber goal (20-30 grams daily), such as Metamucil, Citrucel, or FiberCon (take as directed, separate from other oral medications by >2 hours).  -Recommend taking an OTC stool softener such as Colace as directed to avoid hard stools and straining with bowel movements PRN  -If still no improvement with these measures, call/follow-up    Abdominal bloating  - recommend OTC simethicone as directed, such as Phazyme or Gas-x  - recommend low gas diet: Reduce or eliminate these foods from your diet: Broccoli, Cauliflower, [place] sprouts, Cabbage, Cooked dried beans, Carbonated beverages (sparkling water, soda, beer, champagne)  Other  Causes Of Excess Gas Include:   1) EATING TOO FAST or TALKING WHILE YOU CHEW may cause you to swallow air. This increases the amount of gas in the stomach and may worsen your symptoms.  --> Chew each mouthful completely before swallowing. Take your time.  2) OVEREATING may increase the feeling of being bloated and cause more gas.  --> When you are full, stop eating.  3) CONSTIPATION can increase the amount of normal intestinal gas.  --> Avoid constipation by increasing the amount of fiber in your diet by including whole cereal grains, fresh vegetables (except those in the above list) and fresh fruits. High-fiber foods absorb water and carry it out of the body. When increasing the amount of fiber in your diet, you also need to increase the amount of water that you drink. You should drink at least eight 8-ounce glasses of water (two quarts) per day.    RUQ pain  -     H. pylori antigen, stool; Future; Expected date: 12/12/2023  -     C-reactive protein; Future; Expected date: 12/12/2023  -     US Abdomen Limited (LIVER); Future; Expected date: 12/12/2023  -     Lipase; Future; Expected date: 12/12/2023  -consider EGD    Stool mucus  - schedule Colonoscopy, discussed procedure with the patient, including risks and benefits, patient verbalized understanding    Dysplastic polyp of colon & History of colon polyps  - schedule Colonoscopy, discussed procedure with the patient, including risks and benefits, patient verbalized understanding    Health Maintenance:   -Prevnar 13: Recommend   -Pneumovax 23: Recommend 8 weeks after Prevnar 13  -Flu Shot: Recommend annually  -Shingrix: Recommend    -Hepatitis A/Hepatitis B: Immune  -Annual skin exam: Recommend   -Colon cancer screening: does not involve greater than 1/3 of colon, Distribution of colonic disease: rectum   -DEXA scan: Recommend  -Tobacco: Avoid  -Should avoid NSAIDs and narcotics, use only Tylenol for pain relief.     Follow up in about 4 weeks (around 1/9/2024), or  if symptoms worsen or fail to improve.      If no improvement in symptoms or symptoms worsen, call/follow-up at clinic or go to ER.        Total time spent on the encounter includes face to face time and non-face to face time preparing to see the patient (eg, review of tests), Obtaining and/or reviewing separately obtained history, Documenting clinical information in the electronic or other health record, Independently interpreting results (not separately reported) and communicating results to the patient/family/caregiver, or Care coordination (not separately reported).

## 2023-12-13 ENCOUNTER — LAB VISIT (OUTPATIENT)
Dept: LAB | Facility: HOSPITAL | Age: 53
End: 2023-12-13
Payer: OTHER GOVERNMENT

## 2023-12-13 DIAGNOSIS — K51.218 ULCERATIVE PROCTITIS WITH OTHER COMPLICATION: ICD-10-CM

## 2023-12-13 PROCEDURE — 83993 ASSAY FOR CALPROTECTIN FECAL: CPT

## 2023-12-13 PROCEDURE — 87338 HPYLORI STOOL AG IA: CPT

## 2023-12-19 LAB — CALPROTECTIN STL-MCNT: 537.1 MCG/G

## 2023-12-22 LAB — H PYLORI AG STL QL IA: NOT DETECTED

## 2023-12-27 ENCOUNTER — HOSPITAL ENCOUNTER (OUTPATIENT)
Dept: RADIOLOGY | Facility: HOSPITAL | Age: 53
Discharge: HOME | End: 2023-12-27
Payer: OTHER GOVERNMENT

## 2023-12-27 ENCOUNTER — PATIENT MESSAGE (OUTPATIENT)
Dept: FAMILY MEDICINE | Facility: CLINIC | Age: 53
End: 2023-12-27
Payer: OTHER GOVERNMENT

## 2023-12-27 DIAGNOSIS — R10.11 RUQ PAIN: ICD-10-CM

## 2023-12-27 PROCEDURE — 76705 US ABDOMEN LIMITED: ICD-10-PCS | Mod: 26,,, | Performed by: RADIOLOGY

## 2023-12-27 PROCEDURE — 76705 ECHO EXAM OF ABDOMEN: CPT | Mod: TC

## 2023-12-27 PROCEDURE — 76705 ECHO EXAM OF ABDOMEN: CPT | Mod: 26,,, | Performed by: RADIOLOGY

## 2023-12-27 NOTE — TELEPHONE ENCOUNTER
Started ginny 12/1, the abdominal cramps and diarrhea have been since then, tried to give it time to subside but it hasn't. I told him to hold off taking another dose until response.

## 2023-12-28 ENCOUNTER — TELEPHONE (OUTPATIENT)
Dept: FAMILY MEDICINE | Facility: CLINIC | Age: 53
End: 2023-12-28
Payer: OTHER GOVERNMENT

## 2023-12-28 DIAGNOSIS — Z79.899 ENCOUNTER FOR LONG-TERM (CURRENT) USE OF OTHER MEDICATIONS: ICD-10-CM

## 2023-12-28 DIAGNOSIS — E03.9 HYPOTHYROIDISM (ACQUIRED): Primary | ICD-10-CM

## 2023-12-28 NOTE — TELEPHONE ENCOUNTER
Spoke to patient that thyroid lab is due and he does not need to fast. Order pended. Updated remind me.

## 2023-12-28 NOTE — TELEPHONE ENCOUNTER
----- Message from RT Patricia sent at 12/28/2023  9:02 AM CST -----  Regarding: FW: Repeat TSH    ----- Message -----  From: Leidy Winkler LPN  Sent: 12/28/2023  12:00 AM CST  To: [facility] Staff  Subject: Repeat TSH                                       Please ensure pt has repeat TSH

## 2024-01-04 ENCOUNTER — OFFICE VISIT (OUTPATIENT)
Dept: FAMILY MEDICINE | Facility: CLINIC | Age: 54
End: 2024-01-04
Payer: OTHER GOVERNMENT

## 2024-01-04 VITALS
DIASTOLIC BLOOD PRESSURE: 80 MMHG | WEIGHT: 283 LBS | BODY MASS INDEX: 39.62 KG/M2 | SYSTOLIC BLOOD PRESSURE: 122 MMHG | HEIGHT: 71 IN

## 2024-01-04 DIAGNOSIS — E11.9 TYPE 2 DIABETES MELLITUS WITHOUT COMPLICATION, WITHOUT LONG-TERM CURRENT USE OF INSULIN: Primary | ICD-10-CM

## 2024-01-04 DIAGNOSIS — K51.211 CHRONIC ULCERATIVE PROCTITIS WITH RECTAL BLEEDING: ICD-10-CM

## 2024-01-04 DIAGNOSIS — E03.9 HYPOTHYROIDISM (ACQUIRED): ICD-10-CM

## 2024-01-04 LAB
T4 FREE SERPL-MCNC: 0.9 NG/DL (ref 0.8–1.8)
TSH SERPL-ACNC: 7.01 MIU/L (ref 0.4–4.5)

## 2024-01-04 PROCEDURE — 99214 OFFICE O/P EST MOD 30 MIN: CPT | Mod: S$GLB,,, | Performed by: PHYSICIAN ASSISTANT

## 2024-01-04 RX ORDER — LANCETS
EACH MISCELLANEOUS
Qty: 90 EACH | Refills: 1 | Status: SHIPPED | OUTPATIENT
Start: 2024-01-04

## 2024-01-04 RX ORDER — GLIPIZIDE 5 MG/1
5 TABLET ORAL
Qty: 60 TABLET | Refills: 5 | Status: ON HOLD | OUTPATIENT
Start: 2024-01-04 | End: 2024-02-24 | Stop reason: HOSPADM

## 2024-01-04 RX ORDER — INSULIN PUMP SYRINGE, 3 ML
EACH MISCELLANEOUS
Qty: 1 EACH | Refills: 0 | Status: SHIPPED | OUTPATIENT
Start: 2024-01-04 | End: 2025-01-03

## 2024-01-04 NOTE — PROGRESS NOTES
SUBJECTIVE:    Patient ID: Isidoro Singh is a 53 y.o. male.    Chief Complaint: Follow-up (No bottles//DM follow up after having rxn to jardiance)    This is a 53-year-old male who presents today for regular follow-up. Was found to have A1c at 7.1 and was started on jardiance. [place] to have hive like eruption and caused GI issues, cramping. Just was not able to tolerate. Has been off since before Marek. Since being off he is feeling back to his baseline. Interested in another oral option but would like to avoid anything that may mess with GI tract.    Follow-up  Associated symptoms include arthralgias. Pertinent negatives include no chest pain, headaches, joint swelling, neck pain, vomiting or weakness.       Telephone on 12/28/2023   Component Date Value Ref Range Status    TSH w/reflex to FT4 01/03/2024 7.01 (H)  0.40 - 4.50 mIU/L Final    T4, Free 01/03/2024 0.9  0.8 - 1.8 ng/dL Final   Lab Visit on 12/13/2023   Component Date Value Ref Range Status    Calprotectin 12/13/2023 537.1 (H)  <50 mcg/g Final    H. Pylori Antigen, Stool 12/13/2023 NOT DETECTED   Final   Hospital Outpatient Visit on 11/17/2023   Component Date Value Ref Range Status    85% Max Predicted HR 11/17/2023 142   Final    Max Predicted HR 11/17/2023 167   Final    [facility] CV CPX PATIENT IS MALE 11/17/2023 1.0   Final    [facility] CV CPX PATIENT IS FEMALE 11/17/2023 0.0   Final    HR at rest 11/17/2023 74  bpm Final    Systolic blood pressure 11/17/2023 132  mmHg Final    Diastolic blood pressure 11/17/2023 70  mmHg Final    RPP 11/17/2023 9,768   Final    Exercise duration (min) 11/17/2023 6  minutes Final    Exercise duration (sec) 11/17/2023 45  seconds Final    Peak HR 11/17/2023 142  bpm Final    Peak Systolic BP 11/17/2023 164  mmHg Final    Peak Diatolic BP 11/17/2023 82  mmHg Final    Peak RPP 11/17/2023 23,288   Final    Estimated METs 11/17/2023 9   Final    % Max HR Achieved 11/17/2023 85   Final    1 Minute Recovery HR 11/17/2023 125   bpm Final   Telephone on 11/15/2023   Component Date Value Ref Range Status    Glucose 11/15/2023 108 (H)  65 - 99 mg/dL Final    BUN 11/15/2023 13  7 - 25 mg/dL Final    Creatinine 11/15/2023 1.05  0.70 - 1.30 mg/dL Final    eGFR 11/15/2023 85  > OR = 60 mL/min/1.73m2 Final    BUN/Creatinine Ratio 11/15/2023 SEE NOTE:  6 - 22 (calc) Final    Sodium 11/15/2023 137  135 - 146 mmol/L Final    Potassium 11/15/2023 4.6  3.5 - 5.3 mmol/L Final    Chloride 11/15/2023 102  98 - 110 mmol/L Final    CO2 11/15/2023 25  20 - 32 mmol/L Final    Calcium 11/15/2023 9.4  8.6 - 10.3 mg/dL Final    Total Protein 11/15/2023 7.7  6.1 - 8.1 g/dL Final    Albumin 11/15/2023 4.7  3.6 - 5.1 g/dL Final    Globulin, Total 11/15/2023 3.0  1.9 - 3.7 g/dL (calc) Final    Albumin/Globulin Ratio 11/15/2023 1.6  1.0 - 2.5 (calc) Final    Total Bilirubin 11/15/2023 0.4  0.2 - 1.2 mg/dL Final    Alkaline Phosphatase 11/15/2023 51  35 - 144 U/L Final    AST 11/15/2023 24  10 - 35 U/L Final    ALT 11/15/2023 41  9 - 46 U/L Final    Hemoglobin A1C 11/15/2023 7.1 (H)  <5.7 % of total Hgb Final    WBC 11/15/2023 7.6  3.8 - 10.8 Thousand/uL Final    RBC 11/15/2023 4.61  4.20 - 5.80 Million/uL Final    Hemoglobin 11/15/2023 12.7 (L)  13.2 - 17.1 g/dL Final    Hematocrit 11/15/2023 40.6  38.5 - 50.0 % Final    MCV 11/15/2023 88.1  80.0 - 100.0 fL Final    MCH 11/15/2023 27.5  27.0 - 33.0 pg Final    MCHC 11/15/2023 31.3 (L)  32.0 - 36.0 g/dL Final    RDW 11/15/2023 14.3  11.0 - 15.0 % Final    Platelets 11/15/2023 242  140 - 400 Thousand/uL Final    MPV 11/15/2023 10.9  7.5 - 12.5 fL Final    Neutrophils, Abs 11/15/2023 4,492  1,500 - 7,800 cells/uL Final    Lymph # 11/15/2023 2,067  850 - 3,900 cells/uL Final    Mono # 11/15/2023 631  200 - 950 cells/uL Final    Eos # 11/15/2023 327  15 - 500 cells/uL Final    Baso # 11/15/2023 84  0 - 200 cells/uL Final    Neutrophils Relative 11/15/2023 59.1  % Final    Lymph % 11/15/2023 27.2  % Final    Mono %  11/15/2023 8.3  % Final    Eosinophil % 11/15/2023 4.3  % Final    Basophil % 11/15/2023 1.1  % Final    PROSTATE SPECIFIC ANTIGEN, SCR - Q* 11/15/2023 0.59  < OR = 4.00 ng/mL Final    Cholesterol 11/15/2023 217 (H)  <200 mg/dL Final    HDL 11/15/2023 31 (L)  > OR = 40 mg/dL Final    Triglycerides 11/15/2023 196 (H)  <150 mg/dL Final    LDL Cholesterol 11/15/2023 152 (H)  mg/dL (calc) Final    HDL/Cholesterol Ratio 11/15/2023 7.0 (H)  <5.0 (calc) Final    Non HDL Chol. (LDL+VLDL) 11/15/2023 186 (H)  <130 mg/dL (calc) Final    TSH w/reflex to FT4 11/15/2023 22.71 (H)  0.40 - 4.50 mIU/L Final    T4, Free 11/15/2023 0.9  0.8 - 1.8 ng/dL Final       Past Medical History:   Diagnosis Date    Colon polyp     Hypothyroid 07/05/2016    Sleep apnea     Thrombocytopenia     Ulcerative colitis      Past Surgical History:   Procedure Laterality Date    COLONOSCOPY N/A 12/04/2020    Procedure: COLONOSCOPY;  Surgeon: Ventura Warren MD;  Location: [facility];  Service: Endoscopy;  Laterality: N/A;    COLONOSCOPY N/A 07/19/2021    Procedure: COLONOSCOPY;  Surgeon: Clarence Ogden MD;  Location: [facility] ENDO;  Service: Endoscopy;  Laterality: N/A;    COLONOSCOPY N/A 11/10/2022    Procedure: COLONOSCOPY;  Surgeon: Kath Hernandez MD;  Location: [facility] ENDO;  Service: Endoscopy;  Laterality: N/A;    FOOT SURGERY      left   cyst removed    HAND SURGERY      right   tendon repair     Family History   Problem Relation Age of Onset    Ulcerative colitis Mother         in 70s    Colon cancer Neg Hx     Colon polyps Neg Hx     Crohn's disease Neg Hx        Marital Status:   Alcohol History:  reports current alcohol use.  Tobacco History:  reports that he quit smoking about 10 years ago. His smoking use included cigarettes. He started smoking about 40 years ago. He has a 30.0 pack-year smoking history. He has been exposed to tobacco smoke. He quit smokeless tobacco use about 4 years ago.  His smokeless tobacco use included  snuff.  Drug History:  reports no history of drug use.    Review of patient's allergies indicates:  No Known Allergies    Current Outpatient Medications:     citalopram (CELEXA) 40 MG tablet, Take 1 tablet (40 mg total) by mouth once daily., Disp: 90 tablet, Rfl: 1    ergocalciferol (ERGOCALCIFEROL) 50,000 unit Cap, TAKE 1 CAPSULE BY MOUTH EVERY 7 DAYS, Disp: 12 capsule, Rfl: 3    folic acid (FOLVITE) 1 MG tablet, Take 1 tablet (1 mg total) by mouth once daily., Disp: 90 tablet, Rfl: 3    levothyroxine (SYNTHROID) 200 MCG tablet, Take 1 tablet (200 mcg total) by mouth before breakfast., Disp: 30 tablet, Rfl: 11    mesalamine (CANASA) 1000 MG Supp, Place 1 suppository (1,000 mg total) rectally nightly., Disp: 90 suppository, Rfl: 3    omega-3 acid ethyl esters (LOVAZA) 1 gram capsule, Take 1 capsule (1 g total) by mouth 2 (two) times daily., Disp: 180 capsule, Rfl: 3    sildenafiL (VIAGRA) 100 MG tablet, Take 1 tablet (100 mg total) by mouth daily as needed for Erectile Dysfunction., Disp: 10 tablet, Rfl: 6    testosterone cypionate (DEPOTESTOTERONE CYPIONATE) 200 mg/mL injection, Inject into the muscle every 14 (fourteen) days., Disp: , Rfl:     blood sugar diagnostic Strp, To check BG 1 times daily, to use with insurance preferred meter, Disp: 90 each, Rfl: 1    blood-glucose meter kit, To check BG 1 times daily, to use with insurance preferred meter, Disp: 1 each, Rfl: 0    empagliflozin (JARDIANCE) 25 mg tablet, Take 1 tablet (25 mg total) by mouth once daily. (Patient not taking: Reported on 1/4/2024), Disp: 30 tablet, Rfl: 3    glipiZIDE (GLUCOTROL) 5 MG tablet, Take 1 tablet (5 mg total) by mouth 2 (two) times daily before meals., Disp: 60 tablet, Rfl: 5    lancets Misc, To check BG 1 times daily, to use with insurance preferred meter, Disp: 90 each, Rfl: 1    Review of Systems   Constitutional:  Positive for unexpected weight change. Negative for activity change.   HENT:  Negative for hearing loss, rhinorrhea  "and trouble swallowing.    Eyes:  Negative for discharge and visual disturbance.   Respiratory:  Negative for chest tightness and wheezing.    Cardiovascular:  Negative for chest pain and palpitations.   Gastrointestinal:  Positive for blood in stool and diarrhea. Negative for constipation and vomiting.   Endocrine: Positive for polyuria. Negative for polydipsia.   Genitourinary:  Negative for difficulty urinating, hematuria and urgency.   Musculoskeletal:  Positive for arthralgias. Negative for joint swelling and neck pain.   Neurological:  Negative for weakness and headaches.   Psychiatric/Behavioral:  Negative for confusion and dysphoric mood.           Objective:      Vitals:    01/04/24 1200   BP: 122/80   Weight: 128.4 kg (283 lb)   Height: 5' 11" (1.803 m)     Physical Exam  Constitutional:       General: He is not in acute distress.     Appearance: He is well-developed.   HENT:      Head: Normocephalic and atraumatic.   Eyes:      Pupils: Pupils are equal, round, and reactive to light.   Neck:      Thyroid: No thyromegaly.   Cardiovascular:      Rate and Rhythm: Normal rate and regular rhythm.      Heart sounds: Normal heart sounds.   Pulmonary:      Effort: Pulmonary effort is normal.      Breath sounds: Normal breath sounds.   Abdominal:      General: Bowel sounds are normal. There is no distension.      Palpations: Abdomen is soft.      Tenderness: There is no abdominal tenderness.   Musculoskeletal:         General: Normal range of motion.      Cervical back: Normal range of motion and neck supple.   Skin:     General: Skin is warm and dry.      Findings: No erythema or rash.   Neurological:      Mental Status: He is alert and oriented to person, place, and time.      Cranial Nerves: No cranial nerve deficit.           Assessment:       1. Type 2 diabetes mellitus without complication, without long-term current use of insulin    2. Hypothyroidism (acquired)    3. Chronic ulcerative proctitis with rectal " bleeding         Plan:       Type 2 diabetes mellitus without complication, without long-term current use of insulin  Comments:  A1c at 7.1. intolerant of po meds due to UC. will keep log. add glipizide now. f/u in 3 mos.  Orders:  -     blood-glucose meter kit; To check BG 1 times daily, to use with insurance preferred meter  Dispense: 1 each; Refill: 0  -     lancets Misc; To check BG 1 times daily, to use with insurance preferred meter  Dispense: 90 each; Refill: 1  -     blood sugar diagnostic Strp; To check BG 1 times daily, to use with insurance preferred meter  Dispense: 90 each; Refill: 1  -     glipiZIDE (GLUCOTROL) 5 MG tablet; Take 1 tablet (5 mg total) by mouth 2 (two) times daily before meals.  Dispense: 60 tablet; Refill: 5    Hypothyroidism (acquired)  Comments:  TSH improving and will recheck in 3 mos.    Chronic ulcerative proctitis with rectal bleeding  Comments:  following with GI. maintain as is. c-scope scheduled in Feb      Follow up in about 3 months (around 4/4/2024) for Diabetic Check-Up.        1/4/2024 Vinh Noyola PA-C

## 2024-01-16 DIAGNOSIS — R19.7 DIARRHEA, UNSPECIFIED TYPE: Primary | ICD-10-CM

## 2024-01-26 ENCOUNTER — TELEPHONE (OUTPATIENT)
Dept: FAMILY MEDICINE | Facility: CLINIC | Age: 54
End: 2024-01-26
Payer: OTHER GOVERNMENT

## 2024-01-26 NOTE — TELEPHONE ENCOUNTER
Spoke with pt in regards to message sent. Verbalized per Vivien that this is not urgent if has been going on for 2 months, but if he feels it is he can go to urgent care or ER. Otherwise, maintain hydration, continue to manage diarrhea with otc medications, try probiotics, make next available appt with [facility], and/or refer to GI. Pt acknowledged understanding. Appointment scheduled 02/26/2024.

## 2024-01-26 NOTE — TELEPHONE ENCOUNTER
Tried to call pt in regards to message sent. No voice mail set up, will try and call the pt back later.

## 2024-01-26 NOTE — TELEPHONE ENCOUNTER
----- Message from Suzy Ge sent at 1/26/2024  8:34 AM CST -----  He has lost a lot weight. He has diarrhea all the time. He is just not feeling well. He wants to be seen today. Pt's #  [phone] GH

## 2024-01-26 NOTE — TELEPHONE ENCOUNTER
Spoke with pt in regards to message sent. Pt stated that he is having diarrhea x 2 months, he stated that it starting when he was prescribe Jardiance. Pt d/c Jardiance about 6 weeks ago. [facility] changed him to glipizide about 3 week ago. Pt is still having diarrhea, but not as bad. He has tired Imodium to help with the symptoms, it helps until the medication wears off.

## 2024-01-26 NOTE — TELEPHONE ENCOUNTER
Not urgent if has been going on for 2 months, but if he feels it is he can go to urgent care or ER. Otherwise, maintain hydration, continue to manage diarrhea with otc medications, try probiotics, make next available appt with [facility], and/or refer to GI.

## 2024-02-08 ENCOUNTER — PATIENT MESSAGE (OUTPATIENT)
Dept: FAMILY MEDICINE | Facility: CLINIC | Age: 54
End: 2024-02-08
Payer: OTHER GOVERNMENT

## 2024-02-08 DIAGNOSIS — E03.9 HYPOTHYROIDISM (ACQUIRED): Primary | ICD-10-CM

## 2024-02-09 ENCOUNTER — PATIENT MESSAGE (OUTPATIENT)
Dept: FAMILY MEDICINE | Facility: CLINIC | Age: 54
End: 2024-02-09
Payer: OTHER GOVERNMENT

## 2024-02-21 ENCOUNTER — PATIENT MESSAGE (OUTPATIENT)
Dept: FAMILY MEDICINE | Facility: CLINIC | Age: 54
End: 2024-02-21
Payer: OTHER GOVERNMENT

## 2024-02-21 ENCOUNTER — PATIENT MESSAGE (OUTPATIENT)
Dept: ADMINISTRATIVE | Facility: OTHER | Age: 54
End: 2024-02-21
Payer: OTHER GOVERNMENT

## 2024-02-21 ENCOUNTER — PATIENT MESSAGE (OUTPATIENT)
Dept: GASTROENTEROLOGY | Facility: CLINIC | Age: 54
End: 2024-02-21
Payer: OTHER GOVERNMENT

## 2024-02-21 DIAGNOSIS — E03.9 HYPOTHYROIDISM (ACQUIRED): ICD-10-CM

## 2024-02-21 DIAGNOSIS — E11.9 TYPE 2 DIABETES MELLITUS WITHOUT COMPLICATION, WITHOUT LONG-TERM CURRENT USE OF INSULIN: ICD-10-CM

## 2024-02-21 DIAGNOSIS — Z79.899 ENCOUNTER FOR LONG-TERM (CURRENT) USE OF OTHER MEDICATIONS: Primary | ICD-10-CM

## 2024-02-21 DIAGNOSIS — R53.82 CHRONIC FATIGUE: ICD-10-CM

## 2024-02-21 DIAGNOSIS — E78.1 HYPERTRIGLYCERIDEMIA: ICD-10-CM

## 2024-02-21 NOTE — TELEPHONE ENCOUNTER
Agree with those for now. He was a bit more anemic last lab draw and thyroid was not at goal just yet. We will see what this shows and then go from there

## 2024-02-23 ENCOUNTER — HOSPITAL ENCOUNTER (OUTPATIENT)
Facility: HOSPITAL | Age: 54
Discharge: HOME | End: 2024-02-25
Attending: EMERGENCY MEDICINE | Admitting: INTERNAL MEDICINE
Payer: OTHER GOVERNMENT

## 2024-02-23 ENCOUNTER — TELEPHONE (OUTPATIENT)
Dept: FAMILY MEDICINE | Facility: CLINIC | Age: 54
End: 2024-02-23
Payer: OTHER GOVERNMENT

## 2024-02-23 DIAGNOSIS — K51.911 ULCERATIVE COLITIS WITH RECTAL BLEEDING, UNSPECIFIED LOCATION: ICD-10-CM

## 2024-02-23 DIAGNOSIS — D64.9 SYMPTOMATIC ANEMIA: Primary | ICD-10-CM

## 2024-02-23 DIAGNOSIS — R53.83 FATIGUE: ICD-10-CM

## 2024-02-23 DIAGNOSIS — R05.9 COUGH: ICD-10-CM

## 2024-02-23 DIAGNOSIS — R07.9 CHEST PAIN: ICD-10-CM

## 2024-02-23 LAB
ABO + RH BLD: NORMAL
ALBUMIN SERPL BCP-MCNC: 3 G/DL (ref 3.5–5.2)
ALBUMIN SERPL-MCNC: 3 G/DL (ref 3.6–5.1)
ALBUMIN/GLOB SERPL: 0.9 (CALC) (ref 1–2.5)
ALP SERPL-CCNC: 49 U/L (ref 55–135)
ALP SERPL-CCNC: 58 U/L (ref 35–144)
ALT SERPL W/O P-5'-P-CCNC: 17 U/L (ref 10–44)
ALT SERPL-CCNC: 18 U/L (ref 9–46)
ANION GAP SERPL CALC-SCNC: 6 MMOL/L (ref 8–16)
AST SERPL-CCNC: 14 U/L (ref 10–40)
AST SERPL-CCNC: 15 U/L (ref 10–35)
BASOPHILS # BLD AUTO: 0.05 K/UL (ref 0–0.2)
BASOPHILS # BLD AUTO: 41 CELLS/UL (ref 0–200)
BASOPHILS NFR BLD AUTO: 0.5 %
BASOPHILS NFR BLD: 0.6 % (ref 0–1.9)
BILIRUB SERPL-MCNC: 0.2 MG/DL (ref 0.1–1)
BILIRUB SERPL-MCNC: 0.3 MG/DL (ref 0.2–1.2)
BLD GP AB SCN CELLS X3 SERPL QL: NORMAL
BUN SERPL-MCNC: 12 MG/DL (ref 6–20)
BUN SERPL-MCNC: 13 MG/DL (ref 7–25)
BUN/CREAT SERPL: ABNORMAL (CALC) (ref 6–22)
C DIFF GDH STL QL: NEGATIVE
C DIFF TOX A+B STL QL IA: NEGATIVE
CALCIUM SERPL-MCNC: 8.2 MG/DL (ref 8.7–10.5)
CALCIUM SERPL-MCNC: 8.4 MG/DL (ref 8.6–10.3)
CHLORIDE SERPL-SCNC: 104 MMOL/L (ref 95–110)
CHLORIDE SERPL-SCNC: 104 MMOL/L (ref 98–110)
CO2 SERPL-SCNC: 24 MMOL/L (ref 23–29)
CO2 SERPL-SCNC: 26 MMOL/L (ref 20–32)
CREAT SERPL-MCNC: 0.9 MG/DL (ref 0.5–1.4)
CREAT SERPL-MCNC: 0.92 MG/DL (ref 0.7–1.3)
DIFFERENTIAL METHOD BLD: ABNORMAL
EGFR: 99 ML/MIN/1.73M2
EOSINOPHIL # BLD AUTO: 0.6 K/UL (ref 0–0.5)
EOSINOPHIL # BLD AUTO: 753 CELLS/UL (ref 15–500)
EOSINOPHIL NFR BLD AUTO: 9.3 %
EOSINOPHIL NFR BLD: 7.1 % (ref 0–8)
ERYTHROCYTE [DISTWIDTH] IN BLOOD BY AUTOMATED COUNT: 14.1 % (ref 11–15)
ERYTHROCYTE [DISTWIDTH] IN BLOOD BY AUTOMATED COUNT: 15.7 % (ref 11.5–14.5)
EST. GFR  (NO RACE VARIABLE): >60 ML/MIN/1.73 M^2
GLOBULIN SER CALC-MCNC: 3.2 G/DL (CALC) (ref 1.9–3.7)
GLUCOSE SERPL-MCNC: 100 MG/DL (ref 65–99)
GLUCOSE SERPL-MCNC: 104 MG/DL (ref 70–110)
GLUCOSE SERPL-MCNC: 190 MG/DL (ref 70–110)
HBA1C MFR BLD: 5.9 % OF TOTAL HGB
HCT VFR BLD AUTO: 22.8 % (ref 40–54)
HCT VFR BLD AUTO: 25 % (ref 38.5–50)
HGB BLD-MCNC: 6.4 G/DL (ref 14–18)
HGB BLD-MCNC: 7.2 G/DL (ref 13.2–17.1)
IMM GRANULOCYTES # BLD AUTO: 0.04 K/UL (ref 0–0.04)
IMM GRANULOCYTES NFR BLD AUTO: 0.5 % (ref 0–0.5)
LYMPHOCYTES # BLD AUTO: 2 K/UL (ref 1–4.8)
LYMPHOCYTES # BLD AUTO: 2041 CELLS/UL (ref 850–3900)
LYMPHOCYTES NFR BLD AUTO: 25.2 %
LYMPHOCYTES NFR BLD: 24 % (ref 18–48)
MCH RBC QN AUTO: 24.4 PG (ref 27–33)
MCH RBC QN AUTO: 24.9 PG (ref 27–31)
MCHC RBC AUTO-ENTMCNC: 28.1 G/DL (ref 32–36)
MCHC RBC AUTO-ENTMCNC: 28.8 G/DL (ref 32–36)
MCV RBC AUTO: 84.7 FL (ref 80–100)
MCV RBC AUTO: 89 FL (ref 82–98)
MONOCYTES # BLD AUTO: 0.5 K/UL (ref 0.3–1)
MONOCYTES # BLD AUTO: 672 CELLS/UL (ref 200–950)
MONOCYTES NFR BLD AUTO: 8.3 %
MONOCYTES NFR BLD: 6.4 % (ref 4–15)
NEUTROPHILS # BLD AUTO: 4593 CELLS/UL (ref 1500–7800)
NEUTROPHILS # BLD AUTO: 5 K/UL (ref 1.8–7.7)
NEUTROPHILS NFR BLD AUTO: 56.7 %
NEUTROPHILS NFR BLD: 61.4 % (ref 38–73)
NRBC BLD-RTO: 0 /100 WBC
OB PNL STL: POSITIVE
PLATELET # BLD AUTO: 393 K/UL (ref 150–450)
PLATELET # BLD AUTO: 436 THOUSAND/UL (ref 140–400)
PMV BLD AUTO: 9 FL (ref 9.2–12.9)
PMV BLD REES-ECKER: 9.6 FL (ref 7.5–12.5)
POTASSIUM SERPL-SCNC: 3.7 MMOL/L (ref 3.5–5.1)
POTASSIUM SERPL-SCNC: 5 MMOL/L (ref 3.5–5.3)
PROT SERPL-MCNC: 6.2 G/DL (ref 6.1–8.1)
PROT SERPL-MCNC: 6.3 G/DL (ref 6–8.4)
RBC # BLD AUTO: 2.57 M/UL (ref 4.6–6.2)
RBC # BLD AUTO: 2.95 MILLION/UL (ref 4.2–5.8)
SODIUM SERPL-SCNC: 134 MMOL/L (ref 136–145)
SODIUM SERPL-SCNC: 136 MMOL/L (ref 135–146)
SPECIMEN OUTDATE: NORMAL
TSH SERPL-ACNC: 4.15 MIU/L (ref 0.4–4.5)
WBC # BLD AUTO: 8.1 THOUSAND/UL (ref 3.8–10.8)
WBC # BLD AUTO: 8.17 K/UL (ref 3.9–12.7)
WBC #/AREA STL HPF: ABNORMAL /[HPF]

## 2024-02-23 PROCEDURE — 96361 HYDRATE IV INFUSION ADD-ON: CPT

## 2024-02-23 PROCEDURE — 96374 THER/PROPH/DIAG INJ IV PUSH: CPT

## 2024-02-23 PROCEDURE — C9113 INJ PANTOPRAZOLE SODIUM, VIA: HCPCS | Performed by: PHYSICAL THERAPY ASSISTANT

## 2024-02-23 PROCEDURE — 87209 SMEAR COMPLEX STAIN: CPT | Performed by: EMERGENCY MEDICINE

## 2024-02-23 PROCEDURE — 82962 GLUCOSE BLOOD TEST: CPT

## 2024-02-23 PROCEDURE — 82270 OCCULT BLOOD FECES: CPT | Performed by: EMERGENCY MEDICINE

## 2024-02-23 PROCEDURE — 25000003 PHARM REV CODE 250: Performed by: PHYSICAL THERAPY ASSISTANT

## 2024-02-23 PROCEDURE — 93010 ELECTROCARDIOGRAM REPORT: CPT | Mod: ,,, | Performed by: GENERAL PRACTICE

## 2024-02-23 PROCEDURE — 87449 NOS EACH ORGANISM AG IA: CPT | Mod: 91 | Performed by: EMERGENCY MEDICINE

## 2024-02-23 PROCEDURE — 63600175 PHARM REV CODE 636 W HCPCS: Performed by: PHYSICAL THERAPY ASSISTANT

## 2024-02-23 PROCEDURE — 85025 COMPLETE CBC W/AUTO DIFF WBC: CPT | Performed by: PHYSICIAN ASSISTANT

## 2024-02-23 PROCEDURE — 36430 TRANSFUSION BLD/BLD COMPNT: CPT

## 2024-02-23 PROCEDURE — 80053 COMPREHEN METABOLIC PANEL: CPT | Performed by: PHYSICIAN ASSISTANT

## 2024-02-23 PROCEDURE — 86850 RBC ANTIBODY SCREEN: CPT | Performed by: EMERGENCY MEDICINE

## 2024-02-23 PROCEDURE — 99285 EMERGENCY DEPT VISIT HI MDM: CPT | Mod: 25

## 2024-02-23 PROCEDURE — 89055 LEUKOCYTE ASSESSMENT FECAL: CPT | Performed by: EMERGENCY MEDICINE

## 2024-02-23 PROCEDURE — 87045 FECES CULTURE AEROBIC BACT: CPT | Performed by: EMERGENCY MEDICINE

## 2024-02-23 PROCEDURE — 93005 ELECTROCARDIOGRAM TRACING: CPT | Performed by: GENERAL PRACTICE

## 2024-02-23 PROCEDURE — 87449 NOS EACH ORGANISM AG IA: CPT | Performed by: EMERGENCY MEDICINE

## 2024-02-23 PROCEDURE — 87046 STOOL CULTR AEROBIC BACT EA: CPT | Mod: 59 | Performed by: EMERGENCY MEDICINE

## 2024-02-23 PROCEDURE — P9016 RBC LEUKOCYTES REDUCED: HCPCS | Performed by: EMERGENCY MEDICINE

## 2024-02-23 PROCEDURE — 86920 COMPATIBILITY TEST SPIN: CPT | Performed by: EMERGENCY MEDICINE

## 2024-02-23 PROCEDURE — G0378 HOSPITAL OBSERVATION PER HR: HCPCS

## 2024-02-23 PROCEDURE — 25000003 PHARM REV CODE 250: Performed by: EMERGENCY MEDICINE

## 2024-02-23 RX ORDER — ALUMINUM HYDROXIDE, MAGNESIUM HYDROXIDE, AND SIMETHICONE 1200; 120; 1200 MG/30ML; MG/30ML; MG/30ML
30 SUSPENSION ORAL 4 TIMES DAILY PRN
Status: CANCELLED | OUTPATIENT
Start: 2024-02-23

## 2024-02-23 RX ORDER — LANOLIN ALCOHOL/MO/W.PET/CERES
800 CREAM (GRAM) TOPICAL
Status: CANCELLED | OUTPATIENT
Start: 2024-02-23

## 2024-02-23 RX ORDER — NALOXONE HCL 0.4 MG/ML
0.02 VIAL (ML) INJECTION
Status: DISCONTINUED | OUTPATIENT
Start: 2024-02-23 | End: 2024-02-25 | Stop reason: HOSPADM

## 2024-02-23 RX ORDER — ACETAMINOPHEN 325 MG/1
650 TABLET ORAL EVERY 8 HOURS PRN
Status: CANCELLED | OUTPATIENT
Start: 2024-02-23

## 2024-02-23 RX ORDER — LANOLIN ALCOHOL/MO/W.PET/CERES
800 CREAM (GRAM) TOPICAL
Status: DISCONTINUED | OUTPATIENT
Start: 2024-02-23 | End: 2024-02-25 | Stop reason: HOSPADM

## 2024-02-23 RX ORDER — SODIUM,POTASSIUM PHOSPHATES 280-250MG
2 POWDER IN PACKET (EA) ORAL
Status: CANCELLED | OUTPATIENT
Start: 2024-02-23

## 2024-02-23 RX ORDER — IBUPROFEN 200 MG
16 TABLET ORAL
Status: DISCONTINUED | OUTPATIENT
Start: 2024-02-23 | End: 2024-02-25 | Stop reason: HOSPADM

## 2024-02-23 RX ORDER — SODIUM,POTASSIUM PHOSPHATES 280-250MG
2 POWDER IN PACKET (EA) ORAL
Status: DISCONTINUED | OUTPATIENT
Start: 2024-02-23 | End: 2024-02-25 | Stop reason: HOSPADM

## 2024-02-23 RX ORDER — SODIUM CHLORIDE 9 MG/ML
INJECTION, SOLUTION INTRAVENOUS CONTINUOUS
Status: DISCONTINUED | OUTPATIENT
Start: 2024-02-23 | End: 2024-02-25 | Stop reason: HOSPADM

## 2024-02-23 RX ORDER — CITALOPRAM 20 MG/1
40 TABLET, FILM COATED ORAL DAILY
Status: DISCONTINUED | OUTPATIENT
Start: 2024-02-24 | End: 2024-02-25 | Stop reason: HOSPADM

## 2024-02-23 RX ORDER — GLUCAGON 1 MG
1 KIT INJECTION
Status: DISCONTINUED | OUTPATIENT
Start: 2024-02-23 | End: 2024-02-25 | Stop reason: HOSPADM

## 2024-02-23 RX ORDER — ONDANSETRON HYDROCHLORIDE 2 MG/ML
4 INJECTION, SOLUTION INTRAVENOUS EVERY 6 HOURS PRN
Status: DISCONTINUED | OUTPATIENT
Start: 2024-02-23 | End: 2024-02-25 | Stop reason: HOSPADM

## 2024-02-23 RX ORDER — PANTOPRAZOLE SODIUM 40 MG/10ML
40 INJECTION, POWDER, LYOPHILIZED, FOR SOLUTION INTRAVENOUS DAILY
Status: DISCONTINUED | OUTPATIENT
Start: 2024-02-23 | End: 2024-02-23

## 2024-02-23 RX ORDER — HYDROCODONE BITARTRATE AND ACETAMINOPHEN 500; 5 MG/1; MG/1
TABLET ORAL
Status: DISCONTINUED | OUTPATIENT
Start: 2024-02-23 | End: 2024-02-25 | Stop reason: HOSPADM

## 2024-02-23 RX ORDER — MESALAMINE 1000 MG/1
1000 SUPPOSITORY RECTAL NIGHTLY
Status: DISCONTINUED | OUTPATIENT
Start: 2024-02-23 | End: 2024-02-25 | Stop reason: HOSPADM

## 2024-02-23 RX ORDER — TALC
6 POWDER (GRAM) TOPICAL NIGHTLY PRN
Status: CANCELLED | OUTPATIENT
Start: 2024-02-23

## 2024-02-23 RX ORDER — PANTOPRAZOLE SODIUM 40 MG/10ML
40 INJECTION, POWDER, LYOPHILIZED, FOR SOLUTION INTRAVENOUS EVERY 12 HOURS
Status: DISCONTINUED | OUTPATIENT
Start: 2024-02-24 | End: 2024-02-25 | Stop reason: HOSPADM

## 2024-02-23 RX ORDER — INSULIN ASPART 100 [IU]/ML
0-5 INJECTION, SOLUTION INTRAVENOUS; SUBCUTANEOUS
Status: DISCONTINUED | OUTPATIENT
Start: 2024-02-23 | End: 2024-02-25 | Stop reason: HOSPADM

## 2024-02-23 RX ORDER — SODIUM CHLORIDE 0.9 % (FLUSH) 0.9 %
3 SYRINGE (ML) INJECTION EVERY 12 HOURS PRN
Status: DISCONTINUED | OUTPATIENT
Start: 2024-02-23 | End: 2024-02-25 | Stop reason: HOSPADM

## 2024-02-23 RX ORDER — LEVOTHYROXINE SODIUM 100 UG/1
200 TABLET ORAL
Status: DISCONTINUED | OUTPATIENT
Start: 2024-02-24 | End: 2024-02-25 | Stop reason: HOSPADM

## 2024-02-23 RX ORDER — IBUPROFEN 200 MG
24 TABLET ORAL
Status: DISCONTINUED | OUTPATIENT
Start: 2024-02-23 | End: 2024-02-25 | Stop reason: HOSPADM

## 2024-02-23 RX ADMIN — SODIUM CHLORIDE: 9 INJECTION, SOLUTION INTRAVENOUS at 09:02

## 2024-02-23 RX ADMIN — MESALAMINE 1000 MG: 1000 SUPPOSITORY RECTAL at 09:02

## 2024-02-23 RX ADMIN — SODIUM CHLORIDE: 0.9 INJECTION, SOLUTION INTRAVENOUS at 09:02

## 2024-02-23 RX ADMIN — PANTOPRAZOLE SODIUM 40 MG: 40 INJECTION, POWDER, FOR SOLUTION INTRAVENOUS at 09:02

## 2024-02-23 NOTE — ED NOTES
"PALE. EDEMA AT LOWER LEGS AND ANKLES.  FAINT PETECHIA NOTED AT [facility]. C/O BLOODY STOOLS SINCE NEWLY TAKING DIABETIC MEDS. LAST STOOL TODAY.  BLOOD NOTED BY PT. NO SOB NOTED AT [facility]. PRIVATE ROOM. EVEN AND NON LABORED RESPIRATIONS.  AIRWAY CLEAR.  PULSES REGULAR.  < 3" CAPILLARY REFILL. SKIN WDI.  MAEW.  NON DISTENDED ABDOMEN. ALERT, ORIENTED AND AMBULATORY. NAD AT THIS TIME.  FALLS RISK.  FAMILIY AT BS. SEEN BY RNP. CALL LIGHT IN REACH.  "

## 2024-02-23 NOTE — FIRST PROVIDER EVALUATION
Emergency Department TeleTriage Encounter Note      CHIEF COMPLAINT    Chief Complaint   Patient presents with    abnormal labs     Low RBC low calcium, low H/H. States blood in stool of various shades since november       VITAL SIGNS   Initial Vitals [02/23/24 1144]   BP Pulse Resp Temp SpO2   (!) 152/85 93 18 98.1 °F (36.7 °C) 95 %      MAP       --            ALLERGIES    Review of patient's allergies indicates:  No Known Allergies    PROVIDER TRIAGE NOTE  Patient presents with complaint of abnormal labs.  Reports feeling fatigued and losing weight over the last 4 months.  Had outpatient labs with PCP yesterday which showed low hemoglobin recommended he come to the emergency room.      Phy:   Constitutional: well nourished, well developed, appearing stated age, NAD        Initial orders will be placed and care will be transferred to an alternate provider when patient is roomed for a full evaluation. Any additional orders and the final disposition will be determined by that provider.        ORDERS  Labs Reviewed - No data to display    ED Orders (720h ago, onward)      None              Virtual Visit Note: The provider triage portion of this emergency department evaluation and documentation was performed via [employer], a HIPAA-compliant telemedicine application, in concert with a tele-presenter in the room. A face to face patient evaluation with one of my colleagues will occur once the patient is placed in an emergency department room.      DISCLAIMER: This note was prepared with [employer] voice recognition transcription software. Garbled syntax, mangled pronouns, and other bizarre constructions may be attributed to that software system.

## 2024-02-23 NOTE — ED PROVIDER NOTES
Encounter Date: 2/23/2024       History     Chief Complaint   Patient presents with    abnormal labs     Low RBC low calcium, low H/H. States blood in stool of various shades since november     53-year-old male presents emergency department past medical history includes thrombocytopenia, hypothyroidism, and ulcerative colitis.  Patient states that he he always has watery stools secondary to history of ulcerative colitis he reports that since November he was started on Jardiance and his stools have become more frequent in nature.  Patient reports that he was recently changed to glipizide however he is still having watery stools although not as many, the patient denies any associated fevers, nausea or vomiting he does complain of melanotic stools, no hematemesis, no jaundice, no hematochezia.  The patient reports that he is feeling generalized weakness that has been occurring over the last few weeks he was seen as an outpatient by his primary care provider and had labs drawn his H&H was low therefore he was told to come in the emergency department further evaluation.  Patient reports that he is lost 53 lb since November.       Review of patient's allergies indicates:  No Known Allergies  Past Medical History:   Diagnosis Date    Colon polyp     Hypothyroid 07/05/2016    Sleep apnea     Thrombocytopenia     Ulcerative colitis      Past Surgical History:   Procedure Laterality Date    COLONOSCOPY N/A 12/04/2020    Procedure: COLONOSCOPY;  Surgeon: Ventura Warren MD;  Location: [facility];  Service: Endoscopy;  Laterality: N/A;    COLONOSCOPY N/A 07/19/2021    Procedure: COLONOSCOPY;  Surgeon: Clarence Ogden MD;  Location: [facility];  Service: Endoscopy;  Laterality: N/A;    COLONOSCOPY N/A 11/10/2022    Procedure: COLONOSCOPY;  Surgeon: Kath Hernandez MD;  Location: [facility];  Service: Endoscopy;  Laterality: N/A;    FOOT SURGERY      left   cyst removed    HAND SURGERY      right   tendon repair     Family  History   Problem Relation Age of Onset    Ulcerative colitis Mother         in 70s    Colon cancer Neg Hx     Colon polyps Neg Hx     Crohn's disease Neg Hx      Social History     Tobacco Use    Smoking status: Former     Current packs/day: 0.00     Average packs/day: 1 pack/day for 30.0 years (30.0 ttl pk-yrs)     Types: Cigarettes     Start date: 1984     Quit date: 12/2013     Years since quitting: 10.2     Passive exposure: Past    Smokeless tobacco: Former     Types: Snuff     Quit date: 2020   Substance Use Topics    Alcohol use: Yes     Comment: occasional    Drug use: No     Review of Systems   Constitutional:  Positive for unexpected weight change.   HENT: Negative.     Eyes: Negative.    Respiratory: Negative.     Cardiovascular: Negative.    Gastrointestinal:  Positive for abdominal pain and diarrhea.   Neurological:  Positive for weakness.   All other systems reviewed and are negative.      Physical Exam     Initial Vitals [02/23/24 1144]   BP Pulse Resp Temp SpO2   (!) 152/85 93 18 98.1 °F (36.7 °C) 95 %      MAP       --         Physical Exam    Nursing note and vitals reviewed.  Constitutional: He appears well-developed and well-nourished.   HENT:   Head: Normocephalic and atraumatic.   Right Ear: External ear normal.   Left Ear: External ear normal.   Eyes: EOM are normal. Pupils are equal, round, and reactive to light. Scleral icterus is present.   Cardiovascular:  Normal rate, regular rhythm, normal heart sounds and intact distal pulses.           Pulmonary/Chest: Breath sounds normal. No respiratory distress.   Abdominal: Abdomen is soft. Bowel sounds are normal.     Neurological: He is alert and oriented to person, place, and time. He has normal strength.   Psychiatric: He has a normal mood and affect.         ED Course   Procedures  Labs Reviewed   CBC W/ AUTO DIFFERENTIAL - Abnormal; Notable for the following components:       Result Value    RBC 2.57 (*)     Hemoglobin 6.4 (*)     Hematocrit  22.8 (*)     MCH 24.9 (*)     MCHC 28.1 (*)     RDW 15.7 (*)     MPV 9.0 (*)     Eos # 0.6 (*)     All other components within normal limits    Narrative:     hgb   critical result(s) called and verbal readback obtained from   ronnie العراقي rn by CAF 02/23/2024 13:12   COMPREHENSIVE METABOLIC PANEL - Abnormal; Notable for the following components:    Sodium 134 (*)     Glucose 190 (*)     Calcium 8.2 (*)     Albumin 3.0 (*)     Alkaline Phosphatase 49 (*)     Anion Gap 6 (*)     All other components within normal limits   OCCULT BLOOD STOOL, CA SCREEN - Abnormal; Notable for the following components:    Occult Blood Positive (*)     All other components within normal limits   WBC, STOOL - Abnormal; Notable for the following components:    Stool WBC Many neutrophils seen (*)     All other components within normal limits   CLOSTRIDIUM DIFFICILE   CULTURE, STOOL   CULTURE, STOOL   OCCULT BLOOD X 1, STOOL   STOOL EXAM-OVA,CYSTS,PARASITES   WBC, STOOL   STOOL EXAM-OVA,CYSTS,PARASITES   POCT GLUCOSE, HAND-HELD DEVICE   TYPE & SCREEN   PREPARE RBC SOFT        ECG Results              EKG 12-lead (In process)        Collection Time Result Time QRS Duration OHS QTC Calculation    02/23/24 12:12:04 02/23/24 12:56:00 86 533                     In process by Interface, Lab In [facility] (02/23/24 12:56:03)                   Narrative:    Test Reason : R53.83,    Vent. Rate : 091 BPM     Atrial Rate : 091 BPM     P-R Int : 148 ms          QRS Dur : 086 ms      QT Int : 434 ms       P-R-T Axes : 024 048 027 degrees     QTc Int : 533 ms    Normal sinus rhythm  Nonspecific T wave abnormality  Abnormal ECG  No previous ECGs available    Referred By: [email or web address]   SELF           Confirmed By:                                   Imaging Results              X-Ray Chest PA And Lateral (Final result)  Result time 02/23/24 13:56:26      Final result by Bowen Causey MD (02/23/24 13:56:26)                   Narrative:    Reason: Abnormal  labs.    FINDINGS:    PA and lateral chest without comparisons show normal cardiomediastinal silhouette.  Lungs are clear. Pulmonary vasculature is normal. No acute osseous abnormality.    IMPRESSION:    No acute cardiopulmonary abnormality.    Electronically signed by:  Bowen Causey DO  02/23/2024 01:56 PM CST Workstation: [id number]                                     Medications   0.9%  NaCl infusion (for blood administration) (has no administration in time range)   sodium chloride 0.9% flush 3 mL (has no administration in time range)   ondansetron injection 4 mg (has no administration in time range)   naloxone 0.4 mg/mL injection 0.02 mg (has no administration in time range)   glucose chewable tablet 16 g (has no administration in time range)   glucose chewable tablet 24 g (has no administration in time range)   dextrose 50% injection 12.5 g (has no administration in time range)   dextrose 50% injection 25 g (has no administration in time range)   glucagon (human recombinant) injection 1 mg (has no administration in time range)   0.9%  NaCl infusion (has no administration in time range)   insulin aspart U-100 pen 0-5 Units (has no administration in time range)   citalopram tablet 40 mg (has no administration in time range)   levothyroxine tablet 200 mcg (has no administration in time range)   mesalamine suppository 1,000 mg (has no administration in time range)   pantoprazole injection 40 mg (has no administration in time range)     Medical Decision Making  53-year-old male presents emergency department past medical history includes thrombocytopenia, hypothyroidism, and ulcerative colitis.  Patient states that he he always has watery stools secondary to history of ulcerative colitis he reports that since November he was started on Jardiance and his stools have become more frequent in nature.  Patient reports that he was recently changed to glipizide however he is still having watery stools although not as  many, the patient denies any associated fevers, nausea or vomiting he does complain of melanotic stools, no hematemesis, no jaundice, no hematochezia.  The patient reports that he is feeling generalized weakness that has been occurring over the last few weeks he was seen as an outpatient by his primary care provider and had labs drawn his H&H was low therefore he was told to come in the emergency department further evaluation.  Patient reports that he is lost 53 lb since November.     Considerations include but not limited to, ulcerative colitis, electrolyte abnormalities, GI bleed      53-year-old male presents emergency department past medical history includes thrombocytopenia, hypothyroidism, and ulcerative colitis.  Patient states that since November he has had a 53 lb weight loss he also reports that he has had watery stools since he was started on Jardiance in November he has been changed to glipizide however continues to have watery black stools not as frequent as when he was taking Jardiance.  Patient is complaining of some diffuse abdominal tenderness off and on, and generalized weakness he was seen by his primary care provider and had outpatient labs drawn yesterday H&H of 7.2 and 25.  He was sent to the emergency department for further evaluation.  On today's labs patient's H&H is 6.4 and 22.  Patient is symptomatic and has signed consent to receive blood.  Packed RBCs have been ordered.  Patient reports that his GI doctor is in [place] at [facility] he is actually scheduled to have outpatient colonoscopy next week.  Given patient's symptoms, weight loss, and symptomatic anemia requiring transfusion who will be admitted to the hospital I have consulted GI on-call and I am waiting for hospital medicine to admit patient.  Spoke with GI on-call Dr. Brewster who will evaluate the patient he would like stool studies only at this time.  Report given to hospital medicine Kacey Mendoza and/or  Complexity of Data Reviewed  Independent Historian: spouse  External Data Reviewed: labs, radiology and notes.  Labs: ordered. Decision-making details documented in ED Course.  Radiology: ordered. Decision-making details documented in ED Course.    Risk  Prescription drug management.  Decision regarding hospitalization.              Attending Attestation:     Physician Attestation Statement for NP/PA:       Other NP/PA Attestation Additions:    History of Present Illness: I was consulted on this patient due to medical complexity.  I provided face-to-face evaluation.  Patient with 5 year history ulcerative colitis.  Intermittent bright red blood per rectum that is more frequent lately.  No change in chronic abdominal pain.   Physical Exam: Mild diffuse abdominal tenderness with no guarding or rebound   Medical Decision Making: Patient presents with ulcer colitis with worsening bleeding.  Patient does have symptomatic anemia with hemoglobin of 6.4.  Blood consent obtained.  Blood transfusion ordered.  Alisia discussed with Gastroenterology on-call,  who will perform colonoscopy.  Hospitalist consulted for admission.                                    Clinical Impression:  Final diagnoses:  [R53.83] Fatigue  [R05.9] Cough  [D64.9] Symptomatic anemia (Primary)          ED Disposition Condition    Observation                 Alisia Peterson, TAM  02/23/24 1931       Alisia Peterson, TAM  02/23/24 1931       Luis Alberto Ross MD  02/23/24 0887

## 2024-02-23 NOTE — LETTER
"Isidoro Taylor"Samantha was seen and treated in our emergency department on 2/23/2024 and discharged 2/25/2024.  He may return with light duty on 2/26/2024.      Sincerely,      GIOVANNI Gonzales RN      "

## 2024-02-23 NOTE — TELEPHONE ENCOUNTER
Spoke with patient and let him know to go to the nearest ER per Mendoza for evaluation. See message from yesterday, patient is symptomatic with a HGB of 7.2

## 2024-02-23 NOTE — PHARMACY MED REC
"Admission Medication History     The home medication history was taken by Ysabel Leblanc.    You may go to "Admission" then "Reconcile Home Medications" tabs to review and/or act upon these items.     The home medication list has been updated by the Pharmacy department.   Please read ALL comments highlighted in yellow.   Please address this information as you see fit.    Feel free to contact us if you have any questions or require assistance.        Medications listed below were obtained from: Patient/family and Analytic software- [employer]  No current facility-administered medications on file prior to encounter.     Current Outpatient Medications on File Prior to Encounter   Medication Sig Dispense Refill    citalopram (CELEXA) 40 MG tablet Take 1 tablet (40 mg total) by mouth once daily. 90 tablet 1    ergocalciferol (ERGOCALCIFEROL) 50,000 unit Cap TAKE 1 CAPSULE BY MOUTH EVERY 7 DAYS (Patient taking differently: Take 50,000 Units by mouth every 7 days. FRIDAYS) 12 capsule 3    folic acid (FOLVITE) 1 MG tablet Take 1 tablet (1 mg total) by mouth once daily. 90 tablet 3    levothyroxine (SYNTHROID) 200 MCG tablet Take 1 tablet (200 mcg total) by mouth before breakfast. 30 tablet 11    mesalamine (CANASA) 1000 MG Supp Place 1 suppository (1,000 mg total) rectally nightly. 90 suppository 3    omega-3 acid ethyl esters (LOVAZA) 1 gram capsule Take 1 capsule (1 g total) by mouth 2 (two) times daily. 180 capsule 3    sildenafiL (VIAGRA) 100 MG tablet Take 1 tablet (100 mg total) by mouth daily as needed for Erectile Dysfunction. 10 tablet 6    testosterone cypionate (DEPOTESTOTERONE CYPIONATE) 200 mg/mL injection Inject 100 mg into the muscle every 14 (fourteen) days.      blood sugar diagnostic Strp To check BG 1 times daily, to use with insurance preferred meter 90 each 1    blood-glucose meter kit To check BG 1 times daily, to use with insurance preferred meter 1 each 0    empagliflozin (JARDIANCE) 25 mg tablet Take " 1 tablet (25 mg total) by mouth once daily. (Patient not taking: Reported on 2/23/2024) 30 tablet 3    glipiZIDE (GLUCOTROL) 5 MG tablet Take 1 tablet (5 mg total) by mouth 2 (two) times daily before meals. (Patient not taking: Reported on 2/23/2024) 60 tablet 5    lancets Misc To check BG 1 times daily, to use with insurance preferred meter 90 each 1       Potential issues to be addressed PRIOR TO DISCHARGE  Patient reported not taking the following medications: (Jardiance & Glipizide). These medications remain on the home medication list. Please address accordingly.     Ysabel Leblanc  EXT 1924                  .

## 2024-02-24 LAB
ALBUMIN SERPL BCP-MCNC: 2.9 G/DL (ref 3.5–5.2)
ALP SERPL-CCNC: 47 U/L (ref 55–135)
ALT SERPL W/O P-5'-P-CCNC: 15 U/L (ref 10–44)
ANION GAP SERPL CALC-SCNC: 5 MMOL/L (ref 8–16)
AST SERPL-CCNC: 11 U/L (ref 10–40)
BASOPHILS # BLD AUTO: 0.04 K/UL (ref 0–0.2)
BASOPHILS # BLD AUTO: 0.07 K/UL (ref 0–0.2)
BASOPHILS # BLD AUTO: 0.07 K/UL (ref 0–0.2)
BASOPHILS NFR BLD: 0.5 % (ref 0–1.9)
BASOPHILS NFR BLD: 0.8 % (ref 0–1.9)
BASOPHILS NFR BLD: 1 % (ref 0–1.9)
BILIRUB SERPL-MCNC: 0.4 MG/DL (ref 0.1–1)
BLD PROD TYP BPU: NORMAL
BLD PROD TYP BPU: NORMAL
BLOOD UNIT EXPIRATION DATE: NORMAL
BLOOD UNIT EXPIRATION DATE: NORMAL
BLOOD UNIT TYPE CODE: 6200
BLOOD UNIT TYPE CODE: 6200
BLOOD UNIT TYPE: NORMAL
BLOOD UNIT TYPE: NORMAL
BUN SERPL-MCNC: 9 MG/DL (ref 6–20)
CALCIUM SERPL-MCNC: 8.1 MG/DL (ref 8.7–10.5)
CHLORIDE SERPL-SCNC: 108 MMOL/L (ref 95–110)
CO2 SERPL-SCNC: 24 MMOL/L (ref 23–29)
CODING SYSTEM: NORMAL
CODING SYSTEM: NORMAL
CREAT SERPL-MCNC: 0.8 MG/DL (ref 0.5–1.4)
CROSSMATCH INTERPRETATION: NORMAL
CROSSMATCH INTERPRETATION: NORMAL
DIFFERENTIAL METHOD BLD: ABNORMAL
DISPENSE STATUS: NORMAL
DISPENSE STATUS: NORMAL
EOSINOPHIL # BLD AUTO: 0.7 K/UL (ref 0–0.5)
EOSINOPHIL NFR BLD: 7.5 % (ref 0–8)
EOSINOPHIL NFR BLD: 9.1 % (ref 0–8)
EOSINOPHIL NFR BLD: 9.7 % (ref 0–8)
ERYTHROCYTE [DISTWIDTH] IN BLOOD BY AUTOMATED COUNT: 15.2 % (ref 11.5–14.5)
ERYTHROCYTE [DISTWIDTH] IN BLOOD BY AUTOMATED COUNT: 15.3 % (ref 11.5–14.5)
ERYTHROCYTE [DISTWIDTH] IN BLOOD BY AUTOMATED COUNT: 15.5 % (ref 11.5–14.5)
EST. GFR  (NO RACE VARIABLE): >60 ML/MIN/1.73 M^2
GLUCOSE SERPL-MCNC: 186 MG/DL (ref 70–110)
GLUCOSE SERPL-MCNC: 92 MG/DL (ref 70–110)
GLUCOSE SERPL-MCNC: 96 MG/DL (ref 70–110)
GLUCOSE SERPL-MCNC: 96 MG/DL (ref 70–110)
HCT VFR BLD AUTO: 25 % (ref 40–54)
HCT VFR BLD AUTO: 26.6 % (ref 40–54)
HCT VFR BLD AUTO: 29.9 % (ref 40–54)
HGB BLD-MCNC: 7.6 G/DL (ref 14–18)
HGB BLD-MCNC: 8 G/DL (ref 14–18)
HGB BLD-MCNC: 8.8 G/DL (ref 14–18)
IMM GRANULOCYTES # BLD AUTO: 0.03 K/UL (ref 0–0.04)
IMM GRANULOCYTES # BLD AUTO: 0.04 K/UL (ref 0–0.04)
IMM GRANULOCYTES # BLD AUTO: 0.05 K/UL (ref 0–0.04)
IMM GRANULOCYTES NFR BLD AUTO: 0.4 % (ref 0–0.5)
IMM GRANULOCYTES NFR BLD AUTO: 0.6 % (ref 0–0.5)
IMM GRANULOCYTES NFR BLD AUTO: 0.6 % (ref 0–0.5)
LYMPHOCYTES # BLD AUTO: 2 K/UL (ref 1–4.8)
LYMPHOCYTES # BLD AUTO: 2.2 K/UL (ref 1–4.8)
LYMPHOCYTES # BLD AUTO: 3.2 K/UL (ref 1–4.8)
LYMPHOCYTES NFR BLD: 28.1 % (ref 18–48)
LYMPHOCYTES NFR BLD: 28.1 % (ref 18–48)
LYMPHOCYTES NFR BLD: 35.1 % (ref 18–48)
MAGNESIUM SERPL-MCNC: 1.9 MG/DL (ref 1.6–2.6)
MCH RBC QN AUTO: 25.6 PG (ref 27–31)
MCH RBC QN AUTO: 25.8 PG (ref 27–31)
MCH RBC QN AUTO: 25.8 PG (ref 27–31)
MCHC RBC AUTO-ENTMCNC: 29.4 G/DL (ref 32–36)
MCHC RBC AUTO-ENTMCNC: 30.1 G/DL (ref 32–36)
MCHC RBC AUTO-ENTMCNC: 30.4 G/DL (ref 32–36)
MCV RBC AUTO: 85 FL (ref 82–98)
MCV RBC AUTO: 85 FL (ref 82–98)
MCV RBC AUTO: 88 FL (ref 82–98)
MONOCYTES # BLD AUTO: 0.7 K/UL (ref 0.3–1)
MONOCYTES # BLD AUTO: 0.7 K/UL (ref 0.3–1)
MONOCYTES # BLD AUTO: 0.8 K/UL (ref 0.3–1)
MONOCYTES NFR BLD: 9.1 % (ref 4–15)
MONOCYTES NFR BLD: 9.1 % (ref 4–15)
MONOCYTES NFR BLD: 9.2 % (ref 4–15)
NEUTROPHILS # BLD AUTO: 3.7 K/UL (ref 1.8–7.7)
NEUTROPHILS # BLD AUTO: 4 K/UL (ref 1.8–7.7)
NEUTROPHILS # BLD AUTO: 4.2 K/UL (ref 1.8–7.7)
NEUTROPHILS NFR BLD: 46.8 % (ref 38–73)
NEUTROPHILS NFR BLD: 51.5 % (ref 38–73)
NEUTROPHILS NFR BLD: 52.8 % (ref 38–73)
NRBC BLD-RTO: 0 /100 WBC
NUM UNITS TRANS PACKED RBC: NORMAL
NUM UNITS TRANS PACKED RBC: NORMAL
OHS QRS DURATION: 86 MS
OHS QTC CALCULATION: 533 MS
PHOSPHATE SERPL-MCNC: 4.2 MG/DL (ref 2.7–4.5)
PLATELET # BLD AUTO: 338 K/UL (ref 150–450)
PLATELET # BLD AUTO: 346 K/UL (ref 150–450)
PLATELET # BLD AUTO: 401 K/UL (ref 150–450)
PMV BLD AUTO: 8.5 FL (ref 9.2–12.9)
PMV BLD AUTO: 8.8 FL (ref 9.2–12.9)
PMV BLD AUTO: 8.9 FL (ref 9.2–12.9)
POTASSIUM SERPL-SCNC: 4.2 MMOL/L (ref 3.5–5.1)
PROT SERPL-MCNC: 6 G/DL (ref 6–8.4)
RBC # BLD AUTO: 2.95 M/UL (ref 4.6–6.2)
RBC # BLD AUTO: 3.12 M/UL (ref 4.6–6.2)
RBC # BLD AUTO: 3.41 M/UL (ref 4.6–6.2)
SODIUM SERPL-SCNC: 137 MMOL/L (ref 136–145)
TSH SERPL DL<=0.005 MIU/L-ACNC: 4.14 UIU/ML (ref 0.34–5.6)
WBC # BLD AUTO: 7.13 K/UL (ref 3.9–12.7)
WBC # BLD AUTO: 7.66 K/UL (ref 3.9–12.7)
WBC # BLD AUTO: 8.98 K/UL (ref 3.9–12.7)

## 2024-02-24 PROCEDURE — C9113 INJ PANTOPRAZOLE SODIUM, VIA: HCPCS | Performed by: INTERNAL MEDICINE

## 2024-02-24 PROCEDURE — 85025 COMPLETE CBC W/AUTO DIFF WBC: CPT | Performed by: INTERNAL MEDICINE

## 2024-02-24 PROCEDURE — 36415 COLL VENOUS BLD VENIPUNCTURE: CPT | Performed by: NURSE PRACTITIONER

## 2024-02-24 PROCEDURE — 84443 ASSAY THYROID STIM HORMONE: CPT | Performed by: PHYSICAL THERAPY ASSISTANT

## 2024-02-24 PROCEDURE — 83735 ASSAY OF MAGNESIUM: CPT | Performed by: PHYSICAL THERAPY ASSISTANT

## 2024-02-24 PROCEDURE — 85025 COMPLETE CBC W/AUTO DIFF WBC: CPT | Mod: 91 | Performed by: NURSE PRACTITIONER

## 2024-02-24 PROCEDURE — 80053 COMPREHEN METABOLIC PANEL: CPT | Performed by: PHYSICAL THERAPY ASSISTANT

## 2024-02-24 PROCEDURE — G0378 HOSPITAL OBSERVATION PER HR: HCPCS

## 2024-02-24 PROCEDURE — 25000003 PHARM REV CODE 250: Performed by: PHYSICAL THERAPY ASSISTANT

## 2024-02-24 PROCEDURE — 25000003 PHARM REV CODE 250: Performed by: EMERGENCY MEDICINE

## 2024-02-24 PROCEDURE — 36415 COLL VENOUS BLD VENIPUNCTURE: CPT | Performed by: HOSPITALIST

## 2024-02-24 PROCEDURE — 84100 ASSAY OF PHOSPHORUS: CPT | Performed by: PHYSICAL THERAPY ASSISTANT

## 2024-02-24 PROCEDURE — 85025 COMPLETE CBC W/AUTO DIFF WBC: CPT | Mod: 91 | Performed by: HOSPITALIST

## 2024-02-24 PROCEDURE — 82962 GLUCOSE BLOOD TEST: CPT

## 2024-02-24 PROCEDURE — P9016 RBC LEUKOCYTES REDUCED: HCPCS | Performed by: EMERGENCY MEDICINE

## 2024-02-24 PROCEDURE — 86140 C-REACTIVE PROTEIN: CPT | Mod: 91 | Performed by: STUDENT IN AN ORGANIZED HEALTH CARE EDUCATION/TRAINING PROGRAM

## 2024-02-24 PROCEDURE — 96361 HYDRATE IV INFUSION ADD-ON: CPT

## 2024-02-24 PROCEDURE — 63600175 PHARM REV CODE 636 W HCPCS: Performed by: INTERNAL MEDICINE

## 2024-02-24 PROCEDURE — 86140 C-REACTIVE PROTEIN: CPT | Performed by: HOSPITALIST

## 2024-02-24 PROCEDURE — 96376 TX/PRO/DX INJ SAME DRUG ADON: CPT

## 2024-02-24 PROCEDURE — 83993 ASSAY FOR CALPROTECTIN FECAL: CPT | Performed by: INTERNAL MEDICINE

## 2024-02-24 RX ADMIN — CITALOPRAM HYDROBROMIDE 40 MG: 20 TABLET ORAL at 09:02

## 2024-02-24 RX ADMIN — SODIUM CHLORIDE: 9 INJECTION, SOLUTION INTRAVENOUS at 04:02

## 2024-02-24 RX ADMIN — MESALAMINE 1000 MG: 1000 SUPPOSITORY RECTAL at 08:02

## 2024-02-24 RX ADMIN — LEVOTHYROXINE SODIUM 200 MCG: 0.1 TABLET ORAL at 06:02

## 2024-02-24 RX ADMIN — PANTOPRAZOLE SODIUM 40 MG: 40 INJECTION, POWDER, LYOPHILIZED, FOR SOLUTION INTRAVENOUS at 09:02

## 2024-02-24 RX ADMIN — PANTOPRAZOLE SODIUM 40 MG: 40 INJECTION, POWDER, LYOPHILIZED, FOR SOLUTION INTRAVENOUS at 08:02

## 2024-02-24 NOTE — ASSESSMENT & PLAN NOTE
With rectal bleeding, PPI  Pt has had diagnosis of UC since 2016, scheduled colonoscopy 2/29   GI consulted, appreciate recs   Stool studies pending for current diarrhea   IV fluids, NPO until cleared by GI

## 2024-02-24 NOTE — PLAN OF CARE
[facility]  Initial Discharge Assessment       Primary Care Provider: Vinh Noyola PA-C    Admission Diagnosis: Symptomatic anemia [D64.9]    Admission Date: 2/23/2024  Expected Discharge Date: 2/26/2024    Assessment completed at pt's bedside. Verified demographics, insurance , PCP and pharmacy. Pt is independent with all ADL's . Denies HH,DME, Dialysis and Coumadin Therapy. Pt does have Living Will. Wife is designated person.  Plan is for home with family . Transport via wife.    Transition of Care Barriers: None    Payor:  / Plan:  [employer] / Product Type: Government /     Extended Emergency Contact Information  Primary Emergency Contact: Esther Singh  Address: [address]           [place] [address] [address] [facility]  Home Phone: [phone]  Mobile Phone: [phone]  Relation: Spouse  Preferred language: English   needed? No    Discharge Plan A: Home with family  Discharge Plan B: Home with family      [id number] - [place], [place] - [address] [facility] [address]  [address]  [place] [address]  Phone: [phone] Fax: [phone]      Initial Assessment (most recent)       Adult Discharge Assessment - 02/24/24 1503          Discharge Assessment    Assessment Type Discharge Planning Assessment     Confirmed/corrected address, phone number and insurance Yes     Confirmed Demographics Correct on Facesheet     Source of Information patient;health record     Reason For Admission Symptomatic anemia     People in Home spouse;child(isabela), dependent     Facility Arrived From: Home     Do you expect to return to your current living situation? Yes     Do you have help at home or someone to help you manage your care at home? Yes     Who are your caregiver(s) and their phone number(s)? Esther Singh (Spouse) [phone] (Mobile)     Prior to hospitilization cognitive status: Alert/Oriented     Current cognitive  status: Alert/Oriented     Walking or Climbing Stairs Difficulty no     Dressing/Bathing Difficulty no     Home Accessibility wheelchair accessible     Home Layout Able to live on 1st floor     Equipment Currently Used at Home none     Readmission within 30 days? No     Patient currently being followed by outpatient case management? No     Do you currently have service(s) that help you manage your care at home? No     Do you take prescription medications? Yes     Do you have prescription coverage? Yes     Coverage  -  [employer] -     Do you have any problems affording any of your prescribed medications? No     Is the patient taking medications as prescribed? yes     Who is going to help you get home at discharge? Esther Singh (Spouse) [phone] (Mobile)     How do you get to doctors appointments? car, drives self     Are you on dialysis? No     Do you take coumadin? No     Discharge Plan A Home with family     Discharge Plan B Home with family     DME Needed Upon Discharge  none     Discharge Plan discussed with: Patient     Transition of Care Barriers None

## 2024-02-24 NOTE — ASSESSMENT & PLAN NOTE
Patient's anemia is currently uncontrolled.  Patient with symptoms including pallor, generalized weakness, and fatigue.  Transfuse 2 units. Etiology likely d/t acute blood loss which was from ulcerative colitis.  Current CBC reviewed-   Lab Results   Component Value Date    HGB 6.4 (LL) 02/23/2024    HCT 22.8 (L) 02/23/2024     Monitor serial CBC and transfuse if patient becomes hemodynamically unstable, symptomatic or H/H drops below 7/21.

## 2024-02-24 NOTE — SUBJECTIVE & OBJECTIVE
Past Medical History:   Diagnosis Date    Colon polyp     Hypothyroid 07/05/2016    Sleep apnea     Thrombocytopenia     Ulcerative colitis        Past Surgical History:   Procedure Laterality Date    COLONOSCOPY N/A 12/04/2020    Procedure: COLONOSCOPY;  Surgeon: Ventura Warren MD;  Location: [facility] ENDO;  Service: Endoscopy;  Laterality: N/A;    COLONOSCOPY N/A 07/19/2021    Procedure: COLONOSCOPY;  Surgeon: Clarence Ogden MD;  Location: [facility] ENDO;  Service: Endoscopy;  Laterality: N/A;    COLONOSCOPY N/A 11/10/2022    Procedure: COLONOSCOPY;  Surgeon: Kath Hernandez MD;  Location: [facility] ENDO;  Service: Endoscopy;  Laterality: N/A;    FOOT SURGERY      left   cyst removed    HAND SURGERY      right   tendon repair       Review of patient's allergies indicates:  No Known Allergies    No current facility-administered medications on file prior to encounter.     Current Outpatient Medications on File Prior to Encounter   Medication Sig    citalopram (CELEXA) 40 MG tablet Take 1 tablet (40 mg total) by mouth once daily.    ergocalciferol (ERGOCALCIFEROL) 50,000 unit Cap TAKE 1 CAPSULE BY MOUTH EVERY 7 DAYS (Patient taking differently: Take 50,000 Units by mouth every 7 days. FRIDAYS)    folic acid (FOLVITE) 1 MG tablet Take 1 tablet (1 mg total) by mouth once daily.    levothyroxine (SYNTHROID) 200 MCG tablet Take 1 tablet (200 mcg total) by mouth before breakfast.    mesalamine (CANASA) 1000 MG Supp Place 1 suppository (1,000 mg total) rectally nightly.    omega-3 acid ethyl esters (LOVAZA) 1 gram capsule Take 1 capsule (1 g total) by mouth 2 (two) times daily.    sildenafiL (VIAGRA) 100 MG tablet Take 1 tablet (100 mg total) by mouth daily as needed for Erectile Dysfunction.    testosterone cypionate (DEPOTESTOTERONE CYPIONATE) 200 mg/mL injection Inject 100 mg into the muscle every 14 (fourteen) days.    blood sugar diagnostic Strp To check BG 1 times daily, to use with insurance preferred meter     blood-glucose meter kit To check BG 1 times daily, to use with insurance preferred meter    empagliflozin (JARDIANCE) 25 mg tablet Take 1 tablet (25 mg total) by mouth once daily. (Patient not taking: Reported on 2/23/2024)    glipiZIDE (GLUCOTROL) 5 MG tablet Take 1 tablet (5 mg total) by mouth 2 (two) times daily before meals. (Patient not taking: Reported on 2/23/2024)    lancets Misc To check BG 1 times daily, to use with insurance preferred meter     Family History       Problem Relation (Age of Onset)    Ulcerative colitis Mother          Tobacco Use    Smoking status: Former     Current packs/day: 0.00     Average packs/day: 1 pack/day for 30.0 years (30.0 ttl pk-yrs)     Types: Cigarettes     Start date: 1984     Quit date: 12/2013     Years since quitting: 10.2     Passive exposure: Past    Smokeless tobacco: Former     Types: Snuff     Quit date: 2020   Substance and Sexual Activity    Alcohol use: Yes     Comment: occasional    Drug use: No    Sexual activity: Yes     Review of Systems   Constitutional:  Positive for fatigue and unexpected weight change. Negative for appetite change, chills and fever.   HENT: Negative.  Negative for congestion, ear pain, hearing loss, rhinorrhea, sore throat and tinnitus.    Eyes: Negative.  Negative for pain, discharge and redness.   Respiratory: Negative.  Negative for cough, shortness of breath, wheezing and stridor.    Cardiovascular:  Positive for leg swelling. Negative for chest pain and palpitations.   Gastrointestinal:  Positive for abdominal pain (intermittent cramping) and diarrhea. Negative for abdominal distention, constipation, nausea and vomiting.   Endocrine: Negative.  Negative for cold intolerance, heat intolerance, polydipsia, polyphagia and polyuria.   Genitourinary: Negative.  Negative for decreased urine volume, difficulty urinating, flank pain, hematuria and urgency.   Musculoskeletal: Negative.  Negative for arthralgias, gait problem and myalgias.    Skin:  Positive for pallor. Negative for rash.   Allergic/Immunologic: Negative.  Negative for environmental allergies, food allergies and immunocompromised state.   Neurological:  Positive for weakness (generalized). Negative for dizziness, syncope, facial asymmetry and headaches.   Hematological: Negative.    Psychiatric/Behavioral: Negative.  Negative for agitation, confusion, self-injury and suicidal ideas.      Objective:     Vital Signs (Most Recent):  Temp: 98.1 °F (36.7 °C) (02/23/24 1144)  Pulse: 85 (02/23/24 1930)  Resp: 18 (02/23/24 1604)  BP: 110/65 (02/23/24 1930)  SpO2: 99 % (02/23/24 1930) Vital Signs (24h Range):  Temp:  [98.1 °F (36.7 °C)] 98.1 °F (36.7 °C)  Pulse:  [76-93] 85  Resp:  [18-34] 18  SpO2:  [95 %-100 %] 99 %  BP: (104-152)/(59-85) 110/65     Weight: 117.9 kg (260 lb)  Body mass index is 36.26 kg/m².     Physical Exam  Vitals reviewed.   Constitutional:       General: He is not in acute distress.     Appearance: Normal appearance. He is normal weight. He is not toxic-appearing.   HENT:      Head: Normocephalic and atraumatic.      Nose: Nose normal. No congestion or rhinorrhea.      Mouth/Throat:      Mouth: Mucous membranes are moist.      Pharynx: Oropharynx is clear.   Eyes:      General:         Right eye: No discharge.         Left eye: No discharge.      Extraocular Movements: Extraocular movements intact.      Conjunctiva/sclera: Conjunctivae normal.      Pupils: Pupils are equal, round, and reactive to light.   Cardiovascular:      Rate and Rhythm: Normal rate and regular rhythm.      Pulses: Normal pulses.      Heart sounds: Normal heart sounds.   Pulmonary:      Effort: Pulmonary effort is normal. No respiratory distress.   Chest:      Chest wall: No tenderness.   Abdominal:      General: Abdomen is flat. Bowel sounds are normal. There is no distension.      Palpations: Abdomen is soft.      Tenderness: There is no abdominal tenderness. There is no guarding.    Musculoskeletal:         General: Normal range of motion.      Cervical back: Normal range of motion and neck supple.      Right lower leg: Edema present.      Left lower leg: Edema present.   Skin:     General: Skin is warm and dry.      Coloration: Skin is pale.   Neurological:      General: No focal deficit present.      Mental Status: He is alert and oriented to person, place, and time. Mental status is at baseline.      Motor: No weakness.   Psychiatric:         Mood and Affect: Mood normal.            CRANIAL NERVES     CN III, IV, VI   Pupils are equal, round, and reactive to light.       Significant Labs: All pertinent labs within the past 24 hours have been reviewed.  CBC:   Recent Labs   Lab 02/22/24  0840 02/23/24  1209   WBC 8.1 8.17   HGB 7.2* 6.4*   HCT 25.0* 22.8*   * 393     CMP:   Recent Labs   Lab 02/22/24  0840 02/23/24  1209    134*   K 5.0 3.7    104   CO2 26 24   * 190*   BUN 13 12   CREATININE 0.92 0.9   CALCIUM 8.4* 8.2*   PROT 6.2 6.3   ALBUMIN 3.0* 3.0*   BILITOT 0.3 0.2   ALKPHOS  --  49*   AST 15 14   ALT 18 17   ANIONGAP  --  6*       Significant Imaging: I have reviewed all pertinent imaging results/findings within the past 24 hours.

## 2024-02-24 NOTE — ASSESSMENT & PLAN NOTE
Pt is currently on lovaza, will hold for now due to active bleeding.  Can restart as able and as appropriate

## 2024-02-24 NOTE — HPI
Patient is a 53-year-old male with a past medical history of thrombocytopenia, hypothyroidism, ANTONY, hyperlipidemia and a NAFLD.  Patient presents to the ED today with complaints of watery stools with rectal bleeding.  Patient reports he has had ulcerative colitis since 2016 with intermittent diarrhea and blood in stool since diagnosis.  Patient reports he has intermittent dark melena and bouts of bright red blood. Patient reports he had increased diarrhea once started on Jardiance in November.  Patient reports he was changed to glipizide however the diarrhea continued.  Patient reports generalized fatigue and weakness, pallor, and continued melena.  Patient went to PCP yesterday for labs and was thus sent to the ED due to low H&H.  Patient also reports he has had 53 lb weight loss since November.  Patient found to have hemoglobin of 6.4 in ED, 2 units blood ordered for transfusion.  Patient has current colonoscopy scheduled for 2/29.  GI was consulted, we will be placed NPO with stool studies.  Patient denies fever, chills, nausea or vomiting, jaundice, shortness for breath, or chest pain.  EKG shows normal sinus rhythm.  Chest x-ray negative.  Potassium 3.7, calcium 8.2, hemoglobin 6.4 with hematocrit at 22.8.  Patient is full code.

## 2024-02-24 NOTE — NURSING
Nurses Note -- 4 Eyes      2/24/2024   6:20 AM      Skin assessed during: Admit      [] No Altered Skin Integrity Present    []Prevention Measures Documented      [] Yes- Altered Skin Integrity Present or Discovered   [] LDA Added if Not in Epic (Describe Wound)   [] New Altered Skin Integrity was Present on Admit and Documented in LDA   [] Wound Image Taken    Wound Care Consulted? No    Attending Nurse:  Corine Caballero RN/Staff Member:  cat escobar

## 2024-02-24 NOTE — H&P
[facility] - Emergency Dept  Hospital Medicine  History & Physical    Patient Name: Isidoro Singh  MRN: [id number]  Patient Class: OP- Observation  Admission Date: 2/23/2024  Attending Physician: Hany Prieto MD   Primary Care Provider: Vinh Noyola PA-C         Patient information was obtained from patient, spouse/SO, and ER records.     Subjective:     Principal Problem:Symptomatic anemia    Chief Complaint:   Chief Complaint   Patient presents with    abnormal labs     Low RBC low calcium, low H/H. States blood in stool of various shades since november        HPI: Patient is a 53-year-old male with a past medical history of thrombocytopenia, hypothyroidism, ANTONY, hyperlipidemia and a NAFLD.  Patient presents to the ED today with complaints of watery stools with rectal bleeding.  Patient reports he has had ulcerative colitis since 2016 with intermittent diarrhea and blood in stool since diagnosis.  Patient reports he has intermittent dark melena and bouts of bright red blood. Patient reports he had increased diarrhea once started on Jardiance in November.  Patient reports he was changed to glipizide however the diarrhea continued.  Patient reports generalized fatigue and weakness, pallor, and continued melena.  Patient went to PCP yesterday for labs and was thus sent to the ED due to low H&H.  Patient also reports he has had 53 lb weight loss since November.  Patient found to have hemoglobin of 6.4 in ED, 2 units blood ordered for transfusion.  Patient has current colonoscopy scheduled for 2/29.  GI was consulted, we will be placed NPO with stool studies.  Patient denies fever, chills, nausea or vomiting, jaundice, shortness for breath, or chest pain.  EKG shows normal sinus rhythm.  Chest x-ray negative.  Potassium 3.7, calcium 8.2, hemoglobin 6.4 with hematocrit at 22.8.  Patient is full code.       Past Medical History:   Diagnosis Date    Colon polyp     Hypothyroid 07/05/2016    Sleep  apnea     Thrombocytopenia     Ulcerative colitis        Past Surgical History:   Procedure Laterality Date    COLONOSCOPY N/A 12/04/2020    Procedure: COLONOSCOPY;  Surgeon: Ventura Warren MD;  Location: [facility] ENDO;  Service: Endoscopy;  Laterality: N/A;    COLONOSCOPY N/A 07/19/2021    Procedure: COLONOSCOPY;  Surgeon: Clarence Ogden MD;  Location: [facility] ENDO;  Service: Endoscopy;  Laterality: N/A;    COLONOSCOPY N/A 11/10/2022    Procedure: COLONOSCOPY;  Surgeon: Kath Hernandez MD;  Location: [facility] ENDO;  Service: Endoscopy;  Laterality: N/A;    FOOT SURGERY      left   cyst removed    HAND SURGERY      right   tendon repair       Review of patient's allergies indicates:  No Known Allergies    No current facility-administered medications on file prior to encounter.     Current Outpatient Medications on File Prior to Encounter   Medication Sig    citalopram (CELEXA) 40 MG tablet Take 1 tablet (40 mg total) by mouth once daily.    ergocalciferol (ERGOCALCIFEROL) 50,000 unit Cap TAKE 1 CAPSULE BY MOUTH EVERY 7 DAYS (Patient taking differently: Take 50,000 Units by mouth every 7 days. FRIDAYS)    folic acid (FOLVITE) 1 MG tablet Take 1 tablet (1 mg total) by mouth once daily.    levothyroxine (SYNTHROID) 200 MCG tablet Take 1 tablet (200 mcg total) by mouth before breakfast.    mesalamine (CANASA) 1000 MG Supp Place 1 suppository (1,000 mg total) rectally nightly.    omega-3 acid ethyl esters (LOVAZA) 1 gram capsule Take 1 capsule (1 g total) by mouth 2 (two) times daily.    sildenafiL (VIAGRA) 100 MG tablet Take 1 tablet (100 mg total) by mouth daily as needed for Erectile Dysfunction.    testosterone cypionate (DEPOTESTOTERONE CYPIONATE) 200 mg/mL injection Inject 100 mg into the muscle every 14 (fourteen) days.    blood sugar diagnostic Strp To check BG 1 times daily, to use with insurance preferred meter    blood-glucose meter kit To check BG 1 times daily, to use with insurance preferred meter     empagliflozin (JARDIANCE) 25 mg tablet Take 1 tablet (25 mg total) by mouth once daily. (Patient not taking: Reported on 2/23/2024)    glipiZIDE (GLUCOTROL) 5 MG tablet Take 1 tablet (5 mg total) by mouth 2 (two) times daily before meals. (Patient not taking: Reported on 2/23/2024)    lancets Misc To check BG 1 times daily, to use with insurance preferred meter     Family History       Problem Relation (Age of Onset)    Ulcerative colitis Mother          Tobacco Use    Smoking status: Former     Current packs/day: 0.00     Average packs/day: 1 pack/day for 30.0 years (30.0 ttl pk-yrs)     Types: Cigarettes     Start date: 1984     Quit date: 12/2013     Years since quitting: 10.2     Passive exposure: Past    Smokeless tobacco: Former     Types: Snuff     Quit date: 2020   Substance and Sexual Activity    Alcohol use: Yes     Comment: occasional    Drug use: No    Sexual activity: Yes     Review of Systems   Constitutional:  Positive for fatigue and unexpected weight change. Negative for appetite change, chills and fever.   HENT: Negative.  Negative for congestion, ear pain, hearing loss, rhinorrhea, sore throat and tinnitus.    Eyes: Negative.  Negative for pain, discharge and redness.   Respiratory: Negative.  Negative for cough, shortness of breath, wheezing and stridor.    Cardiovascular:  Positive for leg swelling. Negative for chest pain and palpitations.   Gastrointestinal:  Positive for abdominal pain (intermittent cramping) and diarrhea. Negative for abdominal distention, constipation, nausea and vomiting.   Endocrine: Negative.  Negative for cold intolerance, heat intolerance, polydipsia, polyphagia and polyuria.   Genitourinary: Negative.  Negative for decreased urine volume, difficulty urinating, flank pain, hematuria and urgency.   Musculoskeletal: Negative.  Negative for arthralgias, gait problem and myalgias.   Skin:  Positive for pallor. Negative for rash.   Allergic/Immunologic: Negative.  Negative  for environmental allergies, food allergies and immunocompromised state.   Neurological:  Positive for weakness (generalized). Negative for dizziness, syncope, facial asymmetry and headaches.   Hematological: Negative.    Psychiatric/Behavioral: Negative.  Negative for agitation, confusion, self-injury and suicidal ideas.      Objective:     Vital Signs (Most Recent):  Temp: 98.1 °F (36.7 °C) (02/23/24 1144)  Pulse: 85 (02/23/24 1930)  Resp: 18 (02/23/24 1604)  BP: 110/65 (02/23/24 1930)  SpO2: 99 % (02/23/24 1930) Vital Signs (24h Range):  Temp:  [98.1 °F (36.7 °C)] 98.1 °F (36.7 °C)  Pulse:  [76-93] 85  Resp:  [18-34] 18  SpO2:  [95 %-100 %] 99 %  BP: (104-152)/(59-85) 110/65     Weight: 117.9 kg (260 lb)  Body mass index is 36.26 kg/m².     Physical Exam  Vitals reviewed.   Constitutional:       General: He is not in acute distress.     Appearance: Normal appearance. He is normal weight. He is not toxic-appearing.   HENT:      Head: Normocephalic and atraumatic.      Nose: Nose normal. No congestion or rhinorrhea.      Mouth/Throat:      Mouth: Mucous membranes are moist.      Pharynx: Oropharynx is clear.   Eyes:      General:         Right eye: No discharge.         Left eye: No discharge.      Extraocular Movements: Extraocular movements intact.      Conjunctiva/sclera: Conjunctivae normal.      Pupils: Pupils are equal, round, and reactive to light.   Cardiovascular:      Rate and Rhythm: Normal rate and regular rhythm.      Pulses: Normal pulses.      Heart sounds: Normal heart sounds.   Pulmonary:      Effort: Pulmonary effort is normal. No respiratory distress.   Chest:      Chest wall: No tenderness.   Abdominal:      General: Abdomen is flat. Bowel sounds are normal. There is no distension.      Palpations: Abdomen is soft.      Tenderness: There is no abdominal tenderness. There is no guarding.   Musculoskeletal:         General: Normal range of motion.      Cervical back: Normal range of motion and  neck supple.      Right lower leg: Edema present.      Left lower leg: Edema present.   Skin:     General: Skin is warm and dry.      Coloration: Skin is pale.   Neurological:      General: No focal deficit present.      Mental Status: He is alert and oriented to person, place, and time. Mental status is at baseline.      Motor: No weakness.   Psychiatric:         Mood and Affect: Mood normal.            CRANIAL NERVES     CN III, IV, VI   Pupils are equal, round, and reactive to light.       Significant Labs: All pertinent labs within the past 24 hours have been reviewed.  CBC:   Recent Labs   Lab 02/22/24  0840 02/23/24  1209   WBC 8.1 8.17   HGB 7.2* 6.4*   HCT 25.0* 22.8*   * 393     CMP:   Recent Labs   Lab 02/22/24  0840 02/23/24  1209    134*   K 5.0 3.7    104   CO2 26 24   * 190*   BUN 13 12   CREATININE 0.92 0.9   CALCIUM 8.4* 8.2*   PROT 6.2 6.3   ALBUMIN 3.0* 3.0*   BILITOT 0.3 0.2   ALKPHOS  --  49*   AST 15 14   ALT 18 17   ANIONGAP  --  6*       Significant Imaging: I have reviewed all pertinent imaging results/findings within the past 24 hours.  Assessment/Plan:     * Symptomatic anemia  Patient's anemia is currently uncontrolled.  Patient with symptoms including pallor, generalized weakness, and fatigue.  Transfuse 2 units. Etiology likely d/t acute blood loss which was from ulcerative colitis.  Current CBC reviewed-   Lab Results   Component Value Date    HGB 6.4 (LL) 02/23/2024    HCT 22.8 (L) 02/23/2024     Monitor serial CBC and transfuse if patient becomes hemodynamically unstable, symptomatic or H/H drops below 7/21.    Ulcerative colitis  With rectal bleeding, PPI  Pt has had diagnosis of UC since 2016, scheduled colonoscopy 2/29   GI consulted, appreciate recs   Stool studies pending for current diarrhea   IV fluids, NPO until cleared by GI       Hyperlipidemia  Pt is currently on lovaza, will hold for now due to active bleeding.  Can restart as able and as  appropriate       NAFLD (nonalcoholic fatty liver disease)  Monitor, LFTs currently stable       Hypothyroidism (acquired)  Continue synthroid         VTE Risk Mitigation (From admission, onward)           Ordered     IP VTE HIGH RISK PATIENT  Once         02/23/24 2004     Place sequential compression device  Until discontinued         02/23/24 2004     Reason for No Pharmacological VTE Prophylaxis  Once        Question:  Reasons:  Answer:  Active Bleeding    02/23/24 2004                    On 02/23/2024, patient should be placed in hospital observation services under my care in collaboration with Dr Prieto.       LEAH Funez  Department of Hospital Medicine  [facility] - Emergency Dept

## 2024-02-25 VITALS
WEIGHT: 247.88 LBS | SYSTOLIC BLOOD PRESSURE: 117 MMHG | HEART RATE: 79 BPM | OXYGEN SATURATION: 97 % | TEMPERATURE: 98 F | HEIGHT: 71 IN | RESPIRATION RATE: 18 BRPM | BODY MASS INDEX: 34.7 KG/M2 | DIASTOLIC BLOOD PRESSURE: 69 MMHG

## 2024-02-25 LAB
ALBUMIN SERPL BCP-MCNC: 2.6 G/DL (ref 3.5–5.2)
ALP SERPL-CCNC: 51 U/L (ref 55–135)
ALT SERPL W/O P-5'-P-CCNC: 13 U/L (ref 10–44)
ANION GAP SERPL CALC-SCNC: 4 MMOL/L (ref 8–16)
AST SERPL-CCNC: 9 U/L (ref 10–40)
BASOPHILS # BLD AUTO: 0.04 K/UL (ref 0–0.2)
BASOPHILS NFR BLD: 0.6 % (ref 0–1.9)
BILIRUB SERPL-MCNC: 0.3 MG/DL (ref 0.1–1)
BUN SERPL-MCNC: 8 MG/DL (ref 6–20)
CALCIUM SERPL-MCNC: 7.8 MG/DL (ref 8.7–10.5)
CHLORIDE SERPL-SCNC: 111 MMOL/L (ref 95–110)
CO2 SERPL-SCNC: 24 MMOL/L (ref 23–29)
CREAT SERPL-MCNC: 0.7 MG/DL (ref 0.5–1.4)
CRP SERPL-MCNC: 25.1 MG/L (ref 0–8.2)
DIFFERENTIAL METHOD BLD: ABNORMAL
EOSINOPHIL # BLD AUTO: 0.7 K/UL (ref 0–0.5)
EOSINOPHIL NFR BLD: 10.1 % (ref 0–8)
ERYTHROCYTE [DISTWIDTH] IN BLOOD BY AUTOMATED COUNT: 15.4 % (ref 11.5–14.5)
EST. GFR  (NO RACE VARIABLE): >60 ML/MIN/1.73 M^2
GLUCOSE SERPL-MCNC: 104 MG/DL (ref 70–110)
GLUCOSE SERPL-MCNC: 92 MG/DL (ref 70–110)
HCT VFR BLD AUTO: 26.9 % (ref 40–54)
HGB BLD-MCNC: 7.9 G/DL (ref 14–18)
IMM GRANULOCYTES # BLD AUTO: 0.04 K/UL (ref 0–0.04)
IMM GRANULOCYTES NFR BLD AUTO: 0.6 % (ref 0–0.5)
LYMPHOCYTES # BLD AUTO: 2 K/UL (ref 1–4.8)
LYMPHOCYTES NFR BLD: 29.6 % (ref 18–48)
MAGNESIUM SERPL-MCNC: 2.1 MG/DL (ref 1.6–2.6)
MCH RBC QN AUTO: 25.6 PG (ref 27–31)
MCHC RBC AUTO-ENTMCNC: 29.4 G/DL (ref 32–36)
MCV RBC AUTO: 87 FL (ref 82–98)
MONOCYTES # BLD AUTO: 0.7 K/UL (ref 0.3–1)
MONOCYTES NFR BLD: 10.4 % (ref 4–15)
NEUTROPHILS # BLD AUTO: 3.3 K/UL (ref 1.8–7.7)
NEUTROPHILS NFR BLD: 48.7 % (ref 38–73)
NRBC BLD-RTO: 0 /100 WBC
PHOSPHATE SERPL-MCNC: 3.7 MG/DL (ref 2.7–4.5)
PLATELET # BLD AUTO: 343 K/UL (ref 150–450)
PMV BLD AUTO: 9 FL (ref 9.2–12.9)
POTASSIUM SERPL-SCNC: 3.9 MMOL/L (ref 3.5–5.1)
PROT SERPL-MCNC: 5.6 G/DL (ref 6–8.4)
RBC # BLD AUTO: 3.09 M/UL (ref 4.6–6.2)
SODIUM SERPL-SCNC: 139 MMOL/L (ref 136–145)
WBC # BLD AUTO: 6.66 K/UL (ref 3.9–12.7)

## 2024-02-25 PROCEDURE — 85025 COMPLETE CBC W/AUTO DIFF WBC: CPT | Performed by: NURSE PRACTITIONER

## 2024-02-25 PROCEDURE — 63600175 PHARM REV CODE 636 W HCPCS: Performed by: INTERNAL MEDICINE

## 2024-02-25 PROCEDURE — C9113 INJ PANTOPRAZOLE SODIUM, VIA: HCPCS | Performed by: INTERNAL MEDICINE

## 2024-02-25 PROCEDURE — 80053 COMPREHEN METABOLIC PANEL: CPT | Performed by: PHYSICAL THERAPY ASSISTANT

## 2024-02-25 PROCEDURE — 25000003 PHARM REV CODE 250: Performed by: PHYSICAL THERAPY ASSISTANT

## 2024-02-25 PROCEDURE — 83735 ASSAY OF MAGNESIUM: CPT | Performed by: PHYSICAL THERAPY ASSISTANT

## 2024-02-25 PROCEDURE — 96376 TX/PRO/DX INJ SAME DRUG ADON: CPT

## 2024-02-25 PROCEDURE — 36415 COLL VENOUS BLD VENIPUNCTURE: CPT | Performed by: PHYSICAL THERAPY ASSISTANT

## 2024-02-25 PROCEDURE — 84100 ASSAY OF PHOSPHORUS: CPT | Performed by: PHYSICAL THERAPY ASSISTANT

## 2024-02-25 PROCEDURE — G0378 HOSPITAL OBSERVATION PER HR: HCPCS

## 2024-02-25 PROCEDURE — 96361 HYDRATE IV INFUSION ADD-ON: CPT

## 2024-02-25 RX ADMIN — CITALOPRAM HYDROBROMIDE 40 MG: 20 TABLET ORAL at 08:02

## 2024-02-25 RX ADMIN — PANTOPRAZOLE SODIUM 40 MG: 40 INJECTION, POWDER, LYOPHILIZED, FOR SOLUTION INTRAVENOUS at 08:02

## 2024-02-25 RX ADMIN — LEVOTHYROXINE SODIUM 200 MCG: 0.1 TABLET ORAL at 05:02

## 2024-02-25 NOTE — SUBJECTIVE & OBJECTIVE
Interval History: Patient seen and examined.  Ongoing bloody bowel movements and diarrhea reported- abd pain persists but slightly improved.  Feels less fatigued since RBC infusion with good H&H rise.  Was seen by GI and cleared for d/c with plan for outpatient colonoscopy on Friday, however given ongoing bleeding and drop in H&H on recheck, will monitor overnight, repeat labs, and determine appropriateness for d/c at that point.    Review of Systems   Constitutional:  Positive for fatigue. Negative for chills and fever.   Respiratory:  Negative for cough and shortness of breath.    Gastrointestinal:  Positive for abdominal pain, blood in stool and diarrhea. Negative for nausea and vomiting.     Objective:     Vital Signs (Most Recent):  Temp: 99.7 °F (37.6 °C) (02/24/24 1941)  Pulse: 83 (02/24/24 1941)  Resp: 16 (02/24/24 1941)  BP: 116/61 (02/24/24 1941)  SpO2: 96 % (02/24/24 1941) Vital Signs (24h Range):  Temp:  [98.1 °F (36.7 °C)-99.7 °F (37.6 °C)] 99.7 °F (37.6 °C)  Pulse:  [68-86] 83  Resp:  [15-18] 16  SpO2:  [92 %-100 %] 96 %  BP: ()/(55-71) 116/61     Weight: 112.4 kg (247 lb 14.4 oz)  Body mass index is 34.58 kg/m².    Intake/Output Summary (Last 24 hours) at 2/24/2024 2115  Last data filed at 2/24/2024 2035  Gross per 24 hour   Intake 2452.09 ml   Output 320 ml   Net 2132.09 ml         Physical Exam  Vitals and nursing note reviewed.   Constitutional:       Appearance: Normal appearance.   HENT:      Head: Normocephalic and atraumatic.   Eyes:      Extraocular Movements: Extraocular movements intact.      Conjunctiva/sclera: Conjunctivae normal.      Pupils: Pupils are equal, round, and reactive to light.   Cardiovascular:      Rate and Rhythm: Normal rate and regular rhythm.   Pulmonary:      Effort: Pulmonary effort is normal.      Breath sounds: Normal breath sounds.   Abdominal:      General: Abdomen is flat. Bowel sounds are normal.      Palpations: Abdomen is soft.      Tenderness: There is  abdominal tenderness.   Skin:     Coloration: Skin is pale.   Neurological:      General: No focal deficit present.      Mental Status: He is alert and oriented to person, place, and time. Mental status is at baseline.             Significant Labs: All pertinent labs within the past 24 hours have been reviewed.  CBC:   Recent Labs   Lab 02/23/24  1209 02/24/24  0716 02/24/24  1507   WBC 8.17 7.13 7.66   HGB 6.4* 8.0* 7.6*   HCT 22.8* 26.6* 25.0*    346 338     CMP:   Recent Labs   Lab 02/23/24  1209 02/24/24  0716   * 137   K 3.7 4.2    108   CO2 24 24   * 96   BUN 12 9   CREATININE 0.9 0.8   CALCIUM 8.2* 8.1*   PROT 6.3 6.0   ALBUMIN 3.0* 2.9*   BILITOT 0.2 0.4   ALKPHOS 49* 47*   AST 14 11   ALT 17 15   ANIONGAP 6* 5*       Significant Imaging: I have reviewed all pertinent imaging results/findings within the past 24 hours.

## 2024-02-25 NOTE — PROGRESS NOTES
[facility] Medicine  Progress Note    Patient Name: Isidoro Singh  MRN: [id number]  Patient Class: OP- Observation   Admission Date: 2/23/2024  Length of Stay: 0 days  Attending Physician: Elyse Lornezo MD  Primary Care Provider: Vinh Noyola PA-C        Subjective:     Principal Problem:Symptomatic anemia        HPI:  Patient is a 53-year-old male with a past medical history of thrombocytopenia, hypothyroidism, ANTONY, hyperlipidemia and a NAFLD.  Patient presents to the ED today with complaints of watery stools with rectal bleeding.  Patient reports he has had ulcerative colitis since 2016 with intermittent diarrhea and blood in stool since diagnosis.  Patient reports he has intermittent dark melena and bouts of bright red blood. Patient reports he had increased diarrhea once started on Jardiance in November.  Patient reports he was changed to glipizide however the diarrhea continued.  Patient reports generalized fatigue and weakness, pallor, and continued melena.  Patient went to PCP yesterday for labs and was thus sent to the ED due to low H&H.  Patient also reports he has had 53 lb weight loss since November.  Patient found to have hemoglobin of 6.4 in ED, 2 units blood ordered for transfusion.  Patient has current colonoscopy scheduled for 2/29.  GI was consulted, we will be placed NPO with stool studies.  Patient denies fever, chills, nausea or vomiting, jaundice, shortness for breath, or chest pain.  EKG shows normal sinus rhythm.  Chest x-ray negative.  Potassium 3.7, calcium 8.2, hemoglobin 6.4 with hematocrit at 22.8.  Patient is full code.       Overview/Hospital Course:  No notes on file    Interval History: Patient seen and examined.  Ongoing bloody bowel movements and diarrhea reported- abd pain persists but slightly improved.  Feels less fatigued since RBC infusion with good H&H rise.  Was seen by GI and cleared for d/c with plan for outpatient colonoscopy on  Friday, however given ongoing bleeding and drop in H&H on recheck, will monitor overnight, repeat labs, and determine appropriateness for d/c at that point.    Review of Systems   Constitutional:  Positive for fatigue. Negative for chills and fever.   Respiratory:  Negative for cough and shortness of breath.    Gastrointestinal:  Positive for abdominal pain, blood in stool and diarrhea. Negative for nausea and vomiting.     Objective:     Vital Signs (Most Recent):  Temp: 99.7 °F (37.6 °C) (02/24/24 1941)  Pulse: 83 (02/24/24 1941)  Resp: 16 (02/24/24 1941)  BP: 116/61 (02/24/24 1941)  SpO2: 96 % (02/24/24 1941) Vital Signs (24h Range):  Temp:  [98.1 °F (36.7 °C)-99.7 °F (37.6 °C)] 99.7 °F (37.6 °C)  Pulse:  [68-86] 83  Resp:  [15-18] 16  SpO2:  [92 %-100 %] 96 %  BP: ()/(55-71) 116/61     Weight: 112.4 kg (247 lb 14.4 oz)  Body mass index is 34.58 kg/m².    Intake/Output Summary (Last 24 hours) at 2/24/2024 2115  Last data filed at 2/24/2024 2035  Gross per 24 hour   Intake 2452.09 ml   Output 320 ml   Net 2132.09 ml         Physical Exam  Vitals and nursing note reviewed.   Constitutional:       Appearance: Normal appearance.   HENT:      Head: Normocephalic and atraumatic.   Eyes:      Extraocular Movements: Extraocular movements intact.      Conjunctiva/sclera: Conjunctivae normal.      Pupils: Pupils are equal, round, and reactive to light.   Cardiovascular:      Rate and Rhythm: Normal rate and regular rhythm.   Pulmonary:      Effort: Pulmonary effort is normal.      Breath sounds: Normal breath sounds.   Abdominal:      General: Abdomen is flat. Bowel sounds are normal.      Palpations: Abdomen is soft.      Tenderness: There is abdominal tenderness.   Skin:     Coloration: Skin is pale.   Neurological:      General: No focal deficit present.      Mental Status: He is alert and oriented to person, place, and time. Mental status is at baseline.             Significant Labs: All pertinent labs within the  past 24 hours have been reviewed.  CBC:   Recent Labs   Lab 02/23/24  1209 02/24/24  0716 02/24/24  1507   WBC 8.17 7.13 7.66   HGB 6.4* 8.0* 7.6*   HCT 22.8* 26.6* 25.0*    346 338     CMP:   Recent Labs   Lab 02/23/24  1209 02/24/24  0716   * 137   K 3.7 4.2    108   CO2 24 24   * 96   BUN 12 9   CREATININE 0.9 0.8   CALCIUM 8.2* 8.1*   PROT 6.3 6.0   ALBUMIN 3.0* 2.9*   BILITOT 0.2 0.4   ALKPHOS 49* 47*   AST 14 11   ALT 17 15   ANIONGAP 6* 5*       Significant Imaging: I have reviewed all pertinent imaging results/findings within the past 24 hours.    Assessment/Plan:      * Symptomatic anemia  Patient's anemia is currently uncontrolled.  Patient with symptoms including pallor, generalized weakness, and fatigue.  Transfuse 2 units. Etiology likely d/t acute blood loss which was from ulcerative colitis.  Current CBC reviewed-   Lab Results   Component Value Date    HGB 7.6 (L) 02/24/2024    HCT 25.0 (L) 02/24/2024     Monitor serial CBC and transfuse if patient becomes hemodynamically unstable, symptomatic or H/H drops below 7/21.  -repeat in AM to determine need for ongoing transfusion    Ulcerative colitis  With rectal bleeding  Pt has had diagnosis of UC since 2016, scheduled colonoscopy 2/29   GI consulted, appreciate recs   Stool studies sent- C DIff negative.  IV fluids, clear liquids per GI recs  Transfusion required on admit, trending CBC   GI recommends against steroids for now so as not to alter colonoscopy biopsy results.      Hyperlipidemia  Pt is currently on lovaza, will hold for now due to active bleeding.  Can restart as able and as appropriate       NAFLD (nonalcoholic fatty liver disease)  Monitor, LFTs currently stable       Hypothyroidism (acquired)  Patient has chronic hypothyroidism. TFTs reviewed-   Lab Results   Component Value Date    TSH 4.136 02/24/2024   . Will continue chronic levothyroxine and adjust for and clinical changes.          VTE Risk Mitigation  (From admission, onward)           Ordered     IP VTE HIGH RISK PATIENT  Once         02/23/24 2004     Place sequential compression device  Until discontinued         02/23/24 2004     Reason for No Pharmacological VTE Prophylaxis  Once        Question:  Reasons:  Answer:  Active Bleeding    02/23/24 2004                    Discharge Planning   ARYAN: 2/26/2024     Code Status: Full Code   Is the patient medically ready for discharge?:     Reason for patient still in hospital (select all that apply): Patient trending condition, Laboratory test, Treatment, and Consult recommendations  Discharge Plan A: Home with family                  Angeles Best NP  Department of Hospital Medicine   [facility]

## 2024-02-25 NOTE — ASSESSMENT & PLAN NOTE
Patient has chronic hypothyroidism. TFTs reviewed-   Lab Results   Component Value Date    TSH 4.136 02/24/2024   . Will continue chronic levothyroxine and adjust for and clinical changes.

## 2024-02-25 NOTE — DISCHARGE SUMMARY
[facility] Medicine  Discharge Summary      Patient Name: Isidoro Singh  MRN: [id number]  SIRIA: [id number]  Patient Class: OP- Observation  Admission Date: 2/23/2024  Hospital Length of Stay: 0 days  Discharge Date and Time: 2/25/2024  1:16 PM  Attending Physician: No att. providers found   Discharging Provider: Angeles Best NP  Primary Care Provider: Vinh Noyola PA-C    Primary Care Team: Networked reference to record PCT     HPI:   Patient is a 53-year-old male with a past medical history of thrombocytopenia, hypothyroidism, ANTONY, hyperlipidemia and a NAFLD.  Patient presents to the ED today with complaints of watery stools with rectal bleeding.  Patient reports he has had ulcerative colitis since 2016 with intermittent diarrhea and blood in stool since diagnosis.  Patient reports he has intermittent dark melena and bouts of bright red blood. Patient reports he had increased diarrhea once started on Jardiance in November.  Patient reports he was changed to glipizide however the diarrhea continued.  Patient reports generalized fatigue and weakness, pallor, and continued melena.  Patient went to PCP yesterday for labs and was thus sent to the ED due to low H&H.  Patient also reports he has had 53 lb weight loss since November.  Patient found to have hemoglobin of 6.4 in ED, 2 units blood ordered for transfusion.  Patient has current colonoscopy scheduled for 2/29.  GI was consulted, we will be placed NPO with stool studies.  Patient denies fever, chills, nausea or vomiting, jaundice, shortness for breath, or chest pain.  EKG shows normal sinus rhythm.  Chest x-ray negative.  Potassium 3.7, calcium 8.2, hemoglobin 6.4 with hematocrit at 22.8.  Patient is full code.       * No surgery found *      Hospital Course:   Pt was monitored closely during his stay.  He was transfused 2 units of packed red blood cells with good response.  His abdominal pain improved.  Frequency of bowel  movements slacked off.  He was seen by GI who recommended discharge with close outpatient follow-up with GI as scheduled this week for colonoscopy.  He would slight dip in H&H thereafter and was held overnight for serial H&H and ongoing monitoring with ongoing bloody output with stooling.  His H&H stabilized.  Pt was feeling well and eager for discharge.  He was D/C home and will call his GI doctor in the a.m. to see if colonoscopy available sooner in the week.  We discussed at length signs and symptoms of anemia with Pt and his wife.  He will follow up closely with his PCP-scheduled for appointment tomorrow.  Return precautions were provided.  GI did not recommend steroids on discharge as they did not want to affect the results of the biopsy on Friday.    Pt and wife verbalized understanding and agreement with discharge plan.    Pt was seen on day of discharge by myself and my attending and deemed appropriate for discharge.     Goals of Care Treatment Preferences:  Code Status: Full Code      Consults:   Consults (From admission, onward)          Status Ordering Provider     Inpatient consult to Gastroenterology  Once        Provider:  Brian Cristina MD    Completed GERARDO SMITH            No new Assessment & Plan notes have been filed under this hospital service since the last note was generated.  Service: Hospital Medicine    Final Active Diagnoses:    Diagnosis Date Noted POA    PRINCIPAL PROBLEM:  Symptomatic anemia [D64.9] 02/23/2024 Yes    Ulcerative colitis [K51.90] 07/19/2021 Yes    Hyperlipidemia [E78.5] 04/17/2018 Yes    Hypothyroidism (acquired) [E03.9] 07/05/2016 Yes    NAFLD (nonalcoholic fatty liver disease) [K76.0] 07/05/2016 Yes      Problems Resolved During this Admission:       Discharged Condition: good    Disposition: Home or Self Care    Follow Up:   Follow-up Information       Kath Hernandez MD Follow up.    Specialties: Gastroenterology, Internal Medicine  Why: would let her know  you required hospitalization to assess need for sooner scope.  Contact information:  Mattie [address]END[address] [facility]  [address]  [place] [address]  [phone]                           Patient Instructions:      Diet full liquid     Notify your health care provider if you experience any of the following:  persistent nausea and vomiting or diarrhea     Notify your health care provider if you experience any of the following:  severe uncontrolled pain     Notify your health care provider if you experience any of the following:  persistent dizziness, light-headedness, or visual disturbances     Activity as tolerated       Significant Diagnostic Studies: Labs: CMP   Recent Labs   Lab 02/24/24  0716 02/25/24  0712    139   K 4.2 3.9    111*   CO2 24 24   GLU 96 92   BUN 9 8   CREATININE 0.8 0.7   CALCIUM 8.1* 7.8*   PROT 6.0 5.6*   ALBUMIN 2.9* 2.6*   BILITOT 0.4 0.3   ALKPHOS 47* 51*   AST 11 9*   ALT 15 13   ANIONGAP 5* 4*    and CBC   Recent Labs   Lab 02/24/24  1507 02/24/24  2114 02/25/24  0712   WBC 7.66 8.98 6.66   HGB 7.6* 8.8* 7.9*   HCT 25.0* 29.9* 26.9*    401 343       Pending Diagnostic Studies:       None           Medications:  Reconciled Home Medications:      Medication List        CHANGE how you take these medications      ergocalciferol 50,000 unit Cap  Commonly known as: ERGOCALCIFEROL  TAKE 1 CAPSULE BY MOUTH EVERY 7 DAYS  What changed: additional instructions            CONTINUE taking these medications      blood sugar diagnostic Strp  To check BG 1 times daily, to use with insurance preferred meter     blood-glucose meter kit  To check BG 1 times daily, to use with insurance preferred meter     citalopram 40 MG tablet  Commonly known as: CeleXA  Take 1 tablet (40 mg total) by mouth once daily.     folic acid 1 MG tablet  Commonly known as: FOLVITE  Take 1 tablet (1 mg total) by mouth once daily.     lancets Misc  To check BG 1 times daily, to use with insurance preferred meter     levothyroxine  200 MCG tablet  Commonly known as: SYNTHROID  Take 1 tablet (200 mcg total) by mouth before breakfast.     mesalamine 1000 MG Supp  Commonly known as: CANASA  Place 1 suppository (1,000 mg total) rectally nightly.     omega-3 acid ethyl esters 1 gram capsule  Commonly known as: LOVAZA  Take 1 capsule (1 g total) by mouth 2 (two) times daily.     sildenafiL 100 MG tablet  Commonly known as: VIAGRA  Take 1 tablet (100 mg total) by mouth daily as needed for Erectile Dysfunction.     testosterone cypionate 200 mg/mL injection  Commonly known as: DEPOTESTOTERONE CYPIONATE  Inject 100 mg into the muscle every 14 (fourteen) days.            STOP taking these medications      empagliflozin 25 mg tablet  Commonly known as: JARDIANCE     glipiZIDE 5 MG tablet  Commonly known as: GLUCOTROL              Indwelling Lines/Drains at time of discharge:   Lines/Drains/Airways       None                   Time spent on the discharge of patient: 33 minutes         Angeles Best NP  Department of Hospital Medicine  [facility]

## 2024-02-25 NOTE — NURSING
AVS discussed with pt and spouse. Verbalized understanding of d/c instructions, med list, f/u appointments, and restrictions. Pt and spouse have all personal belongings. D/C ambulatory to private vehicle.

## 2024-02-25 NOTE — ASSESSMENT & PLAN NOTE
Patient's anemia is currently uncontrolled.  Patient with symptoms including pallor, generalized weakness, and fatigue.  Transfuse 2 units. Etiology likely d/t acute blood loss which was from ulcerative colitis.  Current CBC reviewed-   Lab Results   Component Value Date    HGB 7.6 (L) 02/24/2024    HCT 25.0 (L) 02/24/2024     Monitor serial CBC and transfuse if patient becomes hemodynamically unstable, symptomatic or H/H drops below 7/21.  -repeat in AM to determine need for ongoing transfusion

## 2024-02-25 NOTE — HOSPITAL COURSE
Pt was monitored closely during his stay.  He was transfused 2 units of packed red blood cells with good response.  His abdominal pain improved.  Frequency of bowel movements slacked off.  He was seen by GI who recommended discharge with close outpatient follow-up with GI as scheduled this week for colonoscopy.  He would slight dip in H&H thereafter and was held overnight for serial H&H and ongoing monitoring with ongoing bloody output with stooling.  His H&H stabilized.  Pt was feeling well and eager for discharge.  He was D/C home and will call his GI doctor in the a.m. to see if colonoscopy available sooner in the week.  We discussed at length signs and symptoms of anemia with Pt and his wife.  He will follow up closely with his PCP-scheduled for appointment tomorrow.  Return precautions were provided.  GI did not recommend steroids on discharge as they did not want to affect the results of the biopsy on Friday.    Pt and wife verbalized understanding and agreement with discharge plan.    Pt was seen on day of discharge by myself and my attending and deemed appropriate for discharge.

## 2024-02-25 NOTE — PLAN OF CARE
02/25/24 1059   Final Note   Assessment Type Final Discharge Note   Anticipated Discharge Disposition Home   What phone number can be called within the next 1-3 days to see how you are doing after discharge? [phone]   Post-Acute Status   Discharge Delays None known at this time     Patient cleared for discharge from case management standpoint.    Patient discharged home with no further case management needs.

## 2024-02-25 NOTE — ASSESSMENT & PLAN NOTE
With rectal bleeding  Pt has had diagnosis of UC since 2016, scheduled colonoscopy 2/29   GI consulted, appreciate recs   Stool studies sent- C DIff negative.  IV fluids, clear liquids per GI recs  Transfusion required on admit, trending CBC   GI recommends against steroids for now so as not to alter colonoscopy biopsy results.

## 2024-02-26 ENCOUNTER — OFFICE VISIT (OUTPATIENT)
Dept: FAMILY MEDICINE | Facility: CLINIC | Age: 54
End: 2024-02-26
Payer: OTHER GOVERNMENT

## 2024-02-26 ENCOUNTER — TELEPHONE (OUTPATIENT)
Dept: FAMILY MEDICINE | Facility: CLINIC | Age: 54
End: 2024-02-26

## 2024-02-26 ENCOUNTER — PATIENT MESSAGE (OUTPATIENT)
Dept: GASTROENTEROLOGY | Facility: CLINIC | Age: 54
End: 2024-02-26
Payer: OTHER GOVERNMENT

## 2024-02-26 VITALS
WEIGHT: 251 LBS | DIASTOLIC BLOOD PRESSURE: 60 MMHG | HEIGHT: 71 IN | HEART RATE: 89 BPM | SYSTOLIC BLOOD PRESSURE: 110 MMHG | OXYGEN SATURATION: 97 % | BODY MASS INDEX: 35.14 KG/M2

## 2024-02-26 DIAGNOSIS — K51.911 ULCERATIVE COLITIS WITH RECTAL BLEEDING, UNSPECIFIED LOCATION: Primary | ICD-10-CM

## 2024-02-26 LAB
BACTERIA STL CULT: NORMAL
BACTERIA STL CULT: NORMAL

## 2024-02-26 PROCEDURE — 99214 OFFICE O/P EST MOD 30 MIN: CPT | Mod: S$GLB,,, | Performed by: PHYSICIAN ASSISTANT

## 2024-02-26 NOTE — PROGRESS NOTES
SUBJECTIVE:    Patient ID: Isidoro Singh is a 53 y.o. male.    Chief Complaint: Follow-up (No bottles//no refills needed// pt states he went to hospital 12/23 to receive 2 units of blood per doctors office pt states he feels a little better just fatigue )    This is a 54 yo male here for follow up from ER visit. He was admitted for blood loss secondary to UC. In November, he was started on Jardiance for new DM, stayed on it for a weeks, but stopped due to intense gas pains, diarrhea +/- blood, abdominal pain. He was switched to glipizide, and stayed on it for a week, but discontinued due to abdominal pain and diarrhea. He has not been on any diabetic medications since 1 month. Lately he has been feeling weak, fatigued, dizzy, lightheadedness with headache, cold intolerance and pale per wife. Since being discharged he feels a little better, this morning he felt great.   GI plans to do colonoscopy on Thursday  Blood sugar has been  fasting, but down to 60s between 6-8pm. Not on any medications for diabetes. Of note, a few weeks ago when he was in a meeting, his blood sugar dropped and he started shaking; he didn't have a meter to see his blood sugar.     Diabetes  He has type 2 diabetes mellitus. No MedicAlert identification noted. The initial diagnosis of diabetes was made 4 months ago. Hypoglycemia symptoms include confusion, dizziness, headaches, hunger, pallor, sweats and tremors. Pertinent negatives for hypoglycemia include no seizures, sleepiness or speech difficulty. Associated symptoms include fatigue, polydipsia, weakness and weight loss. Pertinent negatives for diabetes include no blurred vision, no chest pain, no foot paresthesias, no foot ulcerations, no polyphagia, no polyuria and no visual change. Hypoglycemia complications include nocturnal hypoglycemia. Pertinent negatives for hypoglycemia complications include no blackouts, no hospitalization, no required assistance and no required  glucagon injection. Symptoms are worsening. Pertinent negatives for diabetic complications include no autonomic neuropathy, CVA, heart disease, impotence, nephropathy, peripheral neuropathy, PVD or retinopathy. Risk factors for coronary artery disease include obesity, diabetes mellitus and male sex. Current diabetic treatment includes diet. His weight is fluctuating minimally. He is following a generally healthy and high fiber diet. When asked about meal planning, he reported none. He has not had a previous visit with a dietitian. He participates in exercise intermittently. He monitors blood glucose at home 5+ x per day. He monitors urine at home <1 x per month. Blood glucose monitoring compliance is good. His home blood glucose trend is fluctuating dramatically. He does not see a podiatrist.Eye exam is current.       No results displayed because visit has over 200 results.      Patient Message on 02/21/2024   Component Date Value Ref Range Status    WBC 02/22/2024 8.1  3.8 - 10.8 Thousand/uL Final    RBC 02/22/2024 2.95 (L)  4.20 - 5.80 Million/uL Final    Hemoglobin 02/22/2024 7.2 (L)  13.2 - 17.1 g/dL Final    Hematocrit 02/22/2024 25.0 (L)  38.5 - 50.0 % Final    MCV 02/22/2024 84.7  80.0 - 100.0 fL Final    MCH 02/22/2024 24.4 (L)  27.0 - 33.0 pg Final    MCHC 02/22/2024 28.8 (L)  32.0 - 36.0 g/dL Final    RDW 02/22/2024 14.1  11.0 - 15.0 % Final    Platelets 02/22/2024 436 (H)  140 - 400 Thousand/uL Final    MPV 02/22/2024 9.6  7.5 - 12.5 fL Final    Neutrophils, Abs 02/22/2024 4,593  1,500 - 7,800 cells/uL Final    Lymph # 02/22/2024 2,041  850 - 3,900 cells/uL Final    Mono # 02/22/2024 672  200 - 950 cells/uL Final    Eos # 02/22/2024 753 (H)  15 - 500 cells/uL Final    Baso # 02/22/2024 41  0 - 200 cells/uL Final    Neutrophils Relative 02/22/2024 56.7  % Final    Lymph % 02/22/2024 25.2  % Final    Mono % 02/22/2024 8.3  % Final    Eosinophil % 02/22/2024 9.3  % Final    Basophil % 02/22/2024 0.5  % Final     Glucose 02/22/2024 100 (H)  65 - 99 mg/dL Final    BUN 02/22/2024 13  7 - 25 mg/dL Final    Creatinine 02/22/2024 0.92  0.70 - 1.30 mg/dL Final    eGFR 02/22/2024 99  > OR = 60 mL/min/1.73m2 Final    BUN/Creatinine Ratio 02/22/2024 SEE NOTE:  6 - 22 (calc) Final    Sodium 02/22/2024 136  135 - 146 mmol/L Final    Potassium 02/22/2024 5.0  3.5 - 5.3 mmol/L Final    Chloride 02/22/2024 104  98 - 110 mmol/L Final    CO2 02/22/2024 26  20 - 32 mmol/L Final    Calcium 02/22/2024 8.4 (L)  8.6 - 10.3 mg/dL Final    Total Protein 02/22/2024 6.2  6.1 - 8.1 g/dL Final    Albumin 02/22/2024 3.0 (L)  3.6 - 5.1 g/dL Final    Globulin, Total 02/22/2024 3.2  1.9 - 3.7 g/dL (calc) Final    Albumin/Globulin Ratio 02/22/2024 0.9 (L)  1.0 - 2.5 (calc) Final    Total Bilirubin 02/22/2024 0.3  0.2 - 1.2 mg/dL Final    Alkaline Phosphatase 02/22/2024 58  35 - 144 U/L Final    AST 02/22/2024 15  10 - 35 U/L Final    ALT 02/22/2024 18  9 - 46 U/L Final    TSH w/reflex to FT4 02/22/2024 4.15  0.40 - 4.50 mIU/L Final    Hemoglobin A1C 02/22/2024 5.9 (H)  <5.7 % of total Hgb Final   Telephone on 12/28/2023   Component Date Value Ref Range Status    TSH w/reflex to FT4 01/03/2024 7.01 (H)  0.40 - 4.50 mIU/L Final    T4, Free 01/03/2024 0.9  0.8 - 1.8 ng/dL Final   Lab Visit on 12/13/2023   Component Date Value Ref Range Status    Calprotectin 12/13/2023 537.1 (H)  <50 mcg/g Final    H. Pylori Antigen, Stool 12/13/2023 NOT DETECTED   Final   Hospital Outpatient Visit on 11/17/2023   Component Date Value Ref Range Status    85% Max Predicted HR 11/17/2023 142   Final    Max Predicted HR 11/17/2023 167   Final    [facility] CV CPX PATIENT IS MALE 11/17/2023 1.0   Final    [facility] CV CPX PATIENT IS FEMALE 11/17/2023 0.0   Final    HR at rest 11/17/2023 74  bpm Final    Systolic blood pressure 11/17/2023 132  mmHg Final    Diastolic blood pressure 11/17/2023 70  mmHg Final    RPP 11/17/2023 9,768   Final    Exercise duration (min) 11/17/2023 6  minutes  Final    Exercise duration (sec) 11/17/2023 45  seconds Final    Peak HR 11/17/2023 142  bpm Final    Peak Systolic BP 11/17/2023 164  mmHg Final    Peak Diatolic BP 11/17/2023 82  mmHg Final    Peak RPP 11/17/2023 23,288   Final    Estimated METs 11/17/2023 9   Final    % Max HR Achieved 11/17/2023 85   Final    1 Minute Recovery HR 11/17/2023 125  bpm Final   Telephone on 11/15/2023   Component Date Value Ref Range Status    Glucose 11/15/2023 108 (H)  65 - 99 mg/dL Final    BUN 11/15/2023 13  7 - 25 mg/dL Final    Creatinine 11/15/2023 1.05  0.70 - 1.30 mg/dL Final    eGFR 11/15/2023 85  > OR = 60 mL/min/1.73m2 Final    BUN/Creatinine Ratio 11/15/2023 SEE NOTE:  6 - 22 (calc) Final    Sodium 11/15/2023 137  135 - 146 mmol/L Final    Potassium 11/15/2023 4.6  3.5 - 5.3 mmol/L Final    Chloride 11/15/2023 102  98 - 110 mmol/L Final    CO2 11/15/2023 25  20 - 32 mmol/L Final    Calcium 11/15/2023 9.4  8.6 - 10.3 mg/dL Final    Total Protein 11/15/2023 7.7  6.1 - 8.1 g/dL Final    Albumin 11/15/2023 4.7  3.6 - 5.1 g/dL Final    Globulin, Total 11/15/2023 3.0  1.9 - 3.7 g/dL (calc) Final    Albumin/Globulin Ratio 11/15/2023 1.6  1.0 - 2.5 (calc) Final    Total Bilirubin 11/15/2023 0.4  0.2 - 1.2 mg/dL Final    Alkaline Phosphatase 11/15/2023 51  35 - 144 U/L Final    AST 11/15/2023 24  10 - 35 U/L Final    ALT 11/15/2023 41  9 - 46 U/L Final    Hemoglobin A1C 11/15/2023 7.1 (H)  <5.7 % of total Hgb Final    WBC 11/15/2023 7.6  3.8 - 10.8 Thousand/uL Final    RBC 11/15/2023 4.61  4.20 - 5.80 Million/uL Final    Hemoglobin 11/15/2023 12.7 (L)  13.2 - 17.1 g/dL Final    Hematocrit 11/15/2023 40.6  38.5 - 50.0 % Final    MCV 11/15/2023 88.1  80.0 - 100.0 fL Final    MCH 11/15/2023 27.5  27.0 - 33.0 pg Final    MCHC 11/15/2023 31.3 (L)  32.0 - 36.0 g/dL Final    RDW 11/15/2023 14.3  11.0 - 15.0 % Final    Platelets 11/15/2023 242  140 - 400 Thousand/uL Final    MPV 11/15/2023 10.9  7.5 - 12.5 fL Final    Neutrophils, Abs  11/15/2023 4,492  1,500 - 7,800 cells/uL Final    Lymph # 11/15/2023 2,067  850 - 3,900 cells/uL Final    Mono # 11/15/2023 631  200 - 950 cells/uL Final    Eos # 11/15/2023 327  15 - 500 cells/uL Final    Baso # 11/15/2023 84  0 - 200 cells/uL Final    Neutrophils Relative 11/15/2023 59.1  % Final    Lymph % 11/15/2023 27.2  % Final    Mono % 11/15/2023 8.3  % Final    Eosinophil % 11/15/2023 4.3  % Final    Basophil % 11/15/2023 1.1  % Final    PROSTATE SPECIFIC ANTIGEN, SCR - Q* 11/15/2023 0.59  < OR = 4.00 ng/mL Final    Cholesterol 11/15/2023 217 (H)  <200 mg/dL Final    HDL 11/15/2023 31 (L)  > OR = 40 mg/dL Final    Triglycerides 11/15/2023 196 (H)  <150 mg/dL Final    LDL Cholesterol 11/15/2023 152 (H)  mg/dL (calc) Final    HDL/Cholesterol Ratio 11/15/2023 7.0 (H)  <5.0 (calc) Final    Non HDL Chol. (LDL+VLDL) 11/15/2023 186 (H)  <130 mg/dL (calc) Final    TSH w/reflex to FT4 11/15/2023 22.71 (H)  0.40 - 4.50 mIU/L Final    T4, Free 11/15/2023 0.9  0.8 - 1.8 ng/dL Final       Past Medical History:   Diagnosis Date    Colon polyp     Hypothyroid 07/05/2016    Sleep apnea     Thrombocytopenia     Ulcerative colitis      Past Surgical History:   Procedure Laterality Date    COLONOSCOPY N/A 12/04/2020    Procedure: COLONOSCOPY;  Surgeon: Ventura Warren MD;  Location: [facility];  Service: Endoscopy;  Laterality: N/A;    COLONOSCOPY N/A 07/19/2021    Procedure: COLONOSCOPY;  Surgeon: Clarence Ogden MD;  Location: [facility];  Service: Endoscopy;  Laterality: N/A;    COLONOSCOPY N/A 11/10/2022    Procedure: COLONOSCOPY;  Surgeon: Kath Hernandez MD;  Location: [facility];  Service: Endoscopy;  Laterality: N/A;    FOOT SURGERY      left   cyst removed    HAND SURGERY      right   tendon repair     Family History   Problem Relation Age of Onset    Ulcerative colitis Mother         in 70s    Colon cancer Neg Hx     Colon polyps Neg Hx     Crohn's disease Neg Hx        Marital Status:   Alcohol History:   reports current alcohol use.  Tobacco History:  reports that he quit smoking about 10 years ago. His smoking use included cigarettes. He started smoking about 40 years ago. He has a 30.0 pack-year smoking history. He has been exposed to tobacco smoke. He quit smokeless tobacco use about 4 years ago.  His smokeless tobacco use included snuff.  Drug History:  reports no history of drug use.    Review of patient's allergies indicates:  No Known Allergies    Current Outpatient Medications:     blood sugar diagnostic Strp, To check BG 1 times daily, to use with insurance preferred meter, Disp: 90 each, Rfl: 1    blood-glucose meter kit, To check BG 1 times daily, to use with insurance preferred meter, Disp: 1 each, Rfl: 0    citalopram (CELEXA) 40 MG tablet, Take 1 tablet (40 mg total) by mouth once daily., Disp: 90 tablet, Rfl: 1    ergocalciferol (ERGOCALCIFEROL) 50,000 unit Cap, TAKE 1 CAPSULE BY MOUTH EVERY 7 DAYS (Patient taking differently: Take 50,000 Units by mouth every 7 days. FRIDAYS), Disp: 12 capsule, Rfl: 3    folic acid (FOLVITE) 1 MG tablet, Take 1 tablet (1 mg total) by mouth once daily., Disp: 90 tablet, Rfl: 3    lancets Misc, To check BG 1 times daily, to use with insurance preferred meter, Disp: 90 each, Rfl: 1    levothyroxine (SYNTHROID) 200 MCG tablet, Take 1 tablet (200 mcg total) by mouth before breakfast., Disp: 30 tablet, Rfl: 11    mesalamine (CANASA) 1000 MG Supp, Place 1 suppository (1,000 mg total) rectally nightly., Disp: 90 suppository, Rfl: 3    omega-3 acid ethyl esters (LOVAZA) 1 gram capsule, Take 1 capsule (1 g total) by mouth 2 (two) times daily., Disp: 180 capsule, Rfl: 3    sildenafiL (VIAGRA) 100 MG tablet, Take 1 tablet (100 mg total) by mouth daily as needed for Erectile Dysfunction., Disp: 10 tablet, Rfl: 6    testosterone cypionate (DEPOTESTOTERONE CYPIONATE) 200 mg/mL injection, Inject 100 mg into the muscle every 14 (fourteen) days., Disp: , Rfl:   No current  "facility-administered medications for this visit.    Review of Systems   Constitutional:  Positive for chills, fatigue and weight loss.   HENT:  Negative for congestion.    Eyes:  Positive for itching. Negative for blurred vision.   Respiratory:  Negative for choking, shortness of breath and wheezing.    Cardiovascular:  Negative for chest pain and palpitations.   Gastrointestinal:  Positive for abdominal pain, anal bleeding and diarrhea. Negative for abdominal distention and constipation.   Endocrine: Positive for cold intolerance and polydipsia. Negative for polyphagia and polyuria.   Genitourinary:  Negative for difficulty urinating and impotence.   Skin:  Positive for pallor.   Neurological:  Positive for dizziness, tremors, weakness and headaches. Negative for seizures and speech difficulty.   Psychiatric/Behavioral:  Positive for confusion.           Objective:      Vitals:    02/26/24 1156   BP: 110/60   Pulse: 89   SpO2: 97%   Weight: 113.9 kg (251 lb)   Height: 5' 11" (1.803 m)     Physical Exam  Constitutional:       Appearance: He is ill-appearing.      Comments: Pale   HENT:      Head: Normocephalic and atraumatic.   Eyes:      Extraocular Movements: Extraocular movements intact.      Pupils: Pupils are equal, round, and reactive to light.   Cardiovascular:      Rate and Rhythm: Normal rate and regular rhythm.      Pulses: Normal pulses.   Pulmonary:      Effort: Pulmonary effort is normal.      Breath sounds: Normal breath sounds.   Abdominal:      General: Abdomen is flat.      Palpations: Abdomen is soft.   Skin:     General: Skin is warm and dry.      Capillary Refill: Capillary refill takes less than 2 seconds.   Neurological:      General: No focal deficit present.      Mental Status: He is alert.           Assessment:       1. Ulcerative colitis with rectal bleeding, unspecified location         Plan:       Ulcerative colitis with rectal bleeding, unspecified location  Comments:  Severe, rectal " bleeding causing symptomatic anemia.  Recheck CBC now after transfusion in the hospital.  Clinically he is doing better but implored him that colonoscopy on Thursday is extremely important to determine the severity of the ulcerative colitis and determine if biologic agent will be necessary to get this under control.  He needs close follow-up with GI after this.  For me I want to see him back in 2-3 months  Orders:  -     Comprehensive Metabolic Panel; Future; Expected date: 02/26/2024  -     CBC W/ AUTO DIFFERENTIAL; Future; Expected date: 02/26/2024  -     FERRITIN; Future; Expected date: 02/26/2024      Follow up in about 3 months (around 5/26/2024).        2/26/2024 Vinh Noyola PA-C

## 2024-02-26 NOTE — TELEPHONE ENCOUNTER
----- Message from Agnes Chandler LPN sent at 2/26/2024 12:14 PM CST -----  Regarding: HFU  Call patient - needs post-hospital phone call within 2 business days and hospital follow up visit scheduled within 7-14 days.

## 2024-02-27 ENCOUNTER — TELEPHONE (OUTPATIENT)
Dept: FAMILY MEDICINE | Facility: CLINIC | Age: 54
End: 2024-02-27
Payer: OTHER GOVERNMENT

## 2024-02-27 DIAGNOSIS — E03.9 HYPOTHYROIDISM (ACQUIRED): Primary | ICD-10-CM

## 2024-02-27 LAB
ALBUMIN SERPL-MCNC: 2.9 G/DL (ref 3.6–5.1)
ALBUMIN/GLOB SERPL: 0.9 (CALC) (ref 1–2.5)
ALP SERPL-CCNC: 59 U/L (ref 35–144)
ALT SERPL-CCNC: 13 U/L (ref 9–46)
AST SERPL-CCNC: 9 U/L (ref 10–35)
BASOPHILS # BLD AUTO: 47 CELLS/UL (ref 0–200)
BASOPHILS NFR BLD AUTO: 0.6 %
BILIRUB SERPL-MCNC: 0.2 MG/DL (ref 0.2–1.2)
BUN SERPL-MCNC: 10 MG/DL (ref 7–25)
BUN/CREAT SERPL: ABNORMAL (CALC) (ref 6–22)
CALCIUM SERPL-MCNC: 8.4 MG/DL (ref 8.6–10.3)
CHLORIDE SERPL-SCNC: 108 MMOL/L (ref 98–110)
CO2 SERPL-SCNC: 28 MMOL/L (ref 20–32)
CREAT SERPL-MCNC: 0.81 MG/DL (ref 0.7–1.3)
EGFR: 105 ML/MIN/1.73M2
EOSINOPHIL # BLD AUTO: 695 CELLS/UL (ref 15–500)
EOSINOPHIL NFR BLD AUTO: 8.8 %
ERYTHROCYTE [DISTWIDTH] IN BLOOD BY AUTOMATED COUNT: 14.3 % (ref 11–15)
FERRITIN SERPL-MCNC: 14 NG/ML (ref 38–380)
GLOBULIN SER CALC-MCNC: 3.3 G/DL (CALC) (ref 1.9–3.7)
GLUCOSE SERPL-MCNC: 134 MG/DL (ref 65–99)
HCT VFR BLD AUTO: 28.8 % (ref 38.5–50)
HGB BLD-MCNC: 8.6 G/DL (ref 13.2–17.1)
LYMPHOCYTES # BLD AUTO: 2236 CELLS/UL (ref 850–3900)
LYMPHOCYTES NFR BLD AUTO: 28.3 %
MCH RBC QN AUTO: 25.5 PG (ref 27–33)
MCHC RBC AUTO-ENTMCNC: 29.9 G/DL (ref 32–36)
MCV RBC AUTO: 85.5 FL (ref 80–100)
MONOCYTES # BLD AUTO: 545 CELLS/UL (ref 200–950)
MONOCYTES NFR BLD AUTO: 6.9 %
NEUTROPHILS # BLD AUTO: 4377 CELLS/UL (ref 1500–7800)
NEUTROPHILS NFR BLD AUTO: 55.4 %
PLATELET # BLD AUTO: 399 THOUSAND/UL (ref 140–400)
PMV BLD REES-ECKER: 9.3 FL (ref 7.5–12.5)
POTASSIUM SERPL-SCNC: 4.8 MMOL/L (ref 3.5–5.3)
PROT SERPL-MCNC: 6.2 G/DL (ref 6.1–8.1)
RBC # BLD AUTO: 3.37 MILLION/UL (ref 4.2–5.8)
SODIUM SERPL-SCNC: 140 MMOL/L (ref 135–146)
WBC # BLD AUTO: 7.9 THOUSAND/UL (ref 3.8–10.8)

## 2024-02-27 NOTE — TELEPHONE ENCOUNTER
Improvement is seen from discharge. Continue as is and make sure that he gets c-scope done this week. Its imperative that is completed to assess the status of UC

## 2024-02-27 NOTE — TELEPHONE ENCOUNTER
----- Message from RT Patricia sent at 2/26/2024  8:49 AM CST -----    ----- Message -----  From: Mackenzie Patel LPN  Sent: 2/26/2024  12:00 AM CST  To: Vinh Noyola Staff        ----- Message from Vinh Noyola PA-C sent at 11/29/2023  3:58 PM CST -----  A few things here and we really need to get him back in sooner to discuss some of this. A1c is now fully diabetic at 7.1%  previously was 5.5. We need to get started on medication for this now and really watch the diet closely in regards to carbs, sugars, etc. Given ulcerative colitis I dont want to use metformin first and will start med called jardiance 25mg once daily. Disp # 30 with 3 refills.want to recheck A1c in 3 mos.     Also, TSH markedly elevated indicating underactive thyroid. You are already on such a high dose need to confirm that you are taking this daily on empty stomach and not missing doses. May need endocrine to weight in on this if we need to go higher on this. Cholesterol should improve some with diet and once we get the thyroid under better control.      Nurses please load medication if he agrees.         Recheck A1C

## 2024-02-28 LAB — CALPROTECTIN STL-MCNT: 1840 UG/G (ref 0–120)

## 2024-02-29 ENCOUNTER — HOSPITAL ENCOUNTER (INPATIENT)
Facility: HOSPITAL | Age: 54
LOS: 3 days | Discharge: HOME | DRG: 387 | End: 2024-03-03
Attending: INTERNAL MEDICINE | Admitting: STUDENT IN AN ORGANIZED HEALTH CARE EDUCATION/TRAINING PROGRAM
Payer: OTHER GOVERNMENT

## 2024-02-29 ENCOUNTER — ANESTHESIA EVENT (OUTPATIENT)
Dept: ENDOSCOPY | Facility: HOSPITAL | Age: 54
DRG: 387 | End: 2024-02-29
Payer: OTHER GOVERNMENT

## 2024-02-29 ENCOUNTER — ANESTHESIA (OUTPATIENT)
Dept: ENDOSCOPY | Facility: HOSPITAL | Age: 54
DRG: 387 | End: 2024-02-29
Payer: OTHER GOVERNMENT

## 2024-02-29 DIAGNOSIS — K62.5 BRBPR (BRIGHT RED BLOOD PER RECTUM): ICD-10-CM

## 2024-02-29 DIAGNOSIS — R07.9 CHEST PAIN: ICD-10-CM

## 2024-02-29 DIAGNOSIS — K51.00 ULCERATIVE PANCOLITIS: Primary | ICD-10-CM

## 2024-02-29 LAB
ALBUMIN SERPL BCP-MCNC: 2.5 G/DL (ref 3.5–5.2)
ALP SERPL-CCNC: 67 U/L (ref 55–135)
ALT SERPL W/O P-5'-P-CCNC: 19 U/L (ref 10–44)
ANION GAP SERPL CALC-SCNC: 6 MMOL/L (ref 8–16)
AST SERPL-CCNC: 12 U/L (ref 10–40)
BASOPHILS # BLD AUTO: 0.06 K/UL (ref 0–0.2)
BASOPHILS NFR BLD: 0.8 % (ref 0–1.9)
BILIRUB SERPL-MCNC: 0.3 MG/DL (ref 0.1–1)
BUN SERPL-MCNC: 6 MG/DL (ref 6–20)
CALCIUM SERPL-MCNC: 8.3 MG/DL (ref 8.7–10.5)
CHLORIDE SERPL-SCNC: 108 MMOL/L (ref 95–110)
CO2 SERPL-SCNC: 25 MMOL/L (ref 23–29)
CREAT SERPL-MCNC: 0.8 MG/DL (ref 0.5–1.4)
CRP SERPL-MCNC: 24.1 MG/L (ref 0–8.2)
DIFFERENTIAL METHOD BLD: ABNORMAL
EOSINOPHIL # BLD AUTO: 0.8 K/UL (ref 0–0.5)
EOSINOPHIL NFR BLD: 10.2 % (ref 0–8)
ERYTHROCYTE [DISTWIDTH] IN BLOOD BY AUTOMATED COUNT: 15.1 % (ref 11.5–14.5)
EST. GFR  (NO RACE VARIABLE): >60 ML/MIN/1.73 M^2
FERRITIN SERPL-MCNC: 24 NG/ML (ref 20–300)
GLUCOSE SERPL-MCNC: 86 MG/DL (ref 70–110)
HCT VFR BLD AUTO: 28.8 % (ref 40–54)
HGB BLD-MCNC: 8.3 G/DL (ref 14–18)
IMM GRANULOCYTES # BLD AUTO: 0.04 K/UL (ref 0–0.04)
IMM GRANULOCYTES NFR BLD AUTO: 0.5 % (ref 0–0.5)
LYMPHOCYTES # BLD AUTO: 2.4 K/UL (ref 1–4.8)
LYMPHOCYTES NFR BLD: 29.9 % (ref 18–48)
MCH RBC QN AUTO: 25.2 PG (ref 27–31)
MCHC RBC AUTO-ENTMCNC: 28.8 G/DL (ref 32–36)
MCV RBC AUTO: 88 FL (ref 82–98)
MONOCYTES # BLD AUTO: 0.5 K/UL (ref 0.3–1)
MONOCYTES NFR BLD: 6.7 % (ref 4–15)
NEUTROPHILS # BLD AUTO: 4.1 K/UL (ref 1.8–7.7)
NEUTROPHILS NFR BLD: 51.9 % (ref 38–73)
NRBC BLD-RTO: 0 /100 WBC
PLATELET # BLD AUTO: 393 K/UL (ref 150–450)
PMV BLD AUTO: 8.8 FL (ref 9.2–12.9)
POCT GLUCOSE: 181 MG/DL (ref 70–110)
POTASSIUM SERPL-SCNC: 3.7 MMOL/L (ref 3.5–5.1)
PROT SERPL-MCNC: 6.3 G/DL (ref 6–8.4)
RBC # BLD AUTO: 3.29 M/UL (ref 4.6–6.2)
SODIUM SERPL-SCNC: 139 MMOL/L (ref 136–145)
WBC # BLD AUTO: 7.92 K/UL (ref 3.9–12.7)

## 2024-02-29 PROCEDURE — 99223 1ST HOSP IP/OBS HIGH 75: CPT | Mod: 25,,, | Performed by: INTERNAL MEDICINE

## 2024-02-29 PROCEDURE — 82728 ASSAY OF FERRITIN: CPT

## 2024-02-29 PROCEDURE — 37000009 HC ANESTHESIA EA ADD 15 MINS: Performed by: INTERNAL MEDICINE

## 2024-02-29 PROCEDURE — 45380 COLONOSCOPY AND BIOPSY: CPT | Performed by: INTERNAL MEDICINE

## 2024-02-29 PROCEDURE — 0DBL8ZX EXCISION OF TRANSVERSE COLON, VIA NATURAL OR ARTIFICIAL OPENING ENDOSCOPIC, DIAGNOSTIC: ICD-10-PCS | Performed by: INTERNAL MEDICINE

## 2024-02-29 PROCEDURE — 80074 ACUTE HEPATITIS PANEL: CPT

## 2024-02-29 PROCEDURE — 25000003 PHARM REV CODE 250: Performed by: INTERNAL MEDICINE

## 2024-02-29 PROCEDURE — 80053 COMPREHEN METABOLIC PANEL: CPT

## 2024-02-29 PROCEDURE — 27201012 HC FORCEPS, HOT/COLD, DISP: Performed by: INTERNAL MEDICINE

## 2024-02-29 PROCEDURE — 11000001 HC ACUTE MED/SURG PRIVATE ROOM

## 2024-02-29 PROCEDURE — 63600175 PHARM REV CODE 636 W HCPCS: Performed by: NURSE ANESTHETIST, CERTIFIED REGISTERED

## 2024-02-29 PROCEDURE — 86480 TB TEST CELL IMMUN MEASURE: CPT

## 2024-02-29 PROCEDURE — 86140 C-REACTIVE PROTEIN: CPT

## 2024-02-29 PROCEDURE — 0DBK8ZX EXCISION OF ASCENDING COLON, VIA NATURAL OR ARTIFICIAL OPENING ENDOSCOPIC, DIAGNOSTIC: ICD-10-PCS | Performed by: INTERNAL MEDICINE

## 2024-02-29 PROCEDURE — 45380 COLONOSCOPY AND BIOPSY: CPT | Mod: ,,, | Performed by: INTERNAL MEDICINE

## 2024-02-29 PROCEDURE — 85025 COMPLETE CBC W/AUTO DIFF WBC: CPT

## 2024-02-29 PROCEDURE — 37000008 HC ANESTHESIA 1ST 15 MINUTES: Performed by: INTERNAL MEDICINE

## 2024-02-29 PROCEDURE — 63600175 PHARM REV CODE 636 W HCPCS

## 2024-02-29 PROCEDURE — 88305 TISSUE EXAM BY PATHOLOGIST: CPT | Mod: TC | Performed by: PATHOLOGY

## 2024-02-29 PROCEDURE — 25000003 PHARM REV CODE 250

## 2024-02-29 PROCEDURE — 25000003 PHARM REV CODE 250: Performed by: NURSE ANESTHETIST, CERTIFIED REGISTERED

## 2024-02-29 RX ORDER — ALUMINUM HYDROXIDE, MAGNESIUM HYDROXIDE, AND SIMETHICONE 1200; 120; 1200 MG/30ML; MG/30ML; MG/30ML
30 SUSPENSION ORAL 4 TIMES DAILY PRN
Status: DISCONTINUED | OUTPATIENT
Start: 2024-02-29 | End: 2024-03-03 | Stop reason: HOSPADM

## 2024-02-29 RX ORDER — GLUCAGON 1 MG
1 KIT INJECTION
Status: DISCONTINUED | OUTPATIENT
Start: 2024-02-29 | End: 2024-03-03 | Stop reason: HOSPADM

## 2024-02-29 RX ORDER — ACETAMINOPHEN 325 MG/1
650 TABLET ORAL EVERY 4 HOURS PRN
Status: DISCONTINUED | OUTPATIENT
Start: 2024-02-29 | End: 2024-03-03 | Stop reason: HOSPADM

## 2024-02-29 RX ORDER — LANOLIN ALCOHOL/MO/W.PET/CERES
800 CREAM (GRAM) TOPICAL
Status: DISCONTINUED | OUTPATIENT
Start: 2024-02-29 | End: 2024-03-03 | Stop reason: HOSPADM

## 2024-02-29 RX ORDER — FOLIC ACID 1 MG/1
1 TABLET ORAL DAILY
Status: DISCONTINUED | OUTPATIENT
Start: 2024-03-01 | End: 2024-03-03 | Stop reason: HOSPADM

## 2024-02-29 RX ORDER — LIDOCAINE HYDROCHLORIDE 20 MG/ML
INJECTION, SOLUTION EPIDURAL; INFILTRATION; INTRACAUDAL; PERINEURAL
Status: DISCONTINUED | OUTPATIENT
Start: 2024-02-29 | End: 2024-02-29

## 2024-02-29 RX ORDER — IBUPROFEN 200 MG
16 TABLET ORAL
Status: DISCONTINUED | OUTPATIENT
Start: 2024-02-29 | End: 2024-03-03 | Stop reason: HOSPADM

## 2024-02-29 RX ORDER — HYDROCODONE BITARTRATE AND ACETAMINOPHEN 5; 325 MG/1; MG/1
1 TABLET ORAL EVERY 6 HOURS PRN
Status: DISCONTINUED | OUTPATIENT
Start: 2024-02-29 | End: 2024-03-03 | Stop reason: HOSPADM

## 2024-02-29 RX ORDER — ACETAMINOPHEN 325 MG/1
650 TABLET ORAL EVERY 8 HOURS PRN
Status: DISCONTINUED | OUTPATIENT
Start: 2024-02-29 | End: 2024-03-03 | Stop reason: HOSPADM

## 2024-02-29 RX ORDER — ONDANSETRON HYDROCHLORIDE 2 MG/ML
4 INJECTION, SOLUTION INTRAVENOUS EVERY 6 HOURS PRN
Status: DISCONTINUED | OUTPATIENT
Start: 2024-02-29 | End: 2024-03-03 | Stop reason: HOSPADM

## 2024-02-29 RX ORDER — METHYLPREDNISOLONE SOD SUCC 125 MG
40 VIAL (EA) INJECTION DAILY
Status: DISCONTINUED | OUTPATIENT
Start: 2024-02-29 | End: 2024-03-03 | Stop reason: HOSPADM

## 2024-02-29 RX ORDER — SODIUM CHLORIDE 0.9 % (FLUSH) 0.9 %
3 SYRINGE (ML) INJECTION EVERY 12 HOURS PRN
Status: DISCONTINUED | OUTPATIENT
Start: 2024-02-29 | End: 2024-03-03 | Stop reason: HOSPADM

## 2024-02-29 RX ORDER — ERGOCALCIFEROL 1.25 MG/1
50000 CAPSULE ORAL
Status: DISCONTINUED | OUTPATIENT
Start: 2024-03-01 | End: 2024-03-03 | Stop reason: HOSPADM

## 2024-02-29 RX ORDER — CITALOPRAM 10 MG/1
40 TABLET ORAL NIGHTLY
Status: DISCONTINUED | OUTPATIENT
Start: 2024-02-29 | End: 2024-03-03 | Stop reason: HOSPADM

## 2024-02-29 RX ORDER — SODIUM CHLORIDE 9 MG/ML
INJECTION, SOLUTION INTRAVENOUS CONTINUOUS
Status: DISCONTINUED | OUTPATIENT
Start: 2024-02-29 | End: 2024-03-03 | Stop reason: HOSPADM

## 2024-02-29 RX ORDER — SODIUM,POTASSIUM PHOSPHATES 280-250MG
2 POWDER IN PACKET (EA) ORAL
Status: DISCONTINUED | OUTPATIENT
Start: 2024-02-29 | End: 2024-03-03 | Stop reason: HOSPADM

## 2024-02-29 RX ORDER — TALC
6 POWDER (GRAM) TOPICAL NIGHTLY PRN
Status: DISCONTINUED | OUTPATIENT
Start: 2024-02-29 | End: 2024-03-03 | Stop reason: HOSPADM

## 2024-02-29 RX ORDER — LEVOTHYROXINE SODIUM 100 UG/1
200 TABLET ORAL
Status: DISCONTINUED | OUTPATIENT
Start: 2024-03-01 | End: 2024-03-03 | Stop reason: HOSPADM

## 2024-02-29 RX ORDER — CITALOPRAM 10 MG/1
40 TABLET ORAL DAILY
Status: DISCONTINUED | OUTPATIENT
Start: 2024-03-01 | End: 2024-02-29

## 2024-02-29 RX ORDER — IBUPROFEN 200 MG
24 TABLET ORAL
Status: DISCONTINUED | OUTPATIENT
Start: 2024-02-29 | End: 2024-03-03 | Stop reason: HOSPADM

## 2024-02-29 RX ORDER — NALOXONE HCL 0.4 MG/ML
0.02 VIAL (ML) INJECTION
Status: DISCONTINUED | OUTPATIENT
Start: 2024-02-29 | End: 2024-03-03 | Stop reason: HOSPADM

## 2024-02-29 RX ORDER — PROPOFOL 10 MG/ML
VIAL (ML) INTRAVENOUS
Status: DISCONTINUED | OUTPATIENT
Start: 2024-02-29 | End: 2024-02-29

## 2024-02-29 RX ADMIN — LIDOCAINE HYDROCHLORIDE 100 MG: 20 INJECTION, SOLUTION EPIDURAL; INFILTRATION; INTRACAUDAL; PERINEURAL at 10:02

## 2024-02-29 RX ADMIN — CITALOPRAM HYDROBROMIDE 40 MG: 10 TABLET ORAL at 09:02

## 2024-02-29 RX ADMIN — PROPOFOL 50 MG: 10 INJECTION, EMULSION INTRAVENOUS at 10:02

## 2024-02-29 RX ADMIN — PROPOFOL 100 MG: 10 INJECTION, EMULSION INTRAVENOUS at 10:02

## 2024-02-29 RX ADMIN — METHYLPREDNISOLONE SODIUM SUCCINATE 40 MG: 125 INJECTION, POWDER, FOR SOLUTION INTRAMUSCULAR; INTRAVENOUS at 06:02

## 2024-02-29 RX ADMIN — SODIUM CHLORIDE: 9 INJECTION, SOLUTION INTRAVENOUS at 09:02

## 2024-02-29 NOTE — NURSING
Arrives to room 310 S/P colonoscopy AAO x 4 VSS Afebrile Moves all extremities well Denies pain no acute distress noted at this time spouse present at bedside

## 2024-02-29 NOTE — TRANSFER OF CARE
"Anesthesia Transfer of Care Note    Patient: Isidoro Singh    Procedure(s) Performed: Procedure(s) (LRB):  COLONOSCOPY (N/A)    Patient location: PACU    Anesthesia Type: general    Transport from OR: Transported from OR on room air with adequate spontaneous ventilation    Post pain: adequate analgesia    Post assessment: no apparent anesthetic complications and tolerated procedure well    Post vital signs: stable    Level of consciousness: sedated and responds to stimulation    Nausea/Vomiting: no nausea/vomiting    Complications: none    Transfer of care protocol was followed      Last vitals: Visit Vitals  BP 96/62 (BP Location: Left arm, Patient Position: Lying)   Pulse 82   Temp 36.7 °C (98.1 °F) (Skin)   Resp 16   Ht 5' 11" (1.803 m)   Wt 113.9 kg (251 lb)   SpO2 97%   BMI 35.01 kg/m²     "

## 2024-02-29 NOTE — PLAN OF CARE
[facility] - Med/Surg  Initial Discharge Assessment       Primary Care Provider: Vinh Noyola PA-C    Admission Diagnosis: Inflammatory bowel disease [K52.9]  BRBPR (bright red blood per rectum) [K62.5]    Admission Date: 2/29/2024  Expected Discharge Date:          Payor:  / Plan:  [employer] / Product Type: Government /     Extended Emergency Contact Information  Primary Emergency Contact: Esther Singh  Address: [address] [address] [place]  Home Phone: [phone]  Mobile Phone: [phone]  Relation: Spouse  Preferred language: English   needed? No    Discharge Plan A: Home with family  Discharge Plan B: Home      [id number] - [place], [place] - [address] [facility] [address]  [address]  [place] [address]  Phone: [phone] Fax: [phone]    DC assessment completed with patient at bedside. Verified information on facesheet as correct. Pt lives at listed address with spouse and 2 children. Reports he has help if needed from spouse - Esther. Denies POMIYA NOK spouse- Esther. PCP is Vinh Noyola PA- reports last apt was Monday 02/26/24. Pharmacy is [facility]. Denies hh/hd/outpt services/ blood thinners. DME, has CPAP and glucometer. Reports being independent with activities. Drives himself to apts. Reports spouse Emmy Santana will provide transportation home upon DC. Reports taking home medications as prescribed and can currently afford them. Verified insurance on file. States was admitted approx 2 wks ago for same condition.  DC plan is home with family  .   Initial Assessment (most recent)       Adult Discharge Assessment - 02/29/24 1538          Discharge Assessment    Assessment Type Discharge Planning Assessment     Confirmed/corrected address, phone number and insurance Yes     Confirmed Demographics Correct on Facesheet     Source of Information patient      Communicated ARYAN with patient/caregiver Yes     People in Home child(isabela), dependent;spouse     Do you expect to return to your current living situation? Yes     Do you have help at home or someone to help you manage your care at home? No     Prior to hospitilization cognitive status: Alert/Oriented     Current cognitive status: Alert/Oriented     Walking or Climbing Stairs Difficulty no     Dressing/Bathing Difficulty no     Equipment Currently Used at Home glucometer;CPAP     Readmission within 30 days? Yes     Patient currently being followed by outpatient case management? No     Do you currently have service(s) that help you manage your care at home? No     Do you take prescription medications? Yes     Do you have prescription coverage? Yes     Coverage      Do you have any problems affording any of your prescribed medications? No     Is the patient taking medications as prescribed? yes     Who is going to help you get home at discharge? Wife - Esther     How do you get to doctors appointments? car, drives self     Are you on dialysis? No     Do you take coumadin? No     Discharge Plan A Home with family     Discharge Plan B Home     DME Needed Upon Discharge  none     Discharge Plan discussed with: Patient

## 2024-02-29 NOTE — H&P
"[facility] Note    CC: Rectal bleeding    HPI 53 y.o. male presents for evaluation of rectal bleeding    Past Medical History:   Diagnosis Date    Colon polyp     Diabetes mellitus     Hypothyroid 07/05/2016    Sleep apnea     Thrombocytopenia     Ulcerative colitis        Allergies and Medications reviewed     Review of Systems  General ROS: negative for - chills, fever or weight loss  Cardiovascular ROS: no chest pain or dyspnea on exertion  Gastrointestinal ROS: + rectal bleeding    Physical Examination  Ht 5' 11" (1.803 m)   Wt 113.9 kg (251 lb)   BMI 35.01 kg/m²   General appearance: alert, cooperative, no distress  HENT: Normocephalic, atraumatic, neck symmetrical, no nasal discharge, sclera anicteric   Lungs: clear to auscultation bilaterally, symmetric chest wall expansion bilaterally  Heart: regular rate and rhythm without rub; no displacement of the PMI   Abdomen: soft  Extremities: extremities symmetric; no clubbing, cyanosis, or edema        Labs:  Lab Results   Component Value Date    WBC 7.9 02/26/2024    HGB 8.6 (L) 02/26/2024    HCT 28.8 (L) 02/26/2024    MCV 85.5 02/26/2024     02/26/2024             Assessment:   53 y.o. male presents for colonsocopy    Plan:  -Proceed to colonoscopy     Kath Hernandez MD  [facility]  [address], [address]  [place] [address]  Office: [phone]  Fax: [phone]   "

## 2024-02-29 NOTE — CONSULTS
[facility]     CC: Ulcerative Colitis    HPI 53 y.o. male presents with severe, worsening, pan-colonic ulcerative colitis associated with > 20 bloody bowel movements a day, found on outpatient colonoscopy today. He was admitted to [facility] 6 days ago due to symptomatic anemia (Hgb 6.4 at admission) and was transfused 2 units of pRBCs. GI was consulted  but recommended scheduled follow up without change in treatment or steroids. His symptoms have been worsening over several months and he has lost 50 pounds during this time.     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 He uses Mesalamine suppositories nightly and has not been taking oral Mesalamine.     Past Medical History:   Diagnosis Date    Colon polyp     Diabetes mellitus     Hypothyroid 07/05/2016    Sleep apnea     Thrombocytopenia     Ulcerative colitis        Past Surgical History:   Procedure Laterality Date    COLONOSCOPY N/A 12/04/2020    Procedure: COLONOSCOPY;  Surgeon: Ventura Warren MD;  Location: [facility] ENDO;  Service: Endoscopy;  Laterality: N/A;    COLONOSCOPY N/A 07/19/2021    Procedure: COLONOSCOPY;  Surgeon: Clarence Ogden MD;  Location: [facility] ENDO;  Service: Endoscopy;  Laterality: N/A;    COLONOSCOPY N/A 11/10/2022    Procedure: COLONOSCOPY;  Surgeon: Kath Henrandez MD;  Location: [facility] ENDO;  Service: Endoscopy;  Laterality: N/A;    FOOT SURGERY      left   cyst removed    HAND SURGERY      right   tendon repair       Social History     Tobacco Use    Smoking status: Former     Current packs/day: 0.00     Average packs/day: 1 pack/day for 30.0 years (30.0 ttl pk-yrs)     Types: Cigarettes     Start date: 1984     Quit date: 12/2013     Years since quitting: 10.2     Passive exposure: Past    Smokeless tobacco: Former     Types: Snuff     Quit date: 2020   Substance Use Topics    Alcohol use: Yes     Comment: occasional    Drug use: No       Family History   Problem Relation Age of Onset     "Ulcerative colitis Mother         in 70s    Colon cancer Neg Hx     Colon polyps Neg Hx     Crohn's disease Neg Hx        Allergies and Medications reviewed     Review of Systems  General ROS: negative for - chills, fever or weight loss  Psychological ROS: negative for - hallucination, depression or suicidal ideation  Ophthalmic ROS: negative for - blurry vision, photophobia or eye pain  ENT ROS: negative for - epistaxis, sore throat or rhinorrhea  Respiratory ROS: no cough, shortness of breath, or wheezing  Cardiovascular ROS: no chest pain or dyspnea on exertion  Gastrointestinal ROS: + bloody diarrhea, + abdominal cramping, no vomiting  Genito-Urinary ROS: no dysuria, trouble voiding, or hematuria  Musculoskeletal ROS: negative for - arthralgia, myalgia, weakness  Neurological ROS: no syncope or seizures; no ataxia  Dermatological ROS: negative for pruritis, rash and jaundice    Physical Examination  BP (!) 83/48 (BP Location: Left arm, Patient Position: Lying)   Pulse 74   Temp 98.4 °F (36.9 °C) (Tympanic)   Resp 20   Ht 5' 11" (1.803 m)   Wt 113.9 kg (251 lb 1.7 oz)   SpO2 (!) 94%   BMI 35.02 kg/m²   General appearance: alert, cooperative, no distress, pale  HENT: Normocephalic, atraumatic, neck symmetrical, no nasal discharge   Eyes: conjunctivae/corneas clear, PERRL, EOM's intact, sclera anicteric  Lungs: clear to auscultation bilaterally, no dullness to percussion bilaterally, symmetric expansion, breathing unlabored  Heart: regular rate and rhythm without rub; no displacement of the PMI   Abdomen: soft, nontender, nondistended, BS active, no masses appreciated   Extremities: extremities symmetric; no clubbing, cyanosis, or edema  Integument: Skin color, texture, turgor normal; no rashes; hair distrubution normal, no jaundice  Neurologic: Alert and oriented X 3, no focal sensory or motor neurologic deficits  Psychiatric: no pressured speech; normal affect; no evidence of impaired cognition, no " anxiety/depression     Labs:  Lab Results   Component Value Date    WBC 7.92 02/29/2024    HGB 8.3 (L) 02/29/2024    HCT 28.8 (L) 02/29/2024    MCV 88 02/29/2024     02/29/2024       CMP  Sodium   Date Value Ref Range Status   02/29/2024 139 136 - 145 mmol/L Final     Potassium   Date Value Ref Range Status   02/29/2024 3.7 3.5 - 5.1 mmol/L Final     Chloride   Date Value Ref Range Status   02/29/2024 108 95 - 110 mmol/L Final     CO2   Date Value Ref Range Status   02/29/2024 25 23 - 29 mmol/L Final     Glucose   Date Value Ref Range Status   02/29/2024 86 70 - 110 mg/dL Final     BUN   Date Value Ref Range Status   02/29/2024 6 6 - 20 mg/dL Final     Creatinine   Date Value Ref Range Status   02/29/2024 0.8 0.5 - 1.4 mg/dL Final     Calcium   Date Value Ref Range Status   02/29/2024 8.3 (L) 8.7 - 10.5 mg/dL Final     Total Protein   Date Value Ref Range Status   02/29/2024 6.3 6.0 - 8.4 g/dL Final     Albumin   Date Value Ref Range Status   02/29/2024 2.5 (L) 3.5 - 5.2 g/dL Final     Total Bilirubin   Date Value Ref Range Status   02/29/2024 0.3 0.1 - 1.0 mg/dL Final     Comment:     For infants and newborns, interpretation of results should be based  on gestational age, weight and in agreement with clinical  observations.    Premature Infant recommended reference ranges:  Up to 24 hours.............<8.0 mg/dL  Up to 48 hours............<12.0 mg/dL  3-5 days..................<15.0 mg/dL  6-29 days.................<15.0 mg/dL       Alkaline Phosphatase   Date Value Ref Range Status   02/29/2024 67 55 - 135 U/L Final     AST   Date Value Ref Range Status   02/29/2024 12 10 - 40 U/L Final     ALT   Date Value Ref Range Status   02/29/2024 19 10 - 44 U/L Final     Anion Gap   Date Value Ref Range Status   02/29/2024 6 (L) 8 - 16 mmol/L Final     eGFR   Date Value Ref Range Status   02/29/2024 >60 >60 mL/min/1.73 m^2 Final   02/26/2024 105 > OR = 60 mL/min/1.73m2 Final     -CRP- 24    C.diff, Stool culture-  negative  -Fecal calprotectin 1840    Imaging:  Abdominal ultrasound December 2023  was independently visualized and reviewed by me and showed hepatic steatosis    Assessment:   53 y.o. male presents with severe pan-colonic UC that is much worsened from prior, associated with anemia (recent need for transfusion), > 20 bloody bowel movements a day, 50 pound weight loss and hypoalbuminemia. Recent C.diff and stool culture are negative    Plan:  -CBC, CMP, CRP, Quantiferon gold, Hepatitis B sAg, cAb, Ab  -Begin Solumedrol 40 mg IV daily  -Ok for regular diet with protein supplement (Ensure)  -Repeat CBC, BMP daily   -Repeat CRP in 72 hours- if not improved consider evaluation for colectomy vs inpatient initiation of biologic therapy based on clinical response    Kath Hernandez MD  [facility]  [address], [address]  [place] [address]  Office: [phone]  Fax: [phone]

## 2024-02-29 NOTE — ANESTHESIA PREPROCEDURE EVALUATION
02/29/2024  Isidoro Singh is a 53 y.o., male.      Pre-op Assessment    I have reviewed the Patient Summary Reports.     I have reviewed the Nursing Notes. I have reviewed the NPO Status.   I have reviewed the Medications.     Review of Systems  Anesthesia Hx:  No problems with previous Anesthesia                Social:  Former Smoker       Cardiovascular:  Cardiovascular Normal                                            Pulmonary:        Sleep Apnea                Renal/:  Renal/ Normal                 Hepatic/GI:   PUD, (ulcerative colitis)   Liver Disease,            Neurological:  Neurology Normal                                      Endocrine:  Diabetes, well controlled, type 2 Hypothyroidism        Obesity / BMI > 30, Morbid Obesity / BMI > 40      Physical Exam  General: Well nourished, Cooperative, Alert and Oriented    Airway:  Mallampati: II   Mouth Opening: Normal  TM Distance: Normal  Neck ROM: Normal ROM    Anesthesia Plan  Type of Anesthesia, risks & benefits discussed:    Anesthesia Type: Gen ETT, Gen Supraglottic Airway, Gen Natural Airway, MAC  Intra-op Monitoring Plan: Standard ASA Monitors  Post Op Pain Control Plan: multimodal analgesia  Induction:  IV  Airway Plan: Direct, Video and Fiberoptic, Post-Induction  Informed Consent: Informed consent signed with the Patient and all parties understand the risks and agree with anesthesia plan.  All questions answered.   ASA Score: 3    Ready For Surgery From Anesthesia Perspective.   .

## 2024-02-29 NOTE — SUBJECTIVE & OBJECTIVE
Past Medical History:   Diagnosis Date    Colon polyp     Diabetes mellitus     Hypothyroid 07/05/2016    Sleep apnea     Thrombocytopenia     Ulcerative colitis        Past Surgical History:   Procedure Laterality Date    COLONOSCOPY N/A 12/04/2020    Procedure: COLONOSCOPY;  Surgeon: Ventura Warren MD;  Location: [facility] ENDO;  Service: Endoscopy;  Laterality: N/A;    COLONOSCOPY N/A 07/19/2021    Procedure: COLONOSCOPY;  Surgeon: Clarence Ogden MD;  Location: [facility] ENDO;  Service: Endoscopy;  Laterality: N/A;    COLONOSCOPY N/A 11/10/2022    Procedure: COLONOSCOPY;  Surgeon: Kath Hernandez MD;  Location: [facility] ENDO;  Service: Endoscopy;  Laterality: N/A;    FOOT SURGERY      left   cyst removed    HAND SURGERY      right   tendon repair       Review of patient's allergies indicates:  No Known Allergies    No current facility-administered medications on file prior to encounter.     Current Outpatient Medications on File Prior to Encounter   Medication Sig    citalopram (CELEXA) 40 MG tablet Take 1 tablet (40 mg total) by mouth once daily.    ergocalciferol (ERGOCALCIFEROL) 50,000 unit Cap TAKE 1 CAPSULE BY MOUTH EVERY 7 DAYS (Patient taking differently: Take 50,000 Units by mouth every 7 days. FRIDAYS)    folic acid (FOLVITE) 1 MG tablet Take 1 tablet (1 mg total) by mouth once daily.    levothyroxine (SYNTHROID) 200 MCG tablet Take 1 tablet (200 mcg total) by mouth before breakfast.    mesalamine (CANASA) 1000 MG Supp Place 1 suppository (1,000 mg total) rectally nightly.    omega-3 acid ethyl esters (LOVAZA) 1 gram capsule Take 1 capsule (1 g total) by mouth 2 (two) times daily.    testosterone cypionate (DEPOTESTOTERONE CYPIONATE) 200 mg/mL injection Inject 100 mg into the muscle every 14 (fourteen) days.    sildenafiL (VIAGRA) 100 MG tablet Take 1 tablet (100 mg total) by mouth daily as needed for Erectile Dysfunction.     Family History       Problem Relation (Age of Onset)    Ulcerative colitis Mother           Tobacco Use    Smoking status: Former     Current packs/day: 0.00     Average packs/day: 1 pack/day for 30.0 years (30.0 ttl pk-yrs)     Types: Cigarettes     Start date: 1984     Quit date: 12/2013     Years since quitting: 10.2     Passive exposure: Past    Smokeless tobacco: Former     Types: Snuff     Quit date: 2020   Substance and Sexual Activity    Alcohol use: Yes     Comment: occasional    Drug use: No    Sexual activity: Yes     Review of Systems   Constitutional:  Negative for activity change and fatigue.   Respiratory:  Negative for chest tightness and shortness of breath.    Cardiovascular:  Negative for chest pain.   Gastrointestinal:  Negative for abdominal distention, nausea and vomiting.   Neurological:  Negative for dizziness and numbness.     Objective:     Vital Signs (Most Recent):  Temp: 98.4 °F (36.9 °C) (02/29/24 1105)  Pulse: 74 (02/29/24 1105)  Resp: 20 (02/29/24 1105)  BP: (!) 83/48 (02/29/24 1105)  SpO2: (!) 94 % (02/29/24 1105) Vital Signs (24h Range):  Temp:  [98.1 °F (36.7 °C)-98.4 °F (36.9 °C)] 98.4 °F (36.9 °C)  Pulse:  [74-82] 74  Resp:  [16-20] 20  SpO2:  [94 %-97 %] 94 %  BP: (83-96)/(48-62) 83/48     Weight: 113.9 kg (251 lb 1.7 oz)  Body mass index is 35.02 kg/m².     Physical Exam  Constitutional:       General: He is not in acute distress.     Appearance: He is not ill-appearing.   HENT:      Head: Normocephalic.      Mouth/Throat:      Mouth: Mucous membranes are moist.      Pharynx: Oropharynx is clear.   Eyes:      Pupils: Pupils are equal, round, and reactive to light.   Cardiovascular:      Rate and Rhythm: Normal rate and regular rhythm.      Heart sounds: Normal heart sounds.   Pulmonary:      Effort: Pulmonary effort is normal.      Breath sounds: Normal breath sounds.   Abdominal:      General: Bowel sounds are normal. There is no distension.   Skin:     General: Skin is warm and dry.      Capillary Refill: Capillary refill takes less than 2 seconds.    Neurological:      General: No focal deficit present.      Mental Status: He is alert and oriented to person, place, and time.   Psychiatric:         Mood and Affect: Mood normal.              CRANIAL NERVES     CN III, IV, VI   Pupils are equal, round, and reactive to light.       Significant Labs: All pertinent labs within the past 24 hours have been reviewed.  Recent Lab Results         02/29/24  1309        Albumin 2.5       ALP 67       ALT 19       Anion Gap 6       AST 12       Baso # 0.06       Basophil % 0.8       BILIRUBIN TOTAL 0.3  Comment: For infants and newborns, interpretation of results should be based  on gestational age, weight and in agreement with clinical  observations.    Premature Infant recommended reference ranges:  Up to 24 hours.............<8.0 mg/dL  Up to 48 hours............<12.0 mg/dL  3-5 days..................<15.0 mg/dL  6-29 days.................<15.0 mg/dL         BUN 6       Calcium 8.3       Chloride 108       CO2 25       Creatinine 0.8       CRP 24.1       Differential Method Automated       eGFR >60       Eos # 0.8       Eos % 10.2       Ferritin 24       Glucose 86       Gran # (ANC) 4.1       Gran % 51.9       Hematocrit 28.8       Hemoglobin 8.3       Immature Grans (Abs) 0.04  Comment: Mild elevation in immature granulocytes is non specific and   can be seen in a variety of conditions including stress response,   acute inflammation, trauma and pregnancy. Correlation with other   laboratory and clinical findings is essential.         Immature Granulocytes 0.5       Lymph # 2.4       Lymph % 29.9       MCH 25.2       MCHC 28.8       MCV 88       Mono # 0.5       Mono % 6.7       MPV 8.8       nRBC 0       Platelet Count 393       Potassium 3.7       PROTEIN TOTAL 6.3       RBC 3.29       RDW 15.1       Sodium 139       WBC 7.92               Significant Imaging: I have reviewed all pertinent imaging results/findings within the past 24 hours.

## 2024-02-29 NOTE — [employer] PATIENT INSTRUCTIONS
Discharge Summary/Instructions after an Endoscopic Procedure  Patient Name: Isidoro Singh  Patient MRN: [id number]  Patient YOB: 1970  Thursday, February 29, 2024  Kath Hernandez MD  Dear patient,  As a result of recent federal legislation (The Federal Cures Act), you may   receive lab or pathology results from your procedure in your [facility]   account before your physician is able to contact you. Your physician or   their representative will relay the results to you with their   recommendations at their soonest availability.  Thank you,  RESTRICTIONS:  During your procedure today, you received medications for sedation.  These   medications may affect your judgment, balance and coordination.  Therefore,   for 24 hours, you have the following restrictions:   - DO NOT drive a car, operate machinery, make legal/financial decisions,   sign important papers or drink alcohol.    ACTIVITY:  Today: no heavy lifting, straining or running due to procedural   sedation/anesthesia.  The following day: return to full activity including work.  DIET:  Eat and drink normally unless instructed otherwise.     TREATMENT FOR COMMON SIDE EFFECTS:  - Mild abdominal pain, nausea, belching, bloating or excessive gas:  rest,   eat lightly and use a heating pad.  - Sore Throat: treat with throat lozenges and/or gargle with warm salt   water.  - Because air was used during the procedure, expelling large amounts of air   from your rectum or belching is normal.  - If a bowel prep was taken, you may not have a bowel movement for 1-3 days.    This is normal.  SYMPTOMS TO WATCH FOR AND REPORT TO YOUR PHYSICIAN:  1. Abdominal pain or bloating, other than gas cramps.  2. Chest pain.  3. Back pain.  4. Signs of infection such as: chills or fever occurring within 24 hours   after the procedure.  5. Rectal bleeding, which would show as bright red, maroon, or black stools.   (A tablespoon of blood from the rectum is not serious,  especially if   hemorrhoids are present.)  6. Vomiting.  7. Weakness or dizziness.  GO DIRECTLY TO THE NEAREST EMERGENCY ROOM IF YOU HAVE ANY OF THE FOLLOWING:      Difficulty breathing              Chills and/or fever over 101 F   Persistent vomiting and/or vomiting blood   Severe abdominal pain   Severe chest pain   Black, tarry stools   Bleeding- more than one tablespoon   Any other symptom or condition that you feel may need urgent attention  Your doctor recommends these additional instructions:  If any biopsies were taken, your doctors clinic will contact you in 1 to 2   weeks with any results.  - Admit the patient to hospital [facility] for ongoing care.   - Clear liquid diet.   - Continue present medications.   -Check CBC, CMP, CRP  -Pre-biologic labs with Quantiferon gold, Hepatitis B Ab, Ag, cAb  -IV iron infusion in near future  -Begin IV Solumedrol 40 mg daily- recheck CRP in 72 hours, if not improved   consider colectomy vs inpatient initiation of biologic therapy based on   clinical response  - Repeat colonoscopy date to be determined after pending pathology results   are reviewed to evaluate the response to therapy.  For questions, problems or results please call your physician - Kath Hernandez MD at Work:  [phone].  [facility], EMERGENCY ROOM PHONE NUMBER: [phone]  IF A COMPLICATION OR EMERGENCY SITUATION ARISES AND YOU ARE UNABLE TO REACH   YOUR PHYSICIAN - GO DIRECTLY TO THE EMERGENCY ROOM.  Kath Hernandez MD  2/29/2024 11:10:15 AM  This report has been verified and signed electronically.  Dear patient,  As a result of recent federal legislation (The Federal Cures Act), you may   receive lab or pathology results from your procedure in your [facility]   account before your physician is able to contact you. Your physician or   their representative will relay the results to you with their   recommendations at their soonest availability.  Thank  you,  [employer]ATION

## 2024-02-29 NOTE — HPI
53 year old male with past medical history of anemia, ulcerative colitis, thrombocytopenia, diabetes mellitus, hypothyroidism, sleep apnea presented for a scheduled colonoscopy today for evaluation of rectal bleeding. Patient was recently admitted last week for an ulcerative colitis flare and anemia. Patient was given 2 units PRBCs with improvement in H&H and was told to keep f/u appointment with GI outpatient. Patient has a long standing history of ulcerative colitis, has been on mesalamine suppositories. Follows with Dr. Becerra outpatient. Colonoscopy today revealed severe pancolitis ulcerative colitis. Discussed with Dr. Becerra, will admit for IV steroids and trend CRP.

## 2024-02-29 NOTE — ANESTHESIA POSTPROCEDURE EVALUATION
Anesthesia Post Evaluation    Patient: Isidoro Singh    Procedure(s) Performed: Procedure(s) (LRB):  COLONOSCOPY (N/A)    Final Anesthesia Type: general      Patient location during evaluation: PACU  Patient participation: Yes- Able to Participate  Level of consciousness: awake and alert  Post-procedure vital signs: reviewed and stable  Pain management: adequate  Airway patency: patent    PONV status at discharge: No PONV  Anesthetic complications: no      Cardiovascular status: blood pressure returned to baseline  Respiratory status: unassisted  Hydration status: euvolemic  Follow-up not needed.              Vitals Value Taken Time   /73 02/29/24 1141   Temp 36.9 °C (98.4 °F) 02/29/24 1105   Pulse 70 02/29/24 1143   Resp 20 02/29/24 1105   SpO2 98 % 02/29/24 1143   Vitals shown include unvalidated device data.      No case tracking events are documented in the log.      Pain/Flores Score: Flores Score: 10 (2/29/2024 11:48 AM)

## 2024-02-29 NOTE — NURSING
Blood Glucose 181 Arm band not recognized by glucometer CSN number typed in not recognized by glucometer

## 2024-02-29 NOTE — ASSESSMENT & PLAN NOTE
Colonoscopy today revealed severe pancolitis ulcerative colitis  Trend CRP  Solumedrol 40mg IV daily  GI following

## 2024-02-29 NOTE — H&P
Pending sale to [facility]  History & Physical    Patient Name: Isidoro Singh  MRN: [id number]  Patient Class: OP- Observation  Admission Date: 2/29/2024  Attending Physician: Aleksandr Borges MD   Primary Care Provider: Vinh Noyola PA-C         Patient information was obtained from patient and past medical records.     Subjective:     Principal Problem:Ulcerative pancolitis    Chief Complaint:   Chief Complaint   Patient presents with    Rectal Bleeding        HPI: 53 year old male with past medical history of anemia, ulcerative colitis, thrombocytopenia, diabetes mellitus, hypothyroidism, sleep apnea presented for a scheduled colonoscopy today for evaluation of rectal bleeding. Patient was recently admitted last week for an ulcerative colitis flare and anemia. Patient was given 2 units PRBCs with improvement in H&H and was told to keep f/u appointment with GI outpatient. Patient has a long standing history of ulcerative colitis, has been on mesalamine suppositories. Follows with Dr. Becerra outpatient. Colonoscopy today revealed severe pancolitis ulcerative colitis. Discussed with Dr. Becerra, will admit for IV steroids and trend CRP.     Past Medical History:   Diagnosis Date    Colon polyp     Diabetes mellitus     Hypothyroid 07/05/2016    Sleep apnea     Thrombocytopenia     Ulcerative colitis        Past Surgical History:   Procedure Laterality Date    COLONOSCOPY N/A 12/04/2020    Procedure: COLONOSCOPY;  Surgeon: Ventura Warren MD;  Location: [facility];  Service: Endoscopy;  Laterality: N/A;    COLONOSCOPY N/A 07/19/2021    Procedure: COLONOSCOPY;  Surgeon: Clarence Ogden MD;  Location: [facility];  Service: Endoscopy;  Laterality: N/A;    COLONOSCOPY N/A 11/10/2022    Procedure: COLONOSCOPY;  Surgeon: Kath Hernandez MD;  Location: [facility];  Service: Endoscopy;  Laterality: N/A;    FOOT SURGERY      left   cyst removed    HAND SURGERY      right   tendon repair        Review of patient's allergies indicates:  No Known Allergies    No current facility-administered medications on file prior to encounter.     Current Outpatient Medications on File Prior to Encounter   Medication Sig    citalopram (CELEXA) 40 MG tablet Take 1 tablet (40 mg total) by mouth once daily.    ergocalciferol (ERGOCALCIFEROL) 50,000 unit Cap TAKE 1 CAPSULE BY MOUTH EVERY 7 DAYS (Patient taking differently: Take 50,000 Units by mouth every 7 days. FRIDAYS)    folic acid (FOLVITE) 1 MG tablet Take 1 tablet (1 mg total) by mouth once daily.    levothyroxine (SYNTHROID) 200 MCG tablet Take 1 tablet (200 mcg total) by mouth before breakfast.    mesalamine (CANASA) 1000 MG Supp Place 1 suppository (1,000 mg total) rectally nightly.    omega-3 acid ethyl esters (LOVAZA) 1 gram capsule Take 1 capsule (1 g total) by mouth 2 (two) times daily.    testosterone cypionate (DEPOTESTOTERONE CYPIONATE) 200 mg/mL injection Inject 100 mg into the muscle every 14 (fourteen) days.    sildenafiL (VIAGRA) 100 MG tablet Take 1 tablet (100 mg total) by mouth daily as needed for Erectile Dysfunction.     Family History       Problem Relation (Age of Onset)    Ulcerative colitis Mother          Tobacco Use    Smoking status: Former     Current packs/day: 0.00     Average packs/day: 1 pack/day for 30.0 years (30.0 ttl pk-yrs)     Types: Cigarettes     Start date: 1984     Quit date: 12/2013     Years since quitting: 10.2     Passive exposure: Past    Smokeless tobacco: Former     Types: Snuff     Quit date: 2020   Substance and Sexual Activity    Alcohol use: Yes     Comment: occasional    Drug use: No    Sexual activity: Yes     Review of Systems   Constitutional:  Negative for activity change and fatigue.   Respiratory:  Negative for chest tightness and shortness of breath.    Cardiovascular:  Negative for chest pain.   Gastrointestinal:  Negative for abdominal distention, nausea and vomiting.   Neurological:  Negative for  dizziness and numbness.     Objective:     Vital Signs (Most Recent):  Temp: 98.4 °F (36.9 °C) (02/29/24 1105)  Pulse: 74 (02/29/24 1105)  Resp: 20 (02/29/24 1105)  BP: (!) 83/48 (02/29/24 1105)  SpO2: (!) 94 % (02/29/24 1105) Vital Signs (24h Range):  Temp:  [98.1 °F (36.7 °C)-98.4 °F (36.9 °C)] 98.4 °F (36.9 °C)  Pulse:  [74-82] 74  Resp:  [16-20] 20  SpO2:  [94 %-97 %] 94 %  BP: (83-96)/(48-62) 83/48     Weight: 113.9 kg (251 lb 1.7 oz)  Body mass index is 35.02 kg/m².     Physical Exam  Constitutional:       General: He is not in acute distress.     Appearance: He is not ill-appearing.   HENT:      Head: Normocephalic.      Mouth/Throat:      Mouth: Mucous membranes are moist.      Pharynx: Oropharynx is clear.   Eyes:      Pupils: Pupils are equal, round, and reactive to light.   Cardiovascular:      Rate and Rhythm: Normal rate and regular rhythm.      Heart sounds: Normal heart sounds.   Pulmonary:      Effort: Pulmonary effort is normal.      Breath sounds: Normal breath sounds.   Abdominal:      General: Bowel sounds are normal. There is no distension.   Skin:     General: Skin is warm and dry.      Capillary Refill: Capillary refill takes less than 2 seconds.   Neurological:      General: No focal deficit present.      Mental Status: He is alert and oriented to person, place, and time.   Psychiatric:         Mood and Affect: Mood normal.              CRANIAL NERVES     CN III, IV, VI   Pupils are equal, round, and reactive to light.       Significant Labs: All pertinent labs within the past 24 hours have been reviewed.  Recent Lab Results         02/29/24  1309        Albumin 2.5       ALP 67       ALT 19       Anion Gap 6       AST 12       Baso # 0.06       Basophil % 0.8       BILIRUBIN TOTAL 0.3  Comment: For infants and newborns, interpretation of results should be based  on gestational age, weight and in agreement with clinical  observations.    Premature Infant recommended reference ranges:  Up to  24 hours.............<8.0 mg/dL  Up to 48 hours............<12.0 mg/dL  3-5 days..................<15.0 mg/dL  6-29 days.................<15.0 mg/dL         BUN 6       Calcium 8.3       Chloride 108       CO2 25       Creatinine 0.8       CRP 24.1       Differential Method Automated       eGFR >60       Eos # 0.8       Eos % 10.2       Ferritin 24       Glucose 86       Gran # (ANC) 4.1       Gran % 51.9       Hematocrit 28.8       Hemoglobin 8.3       Immature Grans (Abs) 0.04  Comment: Mild elevation in immature granulocytes is non specific and   can be seen in a variety of conditions including stress response,   acute inflammation, trauma and pregnancy. Correlation with other   laboratory and clinical findings is essential.         Immature Granulocytes 0.5       Lymph # 2.4       Lymph % 29.9       MCH 25.2       MCHC 28.8       MCV 88       Mono # 0.5       Mono % 6.7       MPV 8.8       nRBC 0       Platelet Count 393       Potassium 3.7       PROTEIN TOTAL 6.3       RBC 3.29       RDW 15.1       Sodium 139       WBC 7.92               Significant Imaging: I have reviewed all pertinent imaging results/findings within the past 24 hours.  Assessment/Plan:     * Ulcerative pancolitis  Colonoscopy today revealed severe pancolitis ulcerative colitis  Trend CRP  Solumedrol 40mg IV daily  GI following      Obesity (BMI 35.0-39.9 without comorbidity)  Body mass index is 35.02 kg/m². Morbid obesity complicates all aspects of disease management from diagnostic modalities to treatment. Encourage weight loss.        Hypothyroidism (acquired)  Continue levothyroxine        VTE Risk Mitigation (From admission, onward)           Ordered     IP VTE HIGH RISK PATIENT  Once         02/29/24 1256     Place sequential compression device  Until discontinued         02/29/24 1256                         On 02/29/2024, patient should be placed in hospital observation services under my care in collaboration with Aleksandr Borges,  MD.           Alisia Chou, FNP  Department of Hospital Medicine  [facility]/Surg

## 2024-02-29 NOTE — ASSESSMENT & PLAN NOTE
Body mass index is 35.02 kg/m². Morbid obesity complicates all aspects of disease management from diagnostic modalities to treatment. Encourage weight loss.

## 2024-03-01 PROBLEM — E11.9 DIABETES MELLITUS: Status: ACTIVE | Noted: 2024-03-01

## 2024-03-01 LAB
ALBUMIN SERPL BCP-MCNC: 2.4 G/DL (ref 3.5–5.2)
ALP SERPL-CCNC: 63 U/L (ref 55–135)
ALT SERPL W/O P-5'-P-CCNC: 17 U/L (ref 10–44)
ANION GAP SERPL CALC-SCNC: 6 MMOL/L (ref 8–16)
AST SERPL-CCNC: 16 U/L (ref 10–40)
BASOPHILS # BLD AUTO: 0.03 K/UL (ref 0–0.2)
BASOPHILS NFR BLD: 0.4 % (ref 0–1.9)
BILIRUB SERPL-MCNC: 0.2 MG/DL (ref 0.1–1)
BUN SERPL-MCNC: 10 MG/DL (ref 6–20)
CALCIUM SERPL-MCNC: 8.5 MG/DL (ref 8.7–10.5)
CHLORIDE SERPL-SCNC: 108 MMOL/L (ref 95–110)
CO2 SERPL-SCNC: 23 MMOL/L (ref 23–29)
CREAT SERPL-MCNC: 0.8 MG/DL (ref 0.5–1.4)
DIFFERENTIAL METHOD BLD: ABNORMAL
EOSINOPHIL # BLD AUTO: 0 K/UL (ref 0–0.5)
EOSINOPHIL NFR BLD: 0.3 % (ref 0–8)
ERYTHROCYTE [DISTWIDTH] IN BLOOD BY AUTOMATED COUNT: 14.8 % (ref 11.5–14.5)
EST. GFR  (NO RACE VARIABLE): >60 ML/MIN/1.73 M^2
GLUCOSE SERPL-MCNC: 232 MG/DL (ref 70–110)
HAV IGM SERPL QL IA: NEGATIVE
HBV CORE IGM SERPL QL IA: NEGATIVE
HBV SURFACE AG SERPL QL IA: NEGATIVE
HCT VFR BLD AUTO: 27.7 % (ref 40–54)
HCV IGG SERPL QL IA: NEGATIVE
HGB BLD-MCNC: 7.8 G/DL (ref 14–18)
IMM GRANULOCYTES # BLD AUTO: 0.11 K/UL (ref 0–0.04)
IMM GRANULOCYTES NFR BLD AUTO: 1.5 % (ref 0–0.5)
LYMPHOCYTES # BLD AUTO: 1.7 K/UL (ref 1–4.8)
LYMPHOCYTES NFR BLD: 22.1 % (ref 18–48)
MAGNESIUM SERPL-MCNC: 2.2 MG/DL (ref 1.6–2.6)
MCH RBC QN AUTO: 24.7 PG (ref 27–31)
MCHC RBC AUTO-ENTMCNC: 28.2 G/DL (ref 32–36)
MCV RBC AUTO: 88 FL (ref 82–98)
MONOCYTES # BLD AUTO: 0.4 K/UL (ref 0.3–1)
MONOCYTES NFR BLD: 5.2 % (ref 4–15)
NEUTROPHILS # BLD AUTO: 5.3 K/UL (ref 1.8–7.7)
NEUTROPHILS NFR BLD: 70.5 % (ref 38–73)
NRBC BLD-RTO: 0 /100 WBC
PLATELET # BLD AUTO: 358 K/UL (ref 150–450)
PMV BLD AUTO: 9.1 FL (ref 9.2–12.9)
POCT GLUCOSE: 127 MG/DL (ref 70–110)
POCT GLUCOSE: 132 MG/DL (ref 70–110)
POCT GLUCOSE: 256 MG/DL (ref 70–110)
POTASSIUM SERPL-SCNC: 4.7 MMOL/L (ref 3.5–5.1)
PROT SERPL-MCNC: 6.3 G/DL (ref 6–8.4)
RBC # BLD AUTO: 3.16 M/UL (ref 4.6–6.2)
SODIUM SERPL-SCNC: 137 MMOL/L (ref 136–145)
WBC # BLD AUTO: 7.51 K/UL (ref 3.9–12.7)

## 2024-03-01 PROCEDURE — 36415 COLL VENOUS BLD VENIPUNCTURE: CPT

## 2024-03-01 PROCEDURE — 83735 ASSAY OF MAGNESIUM: CPT

## 2024-03-01 PROCEDURE — 80053 COMPREHEN METABOLIC PANEL: CPT

## 2024-03-01 PROCEDURE — 11000001 HC ACUTE MED/SURG PRIVATE ROOM

## 2024-03-01 PROCEDURE — 25000003 PHARM REV CODE 250: Performed by: INTERNAL MEDICINE

## 2024-03-01 PROCEDURE — 99232 SBSQ HOSP IP/OBS MODERATE 35: CPT | Mod: ,,, | Performed by: INTERNAL MEDICINE

## 2024-03-01 PROCEDURE — 25000003 PHARM REV CODE 250

## 2024-03-01 PROCEDURE — 63600175 PHARM REV CODE 636 W HCPCS: Performed by: INTERNAL MEDICINE

## 2024-03-01 PROCEDURE — 85025 COMPLETE CBC W/AUTO DIFF WBC: CPT

## 2024-03-01 PROCEDURE — 63600175 PHARM REV CODE 636 W HCPCS

## 2024-03-01 PROCEDURE — 86704 HEP B CORE ANTIBODY TOTAL: CPT | Performed by: INTERNAL MEDICINE

## 2024-03-01 PROCEDURE — 36415 COLL VENOUS BLD VENIPUNCTURE: CPT | Performed by: INTERNAL MEDICINE

## 2024-03-01 RX ORDER — INSULIN ASPART 100 [IU]/ML
0-5 INJECTION, SOLUTION INTRAVENOUS; SUBCUTANEOUS
Status: DISCONTINUED | OUTPATIENT
Start: 2024-03-01 | End: 2024-03-03 | Stop reason: HOSPADM

## 2024-03-01 RX ADMIN — ERGOCALCIFEROL 50000 UNITS: 1.25 CAPSULE ORAL at 08:03

## 2024-03-01 RX ADMIN — IRON SUCROSE 500 MG: 20 INJECTION, SOLUTION INTRAVENOUS at 06:03

## 2024-03-01 RX ADMIN — FOLIC ACID 1 MG: 1 TABLET ORAL at 08:03

## 2024-03-01 RX ADMIN — METHYLPREDNISOLONE SODIUM SUCCINATE 40 MG: 125 INJECTION, POWDER, FOR SOLUTION INTRAMUSCULAR; INTRAVENOUS at 08:03

## 2024-03-01 RX ADMIN — CITALOPRAM HYDROBROMIDE 40 MG: 10 TABLET ORAL at 09:03

## 2024-03-01 RX ADMIN — INSULIN ASPART 1 UNITS: 100 INJECTION, SOLUTION INTRAVENOUS; SUBCUTANEOUS at 10:03

## 2024-03-01 RX ADMIN — LEVOTHYROXINE SODIUM 200 MCG: 0.1 TABLET ORAL at 05:03

## 2024-03-01 NOTE — SUBJECTIVE & OBJECTIVE
Interval History: Patient seen and examined. Sitting up in bed. NAD. Blood glucose elevated today, likely due to steroids, SSI ordered.    Review of Systems   Constitutional:  Negative for fatigue.   HENT:  Negative for sore throat.    Respiratory:  Negative for cough, chest tightness, shortness of breath and wheezing.    Cardiovascular:  Negative for chest pain and palpitations.   Gastrointestinal:  Negative for abdominal pain, blood in stool, diarrhea, nausea and vomiting.   Genitourinary:  Negative for difficulty urinating.   Musculoskeletal:  Negative for arthralgias.   Neurological:  Negative for dizziness.     Objective:     Vital Signs (Most Recent):  Temp: 97.7 °F (36.5 °C) (03/01/24 1106)  Pulse: 79 (03/01/24 1106)  Resp: 16 (03/01/24 1106)  BP: 117/72 (03/01/24 1106)  SpO2: 96 % (03/01/24 1106) Vital Signs (24h Range):  Temp:  [97.5 °F (36.4 °C)-98.9 °F (37.2 °C)] 97.7 °F (36.5 °C)  Pulse:  [74-86] 79  Resp:  [16-17] 16  SpO2:  [93 %-99 %] 96 %  BP: ()/(53-72) 117/72     Weight: 113.9 kg (251 lb 1.7 oz)  Body mass index is 35.02 kg/m².  No intake or output data in the 24 hours ending 03/01/24 1229      Physical Exam  Vitals and nursing note reviewed.   Constitutional:       General: He is not in acute distress.  HENT:      Head: Normocephalic.      Mouth/Throat:      Mouth: Mucous membranes are moist.      Pharynx: Oropharynx is clear.   Eyes:      Pupils: Pupils are equal, round, and reactive to light.   Cardiovascular:      Rate and Rhythm: Normal rate and regular rhythm.      Heart sounds: Normal heart sounds.   Pulmonary:      Effort: Pulmonary effort is normal. No respiratory distress.   Abdominal:      General: There is no distension.      Tenderness: There is no abdominal tenderness.   Skin:     General: Skin is warm and dry.   Neurological:      General: No focal deficit present.      Mental Status: He is alert and oriented to person, place, and time.   Psychiatric:         Mood and Affect:  Mood normal.             Significant Labs: All pertinent labs within the past 24 hours have been reviewed.  Recent Lab Results  (Last 5 results in the past 24 hours)        03/01/24  1106   03/01/24  0759   03/01/24  0313   02/29/24  1710   02/29/24  1309        Albumin     2.4     2.5       ALP     63     67       ALT     17     19       Anion Gap     6     6       AST     16     12       Baso #     0.03     0.06       Basophil %     0.4     0.8       BILIRUBIN TOTAL     0.2  Comment: For infants and newborns, interpretation of results should be based  on gestational age, weight and in agreement with clinical  observations.    Premature Infant recommended reference ranges:  Up to 24 hours.............<8.0 mg/dL  Up to 48 hours............<12.0 mg/dL  3-5 days..................<15.0 mg/dL  6-29 days.................<15.0 mg/dL       0.3  Comment: For infants and newborns, interpretation of results should be based  on gestational age, weight and in agreement with clinical  observations.    Premature Infant recommended reference ranges:  Up to 24 hours.............<8.0 mg/dL  Up to 48 hours............<12.0 mg/dL  3-5 days..................<15.0 mg/dL  6-29 days.................<15.0 mg/dL         BUN     10     6       Calcium     8.5     8.3       Chloride     108     108       CO2     23     25       Creatinine     0.8     0.8       CRP         24.1       Differential Method     Automated     Automated       eGFR     >60     >60       Eos #     0.0     0.8       Eos %     0.3     10.2       Ferritin         24       Glucose     232     86       Gran # (ANC)     5.3     4.1       Gran %     70.5     51.9       Hematocrit     27.7     28.8       Hemoglobin     7.8     8.3       Hep A IgM         Negative  Comment: Result does not exclude the possibility of exposure to  hepatitis A virus.  Antibody level during early infection  stage may be below the limit of detection of the assay.         Hep B C IgM          Negative       Hepatitis B Surface Ag         Negative       Hepatitis C Ab         Negative  Comment: Signal-to-cutoff ratio is <1.00.    Test Performed by:  [facility]  [address] [place] [address]  : Darrion Nick M.D. Ph.D.; CLIA# [id number]         Immature Grans (Abs)     0.11  Comment: Mild elevation in immature granulocytes is non specific and   can be seen in a variety of conditions including stress response,   acute inflammation, trauma and pregnancy. Correlation with other   laboratory and clinical findings is essential.       0.04  Comment: Mild elevation in immature granulocytes is non specific and   can be seen in a variety of conditions including stress response,   acute inflammation, trauma and pregnancy. Correlation with other   laboratory and clinical findings is essential.         Immature Granulocytes     1.5     0.5       Lymph #     1.7     2.4       Lymph %     22.1     29.9       Magnesium      2.2           MCH     24.7     25.2       MCHC     28.2     28.8       MCV     88     88       Mono #     0.4     0.5       Mono %     5.2     6.7       MPV     9.1     8.8       nRBC     0     0       Platelet Count     358     393       POCT Glucose 127   132     181         Potassium     4.7     3.7       PROTEIN TOTAL     6.3     6.3       RBC     3.16     3.29       RDW     14.8     15.1       Sodium     137     139       WBC     7.51     7.92                              Significant Imaging: I have reviewed all pertinent imaging results/findings within the past 24 hours.

## 2024-03-01 NOTE — ASSESSMENT & PLAN NOTE
Patient's anemia is currently controlled. Etiology likely d/t acute blood loss which was from rectal bleeding  Current CBC reviewed-   Lab Results   Component Value Date    HGB 7.8 (L) 03/01/2024    HCT 27.7 (L) 03/01/2024     Monitor serial CBC and transfuse if patient becomes hemodynamically unstable, symptomatic or H/H drops below 7/21.

## 2024-03-01 NOTE — PROGRESS NOTES
"[facility] Note    CC: Ulcerative Colitis     HPI 53 y.o. male presents with severe, worsening, pan-colonic ulcerative colitis associated with > 20 bloody bowel movements a day, found on outpatient colonoscopy today. He was admitted to [facility] 6 days ago due to symptomatic anemia (Hgb 6.4 at admission) and was transfused 2 units of pRBCs. GI was consulted  but recommended scheduled follow up without change in treatment or steroids. His symptoms have been worsening over several months and he has lost 50 pounds during this time.      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 He uses Mesalamine suppositories nightly and has not been taking oral Mesalamine.     Interval HIstory:  Patient feels somewhat improved today, has had 3 bowel movements since 2 am. He is tolerating a diet without significant abdominal pain. Multiple questions answered.     Past Medical History:   Diagnosis Date    Colon polyp     Diabetes mellitus     Hypothyroid 07/05/2016    Sleep apnea     Thrombocytopenia     Ulcerative colitis        Allergies and Medications reviewed     Review of Systems  General ROS: negative for - chills, fever or weight loss  Cardiovascular ROS: no chest pain or dyspnea on exertion  Gastrointestinal ROS: improving diarrhea, no vomiting ,no signifcant abdominal pain    Physical Examination  /72 (Patient Position: Lying)   Pulse 79   Temp 97.7 °F (36.5 °C) (Oral)   Resp 16   Ht 5' 11" (1.803 m)   Wt 113.9 kg (251 lb 1.7 oz)   SpO2 96%   BMI 35.02 kg/m²   General appearance: alert, cooperative, no distress, pale, fatigued   HENT: Normocephalic, atraumatic, neck symmetrical, no nasal discharge, sclera anicteric   Lungs: clear to auscultation bilaterally, symmetric chest wall expansion bilaterally  Heart: regular rate and rhythm without rub; no displacement of the PMI   Abdomen: soft, nontender,nondistended, BS active ,no masses appreciated   Extremities: extremities " symmetric; no clubbing, cyanosis, or edema        Labs:  Lab Results   Component Value Date    WBC 7.51 03/01/2024    HGB 7.8 (L) 03/01/2024    HCT 27.7 (L) 03/01/2024    MCV 88 03/01/2024     03/01/2024       CMP  Sodium   Date Value Ref Range Status   03/01/2024 137 136 - 145 mmol/L Final     Potassium   Date Value Ref Range Status   03/01/2024 4.7 3.5 - 5.1 mmol/L Final     Chloride   Date Value Ref Range Status   03/01/2024 108 95 - 110 mmol/L Final     CO2   Date Value Ref Range Status   03/01/2024 23 23 - 29 mmol/L Final     Glucose   Date Value Ref Range Status   03/01/2024 232 (H) 70 - 110 mg/dL Final     BUN   Date Value Ref Range Status   03/01/2024 10 6 - 20 mg/dL Final     Creatinine   Date Value Ref Range Status   03/01/2024 0.8 0.5 - 1.4 mg/dL Final     Calcium   Date Value Ref Range Status   03/01/2024 8.5 (L) 8.7 - 10.5 mg/dL Final     Total Protein   Date Value Ref Range Status   03/01/2024 6.3 6.0 - 8.4 g/dL Final     Albumin   Date Value Ref Range Status   03/01/2024 2.4 (L) 3.5 - 5.2 g/dL Final     Total Bilirubin   Date Value Ref Range Status   03/01/2024 0.2 0.1 - 1.0 mg/dL Final     Comment:     For infants and newborns, interpretation of results should be based  on gestational age, weight and in agreement with clinical  observations.    Premature Infant recommended reference ranges:  Up to 24 hours.............<8.0 mg/dL  Up to 48 hours............<12.0 mg/dL  3-5 days..................<15.0 mg/dL  6-29 days.................<15.0 mg/dL       Alkaline Phosphatase   Date Value Ref Range Status   03/01/2024 63 55 - 135 U/L Final     AST   Date Value Ref Range Status   03/01/2024 16 10 - 40 U/L Final     ALT   Date Value Ref Range Status   03/01/2024 17 10 - 44 U/L Final     Anion Gap   Date Value Ref Range Status   03/01/2024 6 (L) 8 - 16 mmol/L Final     eGFR   Date Value Ref Range Status   03/01/2024 >60 >60 mL/min/1.73 m^2 Final   02/26/2024 105 > OR = 60 mL/min/1.73m2 Final     -Acute  hepatitis panel - negative    Assessment:   53 y.o. male  presents with severe pan-colonic UC that is much worsened from prior, associated with anemia (recent need for transfusion), > 20 bloody bowel movements a day, 50 pound weight loss and hypoalbuminemia. Recent C.diff and stool culture are negative. He is improving on IV steroids, hemoglobin decreased today.     Plan:  -Follow up Quantiferon gold  -Continue Solumedrol 40 mg IV daily  -Will give dose of Venofer today  -CBC, BMP daily- if hemoglobin < 7.5 would give 1 unit of blood  -Repeat CRP Sunday (order placed)    -Discussed inpatient Infliximab with pharmacy- it is not on their formulary and they are unsure if they will be able to obtain it for inpatient administration (Quantiferon gold will need to be negative before medication can be ordered)  -If unable to obtain inpatient Infliximab and patient improved with lower CRP on Sunday will likely be ok for discharge then on Prednisone taper with plans to begin outpatient combination therapy  -Continue regular diet with protein supplements  -Dr. Tapia to cover this weekend     Kath Hernandez MD  [facility]  [address], [address]  [place] [address]  Office: [phone]  Fax: [phone]

## 2024-03-01 NOTE — ASSESSMENT & PLAN NOTE
Patient's FSGs are controlled on current medication regimen.  Last A1c reviewed-   Lab Results   Component Value Date    HGBA1C 5.9 (H) 02/22/2024     Most recent fingerstick glucose reviewed-   Recent Labs   Lab 02/29/24  1710 03/01/24  0759 03/01/24  1106   POCTGLUCOSE 181* 132* 127*     Current correctional scale  Low  Maintain anti-hyperglycemic dose as follows-   Antihyperglycemics (From admission, onward)      Start     Stop Route Frequency Ordered    03/01/24 0925  insulin aspart U-100 pen 0-5 Units         -- SubQ Before meals & nightly PRN 03/01/24 0825          Controlled outpatient with diet and exercise.

## 2024-03-01 NOTE — PROGRESS NOTES
[facility] Medicine  Progress Note    Patient Name: Isidoro Singh  MRN: [id number]  Patient Class: IP- Inpatient   Admission Date: 2/29/2024  Length of Stay: 1 days  Attending Physician: Aleksandr Borges MD  Primary Care Provider: Vinh Noyola PA-C        Subjective:     Principal Problem:Ulcerative pancolitis        HPI:  53 year old male with past medical history of anemia, ulcerative colitis, thrombocytopenia, diabetes mellitus, hypothyroidism, sleep apnea presented for a scheduled colonoscopy today for evaluation of rectal bleeding. Patient was recently admitted last week for an ulcerative colitis flare and anemia. Patient was given 2 units PRBCs with improvement in H&H and was told to keep f/u appointment with GI outpatient. Patient has a long standing history of ulcerative colitis, has been on mesalamine suppositories. Follows with Dr. Becerra outpatient. Colonoscopy today revealed severe pancolitis ulcerative colitis. Discussed with Dr. Becerra, will admit for IV steroids and trend CRP.     Overview/Hospital Course:  No notes on file    Interval History: Patient seen and examined. Sitting up in bed. NAD. Blood glucose elevated today, likely due to steroids, SSI ordered.    Review of Systems   Constitutional:  Negative for fatigue.   HENT:  Negative for sore throat.    Respiratory:  Negative for cough, chest tightness, shortness of breath and wheezing.    Cardiovascular:  Negative for chest pain and palpitations.   Gastrointestinal:  Negative for abdominal pain, blood in stool, diarrhea, nausea and vomiting.   Genitourinary:  Negative for difficulty urinating.   Musculoskeletal:  Negative for arthralgias.   Neurological:  Negative for dizziness.     Objective:     Vital Signs (Most Recent):  Temp: 97.7 °F (36.5 °C) (03/01/24 1106)  Pulse: 79 (03/01/24 1106)  Resp: 16 (03/01/24 1106)  BP: 117/72 (03/01/24 1106)  SpO2: 96 % (03/01/24 1106) Vital Signs (24h Range):  Temp:   [97.5 °F (36.4 °C)-98.9 °F (37.2 °C)] 97.7 °F (36.5 °C)  Pulse:  [74-86] 79  Resp:  [16-17] 16  SpO2:  [93 %-99 %] 96 %  BP: ()/(53-72) 117/72     Weight: 113.9 kg (251 lb 1.7 oz)  Body mass index is 35.02 kg/m².  No intake or output data in the 24 hours ending 03/01/24 1229      Physical Exam  Vitals and nursing note reviewed.   Constitutional:       General: He is not in acute distress.  HENT:      Head: Normocephalic.      Mouth/Throat:      Mouth: Mucous membranes are moist.      Pharynx: Oropharynx is clear.   Eyes:      Pupils: Pupils are equal, round, and reactive to light.   Cardiovascular:      Rate and Rhythm: Normal rate and regular rhythm.      Heart sounds: Normal heart sounds.   Pulmonary:      Effort: Pulmonary effort is normal. No respiratory distress.   Abdominal:      General: There is no distension.      Tenderness: There is no abdominal tenderness.   Skin:     General: Skin is warm and dry.   Neurological:      General: No focal deficit present.      Mental Status: He is alert and oriented to person, place, and time.   Psychiatric:         Mood and Affect: Mood normal.             Significant Labs: All pertinent labs within the past 24 hours have been reviewed.  Recent Lab Results  (Last 5 results in the past 24 hours)        03/01/24  1106   03/01/24  0759   03/01/24  0313   02/29/24  1710   02/29/24  1309        Albumin     2.4     2.5       ALP     63     67       ALT     17     19       Anion Gap     6     6       AST     16     12       Baso #     0.03     0.06       Basophil %     0.4     0.8       BILIRUBIN TOTAL     0.2  Comment: For infants and newborns, interpretation of results should be based  on gestational age, weight and in agreement with clinical  observations.    Premature Infant recommended reference ranges:  Up to 24 hours.............<8.0 mg/dL  Up to 48 hours............<12.0 mg/dL  3-5 days..................<15.0 mg/dL  6-29 days.................<15.0 mg/dL        0.3  Comment: For infants and newborns, interpretation of results should be based  on gestational age, weight and in agreement with clinical  observations.    Premature Infant recommended reference ranges:  Up to 24 hours.............<8.0 mg/dL  Up to 48 hours............<12.0 mg/dL  3-5 days..................<15.0 mg/dL  6-29 days.................<15.0 mg/dL         BUN     10     6       Calcium     8.5     8.3       Chloride     108     108       CO2     23     25       Creatinine     0.8     0.8       CRP         24.1       Differential Method     Automated     Automated       eGFR     >60     >60       Eos #     0.0     0.8       Eos %     0.3     10.2       Ferritin         24       Glucose     232     86       Gran # (ANC)     5.3     4.1       Gran %     70.5     51.9       Hematocrit     27.7     28.8       Hemoglobin     7.8     8.3       Hep A IgM         Negative  Comment: Result does not exclude the possibility of exposure to  hepatitis A virus.  Antibody level during early infection  stage may be below the limit of detection of the assay.         Hep B C IgM         Negative       Hepatitis B Surface Ag         Negative       Hepatitis C Ab         Negative  Comment: Signal-to-cutoff ratio is <1.00.    Test Performed by:  [facility] [place]  [address] [id number]  : Darrion Nick M.D. Ph.D.; CLIA# [id number]         Immature Grans (Abs)     0.11  Comment: Mild elevation in immature granulocytes is non specific and   can be seen in a variety of conditions including stress response,   acute inflammation, trauma and pregnancy. Correlation with other   laboratory and clinical findings is essential.       0.04  Comment: Mild elevation in immature granulocytes is non specific and   can be seen in a variety of conditions including stress response,   acute inflammation, trauma and pregnancy. Correlation with other   laboratory and clinical  findings is essential.         Immature Granulocytes     1.5     0.5       Lymph #     1.7     2.4       Lymph %     22.1     29.9       Magnesium      2.2           MCH     24.7     25.2       MCHC     28.2     28.8       MCV     88     88       Mono #     0.4     0.5       Mono %     5.2     6.7       MPV     9.1     8.8       nRBC     0     0       Platelet Count     358     393       POCT Glucose 127   132     181         Potassium     4.7     3.7       PROTEIN TOTAL     6.3     6.3       RBC     3.16     3.29       RDW     14.8     15.1       Sodium     137     139       WBC     7.51     7.92                              Significant Imaging: I have reviewed all pertinent imaging results/findings within the past 24 hours.    Assessment/Plan:      * Ulcerative pancolitis  Colonoscopy 2/29/24  revealed severe pancolitis ulcerative colitis  Trend CRP  Solumedrol 40mg IV daily  GI following      Diabetes mellitus  Patient's FSGs are controlled on current medication regimen.  Last A1c reviewed-   Lab Results   Component Value Date    HGBA1C 5.9 (H) 02/22/2024     Most recent fingerstick glucose reviewed-   Recent Labs   Lab 02/29/24  1710 03/01/24  0759 03/01/24  1106   POCTGLUCOSE 181* 132* 127*     Current correctional scale  Low  Maintain anti-hyperglycemic dose as follows-   Antihyperglycemics (From admission, onward)      Start     Stop Route Frequency Ordered    03/01/24 0925  insulin aspart U-100 pen 0-5 Units         -- SubQ Before meals & nightly PRN 03/01/24 0825          Controlled outpatient with diet and exercise.    Anemia  Patient's anemia is currently controlled. Etiology likely d/t acute blood loss which was from rectal bleeding  Current CBC reviewed-   Lab Results   Component Value Date    HGB 7.8 (L) 03/01/2024    HCT 27.7 (L) 03/01/2024     Monitor serial CBC and transfuse if patient becomes hemodynamically unstable, symptomatic or H/H drops below 7/21.    Obesity (BMI 35.0-39.9 without  comorbidity)  Body mass index is 35.02 kg/m². Morbid obesity complicates all aspects of disease management from diagnostic modalities to treatment. Encourage weight loss.        Hypothyroidism (acquired)  Continue levothyroxine        VTE Risk Mitigation (From admission, onward)           Ordered     IP VTE HIGH RISK PATIENT  Once         02/29/24 1256     Place sequential compression device  Until discontinued         02/29/24 1256                    Discharge Planning   ARYAN: 3/4/2024     Code Status: Full Code   Is the patient medically ready for discharge?:     Reason for patient still in hospital (select all that apply): Treatment  Discharge Plan A: Home with family                  TAM Jules  Department of Hospital Medicine   [facility]/Surg

## 2024-03-01 NOTE — ASSESSMENT & PLAN NOTE
Colonoscopy 2/29/24  revealed severe pancolitis ulcerative colitis  Trend CRP  Solumedrol 40mg IV daily  GI following

## 2024-03-02 LAB
ALBUMIN SERPL BCP-MCNC: 2.3 G/DL (ref 3.5–5.2)
ALP SERPL-CCNC: 55 U/L (ref 55–135)
ALT SERPL W/O P-5'-P-CCNC: 19 U/L (ref 10–44)
ANION GAP SERPL CALC-SCNC: 6 MMOL/L (ref 8–16)
AST SERPL-CCNC: 9 U/L (ref 10–40)
BASOPHILS # BLD AUTO: 0.05 K/UL (ref 0–0.2)
BASOPHILS NFR BLD: 0.5 % (ref 0–1.9)
BILIRUB SERPL-MCNC: 0.2 MG/DL (ref 0.1–1)
BUN SERPL-MCNC: 8 MG/DL (ref 6–20)
CALCIUM SERPL-MCNC: 8.3 MG/DL (ref 8.7–10.5)
CHLORIDE SERPL-SCNC: 108 MMOL/L (ref 95–110)
CO2 SERPL-SCNC: 25 MMOL/L (ref 23–29)
CREAT SERPL-MCNC: 0.7 MG/DL (ref 0.5–1.4)
CRP SERPL-MCNC: 8.2 MG/L (ref 0–8.2)
DIFFERENTIAL METHOD BLD: ABNORMAL
EOSINOPHIL # BLD AUTO: 0.1 K/UL (ref 0–0.5)
EOSINOPHIL NFR BLD: 0.5 % (ref 0–8)
ERYTHROCYTE [DISTWIDTH] IN BLOOD BY AUTOMATED COUNT: 14.8 % (ref 11.5–14.5)
EST. GFR  (NO RACE VARIABLE): >60 ML/MIN/1.73 M^2
GLUCOSE SERPL-MCNC: 102 MG/DL (ref 70–110)
HBV CORE AB SERPL QL IA: NEGATIVE
HCT VFR BLD AUTO: 25.8 % (ref 40–54)
HCT VFR BLD AUTO: 28.2 % (ref 40–54)
HGB BLD-MCNC: 7.6 G/DL (ref 14–18)
HGB BLD-MCNC: 8.3 G/DL (ref 14–18)
IMM GRANULOCYTES # BLD AUTO: 0.13 K/UL (ref 0–0.04)
IMM GRANULOCYTES NFR BLD AUTO: 1.2 % (ref 0–0.5)
LYMPHOCYTES # BLD AUTO: 2.6 K/UL (ref 1–4.8)
LYMPHOCYTES NFR BLD: 25.1 % (ref 18–48)
MAGNESIUM SERPL-MCNC: 2.1 MG/DL (ref 1.6–2.6)
MCH RBC QN AUTO: 25.1 PG (ref 27–31)
MCHC RBC AUTO-ENTMCNC: 29.5 G/DL (ref 32–36)
MCV RBC AUTO: 85 FL (ref 82–98)
MONOCYTES # BLD AUTO: 1.2 K/UL (ref 0.3–1)
MONOCYTES NFR BLD: 11.2 % (ref 4–15)
NEUTROPHILS # BLD AUTO: 6.4 K/UL (ref 1.8–7.7)
NEUTROPHILS NFR BLD: 61.5 % (ref 38–73)
NRBC BLD-RTO: 0 /100 WBC
PLATELET # BLD AUTO: 406 K/UL (ref 150–450)
PMV BLD AUTO: 9.2 FL (ref 9.2–12.9)
POCT GLUCOSE: 199 MG/DL (ref 70–110)
POCT GLUCOSE: 83 MG/DL (ref 70–110)
POTASSIUM SERPL-SCNC: 4.1 MMOL/L (ref 3.5–5.1)
PROT SERPL-MCNC: 5.9 G/DL (ref 6–8.4)
RBC # BLD AUTO: 3.03 M/UL (ref 4.6–6.2)
SODIUM SERPL-SCNC: 139 MMOL/L (ref 136–145)
WBC # BLD AUTO: 10.41 K/UL (ref 3.9–12.7)

## 2024-03-02 PROCEDURE — 99233 SBSQ HOSP IP/OBS HIGH 50: CPT | Mod: ,,, | Performed by: INTERNAL MEDICINE

## 2024-03-02 PROCEDURE — 63600175 PHARM REV CODE 636 W HCPCS

## 2024-03-02 PROCEDURE — 86140 C-REACTIVE PROTEIN: CPT

## 2024-03-02 PROCEDURE — 83735 ASSAY OF MAGNESIUM: CPT

## 2024-03-02 PROCEDURE — 25000003 PHARM REV CODE 250

## 2024-03-02 PROCEDURE — 85025 COMPLETE CBC W/AUTO DIFF WBC: CPT

## 2024-03-02 PROCEDURE — 85018 HEMOGLOBIN: CPT

## 2024-03-02 PROCEDURE — 36415 COLL VENOUS BLD VENIPUNCTURE: CPT

## 2024-03-02 PROCEDURE — 80053 COMPREHEN METABOLIC PANEL: CPT

## 2024-03-02 PROCEDURE — 85014 HEMATOCRIT: CPT

## 2024-03-02 PROCEDURE — 11000001 HC ACUTE MED/SURG PRIVATE ROOM

## 2024-03-02 RX ADMIN — FOLIC ACID 1 MG: 1 TABLET ORAL at 08:03

## 2024-03-02 RX ADMIN — MELATONIN TAB 3 MG 6 MG: 3 TAB at 10:03

## 2024-03-02 RX ADMIN — CITALOPRAM HYDROBROMIDE 40 MG: 10 TABLET ORAL at 08:03

## 2024-03-02 RX ADMIN — LEVOTHYROXINE SODIUM 200 MCG: 0.1 TABLET ORAL at 05:03

## 2024-03-02 RX ADMIN — METHYLPREDNISOLONE SODIUM SUCCINATE 40 MG: 125 INJECTION, POWDER, FOR SOLUTION INTRAMUSCULAR; INTRAVENOUS at 08:03

## 2024-03-02 NOTE — ASSESSMENT & PLAN NOTE
Patient's anemia is currently controlled. Etiology likely d/t acute blood loss which was from rectal bleeding  Current CBC reviewed-   Lab Results   Component Value Date    HGB 7.6 (L) 03/02/2024    HCT 25.8 (L) 03/02/2024     Monitor serial CBC and transfuse if patient becomes hemodynamically unstable, symptomatic or Hgb drops below 7.5.

## 2024-03-02 NOTE — ASSESSMENT & PLAN NOTE
Colonoscopy 2/29/24  revealed severe pancolitis ulcerative colitis  CRP improved today, repeat tomorrow  Solumedrol 40mg IV daily  GI following

## 2024-03-02 NOTE — ASSESSMENT & PLAN NOTE
Patient's FSGs are controlled on current medication regimen.  Last A1c reviewed-   Lab Results   Component Value Date    HGBA1C 5.9 (H) 02/22/2024     Most recent fingerstick glucose reviewed-   Recent Labs   Lab 03/01/24  2142 03/02/24  0759   POCTGLUCOSE 256* 83       Current correctional scale  Low  Maintain anti-hyperglycemic dose as follows-   Antihyperglycemics (From admission, onward)    Start     Stop Route Frequency Ordered    03/01/24 0925  insulin aspart U-100 pen 0-5 Units         -- SubQ Before meals & nightly PRN 03/01/24 0825        Controlled outpatient with diet and exercise.

## 2024-03-02 NOTE — PROGRESS NOTES
[facility] Medicine  Progress Note    Patient Name: Isidoro Singh  MRN: [id number]  Patient Class: IP- Inpatient   Admission Date: 2/29/2024  Length of Stay: 2 days  Attending Physician: Elvi Kaufman MD  Primary Care Provider: Vinh Noyola PA-C        Subjective:     Principal Problem:Ulcerative pancolitis        HPI:  53 year old male with past medical history of anemia, ulcerative colitis, thrombocytopenia, diabetes mellitus, hypothyroidism, sleep apnea presented for a scheduled colonoscopy today for evaluation of rectal bleeding. Patient was recently admitted last week for an ulcerative colitis flare and anemia. Patient was given 2 units PRBCs with improvement in H&H and was told to keep f/u appointment with GI outpatient. Patient has a long standing history of ulcerative colitis, has been on mesalamine suppositories. Follows with Dr. Beecrra outpatient. Colonoscopy today revealed severe pancolitis ulcerative colitis. Discussed with Dr. Becerra, will admit for IV steroids and trend CRP.     Overview/Hospital Course:  No notes on file    Interval History: Patient seen and examined. Laying in bed. NAD. Reports blood in stool. Denies abdominal pain or nausea.    Review of Systems   Constitutional:  Negative for fatigue.   HENT:  Negative for sore throat.    Respiratory:  Negative for cough, chest tightness, shortness of breath and wheezing.    Cardiovascular:  Negative for chest pain and palpitations.   Gastrointestinal:  Positive for blood in stool. Negative for abdominal pain, diarrhea, nausea and vomiting.   Genitourinary:  Negative for difficulty urinating.   Musculoskeletal:  Negative for arthralgias.   Neurological:  Negative for dizziness.     Objective:     Vital Signs (Most Recent):  Temp: 98.6 °F (37 °C) (03/02/24 1146)  Pulse: 89 (03/02/24 1146)  Resp: 17 (03/02/24 1146)  BP: 112/61 (03/02/24 1146)  SpO2: 98 % (03/02/24 1146) Vital Signs (24h Range):  Temp:  [97  °F (36.1 °C)-98.6 °F (37 °C)] 98.6 °F (37 °C)  Pulse:  [70-89] 89  Resp:  [16-18] 17  SpO2:  [93 %-98 %] 98 %  BP: (107-125)/(59-78) 112/61     Weight: 113.9 kg (251 lb 1.7 oz)  Body mass index is 35.02 kg/m².    Intake/Output Summary (Last 24 hours) at 3/2/2024 1237  Last data filed at 3/2/2024 0729  Gross per 24 hour   Intake 489.25 ml   Output 1300 ml   Net -810.75 ml         Physical Exam  Vitals and nursing note reviewed.   Constitutional:       General: He is not in acute distress.  HENT:      Head: Normocephalic.      Mouth/Throat:      Mouth: Mucous membranes are moist.      Pharynx: Oropharynx is clear.   Eyes:      Pupils: Pupils are equal, round, and reactive to light.   Cardiovascular:      Rate and Rhythm: Normal rate and regular rhythm.      Heart sounds: Normal heart sounds.   Pulmonary:      Effort: Pulmonary effort is normal. No respiratory distress.   Abdominal:      General: There is no distension.      Tenderness: There is no abdominal tenderness.   Skin:     General: Skin is warm and dry.   Neurological:      General: No focal deficit present.      Mental Status: He is alert and oriented to person, place, and time.   Psychiatric:         Mood and Affect: Mood normal.             Significant Labs: All pertinent labs within the past 24 hours have been reviewed.  Recent Lab Results         03/02/24  0759   03/02/24  0300   03/01/24  2142        Albumin   2.3         ALP   55         ALT   19         Anion Gap   6         AST   9         Baso #   0.05         Basophil %   0.5         BILIRUBIN TOTAL   0.2  Comment: For infants and newborns, interpretation of results should be based  on gestational age, weight and in agreement with clinical  observations.    Premature Infant recommended reference ranges:  Up to 24 hours.............<8.0 mg/dL  Up to 48 hours............<12.0 mg/dL  3-5 days..................<15.0 mg/dL  6-29 days.................<15.0 mg/dL           BUN   8         Calcium   8.3          Chloride   108         CO2   25         Creatinine   0.7         CRP   8.2         Differential Method   Automated         eGFR   >60         Eos #   0.1         Eos %   0.5         Glucose   102         Gran # (ANC)   6.4         Gran %   61.5         Hematocrit   25.8         Hemoglobin   7.6         Immature Grans (Abs)   0.13  Comment: Mild elevation in immature granulocytes is non specific and   can be seen in a variety of conditions including stress response,   acute inflammation, trauma and pregnancy. Correlation with other   laboratory and clinical findings is essential.           Immature Granulocytes   1.2         Lymph #   2.6         Lymph %   25.1         Magnesium    2.1         MCH   25.1         MCHC   29.5         MCV   85         Mono #   1.2         Mono %   11.2         MPV   9.2         nRBC   0         Platelet Count   406         POCT Glucose 83     256       Potassium   4.1         PROTEIN TOTAL   5.9         RBC   3.03         RDW   14.8         Sodium   139         WBC   10.41                 Significant Imaging: I have reviewed all pertinent imaging results/findings within the past 24 hours.    Assessment/Plan:      * Ulcerative pancolitis  Colonoscopy 2/29/24  revealed severe pancolitis ulcerative colitis  CRP improved today, repeat tomorrow  Solumedrol 40mg IV daily  GI following      Diabetes mellitus  Patient's FSGs are controlled on current medication regimen.  Last A1c reviewed-   Lab Results   Component Value Date    HGBA1C 5.9 (H) 02/22/2024     Most recent fingerstick glucose reviewed-   Recent Labs   Lab 03/01/24  2142 03/02/24  0759   POCTGLUCOSE 256* 83       Current correctional scale  Low  Maintain anti-hyperglycemic dose as follows-   Antihyperglycemics (From admission, onward)      Start     Stop Route Frequency Ordered    03/01/24 0925  insulin aspart U-100 pen 0-5 Units         -- SubQ Before meals & nightly PRN 03/01/24 0825          Controlled outpatient with diet  and exercise.    Anemia  Patient's anemia is currently controlled. Etiology likely d/t acute blood loss which was from rectal bleeding  Current CBC reviewed-   Lab Results   Component Value Date    HGB 7.6 (L) 03/02/2024    HCT 25.8 (L) 03/02/2024     Monitor serial CBC and transfuse if patient becomes hemodynamically unstable, symptomatic or Hgb drops below 7.5.    Obesity (BMI 35.0-39.9 without comorbidity)  Body mass index is 35.02 kg/m². Morbid obesity complicates all aspects of disease management from diagnostic modalities to treatment. Encourage weight loss.        Hypothyroidism (acquired)  Continue levothyroxine        VTE Risk Mitigation (From admission, onward)           Ordered     IP VTE HIGH RISK PATIENT  Once         02/29/24 1256     Place sequential compression device  Until discontinued         02/29/24 1256                    Discharge Planning   ARYAN: 3/4/2024     Code Status: Full Code   Is the patient medically ready for discharge?:     Reason for patient still in hospital (select all that apply): Laboratory test, Treatment, and Consult recommendations  Discharge Plan A: Home with family                  TAM Jules  Department of Hospital Medicine   [facility]/Surg

## 2024-03-02 NOTE — PROGRESS NOTES
GI Note:    Is feeling OK (attributes improvement there d/t recent transfusion).    No abdo pain.  No N/V.  BMs less frequent.  Seeing less blood.    Abdo - Nondistended.  Soft, nontender.  No masses.      Labs:   Latest Reference Range & Units 02/24/24 21:14 02/25/24 07:12 02/26/24 13:08 02/29/24 13:09 03/01/24 03:13 03/02/24 03:00   Hemoglobin 14.0 - 18.0 g/dL 8.8 (L) 7.9 (L) 8.6 (L) 8.3 (L) 7.8 (L) 7.6 (L)   Hematocrit 40.0 - 54.0 % 29.9 (L) 26.9 (L) 28.8 (L) 28.8 (L) 27.7 (L) 25.8 (L)   MCV 82 - 98 fL 88 87 85.5 88 88 85   (L): Data is abnormally low       Latest Reference Range & Units 02/24/24 07:16 02/29/24 13:09 03/02/24 03:00   CRP 0.0 - 8.2 mg/L 25.1 (H) 24.1 (H) 8.2   (H): Data is abnormally high      IMP:  ARIK, active.  Appears to be responding to the IV steroids.  CRP is down.  Anemia, stable.    REC:  Cont same.  Will reassess tomorrow.

## 2024-03-02 NOTE — SUBJECTIVE & OBJECTIVE
Interval History: Patient seen and examined. Laying in bed. NAD. Reports blood in stool. Denies abdominal pain or nausea.    Review of Systems   Constitutional:  Negative for fatigue.   HENT:  Negative for sore throat.    Respiratory:  Negative for cough, chest tightness, shortness of breath and wheezing.    Cardiovascular:  Negative for chest pain and palpitations.   Gastrointestinal:  Positive for blood in stool. Negative for abdominal pain, diarrhea, nausea and vomiting.   Genitourinary:  Negative for difficulty urinating.   Musculoskeletal:  Negative for arthralgias.   Neurological:  Negative for dizziness.     Objective:     Vital Signs (Most Recent):  Temp: 98.6 °F (37 °C) (03/02/24 1146)  Pulse: 89 (03/02/24 1146)  Resp: 17 (03/02/24 1146)  BP: 112/61 (03/02/24 1146)  SpO2: 98 % (03/02/24 1146) Vital Signs (24h Range):  Temp:  [97 °F (36.1 °C)-98.6 °F (37 °C)] 98.6 °F (37 °C)  Pulse:  [70-89] 89  Resp:  [16-18] 17  SpO2:  [93 %-98 %] 98 %  BP: (107-125)/(59-78) 112/61     Weight: 113.9 kg (251 lb 1.7 oz)  Body mass index is 35.02 kg/m².    Intake/Output Summary (Last 24 hours) at 3/2/2024 1237  Last data filed at 3/2/2024 0729  Gross per 24 hour   Intake 489.25 ml   Output 1300 ml   Net -810.75 ml         Physical Exam  Vitals and nursing note reviewed.   Constitutional:       General: He is not in acute distress.  HENT:      Head: Normocephalic.      Mouth/Throat:      Mouth: Mucous membranes are moist.      Pharynx: Oropharynx is clear.   Eyes:      Pupils: Pupils are equal, round, and reactive to light.   Cardiovascular:      Rate and Rhythm: Normal rate and regular rhythm.      Heart sounds: Normal heart sounds.   Pulmonary:      Effort: Pulmonary effort is normal. No respiratory distress.   Abdominal:      General: There is no distension.      Tenderness: There is no abdominal tenderness.   Skin:     General: Skin is warm and dry.   Neurological:      General: No focal deficit present.      Mental  Status: He is alert and oriented to person, place, and time.   Psychiatric:         Mood and Affect: Mood normal.             Significant Labs: All pertinent labs within the past 24 hours have been reviewed.  Recent Lab Results         03/02/24  0759   03/02/24  0300   03/01/24  2142        Albumin   2.3         ALP   55         ALT   19         Anion Gap   6         AST   9         Baso #   0.05         Basophil %   0.5         BILIRUBIN TOTAL   0.2  Comment: For infants and newborns, interpretation of results should be based  on gestational age, weight and in agreement with clinical  observations.    Premature Infant recommended reference ranges:  Up to 24 hours.............<8.0 mg/dL  Up to 48 hours............<12.0 mg/dL  3-5 days..................<15.0 mg/dL  6-29 days.................<15.0 mg/dL           BUN   8         Calcium   8.3         Chloride   108         CO2   25         Creatinine   0.7         CRP   8.2         Differential Method   Automated         eGFR   >60         Eos #   0.1         Eos %   0.5         Glucose   102         Gran # (ANC)   6.4         Gran %   61.5         Hematocrit   25.8         Hemoglobin   7.6         Immature Grans (Abs)   0.13  Comment: Mild elevation in immature granulocytes is non specific and   can be seen in a variety of conditions including stress response,   acute inflammation, trauma and pregnancy. Correlation with other   laboratory and clinical findings is essential.           Immature Granulocytes   1.2         Lymph #   2.6         Lymph %   25.1         Magnesium    2.1         MCH   25.1         MCHC   29.5         MCV   85         Mono #   1.2         Mono %   11.2         MPV   9.2         nRBC   0         Platelet Count   406         POCT Glucose 83     256       Potassium   4.1         PROTEIN TOTAL   5.9         RBC   3.03         RDW   14.8         Sodium   139         WBC   10.41                 Significant Imaging: I have reviewed all pertinent  imaging results/findings within the past 24 hours.

## 2024-03-03 VITALS
WEIGHT: 251.13 LBS | DIASTOLIC BLOOD PRESSURE: 65 MMHG | HEART RATE: 84 BPM | TEMPERATURE: 98 F | HEIGHT: 71 IN | RESPIRATION RATE: 17 BRPM | SYSTOLIC BLOOD PRESSURE: 126 MMHG | BODY MASS INDEX: 35.16 KG/M2 | OXYGEN SATURATION: 97 %

## 2024-03-03 LAB
ALBUMIN SERPL BCP-MCNC: 2.4 G/DL (ref 3.5–5.2)
ALP SERPL-CCNC: 52 U/L (ref 55–135)
ALT SERPL W/O P-5'-P-CCNC: 16 U/L (ref 10–44)
ANION GAP SERPL CALC-SCNC: 8 MMOL/L (ref 8–16)
AST SERPL-CCNC: 11 U/L (ref 10–40)
BASOPHILS # BLD AUTO: 0.08 K/UL (ref 0–0.2)
BASOPHILS NFR BLD: 0.6 % (ref 0–1.9)
BILIRUB SERPL-MCNC: 0.3 MG/DL (ref 0.1–1)
BUN SERPL-MCNC: 11 MG/DL (ref 6–20)
CALCIUM SERPL-MCNC: 8.6 MG/DL (ref 8.7–10.5)
CHLORIDE SERPL-SCNC: 105 MMOL/L (ref 95–110)
CO2 SERPL-SCNC: 26 MMOL/L (ref 23–29)
CREAT SERPL-MCNC: 0.7 MG/DL (ref 0.5–1.4)
CRP SERPL-MCNC: 7.5 MG/L (ref 0–8.2)
DIFFERENTIAL METHOD BLD: ABNORMAL
EOSINOPHIL # BLD AUTO: 0.1 K/UL (ref 0–0.5)
EOSINOPHIL NFR BLD: 0.7 % (ref 0–8)
ERYTHROCYTE [DISTWIDTH] IN BLOOD BY AUTOMATED COUNT: 15.3 % (ref 11.5–14.5)
EST. GFR  (NO RACE VARIABLE): >60 ML/MIN/1.73 M^2
GAMMA INTERFERON BACKGROUND BLD IA-ACNC: 0.03 IU/ML
GLUCOSE SERPL-MCNC: 114 MG/DL (ref 70–110)
HCT VFR BLD AUTO: 26.8 % (ref 40–54)
HGB BLD-MCNC: 7.9 G/DL (ref 14–18)
IMM GRANULOCYTES # BLD AUTO: 0.27 K/UL (ref 0–0.04)
IMM GRANULOCYTES NFR BLD AUTO: 2.1 % (ref 0–0.5)
LYMPHOCYTES # BLD AUTO: 3.4 K/UL (ref 1–4.8)
LYMPHOCYTES NFR BLD: 26.5 % (ref 18–48)
M TB IFN-G BLD-IMP: NEGATIVE
M TB IFN-G CD4+ BCKGRND COR BLD-ACNC: 0.02 IU/ML
M TB IFN-G CD4+CD8+ BCKGRND COR BLD-ACNC: 0.02 IU/ML
MAGNESIUM SERPL-MCNC: 1.9 MG/DL (ref 1.6–2.6)
MCH RBC QN AUTO: 25.2 PG (ref 27–31)
MCHC RBC AUTO-ENTMCNC: 29.5 G/DL (ref 32–36)
MCV RBC AUTO: 85 FL (ref 82–98)
MITOGEN IGNF BCKGRD COR BLD-ACNC: 9.97 IU/ML
MONOCYTES # BLD AUTO: 1.4 K/UL (ref 0.3–1)
MONOCYTES NFR BLD: 11.1 % (ref 4–15)
NEUTROPHILS # BLD AUTO: 7.6 K/UL (ref 1.8–7.7)
NEUTROPHILS NFR BLD: 59 % (ref 38–73)
NRBC BLD-RTO: 1 /100 WBC
PLATELET # BLD AUTO: 390 K/UL (ref 150–450)
PMV BLD AUTO: 8.6 FL (ref 9.2–12.9)
POTASSIUM SERPL-SCNC: 4.2 MMOL/L (ref 3.5–5.1)
PROT SERPL-MCNC: 6 G/DL (ref 6–8.4)
RBC # BLD AUTO: 3.14 M/UL (ref 4.6–6.2)
SODIUM SERPL-SCNC: 139 MMOL/L (ref 136–145)
WBC # BLD AUTO: 12.93 K/UL (ref 3.9–12.7)

## 2024-03-03 PROCEDURE — 85025 COMPLETE CBC W/AUTO DIFF WBC: CPT

## 2024-03-03 PROCEDURE — 80053 COMPREHEN METABOLIC PANEL: CPT

## 2024-03-03 PROCEDURE — 36415 COLL VENOUS BLD VENIPUNCTURE: CPT | Performed by: INTERNAL MEDICINE

## 2024-03-03 PROCEDURE — 99233 SBSQ HOSP IP/OBS HIGH 50: CPT | Mod: ,,, | Performed by: INTERNAL MEDICINE

## 2024-03-03 PROCEDURE — 63600175 PHARM REV CODE 636 W HCPCS

## 2024-03-03 PROCEDURE — 25000003 PHARM REV CODE 250

## 2024-03-03 PROCEDURE — 83735 ASSAY OF MAGNESIUM: CPT

## 2024-03-03 PROCEDURE — 86140 C-REACTIVE PROTEIN: CPT | Performed by: INTERNAL MEDICINE

## 2024-03-03 RX ORDER — PREDNISONE 10 MG/1
TABLET ORAL
Qty: 30 TABLET | Refills: 0 | Status: SHIPPED | OUTPATIENT
Start: 2024-03-03 | End: 2024-03-03

## 2024-03-03 RX ORDER — PREDNISONE 10 MG/1
TABLET ORAL
Qty: 70 TABLET | Refills: 0 | Status: SHIPPED | OUTPATIENT
Start: 2024-03-04 | End: 2024-04-01

## 2024-03-03 RX ORDER — PREDNISONE 10 MG/1
TABLET ORAL
Qty: 70 TABLET | Refills: 0 | Status: SHIPPED | OUTPATIENT
Start: 2024-03-03 | End: 2024-03-03

## 2024-03-03 RX ADMIN — METHYLPREDNISOLONE SODIUM SUCCINATE 40 MG: 125 INJECTION, POWDER, FOR SOLUTION INTRAMUSCULAR; INTRAVENOUS at 08:03

## 2024-03-03 RX ADMIN — LEVOTHYROXINE SODIUM 200 MCG: 0.1 TABLET ORAL at 06:03

## 2024-03-03 RX ADMIN — FOLIC ACID 1 MG: 1 TABLET ORAL at 08:03

## 2024-03-03 NOTE — DISCHARGE INSTRUCTIONS
Call Dr. Hernandez's office on Monday to make a follow up appointment.  Take prednisone taper as prescribed.

## 2024-03-03 NOTE — PLAN OF CARE
[facility] - Med/Surg  Discharge Final Note    Primary Care Provider: Vinh Noyola PA-C    Expected Discharge Date: 3/3/2024      ZAYRA reviewed discharge orders. Patient will discharge home this afternoon with family who will provide transportation. No case management needs. Cleared by CM.     Final Discharge Note (most recent)       Final Note - 03/03/24 1249          Final Note    Assessment Type Final Discharge Note     Anticipated Discharge Disposition Home or Self Care     Hospital Resources/Appts/Education Provided Provided patient/caregiver with written discharge plan information        Post-Acute Status    Discharge Delays None known at this time                     Important Message from Medicare             Contact Info       Kath Hernandez MD   Specialty: Gastroenterology, Internal Medicine    [address]  [address]  [place] [address]   Phone: [phone]       Next Steps: Follow up in 2 day(s)    Instructions: call on Monday to make an appointment    Vinh Noyola PA-C   Specialty: Family Medicine   Relationship: PCP - General    [address]  [address]  [place] [address]   Phone: [phone]       Next Steps: Follow up in 1 week(s)    Instructions: call to make follow up appointment

## 2024-03-03 NOTE — DISCHARGE SUMMARY
[facility] Medicine  Discharge Summary      Patient Name: Isidoro Singh  MRN: [id number]  SIRIA: [id number]  Patient Class: IP- Inpatient  Admission Date: 2/29/2024  Hospital Length of Stay: 3 days  Discharge Date and Time:  03/03/2024 1:39 PM  Attending Physician: Elvi Kaufman MD   Discharging Provider: TAM Jules  Primary Care Provider: Vinh Noyola PA-C    Primary Care Team: Networked reference to record PCT     HPI:   53 year old male with past medical history of anemia, ulcerative colitis, thrombocytopenia, diabetes mellitus, hypothyroidism, sleep apnea presented for a scheduled colonoscopy today for evaluation of rectal bleeding. Patient was recently admitted last week for an ulcerative colitis flare and anemia. Patient was given 2 units PRBCs with improvement in H&H and was told to keep f/u appointment with GI outpatient. Patient has a long standing history of ulcerative colitis, has been on mesalamine suppositories. Follows with Dr. Becerra outpatient. Colonoscopy today revealed severe pancolitis ulcerative colitis. Discussed with Dr. Becerra, will admit for IV steroids and trend CRP.     Procedure(s) (LRB):  COLONOSCOPY (N/A)      Hospital Course:   53 year old male with past medical history of anemia, ulcerative colitis, thrombocytopenia, diabetes mellitus, hypothyroidism, sleep apnea presented for a scheduled colonoscopy with Dr. Bceerra on 2/29/24 for evaluation of rectal bleeding. Colonoscopy revealed severe pancolonic ulcerative colitis. CRP was elevated at 24.1. Patient was started on IV solumedrol daily and given one dose of venofer. Rectal bleeding improved, now with only smear of blood on toilet paper. Bowel movements slowed and became more formed. H&H stable. CRP improved to 7.5. GI cleared for d/c home on steroid taper and close GI follow up.     Patient seen and examined on date of discharge.        Goals of Care Treatment Preferences:  Code  Status: Full Code      Consults:   Consults (From admission, onward)          Status Ordering Provider     Inpatient consult to Gastroenterology  Once        Provider:  Kath Hernandez MD    Acknowledged LOS ROGEL            No new Assessment & Plan notes have been filed under this hospital service since the last note was generated.  Service: Hospital Medicine    Final Active Diagnoses:    Diagnosis Date Noted POA    PRINCIPAL PROBLEM:  Ulcerative pancolitis [K51.00] 07/19/2021 Yes    Diabetes mellitus [E11.9] 03/01/2024 Yes    Anemia [D64.9] 02/23/2024 Yes    Hypothyroidism (acquired) [E03.9] 07/05/2016 Yes    Obesity (BMI 35.0-39.9 without comorbidity) [E66.9] 07/05/2016 Yes      Problems Resolved During this Admission:    Diagnosis Date Noted Date Resolved POA    Ulcerative proctitis with rectal bleeding [K51.211] 07/05/2016 02/29/2024 Yes       Discharged Condition: good    Disposition: Home or Self Care    Follow Up:   Follow-up Information       Ktah Hernandez MD Follow up in 2 day(s).    Specialties: Gastroenterology, Internal Medicine  Why: call on Monday to make an appointment  Contact information:  [address]  [address]  [place] [address]  [phone]               Vinh Noyola PA-C Follow up in 1 week(s).    Specialty: Family Medicine  Why: call to make follow up appointment  Contact information:  [address]  [address]  [place] [address]  [phone]                           Patient Instructions:      Diet diabetic     Notify your health care provider if you experience any of the following:  persistent nausea and vomiting or diarrhea     Notify your health care provider if you experience any of the following:  temperature >100.4     Notify your health care provider if you experience any of the following:  severe uncontrolled pain     Notify your health care provider if you experience any of the following:  difficulty breathing or increased cough     Notify your health  care provider if you experience any of the following:  severe persistent headache     Notify your health care provider if you experience any of the following:  persistent dizziness, light-headedness, or visual disturbances     Notify your health care provider if you experience any of the following:  increased confusion or weakness     Activity as tolerated       Significant Diagnostic Studies: Labs: CMP   Recent Labs   Lab 03/02/24  0300 03/03/24  0534    139   K 4.1 4.2    105   CO2 25 26    114*   BUN 8 11   CREATININE 0.7 0.7   CALCIUM 8.3* 8.6*   PROT 5.9* 6.0   ALBUMIN 2.3* 2.4*   BILITOT 0.2 0.3   ALKPHOS 55 52*   AST 9* 11   ALT 19 16   ANIONGAP 6* 8    and CBC   Recent Labs   Lab 03/02/24  0300 03/02/24  1720 03/03/24  0534   WBC 10.41  --  12.93*   HGB 7.6* 8.3* 7.9*   HCT 25.8* 28.2* 26.8*     --  390       Pending Diagnostic Studies:       None           Medications:  Reconciled Home Medications:      Medication List        START taking these medications      predniSONE 10 MG tablet  Commonly known as: DELTASONE  Take 4 tablets (40 mg total) by mouth once daily for 7 days, THEN 3 tablets (30 mg total) once daily for 7 days, THEN 2 tablets (20 mg total) once daily for 7 days, THEN 1 tablet (10 mg total) once daily for 7 days.  Start taking on: March 4, 2024            CHANGE how you take these medications      ergocalciferol 50,000 unit Cap  Commonly known as: ERGOCALCIFEROL  TAKE 1 CAPSULE BY MOUTH EVERY 7 DAYS  What changed: additional instructions            CONTINUE taking these medications      blood sugar diagnostic Strp  To check BG 1 times daily, to use with insurance preferred meter     blood-glucose meter kit  To check BG 1 times daily, to use with insurance preferred meter     citalopram 40 MG tablet  Commonly known as: CeleXA  Take 1 tablet (40 mg total) by mouth once daily.     folic acid 1 MG tablet  Commonly known as: FOLVITE  Take 1 tablet (1 mg total) by mouth  once daily.     lancets Misc  To check BG 1 times daily, to use with insurance preferred meter     levothyroxine 200 MCG tablet  Commonly known as: SYNTHROID  Take 1 tablet (200 mcg total) by mouth before breakfast.     mesalamine 1000 MG Supp  Commonly known as: CANASA  Place 1 suppository (1,000 mg total) rectally nightly.     omega-3 acid ethyl esters 1 gram capsule  Commonly known as: LOVAZA  Take 1 capsule (1 g total) by mouth 2 (two) times daily.     sildenafiL 100 MG tablet  Commonly known as: VIAGRA  Take 1 tablet (100 mg total) by mouth daily as needed for Erectile Dysfunction.     testosterone cypionate 200 mg/mL injection  Commonly known as: DEPOTESTOTERONE CYPIONATE  Inject 100 mg into the muscle every 14 (fourteen) days.              Indwelling Lines/Drains at time of discharge:   Lines/Drains/Airways       Airway  Duration                  Airway - Non-Surgical 02/29/24 1020 Nasal Cannula 3 days                    Time spent on the discharge of patient: 35 minutes         TAM Jules  Department of Hospital Medicine  [facility]/Surg

## 2024-03-03 NOTE — HOSPITAL COURSE
53 year old male with past medical history of anemia, ulcerative colitis, thrombocytopenia, diabetes mellitus, hypothyroidism, sleep apnea presented for a scheduled colonoscopy with Dr. Becerra on 2/29/24 for evaluation of rectal bleeding. Colonoscopy revealed severe pancolonic ulcerative colitis. CRP was elevated at 24.1. Patient was started on IV solumedrol daily and given one dose of venofer. Rectal bleeding improved, now with only smear of blood on toilet paper. Bowel movements slowed and became more formed. H&H stable. CRP improved to 7.5. GI cleared for d/c home on steroid taper and close GI follow up.     Patient seen and examined on date of discharge.

## 2024-03-03 NOTE — H&P
"GI note.    Continues to feel well.  Sleeping well (just woke up from a "nap.")  Appetite good.  No n/v.  Further improvement in BMs.  Freq decreased.  More formation to the stool.  Less blood (only seeing it on the toilet tissue now).  No abdo pain.    Abdo - Nondistended.  Soft, nontender.  No masses/orgmeg.      LABS:   Latest Reference Range & Units 02/26/24 13:08 02/29/24 13:09 03/01/24 03:13 03/02/24 03:00 03/02/24 17:20 03/03/24 05:34   Hemoglobin 14.0 - 18.0 g/dL 8.6 (L) 8.3 (L) 7.8 (L) 7.6 (L) 8.3 (L) 7.9 (L)   Hematocrit 40.0 - 54.0 % 28.8 (L) 28.8 (L) 27.7 (L) 25.8 (L) 28.2 (L) 26.8 (L)   MCV 82 - 98 fL 85.5 88 88 85  85   (L): Data is abnormally low      CRP with even further decrease.   Latest Reference Range & Units 02/29/24 13:09 03/02/24 03:00 03/03/24 05:34   CRP 0.0 - 8.2 mg/L 24.1 (H) 8.2 7.5   (H): Data is abnormally high      IMP:  ARIK, responding to the steroids.  Anemia stable.    PLAN:  OK for d/c.  To home, on tapering steroids.  40 mg/day for a week, then 30 mg/day for a week, etc.  Call Dr. Hernandez this coming week, to arrange definitive f/u, plan for biologic, etc.  "

## 2024-03-04 ENCOUNTER — PATIENT OUTREACH (OUTPATIENT)
Dept: FAMILY MEDICINE | Facility: CLINIC | Age: 54
End: 2024-03-04
Payer: OTHER GOVERNMENT

## 2024-03-04 ENCOUNTER — PATIENT MESSAGE (OUTPATIENT)
Dept: GASTROENTEROLOGY | Facility: CLINIC | Age: 54
End: 2024-03-04
Payer: OTHER GOVERNMENT

## 2024-03-04 NOTE — PROGRESS NOTES
Discharge Information     Discharge Date:  3/3/2024     Primary Discharge Diagnosis:  Ulcerative Colitis/Anemia      Discharge Summary:  Reviewed      Medication & Order Review     Were medication changes made or new medications added?   Yes    If so, has the patient filled the prescriptions?  Yes     Was Home Health ordered? No    If so, has Home Health contacted patient and/or initiated services?  No    Name of Home Health Agency? N/A    Durable Medical Equipment ordered?  No     If so, has the DME provider contacted patient and delivered equipment?  N/A    Follow Up               Any problems since discharge? No    How is the patient feeling since returning home?  Patient reports he is stable, no complaints today.     Have you set up recommended follow up appointments?  Scheduled follow up with LEAH Garces on 3/14/2024, pt is aware, he also made appt with GI-Dr. Becerra on 3/6/2024.     Notes:  Patient called here to get follow up scheduled, states he is doing well, picked up new medications, on prednisone taper until he follows with Dr. Becerra.          Lidia Andres

## 2024-03-05 LAB — O+P SPEC MICRO: NORMAL

## 2024-03-06 ENCOUNTER — OFFICE VISIT (OUTPATIENT)
Dept: GASTROENTEROLOGY | Facility: CLINIC | Age: 54
End: 2024-03-06
Payer: OTHER GOVERNMENT

## 2024-03-06 VITALS
HEIGHT: 71 IN | RESPIRATION RATE: 18 BRPM | DIASTOLIC BLOOD PRESSURE: 68 MMHG | WEIGHT: 250.44 LBS | BODY MASS INDEX: 35.06 KG/M2 | HEART RATE: 93 BPM | SYSTOLIC BLOOD PRESSURE: 101 MMHG

## 2024-03-06 DIAGNOSIS — D50.0 IRON DEFICIENCY ANEMIA DUE TO CHRONIC BLOOD LOSS: ICD-10-CM

## 2024-03-06 DIAGNOSIS — K51.011 ULCERATIVE PANCOLITIS WITH RECTAL BLEEDING: Primary | ICD-10-CM

## 2024-03-06 PROCEDURE — 99215 OFFICE O/P EST HI 40 MIN: CPT | Mod: S$PBB,,, | Performed by: INTERNAL MEDICINE

## 2024-03-06 PROCEDURE — 99214 OFFICE O/P EST MOD 30 MIN: CPT | Mod: PBBFAC,PN | Performed by: INTERNAL MEDICINE

## 2024-03-06 PROCEDURE — 99999 PR PBB SHADOW E&M-EST. PATIENT-LVL IV: CPT | Mod: PBBFAC,,, | Performed by: INTERNAL MEDICINE

## 2024-03-06 NOTE — PROGRESS NOTES
"[facility] Gastroenterology Note    CC: Ulcerative colitis, Diarrhea    HPI 53 y.o. male with severe, worsening, pan-colonic ulcerative colitis associated with bloody diarrhea, moderate malnutrition and anemia presents for follow up. He was admitted last week after outpatient colonoscopy and improved on IV steroids. He is currently on Prednisone taper, taking 40 mg daily for 3 more days then decreasing dosage. He currently is having 3-6 bowel movements a day without much blood. He feels improved and has a good appetite though feels week. His wife is present, we discussed beginning combination therapy with Infliximab and Methotrexate, multiple questions answered. He denies extraintestinal manifestations     He remained anemic at discharge after requiring 2 units of pRBCs. He also received IV iron.     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 He previously used  Mesalamine suppositories nightly and has not been taking oral Mesalamine.      Past Medical History:   Diagnosis Date    Colon polyp     Diabetes mellitus     Hypothyroid 07/05/2016    Sleep apnea     Thrombocytopenia     Ulcerative colitis        Allergies and Medications reviewed     Review of Systems  General ROS: negative for - chills, fever or weight loss  Cardiovascular ROS: no chest pain or dyspnea on exertion  Gastrointestinal ROS: improved diarrhea, intermittent bloody stools, no significant abdominal pain    Physical Examination  /68 (BP Location: Left arm, Patient Position: Sitting)   Pulse 93   Resp 18   Ht 5' 11" (1.803 m)   Wt 113.6 kg (250 lb 7.1 oz)   BMI 34.93 kg/m²   General appearance: alert, cooperative, no distress, fatigued, pale  HENT: Normocephalic, atraumatic, neck symmetrical, no nasal discharge, sclera anicteric   Lungs: clear to auscultation bilaterally, symmetric chest wall expansion bilaterally  Heart: regular rate and rhythm without rub; no displacement of the PMI   Abdomen: soft, " nontender,nondistended, BS active, no masses appreciated  Extremities: extremities symmetric; no clubbing, cyanosis, or edema        Labs:  Lab Results   Component Value Date    WBC 12.93 (H) 03/03/2024    HGB 7.9 (L) 03/03/2024    HCT 26.8 (L) 03/03/2024    MCV 85 03/03/2024     03/03/2024     -Quantiferon gold- negative  -Hepatitis B  sAg, cAb- negative    Pathology- chronic colitis with severe activity, negative for dysplasia, granulomas     Assessment:   53 y.o. male with severe pancolonic ulcerative colitis presents for follow up. He required hospital admission last week for IV steroids and blood transfusion. He had a previous diagnosis of ulcerative proctitis treated with Mesalamine suppositories and intermittent oral Mesalamine (patient was not compliant with oral Mesalamine).     Plan:  -Begin Methotrexate 15 mg weekly + folic acid supplementation  -Begin Infliximab 10 mg/kg (male sex, low albumin, significant anemia require higher dosing)  -Refer to hematology for IV iron  -CBC next week  -Mesalamine suppository nightly for now  -Discussed importance of flu, pneumovax and shingles vaccines as well as follow up with dermatology for skin check  -RTC 8 weeks, will plan for Infliximab level/Ab prior to first maintenance dose  -We extensively discussed potential risks of above medication as well as evaluation for colectomy which patient does not wish for at this time. He was provided handouts on Methotrexate and Infliximab  -Repeat colonoscopy 6 months after beginning biologic therapy     Kath Hernandez MD  [facility]  [address], [address]  [place] [address]  Office: [phone]  Fax: [phone]

## 2024-03-07 RX ORDER — METHYLPREDNISOLONE SOD SUCC 125 MG
40 VIAL (EA) INJECTION
Status: CANCELLED | OUTPATIENT
Start: 2024-03-07

## 2024-03-07 RX ORDER — HEPARIN 100 UNIT/ML
500 SYRINGE INTRAVENOUS
Status: CANCELLED | OUTPATIENT
Start: 2024-03-07

## 2024-03-07 RX ORDER — EPINEPHRINE 0.3 MG/.3ML
0.3 INJECTION SUBCUTANEOUS ONCE AS NEEDED
Status: CANCELLED | OUTPATIENT
Start: 2024-03-07

## 2024-03-07 RX ORDER — DIPHENHYDRAMINE HYDROCHLORIDE 50 MG/ML
25 INJECTION INTRAMUSCULAR; INTRAVENOUS
Status: CANCELLED | OUTPATIENT
Start: 2024-03-07

## 2024-03-07 RX ORDER — SODIUM CHLORIDE 0.9 % (FLUSH) 0.9 %
10 SYRINGE (ML) INJECTION
Status: CANCELLED | OUTPATIENT
Start: 2024-03-07

## 2024-03-07 RX ORDER — ACETAMINOPHEN 325 MG/1
650 TABLET ORAL
Status: CANCELLED | OUTPATIENT
Start: 2024-03-07

## 2024-03-07 RX ORDER — IPRATROPIUM BROMIDE AND ALBUTEROL SULFATE 2.5; .5 MG/3ML; MG/3ML
3 SOLUTION RESPIRATORY (INHALATION)
Status: CANCELLED | OUTPATIENT
Start: 2024-03-07

## 2024-03-07 RX ORDER — METHOTREXATE 2.5 MG/1
15 TABLET ORAL
Qty: 24 TABLET | Refills: 11 | Status: SHIPPED | OUTPATIENT
Start: 2024-03-07 | End: 2025-03-07

## 2024-03-07 RX ORDER — DIPHENHYDRAMINE HYDROCHLORIDE 50 MG/ML
50 INJECTION INTRAMUSCULAR; INTRAVENOUS ONCE AS NEEDED
Status: CANCELLED | OUTPATIENT
Start: 2024-03-07

## 2024-03-08 ENCOUNTER — TELEPHONE (OUTPATIENT)
Dept: HEMATOLOGY/ONCOLOGY | Facility: CLINIC | Age: 54
End: 2024-03-08
Payer: OTHER GOVERNMENT

## 2024-03-08 NOTE — NURSING
Called pt regarding referral to Hematology no answer left message on VM to return call   Oncology Navigation   Intake  Cancer Type: Benign hem  Type of Referral: Internal  Date of Referral: 03/07/24  Date Worked: 03/08/24     Treatment                              Acuity      Follow Up  No follow-ups on file.

## 2024-03-08 NOTE — NURSING
Oncology Navigation   Intake  Cancer Type: -- (YESENIA)  Type of Referral: -- (Dr Kath Hernandez)  Date of Referral: 03/07/24  Initial Nurse Navigator Contact: 03/08/24  Date Worked: 03/08/24  First Appointment Available: 03/12/24  Appointment Date: 03/12/24 (Dr Dixon)  First Available Date vs. Scheduled Date (days): 0     Treatment                              Acuity      Follow Up  No follow-ups on file.

## 2024-03-12 ENCOUNTER — OFFICE VISIT (OUTPATIENT)
Dept: FAMILY MEDICINE | Facility: CLINIC | Age: 54
End: 2024-03-12
Payer: OTHER GOVERNMENT

## 2024-03-12 ENCOUNTER — LAB VISIT (OUTPATIENT)
Dept: LAB | Facility: HOSPITAL | Age: 54
End: 2024-03-12
Payer: OTHER GOVERNMENT

## 2024-03-12 ENCOUNTER — OFFICE VISIT (OUTPATIENT)
Dept: HEMATOLOGY/ONCOLOGY | Facility: CLINIC | Age: 54
End: 2024-03-12
Payer: OTHER GOVERNMENT

## 2024-03-12 VITALS
BODY MASS INDEX: 34.86 KG/M2 | OXYGEN SATURATION: 98 % | SYSTOLIC BLOOD PRESSURE: 108 MMHG | WEIGHT: 249 LBS | HEART RATE: 98 BPM | RESPIRATION RATE: 18 BRPM | HEIGHT: 71 IN | DIASTOLIC BLOOD PRESSURE: 60 MMHG

## 2024-03-12 VITALS
HEIGHT: 71 IN | HEART RATE: 96 BPM | WEIGHT: 248.69 LBS | SYSTOLIC BLOOD PRESSURE: 111 MMHG | DIASTOLIC BLOOD PRESSURE: 63 MMHG | OXYGEN SATURATION: 98 % | TEMPERATURE: 98 F | RESPIRATION RATE: 12 BRPM | BODY MASS INDEX: 34.81 KG/M2

## 2024-03-12 DIAGNOSIS — D50.0 IRON DEFICIENCY ANEMIA DUE TO CHRONIC BLOOD LOSS: ICD-10-CM

## 2024-03-12 DIAGNOSIS — D64.9 ANEMIA, UNSPECIFIED TYPE: ICD-10-CM

## 2024-03-12 DIAGNOSIS — K51.911 ULCERATIVE COLITIS WITH RECTAL BLEEDING, UNSPECIFIED LOCATION: Primary | ICD-10-CM

## 2024-03-12 DIAGNOSIS — Z09 HOSPITAL DISCHARGE FOLLOW-UP: ICD-10-CM

## 2024-03-12 PROBLEM — D50.9 IRON DEFICIENCY ANEMIA: Status: ACTIVE | Noted: 2024-03-12

## 2024-03-12 LAB
BASOPHILS # BLD AUTO: 0.04 K/UL (ref 0–0.2)
BASOPHILS NFR BLD: 0.4 % (ref 0–1.9)
DIFFERENTIAL METHOD BLD: ABNORMAL
EOSINOPHIL # BLD AUTO: 0.1 K/UL (ref 0–0.5)
EOSINOPHIL NFR BLD: 1.4 % (ref 0–8)
ERYTHROCYTE [DISTWIDTH] IN BLOOD BY AUTOMATED COUNT: 18.3 % (ref 11.5–14.5)
FERRITIN SERPL-MCNC: 103 NG/ML (ref 20–300)
HCT VFR BLD AUTO: 26.3 % (ref 40–54)
HGB BLD-MCNC: 7.5 G/DL (ref 14–18)
IMM GRANULOCYTES # BLD AUTO: 0.08 K/UL (ref 0–0.04)
IMM GRANULOCYTES NFR BLD AUTO: 0.8 % (ref 0–0.5)
IRON SERPL-MCNC: 16 UG/DL (ref 45–160)
LYMPHOCYTES # BLD AUTO: 1.3 K/UL (ref 1–4.8)
LYMPHOCYTES NFR BLD: 13 % (ref 18–48)
MCH RBC QN AUTO: 25.4 PG (ref 27–31)
MCHC RBC AUTO-ENTMCNC: 28.5 G/DL (ref 32–36)
MCV RBC AUTO: 89 FL (ref 82–98)
MONOCYTES # BLD AUTO: 0.5 K/UL (ref 0.3–1)
MONOCYTES NFR BLD: 4.9 % (ref 4–15)
NEUTROPHILS # BLD AUTO: 8 K/UL (ref 1.8–7.7)
NEUTROPHILS NFR BLD: 79.5 % (ref 38–73)
NRBC BLD-RTO: 0 /100 WBC
PLATELET # BLD AUTO: 380 K/UL (ref 150–450)
PMV BLD AUTO: 8.8 FL (ref 9.2–12.9)
RBC # BLD AUTO: 2.95 M/UL (ref 4.6–6.2)
SATURATED IRON: 5 % (ref 20–50)
TOTAL IRON BINDING CAPACITY: 297 UG/DL (ref 250–450)
TRANSFERRIN SERPL-MCNC: 212 MG/DL (ref 200–375)
WBC # BLD AUTO: 10.04 K/UL (ref 3.9–12.7)

## 2024-03-12 PROCEDURE — 85025 COMPLETE CBC W/AUTO DIFF WBC: CPT | Performed by: INTERNAL MEDICINE

## 2024-03-12 PROCEDURE — 99495 TRANSJ CARE MGMT MOD F2F 14D: CPT | Mod: S$GLB,,, | Performed by: PHYSICIAN ASSISTANT

## 2024-03-12 PROCEDURE — 83540 ASSAY OF IRON: CPT | Performed by: INTERNAL MEDICINE

## 2024-03-12 PROCEDURE — 99214 OFFICE O/P EST MOD 30 MIN: CPT | Mod: PBBFAC,PN | Performed by: INTERNAL MEDICINE

## 2024-03-12 PROCEDURE — 82728 ASSAY OF FERRITIN: CPT | Performed by: INTERNAL MEDICINE

## 2024-03-12 PROCEDURE — 99999 PR PBB SHADOW E&M-EST. PATIENT-LVL IV: CPT | Mod: PBBFAC,,, | Performed by: INTERNAL MEDICINE

## 2024-03-12 PROCEDURE — 99204 OFFICE O/P NEW MOD 45 MIN: CPT | Mod: S$PBB,,, | Performed by: INTERNAL MEDICINE

## 2024-03-12 PROCEDURE — 36415 COLL VENOUS BLD VENIPUNCTURE: CPT | Performed by: INTERNAL MEDICINE

## 2024-03-12 RX ORDER — SODIUM CHLORIDE 9 MG/ML
INJECTION, SOLUTION INTRAVENOUS CONTINUOUS
Status: CANCELLED | OUTPATIENT
Start: 2024-03-12

## 2024-03-12 RX ORDER — EPINEPHRINE 0.3 MG/.3ML
0.3 INJECTION SUBCUTANEOUS ONCE AS NEEDED
Status: CANCELLED | OUTPATIENT
Start: 2024-03-12

## 2024-03-12 RX ORDER — DIPHENHYDRAMINE HYDROCHLORIDE 50 MG/ML
50 INJECTION INTRAMUSCULAR; INTRAVENOUS ONCE AS NEEDED
Status: CANCELLED | OUTPATIENT
Start: 2024-03-12

## 2024-03-12 RX ORDER — SODIUM CHLORIDE 0.9 % (FLUSH) 0.9 %
10 SYRINGE (ML) INJECTION
Status: CANCELLED | OUTPATIENT
Start: 2024-03-12

## 2024-03-12 RX ORDER — HEPARIN 100 UNIT/ML
5 SYRINGE INTRAVENOUS
Status: CANCELLED | OUTPATIENT
Start: 2024-03-12

## 2024-03-12 RX ORDER — ACETAMINOPHEN 325 MG/1
325 TABLET ORAL
Status: CANCELLED
Start: 2024-03-12

## 2024-03-12 NOTE — PROGRESS NOTES
HPI    53 years old male with history of ulcerative colitis.  He had ulcerative colitis flare with iron loss status post transfusion packed red blood cell and IV iron in hospital.  He was referred to Hematology for IV iron therapy.  History of thrombocytopenia suspect ITP under control.  Previously seen and followed by Dr. Mcintosh      Past Medical History:   Diagnosis Date    Colon polyp     Diabetes mellitus     Hypothyroid 07/05/2016    Sleep apnea     Thrombocytopenia     Ulcerative colitis      Social History     Socioeconomic History    Marital status:    Tobacco Use    Smoking status: Former     Current packs/day: 0.00     Average packs/day: 1 pack/day for 30.0 years (30.0 ttl pk-yrs)     Types: Cigarettes     Start date: 1984     Quit date: 12/2013     Years since quitting: 10.2     Passive exposure: Past    Smokeless tobacco: Former     Types: Snuff     Quit date: 2020   Substance and Sexual Activity    Alcohol use: Yes     Comment: occasional    Drug use: No    Sexual activity: Yes     Social Determinants of Health     Financial Resource Strain: Low Risk  (11/10/2023)    Overall Financial Resource Strain (CARDIA)     Difficulty of Paying Living Expenses: Not hard at all   Food Insecurity: No Food Insecurity (11/10/2023)    Hunger Vital Sign     Worried About Running Out of Food in the Last Year: Never true     Ran Out of Food in the Last Year: Never true   Transportation Needs: No Transportation Needs (11/10/2023)    PRAPARE - Transportation     Lack of Transportation (Medical): No     Lack of Transportation (Non-Medical): No   Physical Activity: Insufficiently Active (11/10/2023)    Exercise Vital Sign     Days of Exercise per Week: 1 day     Minutes of Exercise per Session: 30 min   Stress: No Stress Concern Present (11/10/2023)    [employer] - Occupational Stress Questionnaire     Feeling of Stress : Not at all   Social Connections: Unknown (11/10/2023)    Social  Connection and Isolation Panel [NHANES]     Frequency of Communication with Friends and Family: More than three times a week     Frequency of Social Gatherings with Friends and Family: Once a week     Active Member of Clubs or Organizations: Yes     Attends Club or Organization Meetings: More than 4 times per year     Marital Status:    Housing Stability: Low Risk  (11/10/2023)    Housing Stability Vital Sign     Unable to Pay for Housing in the Last Year: No     Number of Places Lived in the Last Year: 1     Unstable Housing in the Last Year: No         Subjective      Review of Systems   Constitutional: Negative for appetite change, fatigue and unexpected weight change.   HENT: Negative for mouth sores.   Eyes: Negative for visual disturbance.   Respiratory: Negative for cough and shortness of breath.   Cardiovascular: Negative for chest pain.   Gastrointestinal: Negative for diarrhea.   Genitourinary: Negative for frequency.   Musculoskeletal: Negative for back pain.   Skin: Negative for rash.   Neurological: Negative for headaches.   Hematological: Negative for adenopathy.   Psychiatric/Behavioral: The patient is not nervous/anxious.   All other systems reviewed and are negative.     Objective    Physical Exam   There were no vitals filed for this visit.    Constitutional: patient is oriented to person, place, and time. patient appears well-developed and well-nourished. No distress.   HENT:   Right Ear: External ear normal.   Left Ear: External ear normal.   Nose: Nose normal.   Mouth/Throat: Oropharynx is clear and moist. No oropharyngeal exudate.   Teeth, gums and lips are normal   No sinus tenderness   Palate, tongue, posterior pharynx are normal   Eyes: Conjunctivae and lids are normal.   Neck: Trachea normal and normal range of motion. No thyromegaly   Cardiovascular: Normal rate, regular rhythm, normal heart sounds, intact distal pulses and normal pulses.   No murmur heard.   No edema, no tenderness  in the extremities.   Pulmonary/Chest: Effort normal and breath sounds normal. No accessory muscle usage. patient has no wheezes..   Abdominal: Soft. Normal appearance and bowel sounds are normal. patient exhibits no distension and no mass. There is no hepatosplenomegaly. There is no tenderness.   Musculoskeletal: Normal range of motion.   Gait is normal   No clubbing, cyanosis     Lymphadenopathy:   Head (right side): No submental and no submandibular adenopathy present.   Head (left side): No submental and no submandibular adenopathy present.   patient has no cervical adenopathy.   Right: No supraclavicular adenopathy present.   Left: No supraclavicular adenopathy present.   Neurological: patient is alert and oriented to person, place, and time. patient has normal strength and normal reflexes. No sensory deficit. Gait normal.   Skin: Skin is warm, dry and intact. No bruising, no lesion and no rash noted. No cyanosis. Nails show no clubbing.   No lesions   Psychiatric: patient has a normal mood and affect. patient speech is normal and behavior is normal. Judgment normal. Cognition and memory are normal.   Vitals reviewed.     Lab Results   Component Value Date    WBC 12.93 (H) 03/03/2024    HGB 7.9 (L) 03/03/2024    HCT 26.8 (L) 03/03/2024    MCV 85 03/03/2024     03/03/2024       CMP  Sodium   Date Value Ref Range Status   03/03/2024 139 136 - 145 mmol/L Final     Potassium   Date Value Ref Range Status   03/03/2024 4.2 3.5 - 5.1 mmol/L Final     Chloride   Date Value Ref Range Status   03/03/2024 105 95 - 110 mmol/L Final     CO2   Date Value Ref Range Status   03/03/2024 26 23 - 29 mmol/L Final     Glucose   Date Value Ref Range Status   03/03/2024 114 (H) 70 - 110 mg/dL Final     BUN   Date Value Ref Range Status   03/03/2024 11 6 - 20 mg/dL Final     Creatinine   Date Value Ref Range Status   03/03/2024 0.7 0.5 - 1.4 mg/dL Final     Calcium   Date Value Ref Range Status   03/03/2024 8.6 (L) 8.7 - 10.5  mg/dL Final     Total Protein   Date Value Ref Range Status   03/03/2024 6.0 6.0 - 8.4 g/dL Final     Albumin   Date Value Ref Range Status   03/03/2024 2.4 (L) 3.5 - 5.2 g/dL Final     Total Bilirubin   Date Value Ref Range Status   03/03/2024 0.3 0.1 - 1.0 mg/dL Final     Comment:     For infants and newborns, interpretation of results should be based  on gestational age, weight and in agreement with clinical  observations.    Premature Infant recommended reference ranges:  Up to 24 hours.............<8.0 mg/dL  Up to 48 hours............<12.0 mg/dL  3-5 days..................<15.0 mg/dL  6-29 days.................<15.0 mg/dL       Alkaline Phosphatase   Date Value Ref Range Status   03/03/2024 52 (L) 55 - 135 U/L Final     AST   Date Value Ref Range Status   03/03/2024 11 10 - 40 U/L Final     ALT   Date Value Ref Range Status   03/03/2024 16 10 - 44 U/L Final     Anion Gap   Date Value Ref Range Status   03/03/2024 8 8 - 16 mmol/L Final     eGFR   Date Value Ref Range Status   03/03/2024 >60 >60 mL/min/1.73 m^2 Final   02/26/2024 105 > OR = 60 mL/min/1.73m2 Final         Assessment    Elevated neutrophil reactive    Anemia hemoglobin 7.9 grams/deciliter normocytic    Ulcerative colitis status post packed red blood cell transfusion due to acute lost    History of thrombocytopenia currently under control with normal platelet count    > injector for weekly x2 premedication Tylenol 325  > check iron studies prior and after infusion  > follow-up with Dr. Mcintosh    Plan    Iron deficiency anemia due to chronic blood loss  -     Ambulatory referral/consult to Hematology / Oncology

## 2024-03-13 ENCOUNTER — PATIENT MESSAGE (OUTPATIENT)
Dept: FAMILY MEDICINE | Facility: CLINIC | Age: 54
End: 2024-03-13
Payer: OTHER GOVERNMENT

## 2024-03-13 ENCOUNTER — PATIENT MESSAGE (OUTPATIENT)
Dept: GASTROENTEROLOGY | Facility: CLINIC | Age: 54
End: 2024-03-13
Payer: OTHER GOVERNMENT

## 2024-03-13 DIAGNOSIS — Z12.83 SKIN CANCER SCREENING: Primary | ICD-10-CM

## 2024-03-18 ENCOUNTER — PATIENT MESSAGE (OUTPATIENT)
Dept: GASTROENTEROLOGY | Facility: CLINIC | Age: 54
End: 2024-03-18
Payer: OTHER GOVERNMENT

## 2024-03-18 ENCOUNTER — PATIENT MESSAGE (OUTPATIENT)
Dept: HEMATOLOGY/ONCOLOGY | Facility: CLINIC | Age: 54
End: 2024-03-18
Payer: OTHER GOVERNMENT

## 2024-03-19 ENCOUNTER — PATIENT MESSAGE (OUTPATIENT)
Dept: FAMILY MEDICINE | Facility: CLINIC | Age: 54
End: 2024-03-19
Payer: OTHER GOVERNMENT

## 2024-03-19 NOTE — TELEPHONE ENCOUNTER
3/12/24 had labs from Dr. Dixon, H & H were low but not under 7. They were having difficulty getting the infusion approved by insurance. Advised him to call Dr. Dixon and explain symptoms which might change the course of treatment.   Will call back if he can not reach them

## 2024-03-20 ENCOUNTER — PATIENT MESSAGE (OUTPATIENT)
Dept: HEMATOLOGY/ONCOLOGY | Facility: CLINIC | Age: 54
End: 2024-03-20
Payer: OTHER GOVERNMENT

## 2024-03-23 NOTE — TELEPHONE ENCOUNTER
Patient at [facility], they only have TSH order per Jade ZHU, sent other orders adding what he needs for yearly labs as well.    Patient/Caregiver provided printed discharge information.

## 2024-03-25 ENCOUNTER — HOSPITAL ENCOUNTER (EMERGENCY)
Facility: HOSPITAL | Age: 54
Discharge: HOME | End: 2024-03-25
Attending: EMERGENCY MEDICINE
Payer: OTHER GOVERNMENT

## 2024-03-25 VITALS
TEMPERATURE: 98 F | OXYGEN SATURATION: 98 % | RESPIRATION RATE: 17 BRPM | DIASTOLIC BLOOD PRESSURE: 70 MMHG | SYSTOLIC BLOOD PRESSURE: 116 MMHG | BODY MASS INDEX: 34.86 KG/M2 | WEIGHT: 249 LBS | HEART RATE: 80 BPM | HEIGHT: 71 IN

## 2024-03-25 DIAGNOSIS — K51.919 ULCERATIVE COLITIS WITH COMPLICATION, UNSPECIFIED LOCATION: ICD-10-CM

## 2024-03-25 DIAGNOSIS — D64.9 SYMPTOMATIC ANEMIA: Primary | ICD-10-CM

## 2024-03-25 DIAGNOSIS — R53.1 WEAKNESS: ICD-10-CM

## 2024-03-25 LAB
ABO + RH BLD: NORMAL
ALBUMIN SERPL BCP-MCNC: 2.6 G/DL (ref 3.5–5.2)
ALP SERPL-CCNC: 44 U/L (ref 55–135)
ALT SERPL W/O P-5'-P-CCNC: 26 U/L (ref 10–44)
ANION GAP SERPL CALC-SCNC: 7 MMOL/L (ref 8–16)
AST SERPL-CCNC: 11 U/L (ref 10–40)
BASOPHILS # BLD AUTO: 0.02 K/UL (ref 0–0.2)
BASOPHILS NFR BLD: 0.3 % (ref 0–1.9)
BILIRUB SERPL-MCNC: 0.3 MG/DL (ref 0.1–1)
BLD GP AB SCN CELLS X3 SERPL QL: NORMAL
BLD PROD TYP BPU: NORMAL
BLD PROD TYP BPU: NORMAL
BLOOD UNIT EXPIRATION DATE: NORMAL
BLOOD UNIT EXPIRATION DATE: NORMAL
BLOOD UNIT TYPE CODE: 6200
BLOOD UNIT TYPE CODE: 6200
BLOOD UNIT TYPE: NORMAL
BLOOD UNIT TYPE: NORMAL
BUN SERPL-MCNC: 12 MG/DL (ref 6–20)
CALCIUM SERPL-MCNC: 8.6 MG/DL (ref 8.7–10.5)
CHLORIDE SERPL-SCNC: 102 MMOL/L (ref 95–110)
CO2 SERPL-SCNC: 25 MMOL/L (ref 23–29)
CODING SYSTEM: NORMAL
CODING SYSTEM: NORMAL
CREAT SERPL-MCNC: 0.8 MG/DL (ref 0.5–1.4)
CROSSMATCH INTERPRETATION: NORMAL
CROSSMATCH INTERPRETATION: NORMAL
DIFFERENTIAL METHOD BLD: ABNORMAL
DISPENSE STATUS: NORMAL
DISPENSE STATUS: NORMAL
EOSINOPHIL # BLD AUTO: 0.1 K/UL (ref 0–0.5)
EOSINOPHIL NFR BLD: 1.5 % (ref 0–8)
ERYTHROCYTE [DISTWIDTH] IN BLOOD BY AUTOMATED COUNT: 18.6 % (ref 11.5–14.5)
EST. GFR  (NO RACE VARIABLE): >60 ML/MIN/1.73 M^2
GLUCOSE SERPL-MCNC: 147 MG/DL (ref 70–110)
HCT VFR BLD AUTO: 21.9 % (ref 40–54)
HGB BLD-MCNC: 6 G/DL (ref 14–18)
IMM GRANULOCYTES # BLD AUTO: 0.1 K/UL (ref 0–0.04)
IMM GRANULOCYTES NFR BLD AUTO: 1.3 % (ref 0–0.5)
LYMPHOCYTES # BLD AUTO: 1.2 K/UL (ref 1–4.8)
LYMPHOCYTES NFR BLD: 14.5 % (ref 18–48)
MCH RBC QN AUTO: 23.3 PG (ref 27–31)
MCHC RBC AUTO-ENTMCNC: 27.4 G/DL (ref 32–36)
MCV RBC AUTO: 85 FL (ref 82–98)
MONOCYTES # BLD AUTO: 0.4 K/UL (ref 0.3–1)
MONOCYTES NFR BLD: 4.4 % (ref 4–15)
NEUTROPHILS # BLD AUTO: 6.2 K/UL (ref 1.8–7.7)
NEUTROPHILS NFR BLD: 78 % (ref 38–73)
NRBC BLD-RTO: 1 /100 WBC
NUM UNITS TRANS PACKED RBC: NORMAL
NUM UNITS TRANS PACKED RBC: NORMAL
PLATELET # BLD AUTO: 447 K/UL (ref 150–450)
PMV BLD AUTO: 8.7 FL (ref 9.2–12.9)
POTASSIUM SERPL-SCNC: 4.9 MMOL/L (ref 3.5–5.1)
PROT SERPL-MCNC: 6.4 G/DL (ref 6–8.4)
RBC # BLD AUTO: 2.57 M/UL (ref 4.6–6.2)
SODIUM SERPL-SCNC: 134 MMOL/L (ref 136–145)
SPECIMEN OUTDATE: NORMAL
WBC # BLD AUTO: 7.93 K/UL (ref 3.9–12.7)

## 2024-03-25 PROCEDURE — 94760 N-INVAS EAR/PLS OXIMETRY 1: CPT

## 2024-03-25 PROCEDURE — 85025 COMPLETE CBC W/AUTO DIFF WBC: CPT | Performed by: PHYSICIAN ASSISTANT

## 2024-03-25 PROCEDURE — 86920 COMPATIBILITY TEST SPIN: CPT | Performed by: EMERGENCY MEDICINE

## 2024-03-25 PROCEDURE — 36430 TRANSFUSION BLD/BLD COMPNT: CPT

## 2024-03-25 PROCEDURE — 93010 ELECTROCARDIOGRAM REPORT: CPT | Mod: ,,, | Performed by: GENERAL PRACTICE

## 2024-03-25 PROCEDURE — 86850 RBC ANTIBODY SCREEN: CPT | Performed by: PHYSICIAN ASSISTANT

## 2024-03-25 PROCEDURE — 99285 EMERGENCY DEPT VISIT HI MDM: CPT | Mod: 25

## 2024-03-25 PROCEDURE — 93005 ELECTROCARDIOGRAM TRACING: CPT

## 2024-03-25 PROCEDURE — 80053 COMPREHEN METABOLIC PANEL: CPT | Performed by: PHYSICIAN ASSISTANT

## 2024-03-25 PROCEDURE — P9016 RBC LEUKOCYTES REDUCED: HCPCS | Performed by: EMERGENCY MEDICINE

## 2024-03-25 PROCEDURE — 36415 COLL VENOUS BLD VENIPUNCTURE: CPT | Performed by: PHYSICIAN ASSISTANT

## 2024-03-25 RX ORDER — HYDROCODONE BITARTRATE AND ACETAMINOPHEN 500; 5 MG/1; MG/1
TABLET ORAL
Status: DISCONTINUED | OUTPATIENT
Start: 2024-03-25 | End: 2024-03-25 | Stop reason: HOSPADM

## 2024-03-25 NOTE — FIRST PROVIDER EVALUATION
Emergency Department TeleTriage Encounter Note      CHIEF COMPLAINT    Chief Complaint   Patient presents with    Weakness    Shortness of Breath     Hx. Anemia  , blood transfusions        VITAL SIGNS   Initial Vitals [03/25/24 1057]   BP Pulse Resp Temp SpO2   122/63 92 18 98 °F (36.7 °C) 100 %      MAP       --            ALLERGIES    Review of patient's allergies indicates:  No Known Allergies    PROVIDER TRIAGE NOTE  Patient presents with complaint of feeling weak.  Reports history of transfusions.  States secondary to pancolitis.      Phy:   Constitutional: well nourished, well developed, appearing stated age, NAD        Initial orders will be placed and care will be transferred to an alternate provider when patient is roomed for a full evaluation. Any additional orders and the final disposition will be determined by that provider.        ORDERS  Labs Reviewed - No data to display    ED Orders (720h ago, onward)      None              Virtual Visit Note: The provider triage portion of this emergency department evaluation and documentation was performed via [employer], a HIPAA-compliant telemedicine application, in concert with a tele-presenter in the room. A face to face patient evaluation with one of my colleagues will occur once the patient is placed in an emergency department room.      DISCLAIMER: This note was prepared with [employer] voice recognition transcription software. Garbled syntax, mangled pronouns, and other bizarre constructions may be attributed to that software system.

## 2024-03-25 NOTE — ED PROVIDER NOTES
Encounter Date: 3/25/2024       History     Chief Complaint   Patient presents with    Weakness    Shortness of Breath     Hx. Anemia  , blood transfusions      HPI patient is a 54-year-old man with a history of ulcerative colitis on methotrexate and finishing steroids who presents emergency department for evaluation of pallor, feeling dizzy and fatigued.  He has a history of anemia in the past with his ulcerative colitis in his had a previous blood transfusion.  He is set up for iron infusions beginning tomorrow.  He was recently admitted to the hospital for ulcerative colitis flare and was given 2 units of blood about 3 weeks ago.  He states his symptoms have been significantly improving.  He denies any belly pain.  He has different amounts of bowel movements a day, mainly bleeds in the morning, proximally 7 bowel movements a day.  Awaiting Remicade approval.    Review of patient's allergies indicates:  No Known Allergies  Past Medical History:   Diagnosis Date    Colon polyp     Diabetes mellitus     Hypothyroid 07/05/2016    Sleep apnea     Thrombocytopenia     Ulcerative colitis      Past Surgical History:   Procedure Laterality Date    COLONOSCOPY N/A 12/04/2020    Procedure: COLONOSCOPY;  Surgeon: Ventura Warren MD;  Location: [facility];  Service: Endoscopy;  Laterality: N/A;    COLONOSCOPY N/A 07/19/2021    Procedure: COLONOSCOPY;  Surgeon: Clarence Ogden MD;  Location: [facility];  Service: Endoscopy;  Laterality: N/A;    COLONOSCOPY N/A 11/10/2022    Procedure: COLONOSCOPY;  Surgeon: Kath Hernandez MD;  Location: [facility];  Service: Endoscopy;  Laterality: N/A;    COLONOSCOPY N/A 2/29/2024    Procedure: COLONOSCOPY;  Surgeon: Kath Hernandez MD;  Location: [facility];  Service: Endoscopy;  Laterality: N/A;    FOOT SURGERY      left   cyst removed    HAND SURGERY      right   tendon repair     Family History   Problem Relation Age of Onset    Ulcerative colitis Mother         in 70s    Colon  cancer Neg Hx     Colon polyps Neg Hx     Crohn's disease Neg Hx      Social History     Tobacco Use    Smoking status: Former     Current packs/day: 0.00     Average packs/day: 1 pack/day for 30.0 years (30.0 ttl pk-yrs)     Types: Cigarettes     Start date: 1984     Quit date: 12/2013     Years since quitting: 10.3     Passive exposure: Past    Smokeless tobacco: Former     Types: Snuff     Quit date: 2020   Substance Use Topics    Alcohol use: Yes     Comment: occasional    Drug use: No     Review of Systems   Constitutional:  Positive for fatigue. Negative for fever.   HENT:  Negative for sore throat.    Respiratory:  Negative for shortness of breath.    Cardiovascular:  Negative for chest pain.   Gastrointestinal:  Positive for blood in stool and diarrhea. Negative for nausea.   Genitourinary:  Negative for dysuria.   Musculoskeletal:  Negative for back pain.   Skin:  Negative for rash.   Neurological:  Positive for light-headedness. Negative for weakness.   Hematological:  Does not bruise/bleed easily.       Physical Exam     Initial Vitals [03/25/24 1057]   BP Pulse Resp Temp SpO2   122/63 92 18 98 °F (36.7 °C) 100 %      MAP       --         Physical Exam    Nursing note and vitals reviewed.  Constitutional: He appears well-developed and well-nourished.   HENT:   Head: Normocephalic and atraumatic.   Eyes: EOM are normal. Pupils are equal, round, and reactive to light.   Conjunctiva is pale.   Neck: Neck supple.   Cardiovascular:  Normal rate and regular rhythm.           Pulmonary/Chest: No respiratory distress.   Abdominal: Abdomen is soft. He exhibits no distension. There is no abdominal tenderness.   Musculoskeletal:         General: Normal range of motion.      Cervical back: Neck supple.     Neurological: He is alert and oriented to person, place, and time. He has normal strength.   Skin: Skin is warm and dry. There is pallor.         ED Course   Procedures  Labs Reviewed   CBC W/ AUTO DIFFERENTIAL -  Abnormal; Notable for the following components:       Result Value    RBC 2.57 (*)     Hemoglobin 6.0 (*)     Hematocrit 21.9 (*)     MCH 23.3 (*)     MCHC 27.4 (*)     RDW 18.6 (*)     MPV 8.7 (*)     Immature Granulocytes 1.3 (*)     Immature Grans (Abs) 0.10 (*)     nRBC 1 (*)     Gran % 78.0 (*)     Lymph % 14.5 (*)     All other components within normal limits    Narrative:     HGB   critical result(s) called and verbal readback obtained from JEAN WATSON by TN3 03/25/2024 12:56   COMPREHENSIVE METABOLIC PANEL - Abnormal; Notable for the following components:    Sodium 134 (*)     Glucose 147 (*)     Calcium 8.6 (*)     Albumin 2.6 (*)     Alkaline Phosphatase 44 (*)     Anion Gap 7 (*)     All other components within normal limits   TYPE & SCREEN   PREPARE RBC SOFT     EKG Readings: (Independently Interpreted)   Rhythm: Normal Sinus Rhythm. Heart Rate: 87. Ectopy: No Ectopy. Conduction: Normal. ST Segments: Normal ST Segments. T Waves: Normal. Clinical Impression: Normal Sinus Rhythm       Imaging Results    None          Medications - No data to display    Medical Decision Making  54-year-old man with a history of ulcerative colitis on methotrexate with recent admission for pancolitis and pinching of his steroids presents emergency department for evaluation of symptomatic anemia.  From a symptomatic standpoint he feels much improved and does not believe he is having ulcerative colitis flare.  Still is having some bloody bowel movements but they have improved.  Laboratory evaluation reveals a hemoglobin of 6 with hematocrit of 21.  Renal function is normal with a creatinine 0.8 his BUN is 12.  He is set up for iron infusions beginning tomorrow.  Discussed with GI, Dr. Warren, who agrees patient does not need admission since his symptoms are improving and okay to go home after blood transfusion from the ED.    Risk  Prescription drug management.                                      Clinical  Impression:  Final diagnoses:  [R53.1] Weakness  [D64.9] Symptomatic anemia (Primary)  [K51.919] Ulcerative colitis with complication, unspecified location          ED Disposition Condition    Discharge Stable          ED Prescriptions    None       Follow-up Information       Follow up With Specialties Details Why Contact Info Additional Information    Vinh Noyola, PA-C Family Medicine   [address] [facility]  [address]  [facility] [address]  [phone]       [facility] -  Emergency Medicine  As needed, If symptoms worsen [address] [place] [address] [address] [place] [id number] 1st floor             Jorge Lockwood MD  03/27/24 4945

## 2024-03-26 ENCOUNTER — INFUSION (OUTPATIENT)
Dept: INFUSION THERAPY | Facility: HOSPITAL | Age: 54
End: 2024-03-26
Attending: INTERNAL MEDICINE
Payer: OTHER GOVERNMENT

## 2024-03-26 VITALS
DIASTOLIC BLOOD PRESSURE: 57 MMHG | TEMPERATURE: 98 F | OXYGEN SATURATION: 95 % | HEART RATE: 85 BPM | WEIGHT: 246.69 LBS | BODY MASS INDEX: 34.41 KG/M2 | SYSTOLIC BLOOD PRESSURE: 112 MMHG | RESPIRATION RATE: 16 BRPM

## 2024-03-26 DIAGNOSIS — D50.0 IRON DEFICIENCY ANEMIA DUE TO CHRONIC BLOOD LOSS: Primary | ICD-10-CM

## 2024-03-26 PROCEDURE — 25000003 PHARM REV CODE 250: Performed by: INTERNAL MEDICINE

## 2024-03-26 PROCEDURE — 96365 THER/PROPH/DIAG IV INF INIT: CPT

## 2024-03-26 PROCEDURE — 63600175 PHARM REV CODE 636 W HCPCS: Mod: JZ | Performed by: INTERNAL MEDICINE

## 2024-03-26 RX ORDER — ACETAMINOPHEN 325 MG/1
325 TABLET ORAL
Status: CANCELLED
Start: 2024-04-02

## 2024-03-26 RX ORDER — ACETAMINOPHEN 325 MG/1
325 TABLET ORAL
Status: COMPLETED | OUTPATIENT
Start: 2024-03-26 | End: 2024-03-26

## 2024-03-26 RX ORDER — DIPHENHYDRAMINE HYDROCHLORIDE 50 MG/ML
50 INJECTION INTRAMUSCULAR; INTRAVENOUS ONCE AS NEEDED
Status: CANCELLED | OUTPATIENT
Start: 2024-04-02

## 2024-03-26 RX ORDER — SODIUM CHLORIDE 0.9 % (FLUSH) 0.9 %
10 SYRINGE (ML) INJECTION
Status: CANCELLED | OUTPATIENT
Start: 2024-04-02

## 2024-03-26 RX ORDER — HEPARIN 100 UNIT/ML
5 SYRINGE INTRAVENOUS
Status: CANCELLED | OUTPATIENT
Start: 2024-04-02

## 2024-03-26 RX ORDER — SODIUM CHLORIDE 9 MG/ML
INJECTION, SOLUTION INTRAVENOUS CONTINUOUS
Status: CANCELLED | OUTPATIENT
Start: 2024-04-02

## 2024-03-26 RX ORDER — EPINEPHRINE 0.3 MG/.3ML
0.3 INJECTION SUBCUTANEOUS ONCE AS NEEDED
Status: CANCELLED | OUTPATIENT
Start: 2024-04-02

## 2024-03-26 RX ADMIN — FERRIC CARBOXYMALTOSE INJECTION 750 MG: 50 INJECTION, SOLUTION INTRAVENOUS at 09:03

## 2024-03-26 RX ADMIN — ACETAMINOPHEN 325 MG: 325 TABLET ORAL at 09:03

## 2024-03-26 RX ADMIN — SODIUM CHLORIDE: 9 INJECTION, SOLUTION INTRAVENOUS at 09:03

## 2024-03-26 NOTE — PLAN OF CARE
Tolerated infusion well with no adverse reaction.  VSS and pt is ambulatory out of infusion unit in [facility].

## 2024-03-28 ENCOUNTER — PATIENT MESSAGE (OUTPATIENT)
Dept: ADMINISTRATIVE | Facility: HOSPITAL | Age: 54
End: 2024-03-28
Payer: OTHER GOVERNMENT

## 2024-03-28 LAB
OHS QRS DURATION: 90 MS
OHS QTC CALCULATION: 442 MS

## 2024-03-30 ENCOUNTER — PATIENT MESSAGE (OUTPATIENT)
Dept: GASTROENTEROLOGY | Facility: CLINIC | Age: 54
End: 2024-03-30
Payer: OTHER GOVERNMENT

## 2024-04-02 ENCOUNTER — INFUSION (OUTPATIENT)
Dept: INFUSION THERAPY | Facility: HOSPITAL | Age: 54
End: 2024-04-02
Attending: INTERNAL MEDICINE
Payer: OTHER GOVERNMENT

## 2024-04-02 VITALS
BODY MASS INDEX: 35.04 KG/M2 | WEIGHT: 250.31 LBS | RESPIRATION RATE: 15 BRPM | DIASTOLIC BLOOD PRESSURE: 62 MMHG | TEMPERATURE: 99 F | HEART RATE: 97 BPM | SYSTOLIC BLOOD PRESSURE: 118 MMHG | OXYGEN SATURATION: 96 % | HEIGHT: 71 IN

## 2024-04-02 DIAGNOSIS — D50.0 IRON DEFICIENCY ANEMIA DUE TO CHRONIC BLOOD LOSS: Primary | ICD-10-CM

## 2024-04-02 PROCEDURE — 96365 THER/PROPH/DIAG IV INF INIT: CPT

## 2024-04-02 PROCEDURE — 63600175 PHARM REV CODE 636 W HCPCS: Mod: JZ | Performed by: INTERNAL MEDICINE

## 2024-04-02 PROCEDURE — 25000003 PHARM REV CODE 250: Performed by: INTERNAL MEDICINE

## 2024-04-02 RX ORDER — DIPHENHYDRAMINE HYDROCHLORIDE 50 MG/ML
50 INJECTION INTRAMUSCULAR; INTRAVENOUS ONCE AS NEEDED
Status: CANCELLED | OUTPATIENT
Start: 2024-04-09

## 2024-04-02 RX ORDER — HEPARIN 100 UNIT/ML
5 SYRINGE INTRAVENOUS
Status: CANCELLED | OUTPATIENT
Start: 2024-04-09

## 2024-04-02 RX ORDER — EPINEPHRINE 0.3 MG/.3ML
0.3 INJECTION SUBCUTANEOUS ONCE AS NEEDED
Status: DISCONTINUED | OUTPATIENT
Start: 2024-04-02 | End: 2024-04-02

## 2024-04-02 RX ORDER — ACETAMINOPHEN 325 MG/1
325 TABLET ORAL
Status: CANCELLED
Start: 2024-04-09

## 2024-04-02 RX ORDER — SODIUM CHLORIDE 9 MG/ML
INJECTION, SOLUTION INTRAVENOUS CONTINUOUS
Status: DISCONTINUED | OUTPATIENT
Start: 2024-04-02 | End: 2024-04-02

## 2024-04-02 RX ORDER — SODIUM CHLORIDE 9 MG/ML
INJECTION, SOLUTION INTRAVENOUS CONTINUOUS
Status: CANCELLED | OUTPATIENT
Start: 2024-04-09

## 2024-04-02 RX ORDER — SODIUM CHLORIDE 0.9 % (FLUSH) 0.9 %
10 SYRINGE (ML) INJECTION
Status: CANCELLED | OUTPATIENT
Start: 2024-04-09

## 2024-04-02 RX ORDER — EPINEPHRINE 0.3 MG/.3ML
0.3 INJECTION SUBCUTANEOUS ONCE AS NEEDED
Status: CANCELLED | OUTPATIENT
Start: 2024-04-09

## 2024-04-02 RX ORDER — DIPHENHYDRAMINE HYDROCHLORIDE 50 MG/ML
50 INJECTION INTRAMUSCULAR; INTRAVENOUS ONCE AS NEEDED
Status: DISCONTINUED | OUTPATIENT
Start: 2024-04-02 | End: 2024-04-02

## 2024-04-02 RX ORDER — ACETAMINOPHEN 325 MG/1
325 TABLET ORAL
Status: COMPLETED | OUTPATIENT
Start: 2024-04-02 | End: 2024-04-02

## 2024-04-02 RX ORDER — SODIUM CHLORIDE 0.9 % (FLUSH) 0.9 %
10 SYRINGE (ML) INJECTION
Status: DISCONTINUED | OUTPATIENT
Start: 2024-04-02 | End: 2024-04-02

## 2024-04-02 RX ADMIN — SODIUM CHLORIDE: 9 INJECTION, SOLUTION INTRAVENOUS at 09:04

## 2024-04-02 RX ADMIN — ACETAMINOPHEN 325 MG: 325 TABLET ORAL at 08:04

## 2024-04-02 RX ADMIN — FERRIC CARBOXYMALTOSE INJECTION 750 MG: 50 INJECTION, SOLUTION INTRAVENOUS at 09:04

## 2024-04-09 ENCOUNTER — INFUSION (OUTPATIENT)
Dept: INFUSION THERAPY | Facility: HOSPITAL | Age: 54
End: 2024-04-09
Attending: INTERNAL MEDICINE
Payer: OTHER GOVERNMENT

## 2024-04-09 VITALS
BODY MASS INDEX: 35.1 KG/M2 | RESPIRATION RATE: 18 BRPM | HEART RATE: 80 BPM | TEMPERATURE: 98 F | HEIGHT: 71 IN | SYSTOLIC BLOOD PRESSURE: 109 MMHG | WEIGHT: 250.75 LBS | DIASTOLIC BLOOD PRESSURE: 71 MMHG

## 2024-04-09 DIAGNOSIS — K51.00 ULCERATIVE PANCOLITIS: Primary | ICD-10-CM

## 2024-04-09 LAB
ANISOCYTOSIS BLD QL SMEAR: SLIGHT
BASOPHILS # BLD AUTO: 0.04 K/UL (ref 0–0.2)
BASOPHILS NFR BLD: 0.5 % (ref 0–1.9)
DACRYOCYTES BLD QL SMEAR: ABNORMAL
DIFFERENTIAL METHOD BLD: ABNORMAL
EOSINOPHIL # BLD AUTO: 0.3 K/UL (ref 0–0.5)
EOSINOPHIL NFR BLD: 3.2 % (ref 0–8)
ERYTHROCYTE [DISTWIDTH] IN BLOOD BY AUTOMATED COUNT: 25 % (ref 11.5–14.5)
HCT VFR BLD AUTO: 30 % (ref 40–54)
HGB BLD-MCNC: 8.6 G/DL (ref 14–18)
HYPOCHROMIA BLD QL SMEAR: ABNORMAL
IMM GRANULOCYTES # BLD AUTO: 0.13 K/UL (ref 0–0.04)
IMM GRANULOCYTES NFR BLD AUTO: 1.5 % (ref 0–0.5)
LYMPHOCYTES # BLD AUTO: 1.1 K/UL (ref 1–4.8)
LYMPHOCYTES NFR BLD: 13.2 % (ref 18–48)
MCH RBC QN AUTO: 26.8 PG (ref 27–31)
MCHC RBC AUTO-ENTMCNC: 28.7 G/DL (ref 32–36)
MCV RBC AUTO: 94 FL (ref 82–98)
MONOCYTES # BLD AUTO: 0.4 K/UL (ref 0.3–1)
MONOCYTES NFR BLD: 5 % (ref 4–15)
NEUTROPHILS # BLD AUTO: 6.4 K/UL (ref 1.8–7.7)
NEUTROPHILS NFR BLD: 76.6 % (ref 38–73)
NRBC BLD-RTO: 0 /100 WBC
PLATELET # BLD AUTO: 355 K/UL (ref 150–450)
PLATELET BLD QL SMEAR: ABNORMAL
PMV BLD AUTO: 9.6 FL (ref 9.2–12.9)
POLYCHROMASIA BLD QL SMEAR: ABNORMAL
RBC # BLD AUTO: 3.21 M/UL (ref 4.6–6.2)
WBC # BLD AUTO: 8.4 K/UL (ref 3.9–12.7)

## 2024-04-09 PROCEDURE — 85025 COMPLETE CBC W/AUTO DIFF WBC: CPT | Performed by: INTERNAL MEDICINE

## 2024-04-09 PROCEDURE — 96375 TX/PRO/DX INJ NEW DRUG ADDON: CPT

## 2024-04-09 PROCEDURE — 96415 CHEMO IV INFUSION ADDL HR: CPT

## 2024-04-09 PROCEDURE — 96367 TX/PROPH/DG ADDL SEQ IV INF: CPT

## 2024-04-09 PROCEDURE — 63600175 PHARM REV CODE 636 W HCPCS: Performed by: INTERNAL MEDICINE

## 2024-04-09 PROCEDURE — 96413 CHEMO IV INFUSION 1 HR: CPT

## 2024-04-09 PROCEDURE — 25000003 PHARM REV CODE 250: Performed by: INTERNAL MEDICINE

## 2024-04-09 RX ORDER — DIPHENHYDRAMINE HYDROCHLORIDE 50 MG/ML
50 INJECTION INTRAMUSCULAR; INTRAVENOUS ONCE AS NEEDED
Status: DISCONTINUED | OUTPATIENT
Start: 2024-04-09 | End: 2024-04-09 | Stop reason: HOSPADM

## 2024-04-09 RX ORDER — ACETAMINOPHEN 325 MG/1
650 TABLET ORAL
Status: CANCELLED | OUTPATIENT
Start: 2024-06-04

## 2024-04-09 RX ORDER — DIPHENHYDRAMINE HYDROCHLORIDE 50 MG/ML
50 INJECTION INTRAMUSCULAR; INTRAVENOUS ONCE AS NEEDED
Status: CANCELLED | OUTPATIENT
Start: 2024-06-04

## 2024-04-09 RX ORDER — EPINEPHRINE 0.3 MG/.3ML
0.3 INJECTION SUBCUTANEOUS ONCE AS NEEDED
Status: CANCELLED | OUTPATIENT
Start: 2024-06-04

## 2024-04-09 RX ORDER — SODIUM CHLORIDE 0.9 % (FLUSH) 0.9 %
10 SYRINGE (ML) INJECTION
Status: DISCONTINUED | OUTPATIENT
Start: 2024-04-09 | End: 2024-04-09 | Stop reason: HOSPADM

## 2024-04-09 RX ORDER — METHYLPREDNISOLONE SOD SUCC 125 MG
40 VIAL (EA) INJECTION
Status: CANCELLED | OUTPATIENT
Start: 2024-06-04

## 2024-04-09 RX ORDER — IPRATROPIUM BROMIDE AND ALBUTEROL SULFATE 2.5; .5 MG/3ML; MG/3ML
3 SOLUTION RESPIRATORY (INHALATION)
Status: CANCELLED | OUTPATIENT
Start: 2024-06-04

## 2024-04-09 RX ORDER — EPINEPHRINE 0.3 MG/.3ML
0.3 INJECTION SUBCUTANEOUS ONCE AS NEEDED
Status: DISCONTINUED | OUTPATIENT
Start: 2024-04-09 | End: 2024-04-09 | Stop reason: HOSPADM

## 2024-04-09 RX ORDER — ACETAMINOPHEN 325 MG/1
650 TABLET ORAL
Status: COMPLETED | OUTPATIENT
Start: 2024-04-09 | End: 2024-04-09

## 2024-04-09 RX ORDER — SODIUM CHLORIDE 0.9 % (FLUSH) 0.9 %
10 SYRINGE (ML) INJECTION
Status: CANCELLED | OUTPATIENT
Start: 2024-06-04

## 2024-04-09 RX ORDER — HEPARIN 100 UNIT/ML
500 SYRINGE INTRAVENOUS
Status: CANCELLED | OUTPATIENT
Start: 2024-06-04

## 2024-04-09 RX ORDER — DIPHENHYDRAMINE HYDROCHLORIDE 50 MG/ML
25 INJECTION INTRAMUSCULAR; INTRAVENOUS
Status: CANCELLED | OUTPATIENT
Start: 2024-06-04

## 2024-04-09 RX ORDER — METHYLPREDNISOLONE SOD SUCC 125 MG
40 VIAL (EA) INJECTION
Status: COMPLETED | OUTPATIENT
Start: 2024-04-09 | End: 2024-04-09

## 2024-04-09 RX ADMIN — DIPHENHYDRAMINE HYDROCHLORIDE 25 MG: 50 INJECTION, SOLUTION INTRAMUSCULAR; INTRAVENOUS at 10:04

## 2024-04-09 RX ADMIN — ACETAMINOPHEN 650 MG: 325 TABLET ORAL at 10:04

## 2024-04-09 RX ADMIN — SODIUM CHLORIDE 1100 MG: 9 INJECTION, SOLUTION INTRAVENOUS at 11:04

## 2024-04-09 RX ADMIN — METHYLPREDNISOLONE SODIUM SUCCINATE 40 MG: 125 INJECTION, POWDER, FOR SOLUTION INTRAMUSCULAR; INTRAVENOUS at 10:04

## 2024-04-09 RX ADMIN — SODIUM CHLORIDE: 9 INJECTION, SOLUTION INTRAVENOUS at 10:04

## 2024-04-09 NOTE — PLAN OF CARE
Problem: Fatigue  Goal: Improved Activity Tolerance  4/9/2024 1037 by Suzanne Gill, RN  Outcome: Met  4/9/2024 1037 by Suzanne Gill, RN  Outcome: Ongoing, Progressing

## 2024-04-23 ENCOUNTER — INFUSION (OUTPATIENT)
Dept: INFUSION THERAPY | Facility: HOSPITAL | Age: 54
End: 2024-04-23
Attending: INTERNAL MEDICINE
Payer: OTHER GOVERNMENT

## 2024-04-23 VITALS
TEMPERATURE: 98 F | RESPIRATION RATE: 16 BRPM | WEIGHT: 255 LBS | HEIGHT: 71 IN | DIASTOLIC BLOOD PRESSURE: 67 MMHG | BODY MASS INDEX: 35.7 KG/M2 | HEART RATE: 81 BPM | SYSTOLIC BLOOD PRESSURE: 108 MMHG

## 2024-04-23 DIAGNOSIS — K51.00 ULCERATIVE PANCOLITIS: Primary | ICD-10-CM

## 2024-04-23 LAB
ALBUMIN SERPL BCP-MCNC: 3.7 G/DL (ref 3.5–5.2)
ALP SERPL-CCNC: 52 U/L (ref 55–135)
ALT SERPL W/O P-5'-P-CCNC: 19 U/L (ref 10–44)
ANION GAP SERPL CALC-SCNC: 9 MMOL/L (ref 8–16)
AST SERPL-CCNC: 16 U/L (ref 10–40)
BASOPHILS # BLD AUTO: 0.07 K/UL (ref 0–0.2)
BASOPHILS NFR BLD: 1 % (ref 0–1.9)
BILIRUB SERPL-MCNC: 0.3 MG/DL (ref 0.1–1)
BUN SERPL-MCNC: 12 MG/DL (ref 6–20)
CALCIUM SERPL-MCNC: 8.7 MG/DL (ref 8.7–10.5)
CHLORIDE SERPL-SCNC: 104 MMOL/L (ref 95–110)
CO2 SERPL-SCNC: 25 MMOL/L (ref 23–29)
CREAT SERPL-MCNC: 0.9 MG/DL (ref 0.5–1.4)
DIFFERENTIAL METHOD BLD: ABNORMAL
EOSINOPHIL # BLD AUTO: 0.3 K/UL (ref 0–0.5)
EOSINOPHIL NFR BLD: 4.5 % (ref 0–8)
ERYTHROCYTE [DISTWIDTH] IN BLOOD BY AUTOMATED COUNT: 24.2 % (ref 11.5–14.5)
EST. GFR  (NO RACE VARIABLE): >60 ML/MIN/1.73 M^2
GLUCOSE SERPL-MCNC: 140 MG/DL (ref 70–110)
HCT VFR BLD AUTO: 34.5 % (ref 40–54)
HGB BLD-MCNC: 10.1 G/DL (ref 14–18)
IMM GRANULOCYTES # BLD AUTO: 0.02 K/UL (ref 0–0.04)
IMM GRANULOCYTES NFR BLD AUTO: 0.3 % (ref 0–0.5)
LYMPHOCYTES # BLD AUTO: 2.3 K/UL (ref 1–4.8)
LYMPHOCYTES NFR BLD: 33.2 % (ref 18–48)
MCH RBC QN AUTO: 28.1 PG (ref 27–31)
MCHC RBC AUTO-ENTMCNC: 29.3 G/DL (ref 32–36)
MCV RBC AUTO: 96 FL (ref 82–98)
MONOCYTES # BLD AUTO: 0.4 K/UL (ref 0.3–1)
MONOCYTES NFR BLD: 5.5 % (ref 4–15)
NEUTROPHILS # BLD AUTO: 3.9 K/UL (ref 1.8–7.7)
NEUTROPHILS NFR BLD: 55.5 % (ref 38–73)
NRBC BLD-RTO: 0 /100 WBC
PLATELET # BLD AUTO: 268 K/UL (ref 150–450)
PMV BLD AUTO: 9.4 FL (ref 9.2–12.9)
POTASSIUM SERPL-SCNC: 3.9 MMOL/L (ref 3.5–5.1)
PROT SERPL-MCNC: 6.6 G/DL (ref 6–8.4)
RBC # BLD AUTO: 3.59 M/UL (ref 4.6–6.2)
SODIUM SERPL-SCNC: 138 MMOL/L (ref 136–145)
WBC # BLD AUTO: 7.05 K/UL (ref 3.9–12.7)

## 2024-04-23 PROCEDURE — 96413 CHEMO IV INFUSION 1 HR: CPT

## 2024-04-23 PROCEDURE — 96415 CHEMO IV INFUSION ADDL HR: CPT

## 2024-04-23 PROCEDURE — 85025 COMPLETE CBC W/AUTO DIFF WBC: CPT | Performed by: INTERNAL MEDICINE

## 2024-04-23 PROCEDURE — 96367 TX/PROPH/DG ADDL SEQ IV INF: CPT

## 2024-04-23 PROCEDURE — 80053 COMPREHEN METABOLIC PANEL: CPT | Performed by: INTERNAL MEDICINE

## 2024-04-23 PROCEDURE — 63600175 PHARM REV CODE 636 W HCPCS: Mod: JZ,JG | Performed by: INTERNAL MEDICINE

## 2024-04-23 PROCEDURE — 96375 TX/PRO/DX INJ NEW DRUG ADDON: CPT

## 2024-04-23 PROCEDURE — 25000003 PHARM REV CODE 250: Performed by: INTERNAL MEDICINE

## 2024-04-23 RX ORDER — ACETAMINOPHEN 325 MG/1
650 TABLET ORAL
Status: CANCELLED | OUTPATIENT
Start: 2024-05-21

## 2024-04-23 RX ORDER — DIPHENHYDRAMINE HYDROCHLORIDE 50 MG/ML
25 INJECTION INTRAMUSCULAR; INTRAVENOUS
Status: CANCELLED | OUTPATIENT
Start: 2024-05-21

## 2024-04-23 RX ORDER — ACETAMINOPHEN 325 MG/1
650 TABLET ORAL
Status: COMPLETED | OUTPATIENT
Start: 2024-04-23 | End: 2024-04-23

## 2024-04-23 RX ORDER — IPRATROPIUM BROMIDE AND ALBUTEROL SULFATE 2.5; .5 MG/3ML; MG/3ML
3 SOLUTION RESPIRATORY (INHALATION)
Status: CANCELLED | OUTPATIENT
Start: 2024-05-21

## 2024-04-23 RX ORDER — SODIUM CHLORIDE 0.9 % (FLUSH) 0.9 %
10 SYRINGE (ML) INJECTION
Status: CANCELLED | OUTPATIENT
Start: 2024-05-21

## 2024-04-23 RX ORDER — DIPHENHYDRAMINE HYDROCHLORIDE 50 MG/ML
50 INJECTION INTRAMUSCULAR; INTRAVENOUS ONCE AS NEEDED
Status: DISCONTINUED | OUTPATIENT
Start: 2024-04-23 | End: 2024-04-23 | Stop reason: HOSPADM

## 2024-04-23 RX ORDER — SODIUM CHLORIDE 0.9 % (FLUSH) 0.9 %
10 SYRINGE (ML) INJECTION
Status: DISCONTINUED | OUTPATIENT
Start: 2024-04-23 | End: 2024-04-23 | Stop reason: HOSPADM

## 2024-04-23 RX ORDER — EPINEPHRINE 0.3 MG/.3ML
0.3 INJECTION SUBCUTANEOUS ONCE AS NEEDED
Status: DISCONTINUED | OUTPATIENT
Start: 2024-04-23 | End: 2024-04-23 | Stop reason: HOSPADM

## 2024-04-23 RX ORDER — DIPHENHYDRAMINE HYDROCHLORIDE 50 MG/ML
50 INJECTION INTRAMUSCULAR; INTRAVENOUS ONCE AS NEEDED
Status: CANCELLED | OUTPATIENT
Start: 2024-05-21

## 2024-04-23 RX ORDER — EPINEPHRINE 0.3 MG/.3ML
0.3 INJECTION SUBCUTANEOUS ONCE AS NEEDED
Status: CANCELLED | OUTPATIENT
Start: 2024-05-21

## 2024-04-23 RX ORDER — METHYLPREDNISOLONE SOD SUCC 125 MG
40 VIAL (EA) INJECTION
Status: CANCELLED | OUTPATIENT
Start: 2024-05-21

## 2024-04-23 RX ORDER — HEPARIN 100 UNIT/ML
500 SYRINGE INTRAVENOUS
Status: CANCELLED | OUTPATIENT
Start: 2024-05-21

## 2024-04-23 RX ADMIN — SODIUM CHLORIDE 1100 MG: 9 INJECTION, SOLUTION INTRAVENOUS at 01:04

## 2024-04-23 RX ADMIN — ACETAMINOPHEN 650 MG: 325 TABLET ORAL at 01:04

## 2024-04-23 RX ADMIN — DIPHENHYDRAMINE HYDROCHLORIDE 25 MG: 50 INJECTION, SOLUTION INTRAMUSCULAR; INTRAVENOUS at 01:04

## 2024-04-23 RX ADMIN — METHYLPREDNISOLONE SODIUM SUCCINATE 40 MG: 40 INJECTION, POWDER, FOR SOLUTION INTRAMUSCULAR; INTRAVENOUS at 01:04

## 2024-04-23 NOTE — PLAN OF CARE
Problem: Fatigue  Goal: Improved Activity Tolerance  Outcome: Progressing  Intervention: Promote Improved Energy  Flowsheets (Taken 4/23/2024 1897)  Fatigue Management:   frequent rest breaks encouraged   fatigue-related activity identified  Sleep/Rest Enhancement:   regular sleep/rest pattern promoted   reading promoted  Environmental Support:   environmental consistency promoted   rest periods encouraged

## 2024-04-29 ENCOUNTER — OFFICE VISIT (OUTPATIENT)
Dept: FAMILY MEDICINE | Facility: CLINIC | Age: 54
End: 2024-04-29
Payer: OTHER GOVERNMENT

## 2024-04-29 VITALS
BODY MASS INDEX: 35.84 KG/M2 | SYSTOLIC BLOOD PRESSURE: 111 MMHG | DIASTOLIC BLOOD PRESSURE: 63 MMHG | HEART RATE: 77 BPM | WEIGHT: 256 LBS | HEIGHT: 71 IN | OXYGEN SATURATION: 97 % | RESPIRATION RATE: 18 BRPM

## 2024-04-29 DIAGNOSIS — K12.1 STOMATITIS AND MUCOSITIS: ICD-10-CM

## 2024-04-29 DIAGNOSIS — E11.9 TYPE 2 DIABETES MELLITUS WITHOUT COMPLICATION, WITHOUT LONG-TERM CURRENT USE OF INSULIN: Primary | ICD-10-CM

## 2024-04-29 DIAGNOSIS — K51.911 ULCERATIVE COLITIS WITH RECTAL BLEEDING, UNSPECIFIED LOCATION: ICD-10-CM

## 2024-04-29 DIAGNOSIS — K12.30 STOMATITIS AND MUCOSITIS: ICD-10-CM

## 2024-04-29 PROCEDURE — 99214 OFFICE O/P EST MOD 30 MIN: CPT | Mod: S$GLB,,, | Performed by: PHYSICIAN ASSISTANT

## 2024-04-29 NOTE — PROGRESS NOTES
SUBJECTIVE:    Patient ID: Isidoro Singh is a 54 y.o. male.    Chief Complaint: Follow-up (No bottles//no refills needed// pt states he have sores inside of jaws and on gums noticed after taking Methotrexate started in March not painful just discomfort /pt is due for foot exam / A1c was done 02/22)    53 yo male presents for regularly scheduled PCP followup. Under care of GI for severe ulcerative pancolitis. Recently started on MTX and remicade was approved and on loading dose now. NO noticeable blood in the stool. H/H trending up. Maintainas on mesalamine also. Concerned about some stomatitis likely 2/2 MTX.     Follow-up  Associated symptoms include headaches. Pertinent negatives include no arthralgias, chest pain, joint swelling, neck pain, vomiting or weakness.       Infusion on 04/23/2024   Component Date Value Ref Range Status    Sodium 04/23/2024 138  136 - 145 mmol/L Final    Potassium 04/23/2024 3.9  3.5 - 5.1 mmol/L Final    Chloride 04/23/2024 104  95 - 110 mmol/L Final    CO2 04/23/2024 25  23 - 29 mmol/L Final    Glucose 04/23/2024 140 (H)  70 - 110 mg/dL Final    BUN 04/23/2024 12  6 - 20 mg/dL Final    Creatinine 04/23/2024 0.9  0.5 - 1.4 mg/dL Final    Calcium 04/23/2024 8.7  8.7 - 10.5 mg/dL Final    Total Protein 04/23/2024 6.6  6.0 - 8.4 g/dL Final    Albumin 04/23/2024 3.7  3.5 - 5.2 g/dL Final    Total Bilirubin 04/23/2024 0.3  0.1 - 1.0 mg/dL Final    Alkaline Phosphatase 04/23/2024 52 (L)  55 - 135 U/L Final    AST 04/23/2024 16  10 - 40 U/L Final    ALT 04/23/2024 19  10 - 44 U/L Final    eGFR 04/23/2024 >60.0  >60 mL/min/1.73 m^2 Final    Anion Gap 04/23/2024 9  8 - 16 mmol/L Final    WBC 04/23/2024 7.05  3.90 - 12.70 K/uL Final    RBC 04/23/2024 3.59 (L)  4.60 - 6.20 M/uL Final    Hemoglobin 04/23/2024 10.1 (L)  14.0 - 18.0 g/dL Final    Hematocrit 04/23/2024 34.5 (L)  40.0 - 54.0 % Final    MCV 04/23/2024 96  82 - 98 fL Final    MCH 04/23/2024 28.1  27.0 - 31.0  pg Final    MCHC 04/23/2024 29.3 (L)  32.0 - 36.0 g/dL Final    RDW 04/23/2024 24.2 (H)  11.5 - 14.5 % Final    Platelets 04/23/2024 268  150 - 450 K/uL Final    MPV 04/23/2024 9.4  9.2 - 12.9 fL Final    Immature Granulocytes 04/23/2024 0.3  0.0 - 0.5 % Final    Gran # (ANC) 04/23/2024 3.9  1.8 - 7.7 K/uL Final    Immature Grans (Abs) 04/23/2024 0.02  0.00 - 0.04 K/uL Final    Lymph # 04/23/2024 2.3  1.0 - 4.8 K/uL Final    Mono # 04/23/2024 0.4  0.3 - 1.0 K/uL Final    Eos # 04/23/2024 0.3  0.0 - 0.5 K/uL Final    Baso # 04/23/2024 0.07  0.00 - 0.20 K/uL Final    nRBC 04/23/2024 0  0 /100 WBC Final    Gran % 04/23/2024 55.5  38.0 - 73.0 % Final    Lymph % 04/23/2024 33.2  18.0 - 48.0 % Final    Mono % 04/23/2024 5.5  4.0 - 15.0 % Final    Eosinophil % 04/23/2024 4.5  0.0 - 8.0 % Final    Basophil % 04/23/2024 1.0  0.0 - 1.9 % Final    Differential Method 04/23/2024 Automated   Final   Infusion on 04/09/2024   Component Date Value Ref Range Status    WBC 04/09/2024 8.40  3.90 - 12.70 K/uL Final    RBC 04/09/2024 3.21 (L)  4.60 - 6.20 M/uL Final    Hemoglobin 04/09/2024 8.6 (L)  14.0 - 18.0 g/dL Final    Hematocrit 04/09/2024 30.0 (L)  40.0 - 54.0 % Final    MCV 04/09/2024 94  82 - 98 fL Final    MCH 04/09/2024 26.8 (L)  27.0 - 31.0 pg Final    MCHC 04/09/2024 28.7 (L)  32.0 - 36.0 g/dL Final    RDW 04/09/2024 25.0 (H)  11.5 - 14.5 % Final    Platelets 04/09/2024 355  150 - 450 K/uL Final    MPV 04/09/2024 9.6  9.2 - 12.9 fL Final    Immature Granulocytes 04/09/2024 1.5 (H)  0.0 - 0.5 % Final    Gran # (ANC) 04/09/2024 6.4  1.8 - 7.7 K/uL Final    Immature Grans (Abs) 04/09/2024 0.13 (H)  0.00 - 0.04 K/uL Final    Lymph # 04/09/2024 1.1  1.0 - 4.8 K/uL Final    Mono # 04/09/2024 0.4  0.3 - 1.0 K/uL Final    Eos # 04/09/2024 0.3  0.0 - 0.5 K/uL Final    Baso # 04/09/2024 0.04  0.00 - 0.20 K/uL Final    nRBC 04/09/2024 0  0 /100 WBC Final    Gran % 04/09/2024 76.6 (H)  38.0 - 73.0  % Final    Lymph % 04/09/2024 13.2 (L)  18.0 - 48.0 % Final    Mono % 04/09/2024 5.0  4.0 - 15.0 % Final    Eosinophil % 04/09/2024 3.2  0.0 - 8.0 % Final    Basophil % 04/09/2024 0.5  0.0 - 1.9 % Final    Platelet Estimate 04/09/2024 Appears normal   Final    Aniso 04/09/2024 Slight   Final    Poly 04/09/2024 Occasional   Final    Hypo 04/09/2024 Occasional   Final    Tear Drop Cells 04/09/2024 Occasional   Final    Differential Method 04/09/2024 Automated   Final   Admission on 03/25/2024, Discharged on 03/25/2024   Component Date Value Ref Range Status    WBC 03/25/2024 7.93  3.90 - 12.70 K/uL Final    RBC 03/25/2024 2.57 (L)  4.60 - 6.20 M/uL Final    Hemoglobin 03/25/2024 6.0 (LL)  14.0 - 18.0 g/dL Final    Hematocrit 03/25/2024 21.9 (L)  40.0 - 54.0 % Final    MCV 03/25/2024 85  82 - 98 fL Final    MCH 03/25/2024 23.3 (L)  27.0 - 31.0 pg Final    MCHC 03/25/2024 27.4 (L)  32.0 - 36.0 g/dL Final    RDW 03/25/2024 18.6 (H)  11.5 - 14.5 % Final    Platelets 03/25/2024 447  150 - 450 K/uL Final    MPV 03/25/2024 8.7 (L)  9.2 - 12.9 fL Final    Immature Granulocytes 03/25/2024 1.3 (H)  0.0 - 0.5 % Final    Gran # (ANC) 03/25/2024 6.2  1.8 - 7.7 K/uL Final    Immature Grans (Abs) 03/25/2024 0.10 (H)  0.00 - 0.04 K/uL Final    Lymph # 03/25/2024 1.2  1.0 - 4.8 K/uL Final    Mono # 03/25/2024 0.4  0.3 - 1.0 K/uL Final    Eos # 03/25/2024 0.1  0.0 - 0.5 K/uL Final    Baso # 03/25/2024 0.02  0.00 - 0.20 K/uL Final    nRBC 03/25/2024 1 (A)  0 /100 WBC Final    Gran % 03/25/2024 78.0 (H)  38.0 - 73.0 % Final    Lymph % 03/25/2024 14.5 (L)  18.0 - 48.0 % Final    Mono % 03/25/2024 4.4  4.0 - 15.0 % Final    Eosinophil % 03/25/2024 1.5  0.0 - 8.0 % Final    Basophil % 03/25/2024 0.3  0.0 - 1.9 % Final    Differential Method 03/25/2024 Automated   Final    Sodium 03/25/2024 134 (L)  136 - 145 mmol/L Final    Potassium 03/25/2024 4.9  3.5 - 5.1 mmol/L Final    Chloride 03/25/2024 102  95 -  110 mmol/L Final    CO2 03/25/2024 25  23 - 29 mmol/L Final    Glucose 03/25/2024 147 (H)  70 - 110 mg/dL Final    BUN 03/25/2024 12  6 - 20 mg/dL Final    Creatinine 03/25/2024 0.8  0.5 - 1.4 mg/dL Final    Calcium 03/25/2024 8.6 (L)  8.7 - 10.5 mg/dL Final    Total Protein 03/25/2024 6.4  6.0 - 8.4 g/dL Final    Albumin 03/25/2024 2.6 (L)  3.5 - 5.2 g/dL Final    Total Bilirubin 03/25/2024 0.3  0.1 - 1.0 mg/dL Final    Alkaline Phosphatase 03/25/2024 44 (L)  55 - 135 U/L Final    AST 03/25/2024 11  10 - 40 U/L Final    ALT 03/25/2024 26  10 - 44 U/L Final    eGFR 03/25/2024 >60  >60 mL/min/1.73 m^2 Final    Anion Gap 03/25/2024 7 (L)  8 - 16 mmol/L Final    Group & Rh 03/25/2024 A POS   Final    Indirect Alyssa 03/25/2024 NEG   Final    Specimen Outdate 03/25/2024 03/28/2024 23:59   Final    QRS Duration 03/25/2024 90  ms Final    [facility] QTC Calculation 03/25/2024 442  ms Final    UNIT NUMBER 03/25/2024 [id number]   Final    Product Code 03/25/2024 [id number]   Final    DISPENSE STATUS 03/25/2024 TRANSFUSED   Final    CODING SYSTEM 03/25/2024 [id number]   Final    Unit Blood Type Code 03/25/2024 6200   Final    Unit Blood Type 03/25/2024 A POS   Final    Unit Expiration 03/25/2024 [id number]   Final    CROSSMATCH INTERPRETATION 03/25/2024 Compatible   Final    UNIT NUMBER 03/25/2024 [id number]   Final    Product Code 03/25/2024 [id number]   Final    DISPENSE STATUS 03/25/2024 TRANSFUSED   Final    CODING SYSTEM 03/25/2024 [id number]   Final    Unit Blood Type Code 03/25/2024 6200   Final    Unit Blood Type 03/25/2024 A POS   Final    Unit Expiration 03/25/2024 [id number]   Final    CROSSMATCH INTERPRETATION 03/25/2024 Compatible   Final   Lab Visit on 03/12/2024   Component Date Value Ref Range Status    WBC 03/12/2024 10.04  3.90 - 12.70 K/uL Final    RBC 03/12/2024 2.95 (L)  4.60 - 6.20 M/uL Final    Hemoglobin 03/12/2024 7.5 (L)  14.0 - 18.0 g/dL Final    Hematocrit 03/12/2024  26.3 (L)  40.0 - 54.0 % Final    MCV 03/12/2024 89  82 - 98 fL Final    MCH 03/12/2024 25.4 (L)  27.0 - 31.0 pg Final    MCHC 03/12/2024 28.5 (L)  32.0 - 36.0 g/dL Final    RDW 03/12/2024 18.3 (H)  11.5 - 14.5 % Final    Platelets 03/12/2024 380  150 - 450 K/uL Final    MPV 03/12/2024 8.8 (L)  9.2 - 12.9 fL Final    Immature Granulocytes 03/12/2024 0.8 (H)  0.0 - 0.5 % Final    Gran # (ANC) 03/12/2024 8.0 (H)  1.8 - 7.7 K/uL Final    Immature Grans (Abs) 03/12/2024 0.08 (H)  0.00 - 0.04 K/uL Final    Lymph # 03/12/2024 1.3  1.0 - 4.8 K/uL Final    Mono # 03/12/2024 0.5  0.3 - 1.0 K/uL Final    Eos # 03/12/2024 0.1  0.0 - 0.5 K/uL Final    Baso # 03/12/2024 0.04  0.00 - 0.20 K/uL Final    nRBC 03/12/2024 0  0 /100 WBC Final    Gran % 03/12/2024 79.5 (H)  38.0 - 73.0 % Final    Lymph % 03/12/2024 13.0 (L)  18.0 - 48.0 % Final    Mono % 03/12/2024 4.9  4.0 - 15.0 % Final    Eosinophil % 03/12/2024 1.4  0.0 - 8.0 % Final    Basophil % 03/12/2024 0.4  0.0 - 1.9 % Final    Differential Method 03/12/2024 Automated   Final    Iron 03/12/2024 16 (L)  45 - 160 ug/dL Final    Transferrin 03/12/2024 212  200 - 375 mg/dL Final    TIBC 03/12/2024 297  250 - 450 ug/dL Final    Saturated Iron 03/12/2024 5 (L)  20 - 50 % Final    Ferritin 03/12/2024 103  20.0 - 300.0 ng/mL Final   Admission on 02/29/2024, Discharged on 03/03/2024   Component Date Value Ref Range Status    WBC 02/29/2024 7.92  3.90 - 12.70 K/uL Final    RBC 02/29/2024 3.29 (L)  4.60 - 6.20 M/uL Final    Hemoglobin 02/29/2024 8.3 (L)  14.0 - 18.0 g/dL Final    Hematocrit 02/29/2024 28.8 (L)  40.0 - 54.0 % Final    MCV 02/29/2024 88  82 - 98 fL Final    MCH 02/29/2024 25.2 (L)  27.0 - 31.0 pg Final    MCHC 02/29/2024 28.8 (L)  32.0 - 36.0 g/dL Final    RDW 02/29/2024 15.1 (H)  11.5 - 14.5 % Final    Platelets 02/29/2024 393  150 - 450 K/uL Final    MPV 02/29/2024 8.8 (L)  9.2 - 12.9 fL Final    Immature Granulocytes 02/29/2024 0.5   0.0 - 0.5 % Final    Gran # (ANC) 02/29/2024 4.1  1.8 - 7.7 K/uL Final    Immature Grans (Abs) 02/29/2024 0.04  0.00 - 0.04 K/uL Final    Lymph # 02/29/2024 2.4  1.0 - 4.8 K/uL Final    Mono # 02/29/2024 0.5  0.3 - 1.0 K/uL Final    Eos # 02/29/2024 0.8 (H)  0.0 - 0.5 K/uL Final    Baso # 02/29/2024 0.06  0.00 - 0.20 K/uL Final    nRBC 02/29/2024 0  0 /100 WBC Final    Gran % 02/29/2024 51.9  38.0 - 73.0 % Final    Lymph % 02/29/2024 29.9  18.0 - 48.0 % Final    Mono % 02/29/2024 6.7  4.0 - 15.0 % Final    Eosinophil % 02/29/2024 10.2 (H)  0.0 - 8.0 % Final    Basophil % 02/29/2024 0.8  0.0 - 1.9 % Final    Differential Method 02/29/2024 Automated   Final    Sodium 02/29/2024 139  136 - 145 mmol/L Final    Potassium 02/29/2024 3.7  3.5 - 5.1 mmol/L Final    Chloride 02/29/2024 108  95 - 110 mmol/L Final    CO2 02/29/2024 25  23 - 29 mmol/L Final    Glucose 02/29/2024 86  70 - 110 mg/dL Final    BUN 02/29/2024 6  6 - 20 mg/dL Final    Creatinine 02/29/2024 0.8  0.5 - 1.4 mg/dL Final    Calcium 02/29/2024 8.3 (L)  8.7 - 10.5 mg/dL Final    Total Protein 02/29/2024 6.3  6.0 - 8.4 g/dL Final    Albumin 02/29/2024 2.5 (L)  3.5 - 5.2 g/dL Final    Total Bilirubin 02/29/2024 0.3  0.1 - 1.0 mg/dL Final    Alkaline Phosphatase 02/29/2024 67  55 - 135 U/L Final    AST 02/29/2024 12  10 - 40 U/L Final    ALT 02/29/2024 19  10 - 44 U/L Final    eGFR 02/29/2024 >60  >60 mL/min/1.73 m^2 Final    Anion Gap 02/29/2024 6 (L)  8 - 16 mmol/L Final    CRP 02/29/2024 24.1 (H)  0.0 - 8.2 mg/L Final    TB Gold Plus 02/29/2024 Negative  Negative Final    TB1 - Nil 02/29/2024 0.02  IU/mL Final    TB2 - Nil 02/29/2024 0.02  IU/mL Final    Mitogen - Nil 02/29/2024 9.97  IU/mL Final    Quantiferon Nil Value 02/29/2024 0.03  IU/mL Final    Hep A IgM 02/29/2024 Negative  Negative Final    Hepatitis B Surface Ag 02/29/2024 Negative  Negative Final    Hep B C IgM 02/29/2024 Negative  Negative Final     Hepatitis C Ab 02/29/2024 Negative  Negative Final    Ferritin 02/29/2024 24  20.0 - 300.0 ng/mL Final    POCT Glucose 02/29/2024 181 (H)  70 - 110 mg/dL Final    Sodium 03/01/2024 137  136 - 145 mmol/L Final    Potassium 03/01/2024 4.7  3.5 - 5.1 mmol/L Final    Chloride 03/01/2024 108  95 - 110 mmol/L Final    CO2 03/01/2024 23  23 - 29 mmol/L Final    Glucose 03/01/2024 232 (H)  70 - 110 mg/dL Final    BUN 03/01/2024 10  6 - 20 mg/dL Final    Creatinine 03/01/2024 0.8  0.5 - 1.4 mg/dL Final    Calcium 03/01/2024 8.5 (L)  8.7 - 10.5 mg/dL Final    Total Protein 03/01/2024 6.3  6.0 - 8.4 g/dL Final    Albumin 03/01/2024 2.4 (L)  3.5 - 5.2 g/dL Final    Total Bilirubin 03/01/2024 0.2  0.1 - 1.0 mg/dL Final    Alkaline Phosphatase 03/01/2024 63  55 - 135 U/L Final    AST 03/01/2024 16  10 - 40 U/L Final    ALT 03/01/2024 17  10 - 44 U/L Final    eGFR 03/01/2024 >60  >60 mL/min/1.73 m^2 Final    Anion Gap 03/01/2024 6 (L)  8 - 16 mmol/L Final    Magnesium 03/01/2024 2.2  1.6 - 2.6 mg/dL Final    WBC 03/01/2024 7.51  3.90 - 12.70 K/uL Final    RBC 03/01/2024 3.16 (L)  4.60 - 6.20 M/uL Final    Hemoglobin 03/01/2024 7.8 (L)  14.0 - 18.0 g/dL Final    Hematocrit 03/01/2024 27.7 (L)  40.0 - 54.0 % Final    MCV 03/01/2024 88  82 - 98 fL Final    MCH 03/01/2024 24.7 (L)  27.0 - 31.0 pg Final    MCHC 03/01/2024 28.2 (L)  32.0 - 36.0 g/dL Final    RDW 03/01/2024 14.8 (H)  11.5 - 14.5 % Final    Platelets 03/01/2024 358  150 - 450 K/uL Final    MPV 03/01/2024 9.1 (L)  9.2 - 12.9 fL Final    Immature Granulocytes 03/01/2024 1.5 (H)  0.0 - 0.5 % Final    Gran # (ANC) 03/01/2024 5.3  1.8 - 7.7 K/uL Final    Immature Grans (Abs) 03/01/2024 0.11 (H)  0.00 - 0.04 K/uL Final    Lymph # 03/01/2024 1.7  1.0 - 4.8 K/uL Final    Mono # 03/01/2024 0.4  0.3 - 1.0 K/uL Final    Eos # 03/01/2024 0.0  0.0 - 0.5 K/uL Final    Baso # 03/01/2024 0.03  0.00 - 0.20 K/uL Final    nRBC 03/01/2024 0  0 /100 WBC  Final    Gran % 03/01/2024 70.5  38.0 - 73.0 % Final    Lymph % 03/01/2024 22.1  18.0 - 48.0 % Final    Mono % 03/01/2024 5.2  4.0 - 15.0 % Final    Eosinophil % 03/01/2024 0.3  0.0 - 8.0 % Final    Basophil % 03/01/2024 0.4  0.0 - 1.9 % Final    Differential Method 03/01/2024 Automated   Final    POCT Glucose 03/01/2024 132 (H)  70 - 110 mg/dL Final    Hep B Core Total Ab 03/01/2024 Negative  Negative Final    POCT Glucose 03/01/2024 127 (H)  70 - 110 mg/dL Final    POCT Glucose 03/01/2024 256 (H)  70 - 110 mg/dL Final    Sodium 03/02/2024 139  136 - 145 mmol/L Final    Potassium 03/02/2024 4.1  3.5 - 5.1 mmol/L Final    Chloride 03/02/2024 108  95 - 110 mmol/L Final    CO2 03/02/2024 25  23 - 29 mmol/L Final    Glucose 03/02/2024 102  70 - 110 mg/dL Final    BUN 03/02/2024 8  6 - 20 mg/dL Final    Creatinine 03/02/2024 0.7  0.5 - 1.4 mg/dL Final    Calcium 03/02/2024 8.3 (L)  8.7 - 10.5 mg/dL Final    Total Protein 03/02/2024 5.9 (L)  6.0 - 8.4 g/dL Final    Albumin 03/02/2024 2.3 (L)  3.5 - 5.2 g/dL Final    Total Bilirubin 03/02/2024 0.2  0.1 - 1.0 mg/dL Final    Alkaline Phosphatase 03/02/2024 55  55 - 135 U/L Final    AST 03/02/2024 9 (L)  10 - 40 U/L Final    ALT 03/02/2024 19  10 - 44 U/L Final    eGFR 03/02/2024 >60  >60 mL/min/1.73 m^2 Final    Anion Gap 03/02/2024 6 (L)  8 - 16 mmol/L Final    Magnesium 03/02/2024 2.1  1.6 - 2.6 mg/dL Final    WBC 03/02/2024 10.41  3.90 - 12.70 K/uL Final    RBC 03/02/2024 3.03 (L)  4.60 - 6.20 M/uL Final    Hemoglobin 03/02/2024 7.6 (L)  14.0 - 18.0 g/dL Final    Hematocrit 03/02/2024 25.8 (L)  40.0 - 54.0 % Final    MCV 03/02/2024 85  82 - 98 fL Final    MCH 03/02/2024 25.1 (L)  27.0 - 31.0 pg Final    MCHC 03/02/2024 29.5 (L)  32.0 - 36.0 g/dL Final    RDW 03/02/2024 14.8 (H)  11.5 - 14.5 % Final    Platelets 03/02/2024 406  150 - 450 K/uL Final    MPV 03/02/2024 9.2  9.2 - 12.9 fL Final    Immature Granulocytes 03/02/2024 1.2 (H)   0.0 - 0.5 % Final    Gran # (ANC) 03/02/2024 6.4  1.8 - 7.7 K/uL Final    Immature Grans (Abs) 03/02/2024 0.13 (H)  0.00 - 0.04 K/uL Final    Lymph # 03/02/2024 2.6  1.0 - 4.8 K/uL Final    Mono # 03/02/2024 1.2 (H)  0.3 - 1.0 K/uL Final    Eos # 03/02/2024 0.1  0.0 - 0.5 K/uL Final    Baso # 03/02/2024 0.05  0.00 - 0.20 K/uL Final    nRBC 03/02/2024 0  0 /100 WBC Final    Gran % 03/02/2024 61.5  38.0 - 73.0 % Final    Lymph % 03/02/2024 25.1  18.0 - 48.0 % Final    Mono % 03/02/2024 11.2  4.0 - 15.0 % Final    Eosinophil % 03/02/2024 0.5  0.0 - 8.0 % Final    Basophil % 03/02/2024 0.5  0.0 - 1.9 % Final    Differential Method 03/02/2024 Automated   Final    CRP 03/02/2024 8.2  0.0 - 8.2 mg/L Final    POCT Glucose 03/02/2024 83  70 - 110 mg/dL Final    Hemoglobin 03/02/2024 8.3 (L)  14.0 - 18.0 g/dL Final    Hematocrit 03/02/2024 28.2 (L)  40.0 - 54.0 % Final    POCT Glucose 03/02/2024 199 (H)  70 - 110 mg/dL Final    Sodium 03/03/2024 139  136 - 145 mmol/L Final    Potassium 03/03/2024 4.2  3.5 - 5.1 mmol/L Final    Chloride 03/03/2024 105  95 - 110 mmol/L Final    CO2 03/03/2024 26  23 - 29 mmol/L Final    Glucose 03/03/2024 114 (H)  70 - 110 mg/dL Final    BUN 03/03/2024 11  6 - 20 mg/dL Final    Creatinine 03/03/2024 0.7  0.5 - 1.4 mg/dL Final    Calcium 03/03/2024 8.6 (L)  8.7 - 10.5 mg/dL Final    Total Protein 03/03/2024 6.0  6.0 - 8.4 g/dL Final    Albumin 03/03/2024 2.4 (L)  3.5 - 5.2 g/dL Final    Total Bilirubin 03/03/2024 0.3  0.1 - 1.0 mg/dL Final    Alkaline Phosphatase 03/03/2024 52 (L)  55 - 135 U/L Final    AST 03/03/2024 11  10 - 40 U/L Final    ALT 03/03/2024 16  10 - 44 U/L Final    eGFR 03/03/2024 >60  >60 mL/min/1.73 m^2 Final    Anion Gap 03/03/2024 8  8 - 16 mmol/L Final    Magnesium 03/03/2024 1.9  1.6 - 2.6 mg/dL Final    WBC 03/03/2024 12.93 (H)  3.90 - 12.70 K/uL Final    RBC 03/03/2024 3.14 (L)  4.60 - 6.20 M/uL Final    Hemoglobin 03/03/2024 7.9 (L)   14.0 - 18.0 g/dL Final    Hematocrit 03/03/2024 26.8 (L)  40.0 - 54.0 % Final    MCV 03/03/2024 85  82 - 98 fL Final    MCH 03/03/2024 25.2 (L)  27.0 - 31.0 pg Final    MCHC 03/03/2024 29.5 (L)  32.0 - 36.0 g/dL Final    RDW 03/03/2024 15.3 (H)  11.5 - 14.5 % Final    Platelets 03/03/2024 390  150 - 450 K/uL Final    MPV 03/03/2024 8.6 (L)  9.2 - 12.9 fL Final    Immature Granulocytes 03/03/2024 2.1 (H)  0.0 - 0.5 % Final    Gran # (ANC) 03/03/2024 7.6  1.8 - 7.7 K/uL Final    Immature Grans (Abs) 03/03/2024 0.27 (H)  0.00 - 0.04 K/uL Final    Lymph # 03/03/2024 3.4  1.0 - 4.8 K/uL Final    Mono # 03/03/2024 1.4 (H)  0.3 - 1.0 K/uL Final    Eos # 03/03/2024 0.1  0.0 - 0.5 K/uL Final    Baso # 03/03/2024 0.08  0.00 - 0.20 K/uL Final    nRBC 03/03/2024 1 (A)  0 /100 WBC Final    Gran % 03/03/2024 59.0  38.0 - 73.0 % Final    Lymph % 03/03/2024 26.5  18.0 - 48.0 % Final    Mono % 03/03/2024 11.1  4.0 - 15.0 % Final    Eosinophil % 03/03/2024 0.7  0.0 - 8.0 % Final    Basophil % 03/03/2024 0.6  0.0 - 1.9 % Final    Differential Method 03/03/2024 Automated   Final    CRP 03/03/2024 7.5  0.0 - 8.2 mg/L Final   Office Visit on 02/26/2024   Component Date Value Ref Range Status    Glucose 02/26/2024 134 (H)  65 - 99 mg/dL Final    BUN 02/26/2024 10  7 - 25 mg/dL Final    Creatinine 02/26/2024 0.81  0.70 - 1.30 mg/dL Final    eGFR 02/26/2024 105  > OR = 60 mL/min/1.73m2 Final    BUN/Creatinine Ratio 02/26/2024 SEE NOTE:  6 - 22 (calc) Final    Sodium 02/26/2024 140  135 - 146 mmol/L Final    Potassium 02/26/2024 4.8  3.5 - 5.3 mmol/L Final    Chloride 02/26/2024 108  98 - 110 mmol/L Final    CO2 02/26/2024 28  20 - 32 mmol/L Final    Calcium 02/26/2024 8.4 (L)  8.6 - 10.3 mg/dL Final    Total Protein 02/26/2024 6.2  6.1 - 8.1 g/dL Final    Albumin 02/26/2024 2.9 (L)  3.6 - 5.1 g/dL Final    Globulin, Total 02/26/2024 3.3  1.9 - 3.7 g/dL (calc) Final    Albumin/Globulin Ratio 02/26/2024  0.9 (L)  1.0 - 2.5 (calc) Final    Total Bilirubin 02/26/2024 0.2  0.2 - 1.2 mg/dL Final    Alkaline Phosphatase 02/26/2024 59  35 - 144 U/L Final    AST 02/26/2024 9 (L)  10 - 35 U/L Final    ALT 02/26/2024 13  9 - 46 U/L Final    WBC 02/26/2024 7.9  3.8 - 10.8 Thousand/uL Final    RBC 02/26/2024 3.37 (L)  4.20 - 5.80 Million/uL Final    Hemoglobin 02/26/2024 8.6 (L)  13.2 - 17.1 g/dL Final    Hematocrit 02/26/2024 28.8 (L)  38.5 - 50.0 % Final    MCV 02/26/2024 85.5  80.0 - 100.0 fL Final    MCH 02/26/2024 25.5 (L)  27.0 - 33.0 pg Final    MCHC 02/26/2024 29.9 (L)  32.0 - 36.0 g/dL Final    RDW 02/26/2024 14.3  11.0 - 15.0 % Final    Platelets 02/26/2024 399  140 - 400 Thousand/uL Final    MPV 02/26/2024 9.3  7.5 - 12.5 fL Final    Neutrophils, Abs 02/26/2024 4,377  1,500 - 7,800 cells/uL Final    Lymph # 02/26/2024 2,236  850 - 3,900 cells/uL Final    Mono # 02/26/2024 545  200 - 950 cells/uL Final    Eos # 02/26/2024 695 (H)  15 - 500 cells/uL Final    Baso # 02/26/2024 47  0 - 200 cells/uL Final    Neutrophils Relative 02/26/2024 55.4  % Final    Lymph % 02/26/2024 28.3  % Final    Mono % 02/26/2024 6.9  % Final    Eosinophil % 02/26/2024 8.8  % Final    Basophil % 02/26/2024 0.6  % Final    Ferritin 02/26/2024 14 (L)  38 - 380 ng/mL Final   No results displayed because visit has over 200 results.      Patient Message on 02/21/2024   Component Date Value Ref Range Status    WBC 02/22/2024 8.1  3.8 - 10.8 Thousand/uL Final    RBC 02/22/2024 2.95 (L)  4.20 - 5.80 Million/uL Final    Hemoglobin 02/22/2024 7.2 (L)  13.2 - 17.1 g/dL Final    Hematocrit 02/22/2024 25.0 (L)  38.5 - 50.0 % Final    MCV 02/22/2024 84.7  80.0 - 100.0 fL Final    MCH 02/22/2024 24.4 (L)  27.0 - 33.0 pg Final    MCHC 02/22/2024 28.8 (L)  32.0 - 36.0 g/dL Final    RDW 02/22/2024 14.1  11.0 - 15.0 % Final    Platelets 02/22/2024 436 (H)  140 - 400 Thousand/uL Final    MPV 02/22/2024 9.6  7.5 - 12.5 fL Final     Neutrophils, Abs 02/22/2024 4,593  1,500 - 7,800 cells/uL Final    Lymph # 02/22/2024 2,041  850 - 3,900 cells/uL Final    Mono # 02/22/2024 672  200 - 950 cells/uL Final    Eos # 02/22/2024 753 (H)  15 - 500 cells/uL Final    Baso # 02/22/2024 41  0 - 200 cells/uL Final    Neutrophils Relative 02/22/2024 56.7  % Final    Lymph % 02/22/2024 25.2  % Final    Mono % 02/22/2024 8.3  % Final    Eosinophil % 02/22/2024 9.3  % Final    Basophil % 02/22/2024 0.5  % Final    Glucose 02/22/2024 100 (H)  65 - 99 mg/dL Final    BUN 02/22/2024 13  7 - 25 mg/dL Final    Creatinine 02/22/2024 0.92  0.70 - 1.30 mg/dL Final    eGFR 02/22/2024 99  > OR = 60 mL/min/1.73m2 Final    BUN/Creatinine Ratio 02/22/2024 SEE NOTE:  6 - 22 (calc) Final    Sodium 02/22/2024 136  135 - 146 mmol/L Final    Potassium 02/22/2024 5.0  3.5 - 5.3 mmol/L Final    Chloride 02/22/2024 104  98 - 110 mmol/L Final    CO2 02/22/2024 26  20 - 32 mmol/L Final    Calcium 02/22/2024 8.4 (L)  8.6 - 10.3 mg/dL Final    Total Protein 02/22/2024 6.2  6.1 - 8.1 g/dL Final    Albumin 02/22/2024 3.0 (L)  3.6 - 5.1 g/dL Final    Globulin, Total 02/22/2024 3.2  1.9 - 3.7 g/dL (calc) Final    Albumin/Globulin Ratio 02/22/2024 0.9 (L)  1.0 - 2.5 (calc) Final    Total Bilirubin 02/22/2024 0.3  0.2 - 1.2 mg/dL Final    Alkaline Phosphatase 02/22/2024 58  35 - 144 U/L Final    AST 02/22/2024 15  10 - 35 U/L Final    ALT 02/22/2024 18  9 - 46 U/L Final    TSH w/reflex to FT4 02/22/2024 4.15  0.40 - 4.50 mIU/L Final    Hemoglobin A1C 02/22/2024 5.9 (H)  <5.7 % of total Hgb Final   Telephone on 12/28/2023   Component Date Value Ref Range Status    TSH w/reflex to FT4 01/03/2024 7.01 (H)  0.40 - 4.50 mIU/L Final    T4, Free 01/03/2024 0.9  0.8 - 1.8 ng/dL Final   Lab Visit on 12/13/2023   Component Date Value Ref Range Status    Calprotectin 12/13/2023 537.1 (H)  <50 mcg/g Final    H. Pylori Antigen, Stool 12/13/2023 NOT DETECTED   Final    There may be more visits with results that are not included.       Past Medical History:   Diagnosis Date    Colon polyp     Diabetes mellitus     Hypothyroid 07/05/2016    Sleep apnea     Thrombocytopenia     Ulcerative colitis      Past Surgical History:   Procedure Laterality Date    COLONOSCOPY N/A 12/04/2020    Procedure: COLONOSCOPY;  Surgeon: Ventura Warren MD;  Location: [facility] ENDO;  Service: Endoscopy;  Laterality: N/A;    COLONOSCOPY N/A 07/19/2021    Procedure: COLONOSCOPY;  Surgeon: Clarence Ogden MD;  Location: [facility] ENDO;  Service: Endoscopy;  Laterality: N/A;    COLONOSCOPY N/A 11/10/2022    Procedure: COLONOSCOPY;  Surgeon: Kath Hernandez MD;  Location: [facility] ENDO;  Service: Endoscopy;  Laterality: N/A;    COLONOSCOPY N/A 2/29/2024    Procedure: COLONOSCOPY;  Surgeon: Kath Hernandez MD;  Location: [facility] ENDO;  Service: Endoscopy;  Laterality: N/A;    FOOT SURGERY      left   cyst removed    HAND SURGERY      right   tendon repair     Family History   Problem Relation Name Age of Onset    Ulcerative colitis Mother          in 70s    Colon cancer Neg Hx      Colon polyps Neg Hx      Crohn's disease Neg Hx         Marital Status:   Alcohol History:  reports current alcohol use.  Tobacco History:  reports that he quit smoking about 10 years ago. His smoking use included cigarettes. He started smoking about 40 years ago. He has a 30 pack-year smoking history. He has been exposed to tobacco smoke. He quit smokeless tobacco use about 4 years ago.  His smokeless tobacco use included snuff.  Drug History:  reports no history of drug use.    Review of patient's allergies indicates:  No Known Allergies    Current Outpatient Medications:     citalopram (CELEXA) 40 MG tablet, Take 1 tablet (40 mg total) by mouth once daily., Disp: 90 tablet, Rfl: 1    ergocalciferol (ERGOCALCIFEROL) 50,000 unit Cap, TAKE 1 CAPSULE BY MOUTH EVERY 7 DAYS, Disp: 12 capsule, Rfl: 3    folic  acid (FOLVITE) 1 MG tablet, Take 1 tablet (1 mg total) by mouth once daily., Disp: 90 tablet, Rfl: 3    levothyroxine (SYNTHROID) 200 MCG tablet, Take 1 tablet (200 mcg total) by mouth before breakfast., Disp: 30 tablet, Rfl: 11    mesalamine (CANASA) 1000 MG Supp, Place 1 suppository (1,000 mg total) rectally nightly., Disp: 90 suppository, Rfl: 3    methotrexate 2.5 MG Tab, Take 6 tablets (15 mg total) by mouth every 7 days., Disp: 24 tablet, Rfl: 11    omega-3 acid ethyl esters (LOVAZA) 1 gram capsule, Take 1 capsule (1 g total) by mouth 2 (two) times daily., Disp: 180 capsule, Rfl: 3    sildenafiL (VIAGRA) 100 MG tablet, Take 1 tablet (100 mg total) by mouth daily as needed for Erectile Dysfunction., Disp: 10 tablet, Rfl: 6    testosterone cypionate (DEPOTESTOTERONE CYPIONATE) 200 mg/mL injection, Inject 100 mg into the muscle every 14 (fourteen) days., Disp: , Rfl:     blood sugar diagnostic Strp, To check BG 1 times daily, to use with insurance preferred meter, Disp: 90 each, Rfl: 1    blood-glucose meter kit, To check BG 1 times daily, to use with insurance preferred meter, Disp: 1 each, Rfl: 0    lancets Misc, To check BG 1 times daily, to use with insurance preferred meter, Disp: 90 each, Rfl: 1    Review of Systems   Constitutional:  Positive for activity change and unexpected weight change.   HENT:  Negative for hearing loss, rhinorrhea and trouble swallowing.    Eyes:  Negative for discharge and visual disturbance.   Respiratory:  Negative for chest tightness and wheezing.    Cardiovascular:  Negative for chest pain and palpitations.   Gastrointestinal:  Positive for diarrhea. Negative for blood in stool, constipation and vomiting.   Endocrine: Negative for polydipsia and polyuria.   Genitourinary:  Negative for difficulty urinating, hematuria and urgency.   Musculoskeletal:  Negative for arthralgias, joint swelling and neck pain.   Neurological:  Positive for headaches. Negative for weakness.  "  Psychiatric/Behavioral:  Negative for confusion and dysphoric mood.           Objective:      Vitals:    04/29/24 1054   BP: 111/63   Pulse: 77   Resp: 18   SpO2: 97%   Weight: 116.1 kg (256 lb)   Height: 5' 11" (1.803 m)     Physical Exam  Constitutional:       General: He is not in acute distress.     Appearance: He is well-developed.   HENT:      Head: Normocephalic and atraumatic.   Eyes:      Pupils: Pupils are equal, round, and reactive to light.   Neck:      Thyroid: No thyromegaly.   Cardiovascular:      Rate and Rhythm: Normal rate and regular rhythm.      Heart sounds: Normal heart sounds.   Pulmonary:      Effort: Pulmonary effort is normal.      Breath sounds: Normal breath sounds.   Abdominal:      General: Bowel sounds are normal. There is no distension.      Palpations: Abdomen is soft.      Tenderness: There is no abdominal tenderness.   Musculoskeletal:         General: Normal range of motion.      Cervical back: Normal range of motion and neck supple.   Skin:     General: Skin is warm and dry.      Findings: No erythema or rash.   Neurological:      Mental Status: He is alert and oriented to person, place, and time.      Cranial Nerves: No cranial nerve deficit.         Assessment:       1. Type 2 diabetes mellitus without complication, without long-term current use of insulin    2. Ulcerative colitis with rectal bleeding, unspecified location    3. Stomatitis and mucositis         Plan:       Type 2 diabetes mellitus without complication, without long-term current use of insulin  Comments:  recent a1c at 5.9% and will plan to recheck in about 3 mos.  Orders:  -     Foot Exam Performed    Ulcerative colitis with rectal bleeding, unspecified location  Comments:  Doing great! will maintain MTX and remicade as prescribed. labs trending up!    Stomatitis and mucositis  Comments:  likely SE to UC meds. discussed management with otc oragel. Hope to see this get better the longer he is on " meds.      Follow up in about 3 months (around 7/29/2024) for Diabetic Check-Up.        4/29/2024 Vinh Noyola PA-C

## 2024-04-30 ENCOUNTER — LAB VISIT (OUTPATIENT)
Dept: LAB | Facility: HOSPITAL | Age: 54
End: 2024-04-30
Attending: INTERNAL MEDICINE
Payer: OTHER GOVERNMENT

## 2024-04-30 DIAGNOSIS — D50.0 IRON DEFICIENCY ANEMIA DUE TO CHRONIC BLOOD LOSS: ICD-10-CM

## 2024-04-30 LAB
BASOPHILS # BLD AUTO: 0.08 K/UL (ref 0–0.2)
BASOPHILS NFR BLD: 1 % (ref 0–1.9)
DIFFERENTIAL METHOD BLD: ABNORMAL
EOSINOPHIL # BLD AUTO: 0.4 K/UL (ref 0–0.5)
EOSINOPHIL NFR BLD: 5 % (ref 0–8)
ERYTHROCYTE [DISTWIDTH] IN BLOOD BY AUTOMATED COUNT: 22.4 % (ref 11.5–14.5)
FERRITIN SERPL-MCNC: 79 NG/ML (ref 20–300)
HCT VFR BLD AUTO: 38.5 % (ref 40–54)
HGB BLD-MCNC: 11.4 G/DL (ref 14–18)
IMM GRANULOCYTES # BLD AUTO: 0.03 K/UL (ref 0–0.04)
IMM GRANULOCYTES NFR BLD AUTO: 0.4 % (ref 0–0.5)
IRON SERPL-MCNC: 331 UG/DL (ref 45–160)
LYMPHOCYTES # BLD AUTO: 2.9 K/UL (ref 1–4.8)
LYMPHOCYTES NFR BLD: 37 % (ref 18–48)
MCH RBC QN AUTO: 29.4 PG (ref 27–31)
MCHC RBC AUTO-ENTMCNC: 29.6 G/DL (ref 32–36)
MCV RBC AUTO: 99 FL (ref 82–98)
MONOCYTES # BLD AUTO: 0.6 K/UL (ref 0.3–1)
MONOCYTES NFR BLD: 7.6 % (ref 4–15)
NEUTROPHILS # BLD AUTO: 3.8 K/UL (ref 1.8–7.7)
NEUTROPHILS NFR BLD: 49 % (ref 38–73)
NRBC BLD-RTO: 0 /100 WBC
PLATELET # BLD AUTO: 279 K/UL (ref 150–450)
PMV BLD AUTO: 9.2 FL (ref 9.2–12.9)
RBC # BLD AUTO: 3.88 M/UL (ref 4.6–6.2)
SATURATED IRON: 89 % (ref 20–50)
TOTAL IRON BINDING CAPACITY: 370 UG/DL (ref 250–450)
TRANSFERRIN SERPL-MCNC: 264 MG/DL (ref 200–375)
WBC # BLD AUTO: 7.73 K/UL (ref 3.9–12.7)

## 2024-04-30 PROCEDURE — 85025 COMPLETE CBC W/AUTO DIFF WBC: CPT | Performed by: INTERNAL MEDICINE

## 2024-04-30 PROCEDURE — 83540 ASSAY OF IRON: CPT | Performed by: INTERNAL MEDICINE

## 2024-04-30 PROCEDURE — 36415 COLL VENOUS BLD VENIPUNCTURE: CPT | Performed by: INTERNAL MEDICINE

## 2024-04-30 PROCEDURE — 82728 ASSAY OF FERRITIN: CPT | Performed by: INTERNAL MEDICINE

## 2024-05-01 ENCOUNTER — OFFICE VISIT (OUTPATIENT)
Dept: GASTROENTEROLOGY | Facility: CLINIC | Age: 54
End: 2024-05-01
Payer: OTHER GOVERNMENT

## 2024-05-01 VITALS
DIASTOLIC BLOOD PRESSURE: 72 MMHG | WEIGHT: 257.25 LBS | BODY MASS INDEX: 36.02 KG/M2 | RESPIRATION RATE: 18 BRPM | SYSTOLIC BLOOD PRESSURE: 107 MMHG | HEART RATE: 81 BPM | HEIGHT: 71 IN

## 2024-05-01 DIAGNOSIS — K51.911 ULCERATIVE COLITIS WITH RECTAL BLEEDING, UNSPECIFIED LOCATION: Primary | ICD-10-CM

## 2024-05-01 PROCEDURE — 99214 OFFICE O/P EST MOD 30 MIN: CPT | Mod: PBBFAC,PN | Performed by: INTERNAL MEDICINE

## 2024-05-01 PROCEDURE — 99999 PR PBB SHADOW E&M-EST. PATIENT-LVL IV: CPT | Mod: PBBFAC,,, | Performed by: INTERNAL MEDICINE

## 2024-05-01 PROCEDURE — 99214 OFFICE O/P EST MOD 30 MIN: CPT | Mod: S$PBB,,, | Performed by: INTERNAL MEDICINE

## 2024-05-01 NOTE — PROGRESS NOTES
"[facility] Note    CC: Ulcerative Colitis     HPI 54 y.o. male with severe, worsening, pan-colonic ulcerative colitis associated with bloody diarrhea, moderate malnutrition and anemia presents for follow up. He is much improved and is about to receive his first maintenance dose of Inflectra 10 mg/kg. He alsocontinues to take Methotrexate 15 mg weekly, Folic acid daily and Canasa suppositories at night. He is having 2-3 loose bowel movements a day without blood. He is gaining weight and his energy level has greatly improved. We discussed his labs and how they have also improved. He has developed intermittent small ulcers in his mouth and is concerned this may be due to Methotrexate. He is followed by Dr. Dixon with hematology and has received IV iron.     His last colonoscopy was in February 2024 and was notable for severe pan-colonic UC (May score of 3).       Past Medical History:   Diagnosis Date    Colon polyp     Diabetes mellitus     Hypothyroid 07/05/2016    Sleep apnea     Thrombocytopenia     Ulcerative colitis        Allergies and Medications reviewed     Review of Systems  General ROS: negative for - chills, fever or weight loss  Cardiovascular ROS: no chest pain or dyspnea on exertion  Gastrointestinal ROS: no blood in stool, improved abdominal pain, improved diarrhea    Physical Examination  /72 (BP Location: Right arm, Patient Position: Sitting)   Pulse 81   Resp 18   Ht 5' 11" (1.803 m)   Wt 116.7 kg (257 lb 4.4 oz)   BMI 35.88 kg/m²   General appearance: alert, cooperative, no distress  HENT: Normocephalic, atraumatic, neck symmetrical, no nasal discharge, sclera anicteric   Lungs: clear to auscultation bilaterally, symmetric chest wall expansion bilaterally  Heart: regular rate and rhythm without rub; no displacement of the PMI   Abdomen: soft, nontender,nondistended, BS active, no masses appreciated  Extremities: extremities symmetric; no clubbing, cyanosis, or " edema      Labs:  Lab Results   Component Value Date    WBC 7.73 04/30/2024    HGB 11.4 (L) 04/30/2024    HCT 38.5 (L) 04/30/2024    MCV 99 (H) 04/30/2024     04/30/2024       CMP  Sodium   Date Value Ref Range Status   04/23/2024 138 136 - 145 mmol/L Final     Potassium   Date Value Ref Range Status   04/23/2024 3.9 3.5 - 5.1 mmol/L Final     Chloride   Date Value Ref Range Status   04/23/2024 104 95 - 110 mmol/L Final     CO2   Date Value Ref Range Status   04/23/2024 25 23 - 29 mmol/L Final     Glucose   Date Value Ref Range Status   04/23/2024 140 (H) 70 - 110 mg/dL Final     BUN   Date Value Ref Range Status   04/23/2024 12 6 - 20 mg/dL Final     Creatinine   Date Value Ref Range Status   04/23/2024 0.9 0.5 - 1.4 mg/dL Final     Calcium   Date Value Ref Range Status   04/23/2024 8.7 8.7 - 10.5 mg/dL Final     Total Protein   Date Value Ref Range Status   04/23/2024 6.6 6.0 - 8.4 g/dL Final     Albumin   Date Value Ref Range Status   04/23/2024 3.7 3.5 - 5.2 g/dL Final     Total Bilirubin   Date Value Ref Range Status   04/23/2024 0.3 0.1 - 1.0 mg/dL Final     Comment:     For infants and newborns, interpretation of results should be based  on gestational age, weight and in agreement with clinical  observations.    Premature Infant recommended reference ranges:  Up to 24 hours.............<8.0 mg/dL  Up to 48 hours............<12.0 mg/dL  3-5 days..................<15.0 mg/dL  6-29 days.................<15.0 mg/dL       Alkaline Phosphatase   Date Value Ref Range Status   04/23/2024 52 (L) 55 - 135 U/L Final     AST   Date Value Ref Range Status   04/23/2024 16 10 - 40 U/L Final     ALT   Date Value Ref Range Status   04/23/2024 19 10 - 44 U/L Final     Anion Gap   Date Value Ref Range Status   04/23/2024 9 8 - 16 mmol/L Final     eGFR   Date Value Ref Range Status   04/23/2024 >60.0 >60 mL/min/1.73 m^2 Final   02/26/2024 105 > OR = 60 mL/min/1.73m2 Final     -Ferritin- 79  -Iron- 331, TIBC-  370      Assessment:   54 y.o. male with severe pancolonic ulcerative colitis and YESENIA presents for follow up . He is much improved on Inflectra 10 mg/kg (to receive his first maintenance dose next month) and Methotrexate.   Plan:  -Check Infliximab level/Ab prior to next dose  -Continue Methotrexate + folic acid for now- if oral ulcers continue/worsen and level at goal/Ab low could consider stopping this medication  -Keep follow up with hematology for IV iron  -RTC in 3 months, schedule colonoscopy at that time (6 month colonoscopy after initiation of biologic therapy)    Kath Hernandez MD  [facility]  [address], [address]  [place] [address]  Office: [phone]  Fax: [phone]

## 2024-05-03 ENCOUNTER — OFFICE VISIT (OUTPATIENT)
Dept: HEMATOLOGY/ONCOLOGY | Facility: CLINIC | Age: 54
End: 2024-05-03
Payer: OTHER GOVERNMENT

## 2024-05-03 ENCOUNTER — PATIENT MESSAGE (OUTPATIENT)
Dept: HEMATOLOGY/ONCOLOGY | Facility: CLINIC | Age: 54
End: 2024-05-03

## 2024-05-03 VITALS
BODY MASS INDEX: 36.08 KG/M2 | OXYGEN SATURATION: 96 % | TEMPERATURE: 98 F | SYSTOLIC BLOOD PRESSURE: 118 MMHG | HEIGHT: 71 IN | RESPIRATION RATE: 10 BRPM | HEART RATE: 77 BPM | WEIGHT: 257.69 LBS | DIASTOLIC BLOOD PRESSURE: 70 MMHG

## 2024-05-03 DIAGNOSIS — K76.0 NAFLD (NONALCOHOLIC FATTY LIVER DISEASE): Primary | ICD-10-CM

## 2024-05-03 DIAGNOSIS — K76.0 FATTY LIVER: ICD-10-CM

## 2024-05-03 DIAGNOSIS — D69.6 THROMBOCYTOPENIA: ICD-10-CM

## 2024-05-03 DIAGNOSIS — D50.0 IRON DEFICIENCY ANEMIA DUE TO CHRONIC BLOOD LOSS: ICD-10-CM

## 2024-05-03 DIAGNOSIS — K51.00 ULCERATIVE PANCOLITIS: ICD-10-CM

## 2024-05-03 DIAGNOSIS — E66.9 OBESITY (BMI 35.0-39.9 WITHOUT COMORBIDITY): ICD-10-CM

## 2024-05-03 DIAGNOSIS — E78.00 PURE HYPERCHOLESTEROLEMIA: ICD-10-CM

## 2024-05-03 PROCEDURE — 99214 OFFICE O/P EST MOD 30 MIN: CPT | Mod: S$PBB,,, | Performed by: INTERNAL MEDICINE

## 2024-05-03 PROCEDURE — 99214 OFFICE O/P EST MOD 30 MIN: CPT | Mod: PBBFAC,PN | Performed by: INTERNAL MEDICINE

## 2024-05-03 PROCEDURE — 99999 PR PBB SHADOW E&M-EST. PATIENT-LVL IV: CPT | Mod: PBBFAC,,, | Performed by: INTERNAL MEDICINE

## 2024-05-03 NOTE — PROGRESS NOTES
Subjective:       Patient ID: Isidoro Singh is a 54 y.o. male.    Chief Complaint: YESENIA    HPI    54 years old male with history of ulcerative colitis.  Seen me previously but was lost to follow-up upper 2020 reestablished this year in March2024  He had ulcerative colitis flare with iron loss status post transfusion packed red blood cell and IV iron in hospital.  He was referred to Hematology for IV iron therapy.  History of thrombocytopenia suspect ITP under control.       Review of Systems    Patient denies issues related to appetite or recent weight change.  Feels well overall.  Denies issues with generalized weakness .  Denies fatigue over above what is normally experienced with day-to-day activities  Denies fever, chills, rigors  Denies issues with ambulation  Denies generalized swelling or new lumps and bumps felt in any part  of body  Denies visual or hearing loss  Denies issues with congestion, sinus issues, cough, sputum production runny nose or itching eyes  Denies chest pain or palpitations, or passing out  Denies abdominal pain, reflux symptoms, nausea vomiting loose stools or constipation  Denies seizure activity or focal weaknesses or symptoms related to TIA, no head aches or blurred vision reported  Denies issues with skin rash or bruising  Denies issues with swelling of feet, tingling or numbness   No issues with sleep,   No recent foreign travel   Good family support reported       Past Medical History:   Diagnosis Date    Colon polyp     Diabetes mellitus     Hypothyroid 07/05/2016    Sleep apnea     Thrombocytopenia     Ulcerative colitis      Past Surgical History:   Procedure Laterality Date    COLONOSCOPY N/A 12/04/2020    Procedure: COLONOSCOPY;  Surgeon: Ventura Warren MD;  Location: [facility];  Service: Endoscopy;  Laterality: N/A;    COLONOSCOPY N/A 07/19/2021    Procedure: COLONOSCOPY;  Surgeon: Clarence Ogden MD;  Location: [facility];  Service: Endoscopy;  Laterality: N/A;     COLONOSCOPY N/A 11/10/2022    Procedure: COLONOSCOPY;  Surgeon: Kath Hernandez MD;  Location: [facility] ENDO;  Service: Endoscopy;  Laterality: N/A;    COLONOSCOPY N/A 2/29/2024    Procedure: COLONOSCOPY;  Surgeon: Kath Hernandez MD;  Location: [facility] ENDO;  Service: Endoscopy;  Laterality: N/A;    FOOT SURGERY      left   cyst removed    HAND SURGERY      right   tendon repair     Family History   Problem Relation Name Age of Onset    Ulcerative colitis Mother          in 70s    Colon cancer Neg Hx      Colon polyps Neg Hx      Crohn's disease Neg Hx        Social History     Socioeconomic History    Marital status:    Tobacco Use    Smoking status: Former     Current packs/day: 0.00     Average packs/day: 1 pack/day for 30.0 years (30.0 ttl pk-yrs)     Types: Cigarettes     Start date: 1984     Quit date: 12/2013     Years since quitting: 10.4     Passive exposure: Past    Smokeless tobacco: Former     Types: Snuff     Quit date: 2020   Substance and Sexual Activity    Alcohol use: Yes     Comment: occasional    Drug use: No    Sexual activity: Yes     Social Determinants of Health     Financial Resource Strain: Low Risk  (11/10/2023)    Overall Financial Resource Strain (CARDIA)     Difficulty of Paying Living Expenses: Not hard at all   Food Insecurity: No Food Insecurity (11/10/2023)    Hunger Vital Sign     Worried About Running Out of Food in the Last Year: Never true     Ran Out of Food in the Last Year: Never true   Transportation Needs: No Transportation Needs (11/10/2023)    PRAPARE - Transportation     Lack of Transportation (Medical): No     Lack of Transportation (Non-Medical): No   Physical Activity: Insufficiently Active (11/10/2023)    Exercise Vital Sign     Days of Exercise per Week: 1 day     Minutes of Exercise per Session: 30 min   Stress: No Stress Concern Present (11/10/2023)    [employer] - Occupational Stress Questionnaire     Feeling of Stress : Not  at all   Housing Stability: Low Risk  (11/10/2023)    Housing Stability Vital Sign     Unable to Pay for Housing in the Last Year: No     Number of Places Lived in the Last Year: 1     Unstable Housing in the Last Year: No     Review of patient's allergies indicates:  No Known Allergies    Current Outpatient Medications:     blood sugar diagnostic Strp, To check BG 1 times daily, to use with insurance preferred meter, Disp: 90 each, Rfl: 1    blood-glucose meter kit, To check BG 1 times daily, to use with insurance preferred meter, Disp: 1 each, Rfl: 0    citalopram (CELEXA) 40 MG tablet, Take 1 tablet (40 mg total) by mouth once daily., Disp: 90 tablet, Rfl: 1    ergocalciferol (ERGOCALCIFEROL) 50,000 unit Cap, TAKE 1 CAPSULE BY MOUTH EVERY 7 DAYS, Disp: 12 capsule, Rfl: 3    folic acid (FOLVITE) 1 MG tablet, Take 1 tablet (1 mg total) by mouth once daily., Disp: 90 tablet, Rfl: 3    lancets Misc, To check BG 1 times daily, to use with insurance preferred meter, Disp: 90 each, Rfl: 1    levothyroxine (SYNTHROID) 200 MCG tablet, Take 1 tablet (200 mcg total) by mouth before breakfast., Disp: 30 tablet, Rfl: 11    mesalamine (CANASA) 1000 MG Supp, Place 1 suppository (1,000 mg total) rectally nightly., Disp: 90 suppository, Rfl: 3    methotrexate 2.5 MG Tab, Take 6 tablets (15 mg total) by mouth every 7 days., Disp: 24 tablet, Rfl: 11    omega-3 acid ethyl esters (LOVAZA) 1 gram capsule, Take 1 capsule (1 g total) by mouth 2 (two) times daily., Disp: 180 capsule, Rfl: 3    sildenafiL (VIAGRA) 100 MG tablet, Take 1 tablet (100 mg total) by mouth daily as needed for Erectile Dysfunction., Disp: 10 tablet, Rfl: 6    testosterone cypionate (DEPOTESTOTERONE CYPIONATE) 200 mg/mL injection, Inject 100 mg into the muscle every 14 (fourteen) days., Disp: , Rfl:     Physical Exam    VITAL SIGNS:  as above   GENERAL: appears well-built, well-nourished.  No anxiety, no agitation, and in no distress.  Patient is awake, alert,  oriented and cooperative.  HEENT:  Showed no congestion. Trachea is central no obvious icterus or pallor noted no hoarseness. no obvious JVD   NECK:  Supple.  No JVD. No obvious cervical submental or supraclavicular adenopathy.  RS:the visualized portion of  Chest expands well. chest appears symmetric, no audible wheezes.  No dyspnea recognized  ABDOMEN:  abdomen appears undistended.  EXTREMITIES:  Without edema.  NEUROLOGICAL:  The patient is appropriate, higher functions are normal.  No  obvious neurological deficits.  normal judgement normal thought content  No confusion, no speech impediment. Cranial nerves are intact and show no deficit. No gross motor deficits noted   SKIN MUSCULOSKELETAL: no joint or skeletal deformity, no clubbing of nails.  No visible rash ecchymosis or petechiae     Lab Results   Component Value Date    WBC 7.73 04/30/2024    HGB 11.4 (L) 04/30/2024    HCT 38.5 (L) 04/30/2024    MCV 99 (H) 04/30/2024     04/30/2024       BMP  Lab Results   Component Value Date     04/23/2024    K 3.9 04/23/2024     04/23/2024    CO2 25 04/23/2024    BUN 12 04/23/2024    CREATININE 0.9 04/23/2024    CALCIUM 8.7 04/23/2024    ANIONGAP 9 04/23/2024    ESTGFRAFRICA 96 11/09/2021    EGFRNONAA 83 11/09/2021     Lab Results   Component Value Date    IRON 331 (H) 04/30/2024    TIBC 370 04/30/2024    FERRITIN 79 04/30/2024         Patient Active Problem List   Diagnosis    Thrombocytopenia    Fatty liver    Hypothyroidism (acquired)    Thyroid disease    Abnormal thyroid blood test    Goiter    NAFLD (nonalcoholic fatty liver disease)    Obesity (BMI 35.0-39.9 without comorbidity)    Obstructive sleep apnea    Hashimoto's thyroiditis    Nodular thyroid disease    Dysmetabolic syndrome    Prediabetes    Hypovitaminosis D    Hyperlipidemia    Hypertriglyceridemia    History of colon polyps    Ulcerative pancolitis    Anemia    Diabetes mellitus    Iron deficiency anemia        Assessment and Plan       Ulcertive colitis: on remicade q 2 weeks and mtx weekly with DR Mary medellin ups recently    Iron deficiency anemia/ulcerative colitis status post infusional iron in February 24 ferritin doing good  Hemoglobin is improved from 6 g to 11.4 steadily rising    ITP by history :stable and normal platelet counts    Dyslipidemia; continue medical management follow-up with primary care dietary compliance    ANTONY:  Continue follow-up primary wear CPAP regularly    Diabetes mellitus:  Continue follow-up PCP monitoring hemoglobin A1c regularly diet restrictions and good routine exercise regimen recommended    NAFLD:  explained to patient fat restriction alcohol restriction and protecting liver from progressing     BMI of 35 pt aware, discussed life style changes    MDM includes  :    - Acute or chronic illness or injury that poses a threat to life or bodily function  - Independent review and explanation of 3+ results from unique tests  - Discussion of management and ordering 3+ unique tests  - Extensive discussion of treatment and management  - Prescription drug management  - Drug therapy requiring intensive monitoring for toxicity

## 2024-05-21 ENCOUNTER — PATIENT MESSAGE (OUTPATIENT)
Dept: GASTROENTEROLOGY | Facility: CLINIC | Age: 54
End: 2024-05-21
Payer: OTHER GOVERNMENT

## 2024-05-21 ENCOUNTER — INFUSION (OUTPATIENT)
Dept: INFUSION THERAPY | Facility: HOSPITAL | Age: 54
End: 2024-05-21
Attending: INTERNAL MEDICINE
Payer: OTHER GOVERNMENT

## 2024-05-21 VITALS
HEIGHT: 71 IN | WEIGHT: 264.88 LBS | HEART RATE: 89 BPM | SYSTOLIC BLOOD PRESSURE: 111 MMHG | DIASTOLIC BLOOD PRESSURE: 61 MMHG | TEMPERATURE: 98 F | BODY MASS INDEX: 37.08 KG/M2 | RESPIRATION RATE: 16 BRPM | OXYGEN SATURATION: 96 %

## 2024-05-21 DIAGNOSIS — K12.1 STOMATITIS: Primary | ICD-10-CM

## 2024-05-21 DIAGNOSIS — K51.00 ULCERATIVE PANCOLITIS: Primary | ICD-10-CM

## 2024-05-21 LAB
ALBUMIN SERPL BCP-MCNC: 4 G/DL (ref 3.5–5.2)
ALP SERPL-CCNC: 57 U/L (ref 55–135)
ALT SERPL W/O P-5'-P-CCNC: 18 U/L (ref 10–44)
ANION GAP SERPL CALC-SCNC: 7 MMOL/L (ref 8–16)
AST SERPL-CCNC: 12 U/L (ref 10–40)
BASOPHILS # BLD AUTO: 0.07 K/UL (ref 0–0.2)
BASOPHILS NFR BLD: 0.9 % (ref 0–1.9)
BILIRUB SERPL-MCNC: 0.2 MG/DL (ref 0.1–1)
BUN SERPL-MCNC: 12 MG/DL (ref 6–20)
CALCIUM SERPL-MCNC: 8.5 MG/DL (ref 8.7–10.5)
CHLORIDE SERPL-SCNC: 105 MMOL/L (ref 95–110)
CO2 SERPL-SCNC: 24 MMOL/L (ref 23–29)
CREAT SERPL-MCNC: 0.9 MG/DL (ref 0.5–1.4)
DIFFERENTIAL METHOD BLD: ABNORMAL
EOSINOPHIL # BLD AUTO: 0.5 K/UL (ref 0–0.5)
EOSINOPHIL NFR BLD: 5.8 % (ref 0–8)
ERYTHROCYTE [DISTWIDTH] IN BLOOD BY AUTOMATED COUNT: 17.6 % (ref 11.5–14.5)
EST. GFR  (NO RACE VARIABLE): >60 ML/MIN/1.73 M^2
GLUCOSE SERPL-MCNC: 188 MG/DL (ref 70–110)
HCT VFR BLD AUTO: 38.6 % (ref 40–54)
HGB BLD-MCNC: 11.9 G/DL (ref 14–18)
IMM GRANULOCYTES # BLD AUTO: 0.03 K/UL (ref 0–0.04)
IMM GRANULOCYTES NFR BLD AUTO: 0.4 % (ref 0–0.5)
LYMPHOCYTES # BLD AUTO: 2.2 K/UL (ref 1–4.8)
LYMPHOCYTES NFR BLD: 26.7 % (ref 18–48)
MCH RBC QN AUTO: 29.5 PG (ref 27–31)
MCHC RBC AUTO-ENTMCNC: 30.8 G/DL (ref 32–36)
MCV RBC AUTO: 96 FL (ref 82–98)
MONOCYTES # BLD AUTO: 0.7 K/UL (ref 0.3–1)
MONOCYTES NFR BLD: 8.2 % (ref 4–15)
NEUTROPHILS # BLD AUTO: 4.7 K/UL (ref 1.8–7.7)
NEUTROPHILS NFR BLD: 58 % (ref 38–73)
NRBC BLD-RTO: 0 /100 WBC
PLATELET # BLD AUTO: 273 K/UL (ref 150–450)
PMV BLD AUTO: 9.9 FL (ref 9.2–12.9)
POTASSIUM SERPL-SCNC: 3.9 MMOL/L (ref 3.5–5.1)
PROT SERPL-MCNC: 7 G/DL (ref 6–8.4)
RBC # BLD AUTO: 4.04 M/UL (ref 4.6–6.2)
SODIUM SERPL-SCNC: 136 MMOL/L (ref 136–145)
WBC # BLD AUTO: 8.08 K/UL (ref 3.9–12.7)

## 2024-05-21 PROCEDURE — 80230 DRUG ASSAY INFLIXIMAB: CPT | Performed by: INTERNAL MEDICINE

## 2024-05-21 PROCEDURE — 96367 TX/PROPH/DG ADDL SEQ IV INF: CPT

## 2024-05-21 PROCEDURE — 63600175 PHARM REV CODE 636 W HCPCS: Performed by: INTERNAL MEDICINE

## 2024-05-21 PROCEDURE — 82397 CHEMILUMINESCENT ASSAY: CPT

## 2024-05-21 PROCEDURE — 25000003 PHARM REV CODE 250: Performed by: INTERNAL MEDICINE

## 2024-05-21 PROCEDURE — 85025 COMPLETE CBC W/AUTO DIFF WBC: CPT | Performed by: INTERNAL MEDICINE

## 2024-05-21 PROCEDURE — 96376 TX/PRO/DX INJ SAME DRUG ADON: CPT

## 2024-05-21 PROCEDURE — 30000890 HC MISC. SEND OUT TEST

## 2024-05-21 PROCEDURE — 30000890 LABCORP MISCELLANEOUS TEST: Performed by: INTERNAL MEDICINE

## 2024-05-21 PROCEDURE — 36415 COLL VENOUS BLD VENIPUNCTURE: CPT | Performed by: INTERNAL MEDICINE

## 2024-05-21 PROCEDURE — 96415 CHEMO IV INFUSION ADDL HR: CPT

## 2024-05-21 PROCEDURE — 80053 COMPREHEN METABOLIC PANEL: CPT | Performed by: INTERNAL MEDICINE

## 2024-05-21 PROCEDURE — 96413 CHEMO IV INFUSION 1 HR: CPT

## 2024-05-21 RX ORDER — ACETAMINOPHEN 325 MG/1
650 TABLET ORAL
Status: COMPLETED | OUTPATIENT
Start: 2024-05-21 | End: 2024-05-21

## 2024-05-21 RX ORDER — EPINEPHRINE 0.3 MG/.3ML
0.3 INJECTION SUBCUTANEOUS ONCE AS NEEDED
OUTPATIENT
Start: 2024-06-04

## 2024-05-21 RX ORDER — IPRATROPIUM BROMIDE AND ALBUTEROL SULFATE 2.5; .5 MG/3ML; MG/3ML
3 SOLUTION RESPIRATORY (INHALATION)
OUTPATIENT
Start: 2024-06-04

## 2024-05-21 RX ORDER — DIPHENHYDRAMINE HYDROCHLORIDE 50 MG/ML
50 INJECTION INTRAMUSCULAR; INTRAVENOUS ONCE AS NEEDED
OUTPATIENT
Start: 2024-06-04

## 2024-05-21 RX ORDER — ACETAMINOPHEN 325 MG/1
650 TABLET ORAL
OUTPATIENT
Start: 2024-06-04

## 2024-05-21 RX ORDER — DIPHENHYDRAMINE HYDROCHLORIDE 50 MG/ML
50 INJECTION INTRAMUSCULAR; INTRAVENOUS ONCE AS NEEDED
Status: DISCONTINUED | OUTPATIENT
Start: 2024-05-21 | End: 2024-05-21 | Stop reason: HOSPADM

## 2024-05-21 RX ORDER — SODIUM CHLORIDE 0.9 % (FLUSH) 0.9 %
10 SYRINGE (ML) INJECTION
Status: DISCONTINUED | OUTPATIENT
Start: 2024-05-21 | End: 2024-05-21 | Stop reason: HOSPADM

## 2024-05-21 RX ORDER — METHYLPREDNISOLONE SOD SUCC 125 MG
40 VIAL (EA) INJECTION
OUTPATIENT
Start: 2024-06-04

## 2024-05-21 RX ORDER — EPINEPHRINE 0.3 MG/.3ML
0.3 INJECTION SUBCUTANEOUS ONCE AS NEEDED
Status: DISCONTINUED | OUTPATIENT
Start: 2024-05-21 | End: 2024-05-21 | Stop reason: HOSPADM

## 2024-05-21 RX ORDER — HEPARIN 100 UNIT/ML
500 SYRINGE INTRAVENOUS
OUTPATIENT
Start: 2024-06-04

## 2024-05-21 RX ORDER — DIPHENHYDRAMINE HYDROCHLORIDE 50 MG/ML
25 INJECTION INTRAMUSCULAR; INTRAVENOUS
OUTPATIENT
Start: 2024-06-04

## 2024-05-21 RX ORDER — SODIUM CHLORIDE 0.9 % (FLUSH) 0.9 %
10 SYRINGE (ML) INJECTION
OUTPATIENT
Start: 2024-06-04

## 2024-05-21 RX ADMIN — METHYLPREDNISOLONE SODIUM SUCCINATE 40 MG: 40 INJECTION, POWDER, FOR SOLUTION INTRAMUSCULAR; INTRAVENOUS at 01:05

## 2024-05-21 RX ADMIN — SODIUM CHLORIDE 1200 MG: 9 INJECTION, SOLUTION INTRAVENOUS at 01:05

## 2024-05-21 RX ADMIN — DIPHENHYDRAMINE HYDROCHLORIDE 25 MG: 50 INJECTION, SOLUTION INTRAMUSCULAR; INTRAVENOUS at 01:05

## 2024-05-21 RX ADMIN — ACETAMINOPHEN 650 MG: 325 TABLET ORAL at 01:05

## 2024-05-21 RX ADMIN — SODIUM CHLORIDE: 9 INJECTION, SOLUTION INTRAVENOUS at 01:05

## 2024-05-21 NOTE — PLAN OF CARE
Problem: Fatigue  Goal: Improved Activity Tolerance  Outcome: Progressing  Intervention: Promote Improved Energy  Flowsheets (Taken 5/21/2024 1244)  Sleep/Rest Enhancement: regular sleep/rest pattern promoted  Activity Management: Ambulated -L4  Environmental Support: rest periods encouraged

## 2024-05-28 ENCOUNTER — PATIENT MESSAGE (OUTPATIENT)
Dept: FAMILY MEDICINE | Facility: CLINIC | Age: 54
End: 2024-05-28
Payer: OTHER GOVERNMENT

## 2024-05-28 DIAGNOSIS — K12.1 STOMATITIS: Primary | ICD-10-CM

## 2024-05-31 LAB
LABCORP MISC TEST CODE: NORMAL
LABCORP MISC TEST NAME: NORMAL
LABCORP MISCELLANEOUS TEST: NORMAL

## 2024-06-05 ENCOUNTER — TELEPHONE (OUTPATIENT)
Dept: OTOLARYNGOLOGY | Facility: CLINIC | Age: 54
End: 2024-06-05
Payer: OTHER GOVERNMENT

## 2024-06-05 NOTE — TELEPHONE ENCOUNTER
----- Message from Priya Stanford sent at 6/5/2024  3:31 PM CDT -----  Regarding: referral  Contact: patient  Type:  Sooner Appointment Request    Caller is requesting a sooner appointment.  Caller declined first available appointment listed below.  Caller will not accept being placed on the waitlist and is requesting a message be sent to doctor.    Name of Caller:  patient   When is the first available appointment?    Symptoms:  throat   Would the patient rather a call back or a response via [facility]?   Best Call Back Number:  [phone]    Additional Information:  call to be seen referral in chart   methotrexate 2.5 MG Tab causing sore in mouth and throat.

## 2024-06-11 ENCOUNTER — OFFICE VISIT (OUTPATIENT)
Dept: OTOLARYNGOLOGY | Facility: CLINIC | Age: 54
End: 2024-06-11
Payer: OTHER GOVERNMENT

## 2024-06-11 VITALS
WEIGHT: 271.81 LBS | DIASTOLIC BLOOD PRESSURE: 71 MMHG | BODY MASS INDEX: 38.05 KG/M2 | HEART RATE: 79 BPM | HEIGHT: 71 IN | SYSTOLIC BLOOD PRESSURE: 120 MMHG

## 2024-06-11 DIAGNOSIS — T45.1X5A ADVERSE EFFECT OF METHOTREXATE, INITIAL ENCOUNTER: ICD-10-CM

## 2024-06-11 DIAGNOSIS — K12.32 ORAL MUCOSITIS (ULCERATIVE) DUE TO OTHER DRUGS: Primary | ICD-10-CM

## 2024-06-11 DIAGNOSIS — J06.0: ICD-10-CM

## 2024-06-11 DIAGNOSIS — Z79.620 INFLIXIMAB (REMICADE) LONG-TERM USE: ICD-10-CM

## 2024-06-11 DIAGNOSIS — J02.9 PHARYNGITIS, UNSPECIFIED ETIOLOGY: ICD-10-CM

## 2024-06-11 DIAGNOSIS — K51.00 ULCERATIVE PANCOLITIS: ICD-10-CM

## 2024-06-11 DIAGNOSIS — K12.0 APHTHOUS STOMATITIS: ICD-10-CM

## 2024-06-11 PROCEDURE — 31575 DIAGNOSTIC LARYNGOSCOPY: CPT | Mod: PBBFAC,PO | Performed by: OTOLARYNGOLOGY

## 2024-06-11 PROCEDURE — 99999 PR PBB SHADOW E&M-EST. PATIENT-LVL V: CPT | Mod: PBBFAC,,, | Performed by: OTOLARYNGOLOGY

## 2024-06-11 PROCEDURE — 99215 OFFICE O/P EST HI 40 MIN: CPT | Mod: PBBFAC,PO | Performed by: OTOLARYNGOLOGY

## 2024-06-11 PROCEDURE — 99204 OFFICE O/P NEW MOD 45 MIN: CPT | Mod: 25,S$PBB,, | Performed by: OTOLARYNGOLOGY

## 2024-06-11 NOTE — PROCEDURES
"Laryngoscopy    Date/Time: 6/11/2024 11:00 AM    Performed by: Philipp Doll MD  Authorized by: Philipp Doll MD    Time out: Immediately prior to procedure a "time out" was called to verify the correct patient, procedure, equipment, support staff and site/side marked as required.    Consent Done?:  Yes (Verbal)  Anesthesia:     Local anesthetic:  Other    Patient tolerance:  Patient tolerated the procedure well with no immediate complications    Decongestion performed?: Yes    Laryngoscopy:     Areas examined:  Nasal cavities, nasopharynx, oropharynx, hypopharynx, larynx and vocal cords  Larynx/hypopharynx:      Oxymetazoline and 4% lidocaine aerosolized both nares.  Flexible disposable Storz video scope used on the left side.  Some very slight superficial ulceration of the medial superior head of the inferior turbinate.  Some hyperemia of the Eustachian tube orifice and superior lateral nasopharynx as well as some central slight granular change of the small central ulceration of the upper central nasopharynx.  Nasal surface of the soft palate is without any focal lesion.  Oropharynx shows some ulceration of the medial inferior aspect of both tonsils left-greater-than-right and left-greater-than-right posterior and lateral diffuse exudative large areas of confluence ulceration.  No purulence.  No gross edema.  The supraglottic portion of the epiglottis has slight exudative change without associated edema likely superficial ulceration.  A few small spots of the lateral right hypopharynx are noted as well as 1 in the apex of the left Piriforms sinus also small.  Mild posterior edema no marked edema of the glottis of supraglottis.    "

## 2024-06-11 NOTE — PATIENT INSTRUCTIONS
Hold off on the methotrexate.  I would recommend discontinuing it.  This should be discussed with Dr. Hernandez but not restart until after speaking with her (as we discussed by phone today).    Start the dexamethasone as a swish gargle and swallow 3 times daily until sore throat resolves or week.  Then decrease to 3 times daily swish gargle and spit.      Contact the office if symptoms are not improving quickly.  If symptoms dramatically worsen with the inability to maintain nutrition, or any symptoms of throat swelling, inability to swallow or difficulty breathing go straight to the emergency department or call 911.    Routine follow up.

## 2024-06-11 NOTE — PROGRESS NOTES
[facility]    Subjective:      Patient: Isidoro Singh Patient PCP: Vinh Noyola PA-C         :  1970     Sex:  male      MRN:  [id number]          Date of Visit: 2024      Chief Complaint: Mouth Lesions and Oral Pain (Pt states he has sores in his throat and mouth after taking methotrexate. Pt states he coughs up chunks of skin. States this has been going on for about 4 weeks. Pt seen family medicine pcp on 2024 and that's who referred him.)      Patient ID: Isidoro Singh is a 54 y.o. male     Patient is a  former 30 pack year smoker quitting 4 years ago with a past medical history of hypothyroidism with a history of radio nuclear ablation for Hashimoto's, prediabetes with a hemoglobin A1c of 5.9% and methotrexate therapy for pan colitis referred to me by Dr. Kath Hernandez in consultation for oral lesions and pain since starting methotrexate.  Feels he expectorates chunks of skin.  This has been ongoing for 4 weeks.  Patient of Dr. Hernandez for gonzalez colitis and methotrexate.  Treated with infliximab last 2024.  Referred by Vinh Noyola PA-C..  Per GI note from 2024 visit patient has been developing oral ulcers in his on methotrexate 15 mg weekly at that time.  Infliximab level drawn 2024    Labs:  WBC   Date Value Ref Range Status   2024 8.08 3.90 - 12.70 K/uL Final     Hemoglobin   Date Value Ref Range Status   2024 11.9 (L) 14.0 - 18.0 g/dL Final     Platelets   Date Value Ref Range Status   2024 273 150 - 450 K/uL Final     Creatinine   Date Value Ref Range Status   2024 0.9 0.5 - 1.4 mg/dL Final     TSH   Date Value Ref Range Status   2024 4.136 0.340 - 5.600 uIU/mL Final     Hemoglobin A1C   Date Value Ref Range Status   2024 5.9 (H) <5.7 % of total Hgb Final     Comment:     For someone without known diabetes, a hemoglobin   A1c value between 5.7% and 6.4% is consistent with  prediabetes and should be confirmed  with a   follow-up test.     For someone with known diabetes, a value <7%  indicates that their diabetes is well controlled. A1c  targets should be individualized based on duration of  diabetes, age, comorbid conditions, and other  considerations.     This assay result is consistent with an increased risk  of diabetes.     Currently, no consensus exists regarding use of  hemoglobin A1c for diagnosis of diabetes for children.     HbA1c performed on Roche platform.  Effective 11/13/23 a change in test platforms may have   shifted HbA1c results compared to historical results.             Past Medical History  He has a past medical history of Colon polyp, Diabetes mellitus, Hypothyroid, Sleep apnea, Thrombocytopenia, and Ulcerative colitis.    Family / Surgical / Social History  His family history includes Ulcerative colitis in his mother.    Past Surgical History:   Procedure Laterality Date    COLONOSCOPY N/A 12/04/2020    Procedure: COLONOSCOPY;  Surgeon: Ventura Warren MD;  Location: [facility];  Service: Endoscopy;  Laterality: N/A;    COLONOSCOPY N/A 07/19/2021    Procedure: COLONOSCOPY;  Surgeon: Clarence Ogden MD;  Location: [facility];  Service: Endoscopy;  Laterality: N/A;    COLONOSCOPY N/A 11/10/2022    Procedure: COLONOSCOPY;  Surgeon: Kath Hernandez MD;  Location: [facility];  Service: Endoscopy;  Laterality: N/A;    COLONOSCOPY N/A 2/29/2024    Procedure: COLONOSCOPY;  Surgeon: Kath Hernandez MD;  Location: [facility];  Service: Endoscopy;  Laterality: N/A;    FOOT SURGERY      left   cyst removed    HAND SURGERY      right   tendon repair       Social History     Tobacco Use    Smoking status: Former     Current packs/day: 0.00     Average packs/day: 1 pack/day for 30.0 years (30.0 ttl pk-yrs)     Types: Cigarettes     Start date: 1984     Quit date: 12/2013     Years since quitting: 10.5     Passive exposure: Past    Smokeless tobacco: Former     Types: Snuff     Quit date: 2020   Substance  "and Sexual Activity    Alcohol use: Yes     Comment: occasional    Drug use: No    Sexual activity: Yes       Medications  He has a current medication list which includes the following prescription(s): diphenhydramine hcl, ergocalciferol, folic acid, levothyroxine, mesalamine, methotrexate, omega-3 acid ethyl esters, testosterone cypionate, citalopram, dexamethasone, and sildenafil.      Allergies  Review of patient's allergies indicates:  No Known Allergies    All medications, allergies, and past history have been reviewed.    Objective:      Vitals:      5/3/2024    10:30 AM 5/21/2024    12:45 PM 6/11/2024    11:14 AM   Vitals - 1 value per visit   SYSTOLIC 118 113 120   DIASTOLIC 70 73 71   Pulse 77 96 79   Temp 97.8 °F (36.6 °C) 98.1 °F (36.7 °C)    Resp 10 16    SPO2 96 % 96 %    Weight (lb) 257.72 264.9 271.83   Weight (kg) 116.9 120.158 123.3   Height 5' 11" (1.803 m) 5' 11" (1.803 m) 5' 11" (1.803 m)   BMI (Calculated) 36 37 37.9   Pain Score Zero Eight Three       Body surface area is 2.49 meters squared.    Physical Exam:    GENERAL  APPEARANCE -  alert, appears stated age, and cooperative  BARRIER(S) TO COMMUNICATION -  none VOICE - appropriate for age and gender    INTEGUMENTARY  no suspicious head and neck lesions    HEENT  HEAD: Normocephalic, without obvious abnormality, atraumatic  FACE: INSPECTION - Symmetric, no signs of trauma, no suspicious lesion(s)      STRENGTH - facial symmetry intact     PALPATION -  No masses     SALIVARY GLANDS - non-tender with no appreciable mass    NECK/THYROID: normal atraumatic, no neck masses, normal thyroid, no jvd    EYES  Normal occular alignment and mobility with no visible nystagmus at rest    EARS/NOSE/MOUTH/THROAT  EARS  PINNAE AND EXTERNAL EARS - no suspicious lesion OTOSCOPIC EXAM (surgical microscopy was not used for visualization/instrumentation): EAR EXAM - Normal ear canals, tympanic membranes and mobility, and middle ear spaces bilaterally.  HEARING - " grossly intact to voice/finger rub    NOSE AND SINUSES  EXTERNAL NOSE - Grossly normal for age/sex  SEPTUM - obstructive right deviation TURBINATES - within normal limits MUCOSA - slight ulceration left medial/superior aspect of inferior turbinate.  No pus, crusting or erythema.  Minimal mucoid drainage.     MOUTH AND THROAT   ORAL CAVITY, LIPS, TEETH, GUMS & TONGUE - small aphthous ulcers of the gingiva sulcus medially, and larger of the tonsils most notably of the anterior pillar to superior pole area of the lateral soft palate on the left side.  No hydrops or edema of the uvula.  Exudate noted as well as postnasal drip of the posterior central oropharynx.  OROPHARYNX /TONSILS/PHARYNGEAL WALLS/HYPOPHARYNX - no erythema or exudates  NASOPHARYNX - limited mirror exam - unable to visualize due to anatomy/gag  LARYNX -  - limited mirror exam - unable to visualize due to anatomy/gag      CHEST AND LUNG   INSPECTION & AUSCULTATION - normal effort, no stridor    CARDIOVASCULAR  AUSCULTATION & PERIPHERAL VASCULAR - regular rate and rhythm.    NEUROLOGIC  MENTAL STATUS - alert, interactive CRANIAL NERVES - normal    LYMPHATIC  HEAD AND NECK - non-palpable; SUPRACLAVICULAR - deferred      Procedure(s):  Flexible laryngoscopy performed.  See procedure note.                      Assessment:      Problem List Items Addressed This Visit          GI    Ulcerative pancolitis     Other Visit Diagnoses       Oral mucositis (ulcerative) due to other drugs    -  Primary    Aphthous stomatitis        Pharyngitis, unspecified etiology        Hypopharyngitis        Infliximab (Remicade) long-term use        Adverse effect of methotrexate, initial encounter                     Plan:      Patient encouraged to not take his methotrexate which is due tomorrow until he speaks with Dr. Hernandez.  He has not due for his next Remicade infusion until July.  The combination seems to be causing mucositis and it maybe appropriate to continue with  methotrexate at a lower dose but something needs to change.  He is going to continue with symptomatic relief but we are going to use dexamethasone rinses and swallows once the soreness of the throat component goes away he can just rinse, gargle and spit the dexamethasone (steroid) until the oral/tonsil lesions have resolved.    He and I discussed at length the importance of resolving this issue which has been evolving over the last few months seemingly attributable to his medications not to any    Follow up routinely          Voice recognition software was used in the creation of this note/communication and any sound-alike errors which may have occurred from its use should be taken in context when interpreting.  If such errors prevent a clear understanding of the note/communication, please contact the office for clarification.

## 2024-06-12 ENCOUNTER — PATIENT MESSAGE (OUTPATIENT)
Dept: GASTROENTEROLOGY | Facility: CLINIC | Age: 54
End: 2024-06-12
Payer: OTHER GOVERNMENT

## 2024-06-21 DIAGNOSIS — K12.1 STOMATITIS: Primary | ICD-10-CM

## 2024-07-01 ENCOUNTER — PATIENT MESSAGE (OUTPATIENT)
Dept: FAMILY MEDICINE | Facility: CLINIC | Age: 54
End: 2024-07-01
Payer: OTHER GOVERNMENT

## 2024-07-10 ENCOUNTER — PATIENT MESSAGE (OUTPATIENT)
Dept: GASTROENTEROLOGY | Facility: CLINIC | Age: 54
End: 2024-07-10
Payer: OTHER GOVERNMENT

## 2024-07-16 ENCOUNTER — INFUSION (OUTPATIENT)
Dept: INFUSION THERAPY | Facility: HOSPITAL | Age: 54
End: 2024-07-16
Attending: INTERNAL MEDICINE
Payer: OTHER GOVERNMENT

## 2024-07-16 ENCOUNTER — DOCUMENTATION ONLY (OUTPATIENT)
Dept: INFUSION THERAPY | Facility: HOSPITAL | Age: 54
End: 2024-07-16

## 2024-07-16 VITALS
HEIGHT: 71 IN | TEMPERATURE: 98 F | DIASTOLIC BLOOD PRESSURE: 80 MMHG | WEIGHT: 276.63 LBS | HEART RATE: 79 BPM | BODY MASS INDEX: 38.73 KG/M2 | RESPIRATION RATE: 18 BRPM | OXYGEN SATURATION: 94 % | SYSTOLIC BLOOD PRESSURE: 125 MMHG

## 2024-07-16 DIAGNOSIS — K51.00 ULCERATIVE PANCOLITIS: Primary | ICD-10-CM

## 2024-07-16 LAB
ALBUMIN SERPL BCP-MCNC: 4.1 G/DL (ref 3.5–5.2)
ALP SERPL-CCNC: 63 U/L (ref 55–135)
ALT SERPL W/O P-5'-P-CCNC: 32 U/L (ref 10–44)
ANION GAP SERPL CALC-SCNC: 9 MMOL/L (ref 8–16)
AST SERPL-CCNC: 19 U/L (ref 10–40)
BASOPHILS # BLD AUTO: 0.06 K/UL (ref 0–0.2)
BASOPHILS NFR BLD: 0.8 % (ref 0–1.9)
BILIRUB SERPL-MCNC: 0.4 MG/DL (ref 0.1–1)
BUN SERPL-MCNC: 14 MG/DL (ref 6–20)
CALCIUM SERPL-MCNC: 9 MG/DL (ref 8.7–10.5)
CHLORIDE SERPL-SCNC: 102 MMOL/L (ref 95–110)
CO2 SERPL-SCNC: 26 MMOL/L (ref 23–29)
CREAT SERPL-MCNC: 0.9 MG/DL (ref 0.5–1.4)
DIFFERENTIAL METHOD BLD: ABNORMAL
EOSINOPHIL # BLD AUTO: 0.3 K/UL (ref 0–0.5)
EOSINOPHIL NFR BLD: 4 % (ref 0–8)
ERYTHROCYTE [DISTWIDTH] IN BLOOD BY AUTOMATED COUNT: 14.4 % (ref 11.5–14.5)
EST. GFR  (NO RACE VARIABLE): >60 ML/MIN/1.73 M^2
GLUCOSE SERPL-MCNC: 158 MG/DL (ref 70–110)
HCT VFR BLD AUTO: 41.3 % (ref 40–54)
HGB BLD-MCNC: 12.9 G/DL (ref 14–18)
IMM GRANULOCYTES # BLD AUTO: 0.02 K/UL (ref 0–0.04)
IMM GRANULOCYTES NFR BLD AUTO: 0.3 % (ref 0–0.5)
LYMPHOCYTES # BLD AUTO: 1.8 K/UL (ref 1–4.8)
LYMPHOCYTES NFR BLD: 23.5 % (ref 18–48)
MCH RBC QN AUTO: 29 PG (ref 27–31)
MCHC RBC AUTO-ENTMCNC: 31.2 G/DL (ref 32–36)
MCV RBC AUTO: 93 FL (ref 82–98)
MONOCYTES # BLD AUTO: 0.4 K/UL (ref 0.3–1)
MONOCYTES NFR BLD: 4.6 % (ref 4–15)
NEUTROPHILS # BLD AUTO: 5.2 K/UL (ref 1.8–7.7)
NEUTROPHILS NFR BLD: 66.8 % (ref 38–73)
NRBC BLD-RTO: 0 /100 WBC
PLATELET # BLD AUTO: 255 K/UL (ref 150–450)
PMV BLD AUTO: 10 FL (ref 9.2–12.9)
POTASSIUM SERPL-SCNC: 4.1 MMOL/L (ref 3.5–5.1)
PROT SERPL-MCNC: 7.5 G/DL (ref 6–8.4)
RBC # BLD AUTO: 4.45 M/UL (ref 4.6–6.2)
SODIUM SERPL-SCNC: 137 MMOL/L (ref 136–145)
WBC # BLD AUTO: 7.82 K/UL (ref 3.9–12.7)

## 2024-07-16 PROCEDURE — 96366 THER/PROPH/DIAG IV INF ADDON: CPT

## 2024-07-16 PROCEDURE — 85025 COMPLETE CBC W/AUTO DIFF WBC: CPT | Performed by: INTERNAL MEDICINE

## 2024-07-16 PROCEDURE — 25000003 PHARM REV CODE 250: Performed by: INTERNAL MEDICINE

## 2024-07-16 PROCEDURE — 96365 THER/PROPH/DIAG IV INF INIT: CPT

## 2024-07-16 PROCEDURE — 80053 COMPREHEN METABOLIC PANEL: CPT | Performed by: INTERNAL MEDICINE

## 2024-07-16 PROCEDURE — 96415 CHEMO IV INFUSION ADDL HR: CPT

## 2024-07-16 PROCEDURE — 96413 CHEMO IV INFUSION 1 HR: CPT

## 2024-07-16 PROCEDURE — 96375 TX/PRO/DX INJ NEW DRUG ADDON: CPT

## 2024-07-16 PROCEDURE — 96367 TX/PROPH/DG ADDL SEQ IV INF: CPT

## 2024-07-16 PROCEDURE — 63600175 PHARM REV CODE 636 W HCPCS: Performed by: INTERNAL MEDICINE

## 2024-07-16 RX ORDER — HEPARIN 100 UNIT/ML
500 SYRINGE INTRAVENOUS
OUTPATIENT
Start: 2024-07-30

## 2024-07-16 RX ORDER — SODIUM CHLORIDE 0.9 % (FLUSH) 0.9 %
10 SYRINGE (ML) INJECTION
OUTPATIENT
Start: 2024-07-30

## 2024-07-16 RX ORDER — ACETAMINOPHEN 325 MG/1
650 TABLET ORAL
Status: COMPLETED | OUTPATIENT
Start: 2024-07-16 | End: 2024-07-16

## 2024-07-16 RX ORDER — ACETAMINOPHEN 325 MG/1
650 TABLET ORAL
OUTPATIENT
Start: 2024-07-30

## 2024-07-16 RX ORDER — METHYLPREDNISOLONE SOD SUCC 125 MG
40 VIAL (EA) INJECTION
OUTPATIENT
Start: 2024-07-30

## 2024-07-16 RX ORDER — IPRATROPIUM BROMIDE AND ALBUTEROL SULFATE 2.5; .5 MG/3ML; MG/3ML
3 SOLUTION RESPIRATORY (INHALATION)
OUTPATIENT
Start: 2024-07-30

## 2024-07-16 RX ORDER — EPINEPHRINE 0.3 MG/.3ML
0.3 INJECTION SUBCUTANEOUS ONCE AS NEEDED
OUTPATIENT
Start: 2024-07-30

## 2024-07-16 RX ORDER — DIPHENHYDRAMINE HYDROCHLORIDE 50 MG/ML
50 INJECTION INTRAMUSCULAR; INTRAVENOUS ONCE AS NEEDED
Status: DISCONTINUED | OUTPATIENT
Start: 2024-07-16 | End: 2024-07-16 | Stop reason: HOSPADM

## 2024-07-16 RX ORDER — EPINEPHRINE 0.3 MG/.3ML
0.3 INJECTION SUBCUTANEOUS ONCE AS NEEDED
Status: DISCONTINUED | OUTPATIENT
Start: 2024-07-16 | End: 2024-07-16 | Stop reason: HOSPADM

## 2024-07-16 RX ORDER — SODIUM CHLORIDE 0.9 % (FLUSH) 0.9 %
10 SYRINGE (ML) INJECTION
Status: DISCONTINUED | OUTPATIENT
Start: 2024-07-16 | End: 2024-07-16 | Stop reason: HOSPADM

## 2024-07-16 RX ORDER — DIPHENHYDRAMINE HYDROCHLORIDE 50 MG/ML
50 INJECTION INTRAMUSCULAR; INTRAVENOUS ONCE AS NEEDED
OUTPATIENT
Start: 2024-07-30

## 2024-07-16 RX ORDER — DIPHENHYDRAMINE HYDROCHLORIDE 50 MG/ML
25 INJECTION INTRAMUSCULAR; INTRAVENOUS
OUTPATIENT
Start: 2024-07-30

## 2024-07-16 RX ADMIN — ACETAMINOPHEN 650 MG: 325 TABLET ORAL at 01:07

## 2024-07-16 RX ADMIN — SODIUM CHLORIDE 1200 MG: 9 INJECTION, SOLUTION INTRAVENOUS at 01:07

## 2024-07-16 RX ADMIN — METHYLPREDNISOLONE SODIUM SUCCINATE 40 MG: 40 INJECTION, POWDER, FOR SOLUTION INTRAMUSCULAR; INTRAVENOUS at 01:07

## 2024-07-16 RX ADMIN — DIPHENHYDRAMINE HYDROCHLORIDE 25 MG: 50 INJECTION, SOLUTION INTRAMUSCULAR; INTRAVENOUS at 01:07

## 2024-07-25 ENCOUNTER — OFFICE VISIT (OUTPATIENT)
Dept: OTOLARYNGOLOGY | Facility: CLINIC | Age: 54
End: 2024-07-25
Payer: OTHER GOVERNMENT

## 2024-07-25 VITALS
WEIGHT: 278.44 LBS | SYSTOLIC BLOOD PRESSURE: 128 MMHG | BODY MASS INDEX: 38.98 KG/M2 | DIASTOLIC BLOOD PRESSURE: 74 MMHG | HEIGHT: 71 IN | HEART RATE: 80 BPM

## 2024-07-25 DIAGNOSIS — K12.0 APHTHOUS STOMATITIS: Primary | ICD-10-CM

## 2024-07-25 DIAGNOSIS — Z79.620 INFLIXIMAB (REMICADE) LONG-TERM USE: ICD-10-CM

## 2024-07-25 DIAGNOSIS — J06.0: ICD-10-CM

## 2024-07-25 DIAGNOSIS — K12.32 ORAL MUCOSITIS (ULCERATIVE) DUE TO OTHER DRUGS: ICD-10-CM

## 2024-07-25 DIAGNOSIS — K51.00 ULCERATIVE PANCOLITIS: ICD-10-CM

## 2024-07-25 DIAGNOSIS — T45.1X5A ADVERSE EFFECT OF METHOTREXATE, INITIAL ENCOUNTER: ICD-10-CM

## 2024-07-25 PROCEDURE — 99999 PR PBB SHADOW E&M-EST. PATIENT-LVL IV: CPT | Mod: PBBFAC,,, | Performed by: OTOLARYNGOLOGY

## 2024-07-25 PROCEDURE — 99213 OFFICE O/P EST LOW 20 MIN: CPT | Mod: S$PBB,,, | Performed by: OTOLARYNGOLOGY

## 2024-07-25 PROCEDURE — 99214 OFFICE O/P EST MOD 30 MIN: CPT | Mod: PBBFAC,PO | Performed by: OTOLARYNGOLOGY

## 2024-07-25 NOTE — PROGRESS NOTES
[facility]    Subjective:      Patient: Isidoro Singh Patient PCP: Vinh Noyola PA-C         :  1970     Sex:  male      MRN:  [id number]          Date of Visit: 2024      Chief Complaint: Follow-up (Follow up on throat and mouth ulcers. Pt states ever since he stopped taking the methotrexate his ulcers have cleared up in his mouth and throat.)      Patient ID: Isidoro Singh is a 54 y.o. male     2024 follow-up visit former 30 pack year smoker on methotrexate and infliximab (Remicade) for pan colitis seen with ulcerative pharyngitis and hypo pharyngitis consistent with side effect of medication.  Reduced dose or alternate treatment recommended.  Communicated with Dr. Hernandez.  Patient returns today feeling well having discontinued methotrexate and continuing with Remicade.  No throat-clearing, voice change, globus let alone any hemoptysis production of tissue like expectorations otalgia or other concerning symptoms.  Feels like his GI symptoms are stable.  He does still get some cramping and mucus but no bleeding.  This is no better or worse than it has been in the past.  Still doing Remicade.  Sees Dr. Hernandez next month. Oral lesions and throat symptoms have completely resolved.    Reports some occasional blood from the right nares.  Not using his humidification chamber with CPAP.  No obstructive symptoms pressure pain or any other bleeding or bruising.    2024 initial patient consultation Patient is a  former 30 pack year smoker quitting 4 years ago with a past medical history of hypothyroidism with a history of radio nuclear ablation for Hashimoto's, prediabetes with a hemoglobin A1c of 5.9% and methotrexate therapy for pan colitis referred to me by Dr. Kath Hernandez in consultation for oral lesions and pain since starting methotrexate.  Feels he expectorates chunks of skin.  This has been ongoing for 4 weeks.  Patient of Dr. Hernandez for gonzalez colitis and  methotrexate.  Treated with infliximab last 05/21/2024.  Referred by Vinh Noyola PA-C..  Per GI note from 05/01/2024 visit patient has been developing oral ulcers in his on methotrexate 15 mg weekly at that time.  Infliximab level drawn 05/21/2024    Labs:  WBC   Date Value Ref Range Status   07/16/2024 7.82 3.90 - 12.70 K/uL Final     Hemoglobin   Date Value Ref Range Status   07/16/2024 12.9 (L) 14.0 - 18.0 g/dL Final     Platelets   Date Value Ref Range Status   07/16/2024 255 150 - 450 K/uL Final     Creatinine   Date Value Ref Range Status   07/16/2024 0.9 0.5 - 1.4 mg/dL Final     TSH   Date Value Ref Range Status   02/24/2024 4.136 0.340 - 5.600 uIU/mL Final     Hemoglobin A1C   Date Value Ref Range Status   02/22/2024 5.9 (H) <5.7 % of total Hgb Final     Comment:     For someone without known diabetes, a hemoglobin   A1c value between 5.7% and 6.4% is consistent with  prediabetes and should be confirmed with a   follow-up test.     For someone with known diabetes, a value <7%  indicates that their diabetes is well controlled. A1c  targets should be individualized based on duration of  diabetes, age, comorbid conditions, and other  considerations.     This assay result is consistent with an increased risk  of diabetes.     Currently, no consensus exists regarding use of  hemoglobin A1c for diagnosis of diabetes for children.     HbA1c performed on Roche platform.  Effective 11/13/23 a change in test platforms may have   shifted HbA1c results compared to historical results.             Past Medical History  He has a past medical history of Colon polyp, Diabetes mellitus, Hypothyroid, Sleep apnea, Thrombocytopenia, and Ulcerative colitis.    Family / Surgical / Social History  His family history includes Ulcerative colitis in his mother.    Past Surgical History:   Procedure Laterality Date    COLONOSCOPY N/A 12/04/2020    Procedure: COLONOSCOPY;  Surgeon: Ventura Warren MD;  Location: [facility];   "Service: Endoscopy;  Laterality: N/A;    COLONOSCOPY N/A 07/19/2021    Procedure: COLONOSCOPY;  Surgeon: Clarence Ogden MD;  Location: [facility] ENDO;  Service: Endoscopy;  Laterality: N/A;    COLONOSCOPY N/A 11/10/2022    Procedure: COLONOSCOPY;  Surgeon: Kath Hernandez MD;  Location: [facility] ENDO;  Service: Endoscopy;  Laterality: N/A;    COLONOSCOPY N/A 2/29/2024    Procedure: COLONOSCOPY;  Surgeon: Kath Hernandez MD;  Location: [facility] ENDO;  Service: Endoscopy;  Laterality: N/A;    FOOT SURGERY      left   cyst removed    HAND SURGERY      right   tendon repair       Social History     Tobacco Use    Smoking status: Former     Current packs/day: 0.00     Average packs/day: 1 pack/day for 30.0 years (30.0 ttl pk-yrs)     Types: Cigarettes     Start date: 1984     Quit date: 12/2013     Years since quitting: 10.6     Passive exposure: Past    Smokeless tobacco: Former     Types: Snuff     Quit date: 2020   Substance and Sexual Activity    Alcohol use: Yes     Comment: occasional    Drug use: No    Sexual activity: Yes       Medications  He has a current medication list which includes the following prescription(s): ergocalciferol, folic acid, levothyroxine, omega-3 acid ethyl esters, testosterone cypionate, diphenhydramine hcl, citalopram, mesalamine, methotrexate, and sildenafil.      Allergies  Review of patient's allergies indicates:  No Known Allergies    All medications, allergies, and past history have been reviewed.    Objective:      Vitals:      6/11/2024    11:14 AM 7/16/2024     1:05 PM 7/25/2024     4:02 PM   Vitals - 1 value per visit   SYSTOLIC 120 124 128   DIASTOLIC 71 71 74   Pulse 79 88 80   Temp  98.3 °F (36.8 °C)    Resp  18    SPO2  96 %    Weight (lb) 271.83 276.6 278.44   Weight (kg) 123.3 125.465 126.3   Height 5' 11" (1.803 m) 5' 11" (1.803 m) 5' 11" (1.803 m)   BMI (Calculated) 37.9 38.6 38.9   Pain Score Three Three Zero       Body surface area is 2.52 meters squared.    Physical " Exam:    GENERAL  APPEARANCE -  alert, appears stated age, and cooperative  BARRIER(S) TO COMMUNICATION -  none VOICE - appropriate for age and gender    INTEGUMENTARY  no suspicious head and neck lesions    HEENT  HEAD: Normocephalic, without obvious abnormality, atraumatic  FACE: INSPECTION - Symmetric, no signs of trauma, no suspicious lesion(s)      STRENGTH - facial symmetry intact     PALPATION -  No masses     SALIVARY GLANDS - non-tender with no appreciable mass    NECK/THYROID: normal atraumatic, no neck masses, normal thyroid, no jvd    EYES  Normal occular alignment and mobility with no visible nystagmus at rest    EARS/NOSE/MOUTH/THROAT  EARS  PINNAE AND EXTERNAL EARS - no suspicious lesion OTOSCOPIC EXAM (surgical microscopy was not used for visualization/instrumentation): EAR EXAM - Normal ear canals, tympanic membranes and mobility, and middle ear spaces bilaterally.  HEARING - grossly intact to voice/finger rub    NOSE AND SINUSES  EXTERNAL NOSE - Grossly normal for age/sex  SEPTUM - obstructive deviation TURBINATES - within normal limits MUCOSA - NO ulceration. No pus, crusting or erythema.  Minimal mucoid drainage.     MOUTH AND THROAT   ORAL CAVITY, LIPS, TEETH, GUMS & TONGUE - normal pink healthy mucosa throughout the oral cavity oropharynx and hypopharynx.  Mirror examination of the hypopharynx and larynx with no exudate lesion asymmetry sloughing redness or any pooling of secretions.  OROPHARYNX /TONSILS/PHARYNGEAL WALLS/HYPOPHARYNX - see above  NASOPHARYNX - limited mirror exam - unable to visualize due to anatomy/gag  LARYNX -  - limited mirror exam - see above    CHEST AND LUNG   INSPECTION & AUSCULTATION - normal effort, no stridor    CARDIOVASCULAR  AUSCULTATION & PERIPHERAL VASCULAR - regular rate and rhythm.    NEUROLOGIC  MENTAL STATUS - alert, interactive CRANIAL NERVES - normal    LYMPHATIC  HEAD AND NECK - non-palpable; SUPRACLAVICULAR -  deferred      Procedure(s):  none                      Assessment:      Problem List Items Addressed This Visit          GI    Ulcerative pancolitis     Other Visit Diagnoses       Aphthous stomatitis    -  Primary    Oral mucositis (ulcerative) due to other drugs        Hypopharyngitis        Infliximab (Remicade) long-term use        Adverse effect of methotrexate, initial encounter                       Plan:      Follow up with Gastroenterology (Dr. Hernandez) for monitoring of pan colitis.    For the dryness and irritation of the nose use the humidifier of the CPAP.  Dry nose care as outlined.  If there continues to be any blood from the nose follow up for repeat endoscopy.    Return with any worsening of symptoms, failure to improve, or any other concerns for further evaluation and treatment.              Voice recognition software was used in the creation of this note/communication and any sound-alike errors which may have occurred from its use should be taken in context when interpreting.  If such errors prevent a clear understanding of the note/communication, please contact the office for clarification.

## 2024-07-25 NOTE — PATIENT INSTRUCTIONS
Follow up with Gastroenterology (Dr. Hernandez) for monitoring of pan colitis.    For the dryness and irritation of the nose use the humidifier of the CPAP.  Dry nose care as outlined.  If there continues to be any blood from the nose follow up for repeat endoscopy.    Return with any worsening of symptoms, failure to improve, or any other concerns for further evaluation and treatment.      Voice recognition software was used in the creation of this note/communication and any sound-alike errors which may have occurred from its use should be taken in context when interpreting.  If such errors prevent a clear understanding of the note/communication, please contact the office for clarification.        DRY NOSE CARE    Use lots of saline throughout the day to keep the nose moist.  Consider using saline gel for longer-term moisturizing.  Aquaphor or Vaseline should be applied to the nostrils at night when needed for crusting/more severe dryness. Antibiotic ointment can be used up to a week for tender dryness/crusting.  Follow-up for further evaluation treatment if symptoms of are not resolved.

## 2024-07-31 ENCOUNTER — LAB VISIT (OUTPATIENT)
Dept: LAB | Facility: HOSPITAL | Age: 54
End: 2024-07-31
Attending: INTERNAL MEDICINE
Payer: OTHER GOVERNMENT

## 2024-07-31 DIAGNOSIS — K51.00 ULCERATIVE PANCOLITIS: ICD-10-CM

## 2024-07-31 DIAGNOSIS — K76.0 NAFLD (NONALCOHOLIC FATTY LIVER DISEASE): ICD-10-CM

## 2024-07-31 DIAGNOSIS — E66.9 OBESITY (BMI 35.0-39.9 WITHOUT COMORBIDITY): ICD-10-CM

## 2024-07-31 DIAGNOSIS — D69.6 THROMBOCYTOPENIA: ICD-10-CM

## 2024-07-31 LAB
ALBUMIN SERPL BCP-MCNC: 3.8 G/DL (ref 3.5–5.2)
ALP SERPL-CCNC: 62 U/L (ref 55–135)
ALT SERPL W/O P-5'-P-CCNC: 43 U/L (ref 10–44)
ANION GAP SERPL CALC-SCNC: 6 MMOL/L (ref 8–16)
AST SERPL-CCNC: 22 U/L (ref 10–40)
BASOPHILS # BLD AUTO: 0.05 K/UL (ref 0–0.2)
BASOPHILS NFR BLD: 1.1 % (ref 0–1.9)
BILIRUB SERPL-MCNC: 0.5 MG/DL (ref 0.1–1)
BUN SERPL-MCNC: 14 MG/DL (ref 6–20)
CALCIUM SERPL-MCNC: 9.1 MG/DL (ref 8.7–10.5)
CHLORIDE SERPL-SCNC: 106 MMOL/L (ref 95–110)
CO2 SERPL-SCNC: 26 MMOL/L (ref 23–29)
CREAT SERPL-MCNC: 1 MG/DL (ref 0.5–1.4)
DIFFERENTIAL METHOD BLD: ABNORMAL
EOSINOPHIL # BLD AUTO: 0.3 K/UL (ref 0–0.5)
EOSINOPHIL NFR BLD: 6 % (ref 0–8)
ERYTHROCYTE [DISTWIDTH] IN BLOOD BY AUTOMATED COUNT: 14.5 % (ref 11.5–14.5)
EST. GFR  (NO RACE VARIABLE): >60 ML/MIN/1.73 M^2
FERRITIN SERPL-MCNC: 28 NG/ML (ref 20–300)
GLUCOSE SERPL-MCNC: 115 MG/DL (ref 70–110)
HCT VFR BLD AUTO: 40.3 % (ref 40–54)
HGB BLD-MCNC: 12.9 G/DL (ref 14–18)
IMM GRANULOCYTES # BLD AUTO: 0.01 K/UL (ref 0–0.04)
IMM GRANULOCYTES NFR BLD AUTO: 0.2 % (ref 0–0.5)
IRON SERPL-MCNC: 78 UG/DL (ref 45–160)
LYMPHOCYTES # BLD AUTO: 1.4 K/UL (ref 1–4.8)
LYMPHOCYTES NFR BLD: 29 % (ref 18–48)
MCH RBC QN AUTO: 29.8 PG (ref 27–31)
MCHC RBC AUTO-ENTMCNC: 32 G/DL (ref 32–36)
MCV RBC AUTO: 93 FL (ref 82–98)
MONOCYTES # BLD AUTO: 0.4 K/UL (ref 0.3–1)
MONOCYTES NFR BLD: 9.5 % (ref 4–15)
NEUTROPHILS # BLD AUTO: 2.5 K/UL (ref 1.8–7.7)
NEUTROPHILS NFR BLD: 54.2 % (ref 38–73)
NRBC BLD-RTO: 0 /100 WBC
PLATELET # BLD AUTO: 204 K/UL (ref 150–450)
PMV BLD AUTO: 11.1 FL (ref 9.2–12.9)
POTASSIUM SERPL-SCNC: 4 MMOL/L (ref 3.5–5.1)
PROT SERPL-MCNC: 7.3 G/DL (ref 6–8.4)
RBC # BLD AUTO: 4.33 M/UL (ref 4.6–6.2)
SATURATED IRON: 17 % (ref 20–50)
SODIUM SERPL-SCNC: 138 MMOL/L (ref 136–145)
TOTAL IRON BINDING CAPACITY: 448 UG/DL (ref 250–450)
TRANSFERRIN SERPL-MCNC: 320 MG/DL (ref 200–375)
WBC # BLD AUTO: 4.65 K/UL (ref 3.9–12.7)

## 2024-07-31 PROCEDURE — 85025 COMPLETE CBC W/AUTO DIFF WBC: CPT | Performed by: INTERNAL MEDICINE

## 2024-07-31 PROCEDURE — 80053 COMPREHEN METABOLIC PANEL: CPT | Performed by: INTERNAL MEDICINE

## 2024-07-31 PROCEDURE — 82728 ASSAY OF FERRITIN: CPT | Performed by: INTERNAL MEDICINE

## 2024-07-31 PROCEDURE — 84466 ASSAY OF TRANSFERRIN: CPT | Performed by: INTERNAL MEDICINE

## 2024-07-31 PROCEDURE — 36415 COLL VENOUS BLD VENIPUNCTURE: CPT | Performed by: INTERNAL MEDICINE

## 2024-08-02 ENCOUNTER — OFFICE VISIT (OUTPATIENT)
Dept: HEMATOLOGY/ONCOLOGY | Facility: CLINIC | Age: 54
End: 2024-08-02
Payer: OTHER GOVERNMENT

## 2024-08-02 VITALS
BODY MASS INDEX: 38.52 KG/M2 | HEIGHT: 71 IN | OXYGEN SATURATION: 97 % | HEART RATE: 78 BPM | SYSTOLIC BLOOD PRESSURE: 123 MMHG | RESPIRATION RATE: 16 BRPM | WEIGHT: 275.13 LBS | TEMPERATURE: 98 F | DIASTOLIC BLOOD PRESSURE: 71 MMHG

## 2024-08-02 DIAGNOSIS — D69.6 THROMBOCYTOPENIA: ICD-10-CM

## 2024-08-02 DIAGNOSIS — E78.00 PURE HYPERCHOLESTEROLEMIA: ICD-10-CM

## 2024-08-02 DIAGNOSIS — E08.00 DIABETES MELLITUS DUE TO UNDERLYING CONDITION WITH HYPEROSMOLARITY WITHOUT COMA, WITHOUT LONG-TERM CURRENT USE OF INSULIN: ICD-10-CM

## 2024-08-02 DIAGNOSIS — E06.3 HASHIMOTO'S THYROIDITIS: ICD-10-CM

## 2024-08-02 DIAGNOSIS — K76.0 NAFLD (NONALCOHOLIC FATTY LIVER DISEASE): ICD-10-CM

## 2024-08-02 DIAGNOSIS — E03.9 HYPOTHYROIDISM (ACQUIRED): ICD-10-CM

## 2024-08-02 DIAGNOSIS — K51.00 ULCERATIVE PANCOLITIS: ICD-10-CM

## 2024-08-02 DIAGNOSIS — D50.0 IRON DEFICIENCY ANEMIA DUE TO CHRONIC BLOOD LOSS: Primary | ICD-10-CM

## 2024-08-02 DIAGNOSIS — E66.9 OBESITY (BMI 35.0-39.9 WITHOUT COMORBIDITY): ICD-10-CM

## 2024-08-02 DIAGNOSIS — K76.0 FATTY LIVER: ICD-10-CM

## 2024-08-02 DIAGNOSIS — G47.33 OBSTRUCTIVE SLEEP APNEA: ICD-10-CM

## 2024-08-02 PROCEDURE — 99214 OFFICE O/P EST MOD 30 MIN: CPT | Mod: PBBFAC,PN | Performed by: INTERNAL MEDICINE

## 2024-08-02 PROCEDURE — 99999 PR PBB SHADOW E&M-EST. PATIENT-LVL IV: CPT | Mod: PBBFAC,,, | Performed by: INTERNAL MEDICINE

## 2024-08-02 RX ORDER — DIPHENHYDRAMINE HYDROCHLORIDE 50 MG/ML
50 INJECTION INTRAMUSCULAR; INTRAVENOUS ONCE AS NEEDED
OUTPATIENT
Start: 2024-08-02

## 2024-08-02 RX ORDER — SODIUM CHLORIDE 0.9 % (FLUSH) 0.9 %
10 SYRINGE (ML) INJECTION
OUTPATIENT
Start: 2024-08-09

## 2024-08-02 RX ORDER — EPINEPHRINE 0.3 MG/.3ML
0.3 INJECTION SUBCUTANEOUS ONCE AS NEEDED
OUTPATIENT
Start: 2024-08-02

## 2024-08-02 RX ORDER — HEPARIN 100 UNIT/ML
5 SYRINGE INTRAVENOUS
OUTPATIENT
Start: 2024-08-09

## 2024-08-02 RX ORDER — SODIUM CHLORIDE 9 MG/ML
INJECTION, SOLUTION INTRAVENOUS CONTINUOUS
OUTPATIENT
Start: 2024-08-02

## 2024-08-02 RX ORDER — SODIUM CHLORIDE 9 MG/ML
INJECTION, SOLUTION INTRAVENOUS CONTINUOUS
OUTPATIENT
Start: 2024-08-09

## 2024-08-02 RX ORDER — HEPARIN 100 UNIT/ML
5 SYRINGE INTRAVENOUS
OUTPATIENT
Start: 2024-08-02

## 2024-08-02 RX ORDER — DIPHENHYDRAMINE HYDROCHLORIDE 50 MG/ML
50 INJECTION INTRAMUSCULAR; INTRAVENOUS ONCE AS NEEDED
OUTPATIENT
Start: 2024-08-09

## 2024-08-02 RX ORDER — SODIUM CHLORIDE 0.9 % (FLUSH) 0.9 %
10 SYRINGE (ML) INJECTION
OUTPATIENT
Start: 2024-08-02

## 2024-08-02 RX ORDER — EPINEPHRINE 0.3 MG/.3ML
0.3 INJECTION SUBCUTANEOUS ONCE AS NEEDED
OUTPATIENT
Start: 2024-08-09

## 2024-08-02 NOTE — PROGRESS NOTES
Subjective:       Patient ID: Isidoro Singh is a 54 y.o. male.    Chief Complaint: YESENIA    HPI    54 years old male with history of ulcerative colitis.  Seen me previously but was lost to follow-up upper 2020 reestablished this year in March2024  He had ulcerative colitis flare with iron loss status post transfusion packed red blood cell and IV iron in hospital.  He was referred to Hematology for IV iron therapy.  History of thrombocytopenia suspect ITP under control.       Review of Systems   Constitutional:  Negative for appetite change and unexpected weight change.   HENT:  Positive for mouth sores.    Eyes:  Negative for visual disturbance.   Respiratory:  Negative for cough and shortness of breath.    Cardiovascular:  Negative for chest pain.   Gastrointestinal:  Positive for diarrhea. Negative for abdominal pain.   Genitourinary:  Negative for frequency.   Musculoskeletal:  Negative for back pain.   Skin:  Negative for rash.   Neurological:  Negative for headaches.   Hematological:  Negative for adenopathy.   Psychiatric/Behavioral:  The patient is not nervous/anxious.        Patient denies issues related to appetite or recent weight change.  Feels well overall.  Denies issues with generalized weakness .  Denies fatigue over above what is normally experienced with day-to-day activities  Denies fever, chills, rigors  Denies issues with ambulation  Denies generalized swelling or new lumps and bumps felt in any part  of body  Denies visual or hearing loss  Denies issues with congestion, sinus issues, cough, sputum production runny nose or itching eyes  Denies chest pain or palpitations, or passing out  Denies abdominal pain, reflux symptoms, nausea vomiting loose stools or constipation  Denies seizure activity or focal weaknesses or symptoms related to TIA, no head aches or blurred vision reported  Denies issues with skin rash or bruising  Denies issues with swelling of feet, tingling or numbness   No issues  with sleep,   No recent foreign travel   Good family support reported       Past Medical History:   Diagnosis Date    Colon polyp     Diabetes mellitus     Hypothyroid 07/05/2016    Sleep apnea     Thrombocytopenia     Ulcerative colitis      Past Surgical History:   Procedure Laterality Date    COLONOSCOPY N/A 12/04/2020    Procedure: COLONOSCOPY;  Surgeon: Ventura Warren MD;  Location: [facility] ENDO;  Service: Endoscopy;  Laterality: N/A;    COLONOSCOPY N/A 07/19/2021    Procedure: COLONOSCOPY;  Surgeon: Clarence Ogden MD;  Location: [facility] ENDO;  Service: Endoscopy;  Laterality: N/A;    COLONOSCOPY N/A 11/10/2022    Procedure: COLONOSCOPY;  Surgeon: Kath Hernandez MD;  Location: [facility] ENDO;  Service: Endoscopy;  Laterality: N/A;    COLONOSCOPY N/A 2/29/2024    Procedure: COLONOSCOPY;  Surgeon: Kath Hernandez MD;  Location: [facility] ENDO;  Service: Endoscopy;  Laterality: N/A;    FOOT SURGERY      left   cyst removed    HAND SURGERY      right   tendon repair     Family History   Problem Relation Name Age of Onset    Ulcerative colitis Mother          in 70s    Colon cancer Neg Hx      Colon polyps Neg Hx      Crohn's disease Neg Hx        Social History     Socioeconomic History    Marital status:    Tobacco Use    Smoking status: Former     Current packs/day: 0.00     Average packs/day: 1 pack/day for 30.0 years (30.0 ttl pk-yrs)     Types: Cigarettes     Start date: 1984     Quit date: 12/2013     Years since quitting: 10.6     Passive exposure: Past    Smokeless tobacco: Former     Types: Snuff     Quit date: 2020   Substance and Sexual Activity    Alcohol use: Yes     Comment: occasional    Drug use: No    Sexual activity: Yes     Social Determinants of Health     Financial Resource Strain: Low Risk  (11/10/2023)    Overall Financial Resource Strain (CARDIA)     Difficulty of Paying Living Expenses: Not hard at all   Food Insecurity: No Food Insecurity (11/10/2023)    Hunger Vital Sign     Worried  About Running Out of Food in the Last Year: Never true     Ran Out of Food in the Last Year: Never true   Transportation Needs: No Transportation Needs (11/10/2023)    PRAPARE - Transportation     Lack of Transportation (Medical): No     Lack of Transportation (Non-Medical): No   Physical Activity: Insufficiently Active (11/10/2023)    Exercise Vital Sign     Days of Exercise per Week: 1 day     Minutes of Exercise per Session: 30 min   Stress: No Stress Concern Present (11/10/2023)    [employer] - Occupational Stress Questionnaire     Feeling of Stress : Not at all   Housing Stability: Low Risk  (11/10/2023)    Housing Stability Vital Sign     Unable to Pay for Housing in the Last Year: No     Number of Places Lived in the Last Year: 1     Unstable Housing in the Last Year: No     Review of patient's allergies indicates:  No Known Allergies    Current Outpatient Medications:     (Magic mouthwash) 1:1:1 diphenhydrAMINE(Benadryl) 12.5mg/5ml liq, aluminum & magnesium hydroxide-simethicone (Maalox), LIDOcaine viscous 2%, Swish and spit 10 mLs every 4 (four) hours as needed (oral pain). for mouth sores, Disp: 250 mL, Rfl: 2    citalopram (CELEXA) 40 MG tablet, Take 1 tablet (40 mg total) by mouth once daily., Disp: 90 tablet, Rfl: 1    ergocalciferol (ERGOCALCIFEROL) 50,000 unit Cap, TAKE 1 CAPSULE BY MOUTH EVERY 7 DAYS, Disp: 12 capsule, Rfl: 3    folic acid (FOLVITE) 1 MG tablet, Take 1 tablet (1 mg total) by mouth once daily., Disp: 90 tablet, Rfl: 3    levothyroxine (SYNTHROID) 200 MCG tablet, Take 1 tablet (200 mcg total) by mouth before breakfast., Disp: 30 tablet, Rfl: 11    mesalamine (CANASA) 1000 MG Supp, Place 1 suppository (1,000 mg total) rectally nightly., Disp: 90 suppository, Rfl: 3    omega-3 acid ethyl esters (LOVAZA) 1 gram capsule, Take 1 capsule (1 g total) by mouth 2 (two) times daily., Disp: 180 capsule, Rfl: 3    sildenafiL (VIAGRA) 100 MG tablet, Take 1 tablet (100  mg total) by mouth daily as needed for Erectile Dysfunction., Disp: 10 tablet, Rfl: 6    testosterone cypionate (DEPOTESTOTERONE CYPIONATE) 200 mg/mL injection, Inject 100 mg into the muscle every 14 (fourteen) days., Disp: , Rfl:     methotrexate 2.5 MG Tab, Take 6 tablets (15 mg total) by mouth every 7 days., Disp: 24 tablet, Rfl: 11    Physical Exam    VITAL SIGNS:  as above   GENERAL: appears well-built, well-nourished.  No anxiety, no agitation, and in no distress.  Patient is awake, alert, oriented and cooperative.  HEENT:  Showed no congestion. Trachea is central no obvious icterus or pallor noted no hoarseness. no obvious JVD   NECK:  Supple.  No JVD. No obvious cervical submental or supraclavicular adenopathy.  RS:the visualized portion of  Chest expands well. chest appears symmetric, no audible wheezes.  No dyspnea recognized  ABDOMEN:  abdomen appears undistended.  EXTREMITIES:  Without edema.  NEUROLOGICAL:  The patient is appropriate, higher functions are normal.  No  obvious neurological deficits.  normal judgement normal thought content  No confusion, no speech impediment. Cranial nerves are intact and show no deficit. No gross motor deficits noted   SKIN MUSCULOSKELETAL: no joint or skeletal deformity, no clubbing of nails.  No visible rash ecchymosis or petechiae     Lab Results   Component Value Date    WBC 4.65 07/31/2024    HGB 12.9 (L) 07/31/2024    HCT 40.3 07/31/2024    MCV 93 07/31/2024     07/31/2024       BMP  Lab Results   Component Value Date     07/31/2024    K 4.0 07/31/2024     07/31/2024    CO2 26 07/31/2024    BUN 14 07/31/2024    CREATININE 1.0 07/31/2024    CALCIUM 9.1 07/31/2024    ANIONGAP 6 (L) 07/31/2024    ESTGFRAFRICA 96 11/09/2021    EGFRNONAA 83 11/09/2021     Lab Results   Component Value Date    IRON 78 07/31/2024    TIBC 448 07/31/2024    FERRITIN 28 07/31/2024         Patient Active Problem List   Diagnosis    Thrombocytopenia    Fatty liver     Hypothyroidism (acquired)    Thyroid disease    Abnormal thyroid blood test    Goiter    NAFLD (nonalcoholic fatty liver disease)    Obesity (BMI 35.0-39.9 without comorbidity)    Obstructive sleep apnea    Hashimoto's thyroiditis    Nodular thyroid disease    Dysmetabolic syndrome    Prediabetes    Hypovitaminosis D    Hyperlipidemia    Hypertriglyceridemia    History of colon polyps    Ulcerative pancolitis    Anemia    Diabetes mellitus    Iron deficiency anemia        Assessment and Plan      Ulcertive colitis: on remicade q 2 weeks and mtx weekly with DR Mary medellin ups recently    Iron deficiency anemia/ulcerative colitis status post infusional iron in February 24 ferritin doing good but declining steadily again will repeat.  See me 1 month after with CBC CMP iron studies      ITP by history :stable and normal platelet counts    Dyslipidemia; continue medical management follow-up with primary care dietary compliance    ANTONY:  Continue follow-up primary wear CPAP regularly    Diabetes mellitus:  Continue follow-up PCP monitoring hemoglobin A1c regularly diet restrictions and good routine exercise regimen recommended    NAFLD:  explained to patient fat restriction alcohol restriction and protecting liver from progressing     BMI of 35 pt aware, discussed life style changes    MDM includes  :    - Acute or chronic illness or injury that poses a threat to life or bodily function  - Independent review and explanation of 3+ results from unique tests  - Discussion of management and ordering 3+ unique tests  - Extensive discussion of treatment and management  - Prescription drug management  - Drug therapy requiring intensive monitoring for toxicity

## 2024-08-12 ENCOUNTER — OFFICE VISIT (OUTPATIENT)
Dept: FAMILY MEDICINE | Facility: CLINIC | Age: 54
End: 2024-08-12
Payer: OTHER GOVERNMENT

## 2024-08-12 VITALS
OXYGEN SATURATION: 95 % | HEIGHT: 71 IN | HEART RATE: 76 BPM | DIASTOLIC BLOOD PRESSURE: 68 MMHG | SYSTOLIC BLOOD PRESSURE: 110 MMHG | BODY MASS INDEX: 39.2 KG/M2 | WEIGHT: 280 LBS | RESPIRATION RATE: 18 BRPM

## 2024-08-12 DIAGNOSIS — E29.1 HYPOGONADISM IN MALE: ICD-10-CM

## 2024-08-12 DIAGNOSIS — E03.9 HYPOTHYROIDISM (ACQUIRED): ICD-10-CM

## 2024-08-12 DIAGNOSIS — E55.9 HYPOVITAMINOSIS D: ICD-10-CM

## 2024-08-12 DIAGNOSIS — B35.4 TINEA CORPORIS: Primary | ICD-10-CM

## 2024-08-12 DIAGNOSIS — E78.1 HYPERTRIGLYCERIDEMIA: ICD-10-CM

## 2024-08-12 DIAGNOSIS — R73.03 PREDIABETES: ICD-10-CM

## 2024-08-12 DIAGNOSIS — F41.9 ANXIETY: ICD-10-CM

## 2024-08-12 LAB — HBA1C MFR BLD: 6 %

## 2024-08-12 PROCEDURE — 99214 OFFICE O/P EST MOD 30 MIN: CPT | Mod: S$GLB,,, | Performed by: PHYSICIAN ASSISTANT

## 2024-08-12 PROCEDURE — 83036 HEMOGLOBIN GLYCOSYLATED A1C: CPT | Mod: QW,,, | Performed by: PHYSICIAN ASSISTANT

## 2024-08-12 RX ORDER — OMEGA-3-ACID ETHYL ESTERS 1 G/1
1 CAPSULE, LIQUID FILLED ORAL 2 TIMES DAILY
Qty: 180 CAPSULE | Refills: 3 | Status: SHIPPED | OUTPATIENT
Start: 2024-08-12

## 2024-08-12 RX ORDER — FOLIC ACID 1 MG/1
1 TABLET ORAL DAILY
Qty: 90 TABLET | Refills: 3 | Status: SHIPPED | OUTPATIENT
Start: 2024-08-12

## 2024-08-12 RX ORDER — PNEUMOCOCCAL 20-VALENT CONJUGATE VACCINE 2.2; 2.2; 2.2; 2.2; 2.2; 2.2; 2.2; 2.2; 2.2; 2.2; 2.2; 2.2; 2.2; 2.2; 2.2; 2.2; 4.4; 2.2; 2.2; 2.2 UG/.5ML; UG/.5ML; UG/.5ML; UG/.5ML; UG/.5ML; UG/.5ML; UG/.5ML; UG/.5ML; UG/.5ML; UG/.5ML; UG/.5ML; UG/.5ML; UG/.5ML; UG/.5ML; UG/.5ML; UG/.5ML; UG/.5ML; UG/.5ML; UG/.5ML; UG/.5ML
INJECTION, SUSPENSION INTRAMUSCULAR
COMMUNITY
Start: 2024-03-13

## 2024-08-12 RX ORDER — CLOTRIMAZOLE AND BETAMETHASONE DIPROPIONATE 10; .64 MG/G; MG/G
CREAM TOPICAL 2 TIMES DAILY
Qty: 45 G | Refills: 2 | Status: SHIPPED | OUTPATIENT
Start: 2024-08-12 | End: 2024-11-10

## 2024-08-12 RX ORDER — LEVOTHYROXINE SODIUM 200 UG/1
200 TABLET ORAL
Qty: 90 TABLET | Refills: 3 | Status: SHIPPED | OUTPATIENT
Start: 2024-08-12 | End: 2025-08-12

## 2024-08-12 RX ORDER — SILDENAFIL 100 MG/1
100 TABLET, FILM COATED ORAL DAILY PRN
Qty: 10 TABLET | Refills: 6 | Status: SHIPPED | OUTPATIENT
Start: 2024-08-12 | End: 2025-08-12

## 2024-08-12 RX ORDER — ERGOCALCIFEROL 1.25 MG/1
50000 CAPSULE ORAL
Qty: 12 CAPSULE | Refills: 3 | Status: SHIPPED | OUTPATIENT
Start: 2024-08-12

## 2024-08-12 RX ORDER — CITALOPRAM 40 MG/1
40 TABLET, FILM COATED ORAL DAILY
Qty: 90 TABLET | Refills: 1 | Status: SHIPPED | OUTPATIENT
Start: 2024-08-12 | End: 2025-02-08

## 2024-08-12 NOTE — PROGRESS NOTES
SUBJECTIVE:    Patient ID: Isidoro Singh is a 54 y.o. male.    Chief Complaint: Follow-up (No bottles// refills needed// pt states he have a itchy rash on right shoulder going on for 4 months // pt will schedule eye exam with Dr Raymond)    54 Year old male presents today for regular follow-up. Reports that he has been doing somewhat better with the UC. Planning for iron infusion per Dr. Mcintosh. Maintains off MTX and on remicade. Blood counts holding steady now. From a primary standpoint her appears to be pretty stable. A1c is prediabetes at 6%.     He brings to my attention a very itchy lesion to the RT upper chest. Thickened borders and some central clearing.     Follow-up  Pertinent negatives include no arthralgias, chest pain, headaches, joint swelling, neck pain, vomiting or weakness.       Lab Visit on 07/31/2024   Component Date Value Ref Range Status    Sodium 07/31/2024 138  136 - 145 mmol/L Final    Potassium 07/31/2024 4.0  3.5 - 5.1 mmol/L Final    Chloride 07/31/2024 106  95 - 110 mmol/L Final    CO2 07/31/2024 26  23 - 29 mmol/L Final    Glucose 07/31/2024 115 (H)  70 - 110 mg/dL Final    BUN 07/31/2024 14  6 - 20 mg/dL Final    Creatinine 07/31/2024 1.0  0.5 - 1.4 mg/dL Final    Calcium 07/31/2024 9.1  8.7 - 10.5 mg/dL Final    Total Protein 07/31/2024 7.3  6.0 - 8.4 g/dL Final    Albumin 07/31/2024 3.8  3.5 - 5.2 g/dL Final    Total Bilirubin 07/31/2024 0.5  0.1 - 1.0 mg/dL Final    Alkaline Phosphatase 07/31/2024 62  55 - 135 U/L Final    AST 07/31/2024 22  10 - 40 U/L Final    ALT 07/31/2024 43  10 - 44 U/L Final    eGFR 07/31/2024 >60  >60 mL/min/1.73 m^2 Final    Anion Gap 07/31/2024 6 (L)  8 - 16 mmol/L Final    WBC 07/31/2024 4.65  3.90 - 12.70 K/uL Final    RBC 07/31/2024 4.33 (L)  4.60 - 6.20 M/uL Final    Hemoglobin 07/31/2024 12.9 (L)  14.0 - 18.0 g/dL Final    Hematocrit 07/31/2024 40.3  40.0 - 54.0 % Final    MCV 07/31/2024 93  82 - 98 fL Final    MCH  07/31/2024 29.8  27.0 - 31.0 pg Final    MCHC 07/31/2024 32.0  32.0 - 36.0 g/dL Final    RDW 07/31/2024 14.5  11.5 - 14.5 % Final    Platelets 07/31/2024 204  150 - 450 K/uL Final    MPV 07/31/2024 11.1  9.2 - 12.9 fL Final    Immature Granulocytes 07/31/2024 0.2  0.0 - 0.5 % Final    Gran # (ANC) 07/31/2024 2.5  1.8 - 7.7 K/uL Final    Immature Grans (Abs) 07/31/2024 0.01  0.00 - 0.04 K/uL Final    Lymph # 07/31/2024 1.4  1.0 - 4.8 K/uL Final    Mono # 07/31/2024 0.4  0.3 - 1.0 K/uL Final    Eos # 07/31/2024 0.3  0.0 - 0.5 K/uL Final    Baso # 07/31/2024 0.05  0.00 - 0.20 K/uL Final    nRBC 07/31/2024 0  0 /100 WBC Final    Gran % 07/31/2024 54.2  38.0 - 73.0 % Final    Lymph % 07/31/2024 29.0  18.0 - 48.0 % Final    Mono % 07/31/2024 9.5  4.0 - 15.0 % Final    Eosinophil % 07/31/2024 6.0  0.0 - 8.0 % Final    Basophil % 07/31/2024 1.1  0.0 - 1.9 % Final    Differential Method 07/31/2024 Automated   Final    Ferritin 07/31/2024 28  20.0 - 300.0 ng/mL Final    Iron 07/31/2024 78  45 - 160 ug/dL Final    Transferrin 07/31/2024 320  200 - 375 mg/dL Final    TIBC 07/31/2024 448  250 - 450 ug/dL Final    Saturated Iron 07/31/2024 17 (L)  20 - 50 % Final   Infusion on 07/16/2024   Component Date Value Ref Range Status    Sodium 07/16/2024 137  136 - 145 mmol/L Final    Potassium 07/16/2024 4.1  3.5 - 5.1 mmol/L Final    Chloride 07/16/2024 102  95 - 110 mmol/L Final    CO2 07/16/2024 26  23 - 29 mmol/L Final    Glucose 07/16/2024 158 (H)  70 - 110 mg/dL Final    BUN 07/16/2024 14  6 - 20 mg/dL Final    Creatinine 07/16/2024 0.9  0.5 - 1.4 mg/dL Final    Calcium 07/16/2024 9.0  8.7 - 10.5 mg/dL Final    Total Protein 07/16/2024 7.5  6.0 - 8.4 g/dL Final    Albumin 07/16/2024 4.1  3.5 - 5.2 g/dL Final    Total Bilirubin 07/16/2024 0.4  0.1 - 1.0 mg/dL Final    Alkaline Phosphatase 07/16/2024 63  55 - 135 U/L Final    AST 07/16/2024 19  10 - 40 U/L Final    ALT 07/16/2024 32  10 - 44 U/L  Final    eGFR 07/16/2024 >60.0  >60 mL/min/1.73 m^2 Final    Anion Gap 07/16/2024 9  8 - 16 mmol/L Final    WBC 07/16/2024 7.82  3.90 - 12.70 K/uL Final    RBC 07/16/2024 4.45 (L)  4.60 - 6.20 M/uL Final    Hemoglobin 07/16/2024 12.9 (L)  14.0 - 18.0 g/dL Final    Hematocrit 07/16/2024 41.3  40.0 - 54.0 % Final    MCV 07/16/2024 93  82 - 98 fL Final    MCH 07/16/2024 29.0  27.0 - 31.0 pg Final    MCHC 07/16/2024 31.2 (L)  32.0 - 36.0 g/dL Final    RDW 07/16/2024 14.4  11.5 - 14.5 % Final    Platelets 07/16/2024 255  150 - 450 K/uL Final    MPV 07/16/2024 10.0  9.2 - 12.9 fL Final    Immature Granulocytes 07/16/2024 0.3  0.0 - 0.5 % Final    Gran # (ANC) 07/16/2024 5.2  1.8 - 7.7 K/uL Final    Immature Grans (Abs) 07/16/2024 0.02  0.00 - 0.04 K/uL Final    Lymph # 07/16/2024 1.8  1.0 - 4.8 K/uL Final    Mono # 07/16/2024 0.4  0.3 - 1.0 K/uL Final    Eos # 07/16/2024 0.3  0.0 - 0.5 K/uL Final    Baso # 07/16/2024 0.06  0.00 - 0.20 K/uL Final    nRBC 07/16/2024 0  0 /100 WBC Final    Gran % 07/16/2024 66.8  38.0 - 73.0 % Final    Lymph % 07/16/2024 23.5  18.0 - 48.0 % Final    Mono % 07/16/2024 4.6  4.0 - 15.0 % Final    Eosinophil % 07/16/2024 4.0  0.0 - 8.0 % Final    Basophil % 07/16/2024 0.8  0.0 - 1.9 % Final    Differential Method 07/16/2024 Automated   Final   Infusion on 05/21/2024   Component Date Value Ref Range Status    Sodium 05/21/2024 136  136 - 145 mmol/L Final    Potassium 05/21/2024 3.9  3.5 - 5.1 mmol/L Final    Chloride 05/21/2024 105  95 - 110 mmol/L Final    CO2 05/21/2024 24  23 - 29 mmol/L Final    Glucose 05/21/2024 188 (H)  70 - 110 mg/dL Final    BUN 05/21/2024 12  6 - 20 mg/dL Final    Creatinine 05/21/2024 0.9  0.5 - 1.4 mg/dL Final    Calcium 05/21/2024 8.5 (L)  8.7 - 10.5 mg/dL Final    Total Protein 05/21/2024 7.0  6.0 - 8.4 g/dL Final    Albumin 05/21/2024 4.0  3.5 - 5.2 g/dL Final    Total Bilirubin 05/21/2024 0.2  0.1 - 1.0 mg/dL Final    Alkaline  Phosphatase 05/21/2024 57  55 - 135 U/L Final    AST 05/21/2024 12  10 - 40 U/L Final    ALT 05/21/2024 18  10 - 44 U/L Final    eGFR 05/21/2024 >60.0  >60 mL/min/1.73 m^2 Final    Anion Gap 05/21/2024 7 (L)  8 - 16 mmol/L Final    WBC 05/21/2024 8.08  3.90 - 12.70 K/uL Final    RBC 05/21/2024 4.04 (L)  4.60 - 6.20 M/uL Final    Hemoglobin 05/21/2024 11.9 (L)  14.0 - 18.0 g/dL Final    Hematocrit 05/21/2024 38.6 (L)  40.0 - 54.0 % Final    MCV 05/21/2024 96  82 - 98 fL Final    MCH 05/21/2024 29.5  27.0 - 31.0 pg Final    MCHC 05/21/2024 30.8 (L)  32.0 - 36.0 g/dL Final    RDW 05/21/2024 17.6 (H)  11.5 - 14.5 % Final    Platelets 05/21/2024 273  150 - 450 K/uL Final    MPV 05/21/2024 9.9  9.2 - 12.9 fL Final    Immature Granulocytes 05/21/2024 0.4  0.0 - 0.5 % Final    Gran # (ANC) 05/21/2024 4.7  1.8 - 7.7 K/uL Final    Immature Grans (Abs) 05/21/2024 0.03  0.00 - 0.04 K/uL Final    Lymph # 05/21/2024 2.2  1.0 - 4.8 K/uL Final    Mono # 05/21/2024 0.7  0.3 - 1.0 K/uL Final    Eos # 05/21/2024 0.5  0.0 - 0.5 K/uL Final    Baso # 05/21/2024 0.07  0.00 - 0.20 K/uL Final    nRBC 05/21/2024 0  0 /100 WBC Final    Gran % 05/21/2024 58.0  38.0 - 73.0 % Final    Lymph % 05/21/2024 26.7  18.0 - 48.0 % Final    Mono % 05/21/2024 8.2  4.0 - 15.0 % Final    Eosinophil % 05/21/2024 5.8  0.0 - 8.0 % Final    Basophil % 05/21/2024 0.9  0.0 - 1.9 % Final    Differential Method 05/21/2024 Automated   Final    [facility] Miscellaneous Test 05/21/2024 COMMENT   Final    [facility] Test Code: 05/21/2024 329881   Final    [facility] Test Name: 05/21/2024 Infliximab and Anti-Infliximab Antibody,   Final   Lab Visit on 04/30/2024   Component Date Value Ref Range Status    WBC 04/30/2024 7.73  3.90 - 12.70 K/uL Final    RBC 04/30/2024 3.88 (L)  4.60 - 6.20 M/uL Final    Hemoglobin 04/30/2024 11.4 (L)  14.0 - 18.0 g/dL Final    Hematocrit 04/30/2024 38.5 (L)  40.0 - 54.0 % Final    MCV 04/30/2024 99 (H)  82 - 98  fL Final    MCH 04/30/2024 29.4  27.0 - 31.0 pg Final    MCHC 04/30/2024 29.6 (L)  32.0 - 36.0 g/dL Final    RDW 04/30/2024 22.4 (H)  11.5 - 14.5 % Final    Platelets 04/30/2024 279  150 - 450 K/uL Final    MPV 04/30/2024 9.2  9.2 - 12.9 fL Final    Immature Granulocytes 04/30/2024 0.4  0.0 - 0.5 % Final    Gran # (ANC) 04/30/2024 3.8  1.8 - 7.7 K/uL Final    Immature Grans (Abs) 04/30/2024 0.03  0.00 - 0.04 K/uL Final    Lymph # 04/30/2024 2.9  1.0 - 4.8 K/uL Final    Mono # 04/30/2024 0.6  0.3 - 1.0 K/uL Final    Eos # 04/30/2024 0.4  0.0 - 0.5 K/uL Final    Baso # 04/30/2024 0.08  0.00 - 0.20 K/uL Final    nRBC 04/30/2024 0  0 /100 WBC Final    Gran % 04/30/2024 49.0  38.0 - 73.0 % Final    Lymph % 04/30/2024 37.0  18.0 - 48.0 % Final    Mono % 04/30/2024 7.6  4.0 - 15.0 % Final    Eosinophil % 04/30/2024 5.0  0.0 - 8.0 % Final    Basophil % 04/30/2024 1.0  0.0 - 1.9 % Final    Differential Method 04/30/2024 Automated   Final    Iron 04/30/2024 331 (H)  45 - 160 ug/dL Final    Transferrin 04/30/2024 264  200 - 375 mg/dL Final    TIBC 04/30/2024 370  250 - 450 ug/dL Final    Saturated Iron 04/30/2024 89 (H)  20 - 50 % Final    Ferritin 04/30/2024 79  20.0 - 300.0 ng/mL Final   Infusion on 04/23/2024   Component Date Value Ref Range Status    Sodium 04/23/2024 138  136 - 145 mmol/L Final    Potassium 04/23/2024 3.9  3.5 - 5.1 mmol/L Final    Chloride 04/23/2024 104  95 - 110 mmol/L Final    CO2 04/23/2024 25  23 - 29 mmol/L Final    Glucose 04/23/2024 140 (H)  70 - 110 mg/dL Final    BUN 04/23/2024 12  6 - 20 mg/dL Final    Creatinine 04/23/2024 0.9  0.5 - 1.4 mg/dL Final    Calcium 04/23/2024 8.7  8.7 - 10.5 mg/dL Final    Total Protein 04/23/2024 6.6  6.0 - 8.4 g/dL Final    Albumin 04/23/2024 3.7  3.5 - 5.2 g/dL Final    Total Bilirubin 04/23/2024 0.3  0.1 - 1.0 mg/dL Final    Alkaline Phosphatase 04/23/2024 52 (L)  55 - 135 U/L Final    AST 04/23/2024 16  10 - 40 U/L Final     ALT 04/23/2024 19  10 - 44 U/L Final    eGFR 04/23/2024 >60.0  >60 mL/min/1.73 m^2 Final    Anion Gap 04/23/2024 9  8 - 16 mmol/L Final    WBC 04/23/2024 7.05  3.90 - 12.70 K/uL Final    RBC 04/23/2024 3.59 (L)  4.60 - 6.20 M/uL Final    Hemoglobin 04/23/2024 10.1 (L)  14.0 - 18.0 g/dL Final    Hematocrit 04/23/2024 34.5 (L)  40.0 - 54.0 % Final    MCV 04/23/2024 96  82 - 98 fL Final    MCH 04/23/2024 28.1  27.0 - 31.0 pg Final    MCHC 04/23/2024 29.3 (L)  32.0 - 36.0 g/dL Final    RDW 04/23/2024 24.2 (H)  11.5 - 14.5 % Final    Platelets 04/23/2024 268  150 - 450 K/uL Final    MPV 04/23/2024 9.4  9.2 - 12.9 fL Final    Immature Granulocytes 04/23/2024 0.3  0.0 - 0.5 % Final    Gran # (ANC) 04/23/2024 3.9  1.8 - 7.7 K/uL Final    Immature Grans (Abs) 04/23/2024 0.02  0.00 - 0.04 K/uL Final    Lymph # 04/23/2024 2.3  1.0 - 4.8 K/uL Final    Mono # 04/23/2024 0.4  0.3 - 1.0 K/uL Final    Eos # 04/23/2024 0.3  0.0 - 0.5 K/uL Final    Baso # 04/23/2024 0.07  0.00 - 0.20 K/uL Final    nRBC 04/23/2024 0  0 /100 WBC Final    Gran % 04/23/2024 55.5  38.0 - 73.0 % Final    Lymph % 04/23/2024 33.2  18.0 - 48.0 % Final    Mono % 04/23/2024 5.5  4.0 - 15.0 % Final    Eosinophil % 04/23/2024 4.5  0.0 - 8.0 % Final    Basophil % 04/23/2024 1.0  0.0 - 1.9 % Final    Differential Method 04/23/2024 Automated   Final   Infusion on 04/09/2024   Component Date Value Ref Range Status    WBC 04/09/2024 8.40  3.90 - 12.70 K/uL Final    RBC 04/09/2024 3.21 (L)  4.60 - 6.20 M/uL Final    Hemoglobin 04/09/2024 8.6 (L)  14.0 - 18.0 g/dL Final    Hematocrit 04/09/2024 30.0 (L)  40.0 - 54.0 % Final    MCV 04/09/2024 94  82 - 98 fL Final    MCH 04/09/2024 26.8 (L)  27.0 - 31.0 pg Final    MCHC 04/09/2024 28.7 (L)  32.0 - 36.0 g/dL Final    RDW 04/09/2024 25.0 (H)  11.5 - 14.5 % Final    Platelets 04/09/2024 355  150 - 450 K/uL Final    MPV 04/09/2024 9.6  9.2 - 12.9 fL Final    Immature Granulocytes  04/09/2024 1.5 (H)  0.0 - 0.5 % Final    Gran # (ANC) 04/09/2024 6.4  1.8 - 7.7 K/uL Final    Immature Grans (Abs) 04/09/2024 0.13 (H)  0.00 - 0.04 K/uL Final    Lymph # 04/09/2024 1.1  1.0 - 4.8 K/uL Final    Mono # 04/09/2024 0.4  0.3 - 1.0 K/uL Final    Eos # 04/09/2024 0.3  0.0 - 0.5 K/uL Final    Baso # 04/09/2024 0.04  0.00 - 0.20 K/uL Final    nRBC 04/09/2024 0  0 /100 WBC Final    Gran % 04/09/2024 76.6 (H)  38.0 - 73.0 % Final    Lymph % 04/09/2024 13.2 (L)  18.0 - 48.0 % Final    Mono % 04/09/2024 5.0  4.0 - 15.0 % Final    Eosinophil % 04/09/2024 3.2  0.0 - 8.0 % Final    Basophil % 04/09/2024 0.5  0.0 - 1.9 % Final    Platelet Estimate 04/09/2024 Appears normal   Final    Aniso 04/09/2024 Slight   Final    Poly 04/09/2024 Occasional   Final    Hypo 04/09/2024 Occasional   Final    Tear Drop Cells 04/09/2024 Occasional   Final    Differential Method 04/09/2024 Automated   Final   Admission on 03/25/2024, Discharged on 03/25/2024   Component Date Value Ref Range Status    WBC 03/25/2024 7.93  3.90 - 12.70 K/uL Final    RBC 03/25/2024 2.57 (L)  4.60 - 6.20 M/uL Final    Hemoglobin 03/25/2024 6.0 (LL)  14.0 - 18.0 g/dL Final    Hematocrit 03/25/2024 21.9 (L)  40.0 - 54.0 % Final    MCV 03/25/2024 85  82 - 98 fL Final    MCH 03/25/2024 23.3 (L)  27.0 - 31.0 pg Final    MCHC 03/25/2024 27.4 (L)  32.0 - 36.0 g/dL Final    RDW 03/25/2024 18.6 (H)  11.5 - 14.5 % Final    Platelets 03/25/2024 447  150 - 450 K/uL Final    MPV 03/25/2024 8.7 (L)  9.2 - 12.9 fL Final    Immature Granulocytes 03/25/2024 1.3 (H)  0.0 - 0.5 % Final    Gran # (ANC) 03/25/2024 6.2  1.8 - 7.7 K/uL Final    Immature Grans (Abs) 03/25/2024 0.10 (H)  0.00 - 0.04 K/uL Final    Lymph # 03/25/2024 1.2  1.0 - 4.8 K/uL Final    Mono # 03/25/2024 0.4  0.3 - 1.0 K/uL Final    Eos # 03/25/2024 0.1  0.0 - 0.5 K/uL Final    Baso # 03/25/2024 0.02  0.00 - 0.20 K/uL Final    nRBC 03/25/2024 1 (A)  0 /100 WBC Final     Gran % 03/25/2024 78.0 (H)  38.0 - 73.0 % Final    Lymph % 03/25/2024 14.5 (L)  18.0 - 48.0 % Final    Mono % 03/25/2024 4.4  4.0 - 15.0 % Final    Eosinophil % 03/25/2024 1.5  0.0 - 8.0 % Final    Basophil % 03/25/2024 0.3  0.0 - 1.9 % Final    Differential Method 03/25/2024 Automated   Final    Sodium 03/25/2024 134 (L)  136 - 145 mmol/L Final    Potassium 03/25/2024 4.9  3.5 - 5.1 mmol/L Final    Chloride 03/25/2024 102  95 - 110 mmol/L Final    CO2 03/25/2024 25  23 - 29 mmol/L Final    Glucose 03/25/2024 147 (H)  70 - 110 mg/dL Final    BUN 03/25/2024 12  6 - 20 mg/dL Final    Creatinine 03/25/2024 0.8  0.5 - 1.4 mg/dL Final    Calcium 03/25/2024 8.6 (L)  8.7 - 10.5 mg/dL Final    Total Protein 03/25/2024 6.4  6.0 - 8.4 g/dL Final    Albumin 03/25/2024 2.6 (L)  3.5 - 5.2 g/dL Final    Total Bilirubin 03/25/2024 0.3  0.1 - 1.0 mg/dL Final    Alkaline Phosphatase 03/25/2024 44 (L)  55 - 135 U/L Final    AST 03/25/2024 11  10 - 40 U/L Final    ALT 03/25/2024 26  10 - 44 U/L Final    eGFR 03/25/2024 >60  >60 mL/min/1.73 m^2 Final    Anion Gap 03/25/2024 7 (L)  8 - 16 mmol/L Final    Group & Rh 03/25/2024 A POS   Final    Indirect Alyssa 03/25/2024 NEG   Final    Specimen Outdate 03/25/2024 03/28/2024 23:59   Final    QRS Duration 03/25/2024 90  ms Final    [facility] QTC Calculation 03/25/2024 442  ms Final    UNIT NUMBER 03/25/2024 [id number]   Final    Product Code 03/25/2024 [id number]   Final    DISPENSE STATUS 03/25/2024 TRANSFUSED   Final    CODING SYSTEM 03/25/2024 [id number]   Final    Unit Blood Type Code 03/25/2024 6200   Final    Unit Blood Type 03/25/2024 A POS   Final    Unit Expiration 03/25/2024 [id number]   Final    CROSSMATCH INTERPRETATION 03/25/2024 Compatible   Final    UNIT NUMBER 03/25/2024 [id number]   Final    Product Code 03/25/2024 [id number]   Final    DISPENSE STATUS 03/25/2024 TRANSFUSED   Final    CODING SYSTEM 03/25/2024 [id number]   Final    Unit Blood  Type Code 03/25/2024 6200   Final    Unit Blood Type 03/25/2024 A POS   Final    Unit Expiration 03/25/2024 [id number]   Final    CROSSMATCH INTERPRETATION 03/25/2024 Compatible   Final   Lab Visit on 03/12/2024   Component Date Value Ref Range Status    WBC 03/12/2024 10.04  3.90 - 12.70 K/uL Final    RBC 03/12/2024 2.95 (L)  4.60 - 6.20 M/uL Final    Hemoglobin 03/12/2024 7.5 (L)  14.0 - 18.0 g/dL Final    Hematocrit 03/12/2024 26.3 (L)  40.0 - 54.0 % Final    MCV 03/12/2024 89  82 - 98 fL Final    MCH 03/12/2024 25.4 (L)  27.0 - 31.0 pg Final    MCHC 03/12/2024 28.5 (L)  32.0 - 36.0 g/dL Final    RDW 03/12/2024 18.3 (H)  11.5 - 14.5 % Final    Platelets 03/12/2024 380  150 - 450 K/uL Final    MPV 03/12/2024 8.8 (L)  9.2 - 12.9 fL Final    Immature Granulocytes 03/12/2024 0.8 (H)  0.0 - 0.5 % Final    Gran # (ANC) 03/12/2024 8.0 (H)  1.8 - 7.7 K/uL Final    Immature Grans (Abs) 03/12/2024 0.08 (H)  0.00 - 0.04 K/uL Final    Lymph # 03/12/2024 1.3  1.0 - 4.8 K/uL Final    Mono # 03/12/2024 0.5  0.3 - 1.0 K/uL Final    Eos # 03/12/2024 0.1  0.0 - 0.5 K/uL Final    Baso # 03/12/2024 0.04  0.00 - 0.20 K/uL Final    nRBC 03/12/2024 0  0 /100 WBC Final    Gran % 03/12/2024 79.5 (H)  38.0 - 73.0 % Final    Lymph % 03/12/2024 13.0 (L)  18.0 - 48.0 % Final    Mono % 03/12/2024 4.9  4.0 - 15.0 % Final    Eosinophil % 03/12/2024 1.4  0.0 - 8.0 % Final    Basophil % 03/12/2024 0.4  0.0 - 1.9 % Final    Differential Method 03/12/2024 Automated   Final    Iron 03/12/2024 16 (L)  45 - 160 ug/dL Final    Transferrin 03/12/2024 212  200 - 375 mg/dL Final    TIBC 03/12/2024 297  250 - 450 ug/dL Final    Saturated Iron 03/12/2024 5 (L)  20 - 50 % Final    Ferritin 03/12/2024 103  20.0 - 300.0 ng/mL Final   Admission on 02/29/2024, Discharged on 03/03/2024   Component Date Value Ref Range Status    WBC 02/29/2024 7.92  3.90 - 12.70 K/uL Final    RBC 02/29/2024 3.29 (L)  4.60 - 6.20 M/uL Final     Hemoglobin 02/29/2024 8.3 (L)  14.0 - 18.0 g/dL Final    Hematocrit 02/29/2024 28.8 (L)  40.0 - 54.0 % Final    MCV 02/29/2024 88  82 - 98 fL Final    MCH 02/29/2024 25.2 (L)  27.0 - 31.0 pg Final    MCHC 02/29/2024 28.8 (L)  32.0 - 36.0 g/dL Final    RDW 02/29/2024 15.1 (H)  11.5 - 14.5 % Final    Platelets 02/29/2024 393  150 - 450 K/uL Final    MPV 02/29/2024 8.8 (L)  9.2 - 12.9 fL Final    Immature Granulocytes 02/29/2024 0.5  0.0 - 0.5 % Final    Gran # (ANC) 02/29/2024 4.1  1.8 - 7.7 K/uL Final    Immature Grans (Abs) 02/29/2024 0.04  0.00 - 0.04 K/uL Final    Lymph # 02/29/2024 2.4  1.0 - 4.8 K/uL Final    Mono # 02/29/2024 0.5  0.3 - 1.0 K/uL Final    Eos # 02/29/2024 0.8 (H)  0.0 - 0.5 K/uL Final    Baso # 02/29/2024 0.06  0.00 - 0.20 K/uL Final    nRBC 02/29/2024 0  0 /100 WBC Final    Gran % 02/29/2024 51.9  38.0 - 73.0 % Final    Lymph % 02/29/2024 29.9  18.0 - 48.0 % Final    Mono % 02/29/2024 6.7  4.0 - 15.0 % Final    Eosinophil % 02/29/2024 10.2 (H)  0.0 - 8.0 % Final    Basophil % 02/29/2024 0.8  0.0 - 1.9 % Final    Differential Method 02/29/2024 Automated   Final    Sodium 02/29/2024 139  136 - 145 mmol/L Final    Potassium 02/29/2024 3.7  3.5 - 5.1 mmol/L Final    Chloride 02/29/2024 108  95 - 110 mmol/L Final    CO2 02/29/2024 25  23 - 29 mmol/L Final    Glucose 02/29/2024 86  70 - 110 mg/dL Final    BUN 02/29/2024 6  6 - 20 mg/dL Final    Creatinine 02/29/2024 0.8  0.5 - 1.4 mg/dL Final    Calcium 02/29/2024 8.3 (L)  8.7 - 10.5 mg/dL Final    Total Protein 02/29/2024 6.3  6.0 - 8.4 g/dL Final    Albumin 02/29/2024 2.5 (L)  3.5 - 5.2 g/dL Final    Total Bilirubin 02/29/2024 0.3  0.1 - 1.0 mg/dL Final    Alkaline Phosphatase 02/29/2024 67  55 - 135 U/L Final    AST 02/29/2024 12  10 - 40 U/L Final    ALT 02/29/2024 19  10 - 44 U/L Final    eGFR 02/29/2024 >60  >60 mL/min/1.73 m^2 Final    Anion Gap 02/29/2024 6 (L)  8 - 16 mmol/L Final    CRP 02/29/2024 24.1  (H)  0.0 - 8.2 mg/L Final    TB Gold Plus 02/29/2024 Negative  Negative Final    TB1 - Nil 02/29/2024 0.02  IU/mL Final    TB2 - Nil 02/29/2024 0.02  IU/mL Final    Mitogen - Nil 02/29/2024 9.97  IU/mL Final    Quantiferon Nil Value 02/29/2024 0.03  IU/mL Final    Hep A IgM 02/29/2024 Negative  Negative Final    Hepatitis B Surface Ag 02/29/2024 Negative  Negative Final    Hep B C IgM 02/29/2024 Negative  Negative Final    Hepatitis C Ab 02/29/2024 Negative  Negative Final    Ferritin 02/29/2024 24  20.0 - 300.0 ng/mL Final    POCT Glucose 02/29/2024 181 (H)  70 - 110 mg/dL Final    Sodium 03/01/2024 137  136 - 145 mmol/L Final    Potassium 03/01/2024 4.7  3.5 - 5.1 mmol/L Final    Chloride 03/01/2024 108  95 - 110 mmol/L Final    CO2 03/01/2024 23  23 - 29 mmol/L Final    Glucose 03/01/2024 232 (H)  70 - 110 mg/dL Final    BUN 03/01/2024 10  6 - 20 mg/dL Final    Creatinine 03/01/2024 0.8  0.5 - 1.4 mg/dL Final    Calcium 03/01/2024 8.5 (L)  8.7 - 10.5 mg/dL Final    Total Protein 03/01/2024 6.3  6.0 - 8.4 g/dL Final    Albumin 03/01/2024 2.4 (L)  3.5 - 5.2 g/dL Final    Total Bilirubin 03/01/2024 0.2  0.1 - 1.0 mg/dL Final    Alkaline Phosphatase 03/01/2024 63  55 - 135 U/L Final    AST 03/01/2024 16  10 - 40 U/L Final    ALT 03/01/2024 17  10 - 44 U/L Final    eGFR 03/01/2024 >60  >60 mL/min/1.73 m^2 Final    Anion Gap 03/01/2024 6 (L)  8 - 16 mmol/L Final    Magnesium 03/01/2024 2.2  1.6 - 2.6 mg/dL Final    WBC 03/01/2024 7.51  3.90 - 12.70 K/uL Final    RBC 03/01/2024 3.16 (L)  4.60 - 6.20 M/uL Final    Hemoglobin 03/01/2024 7.8 (L)  14.0 - 18.0 g/dL Final    Hematocrit 03/01/2024 27.7 (L)  40.0 - 54.0 % Final    MCV 03/01/2024 88  82 - 98 fL Final    MCH 03/01/2024 24.7 (L)  27.0 - 31.0 pg Final    MCHC 03/01/2024 28.2 (L)  32.0 - 36.0 g/dL Final    RDW 03/01/2024 14.8 (H)  11.5 - 14.5 % Final    Platelets 03/01/2024 358  150 - 450 K/uL Final    MPV 03/01/2024 9.1 (L)   9.2 - 12.9 fL Final    Immature Granulocytes 03/01/2024 1.5 (H)  0.0 - 0.5 % Final    Gran # (ANC) 03/01/2024 5.3  1.8 - 7.7 K/uL Final    Immature Grans (Abs) 03/01/2024 0.11 (H)  0.00 - 0.04 K/uL Final    Lymph # 03/01/2024 1.7  1.0 - 4.8 K/uL Final    Mono # 03/01/2024 0.4  0.3 - 1.0 K/uL Final    Eos # 03/01/2024 0.0  0.0 - 0.5 K/uL Final    Baso # 03/01/2024 0.03  0.00 - 0.20 K/uL Final    nRBC 03/01/2024 0  0 /100 WBC Final    Gran % 03/01/2024 70.5  38.0 - 73.0 % Final    Lymph % 03/01/2024 22.1  18.0 - 48.0 % Final    Mono % 03/01/2024 5.2  4.0 - 15.0 % Final    Eosinophil % 03/01/2024 0.3  0.0 - 8.0 % Final    Basophil % 03/01/2024 0.4  0.0 - 1.9 % Final    Differential Method 03/01/2024 Automated   Final    POCT Glucose 03/01/2024 132 (H)  70 - 110 mg/dL Final    Hep B Core Total Ab 03/01/2024 Negative  Negative Final    POCT Glucose 03/01/2024 127 (H)  70 - 110 mg/dL Final    POCT Glucose 03/01/2024 256 (H)  70 - 110 mg/dL Final    Sodium 03/02/2024 139  136 - 145 mmol/L Final    Potassium 03/02/2024 4.1  3.5 - 5.1 mmol/L Final    Chloride 03/02/2024 108  95 - 110 mmol/L Final    CO2 03/02/2024 25  23 - 29 mmol/L Final    Glucose 03/02/2024 102  70 - 110 mg/dL Final    BUN 03/02/2024 8  6 - 20 mg/dL Final    Creatinine 03/02/2024 0.7  0.5 - 1.4 mg/dL Final    Calcium 03/02/2024 8.3 (L)  8.7 - 10.5 mg/dL Final    Total Protein 03/02/2024 5.9 (L)  6.0 - 8.4 g/dL Final    Albumin 03/02/2024 2.3 (L)  3.5 - 5.2 g/dL Final    Total Bilirubin 03/02/2024 0.2  0.1 - 1.0 mg/dL Final    Alkaline Phosphatase 03/02/2024 55  55 - 135 U/L Final    AST 03/02/2024 9 (L)  10 - 40 U/L Final    ALT 03/02/2024 19  10 - 44 U/L Final    eGFR 03/02/2024 >60  >60 mL/min/1.73 m^2 Final    Anion Gap 03/02/2024 6 (L)  8 - 16 mmol/L Final    Magnesium 03/02/2024 2.1  1.6 - 2.6 mg/dL Final    WBC 03/02/2024 10.41  3.90 - 12.70 K/uL Final    RBC 03/02/2024 3.03 (L)  4.60 - 6.20 M/uL Final     Hemoglobin 03/02/2024 7.6 (L)  14.0 - 18.0 g/dL Final    Hematocrit 03/02/2024 25.8 (L)  40.0 - 54.0 % Final    MCV 03/02/2024 85  82 - 98 fL Final    MCH 03/02/2024 25.1 (L)  27.0 - 31.0 pg Final    MCHC 03/02/2024 29.5 (L)  32.0 - 36.0 g/dL Final    RDW 03/02/2024 14.8 (H)  11.5 - 14.5 % Final    Platelets 03/02/2024 406  150 - 450 K/uL Final    MPV 03/02/2024 9.2  9.2 - 12.9 fL Final    Immature Granulocytes 03/02/2024 1.2 (H)  0.0 - 0.5 % Final    Gran # (ANC) 03/02/2024 6.4  1.8 - 7.7 K/uL Final    Immature Grans (Abs) 03/02/2024 0.13 (H)  0.00 - 0.04 K/uL Final    Lymph # 03/02/2024 2.6  1.0 - 4.8 K/uL Final    Mono # 03/02/2024 1.2 (H)  0.3 - 1.0 K/uL Final    Eos # 03/02/2024 0.1  0.0 - 0.5 K/uL Final    Baso # 03/02/2024 0.05  0.00 - 0.20 K/uL Final    nRBC 03/02/2024 0  0 /100 WBC Final    Gran % 03/02/2024 61.5  38.0 - 73.0 % Final    Lymph % 03/02/2024 25.1  18.0 - 48.0 % Final    Mono % 03/02/2024 11.2  4.0 - 15.0 % Final    Eosinophil % 03/02/2024 0.5  0.0 - 8.0 % Final    Basophil % 03/02/2024 0.5  0.0 - 1.9 % Final    Differential Method 03/02/2024 Automated   Final    CRP 03/02/2024 8.2  0.0 - 8.2 mg/L Final    POCT Glucose 03/02/2024 83  70 - 110 mg/dL Final    Hemoglobin 03/02/2024 8.3 (L)  14.0 - 18.0 g/dL Final    Hematocrit 03/02/2024 28.2 (L)  40.0 - 54.0 % Final    POCT Glucose 03/02/2024 199 (H)  70 - 110 mg/dL Final    Sodium 03/03/2024 139  136 - 145 mmol/L Final    Potassium 03/03/2024 4.2  3.5 - 5.1 mmol/L Final    Chloride 03/03/2024 105  95 - 110 mmol/L Final    CO2 03/03/2024 26  23 - 29 mmol/L Final    Glucose 03/03/2024 114 (H)  70 - 110 mg/dL Final    BUN 03/03/2024 11  6 - 20 mg/dL Final    Creatinine 03/03/2024 0.7  0.5 - 1.4 mg/dL Final    Calcium 03/03/2024 8.6 (L)  8.7 - 10.5 mg/dL Final    Total Protein 03/03/2024 6.0  6.0 - 8.4 g/dL Final    Albumin 03/03/2024 2.4 (L)  3.5 - 5.2 g/dL Final    Total Bilirubin 03/03/2024 0.3  0.1 - 1.0  mg/dL Final    Alkaline Phosphatase 03/03/2024 52 (L)  55 - 135 U/L Final    AST 03/03/2024 11  10 - 40 U/L Final    ALT 03/03/2024 16  10 - 44 U/L Final    eGFR 03/03/2024 >60  >60 mL/min/1.73 m^2 Final    Anion Gap 03/03/2024 8  8 - 16 mmol/L Final    Magnesium 03/03/2024 1.9  1.6 - 2.6 mg/dL Final    WBC 03/03/2024 12.93 (H)  3.90 - 12.70 K/uL Final    RBC 03/03/2024 3.14 (L)  4.60 - 6.20 M/uL Final    Hemoglobin 03/03/2024 7.9 (L)  14.0 - 18.0 g/dL Final    Hematocrit 03/03/2024 26.8 (L)  40.0 - 54.0 % Final    MCV 03/03/2024 85  82 - 98 fL Final    MCH 03/03/2024 25.2 (L)  27.0 - 31.0 pg Final    MCHC 03/03/2024 29.5 (L)  32.0 - 36.0 g/dL Final    RDW 03/03/2024 15.3 (H)  11.5 - 14.5 % Final    Platelets 03/03/2024 390  150 - 450 K/uL Final    MPV 03/03/2024 8.6 (L)  9.2 - 12.9 fL Final    Immature Granulocytes 03/03/2024 2.1 (H)  0.0 - 0.5 % Final    Gran # (ANC) 03/03/2024 7.6  1.8 - 7.7 K/uL Final    Immature Grans (Abs) 03/03/2024 0.27 (H)  0.00 - 0.04 K/uL Final    Lymph # 03/03/2024 3.4  1.0 - 4.8 K/uL Final    Mono # 03/03/2024 1.4 (H)  0.3 - 1.0 K/uL Final    Eos # 03/03/2024 0.1  0.0 - 0.5 K/uL Final    Baso # 03/03/2024 0.08  0.00 - 0.20 K/uL Final    nRBC 03/03/2024 1 (A)  0 /100 WBC Final    Gran % 03/03/2024 59.0  38.0 - 73.0 % Final    Lymph % 03/03/2024 26.5  18.0 - 48.0 % Final    Mono % 03/03/2024 11.1  4.0 - 15.0 % Final    Eosinophil % 03/03/2024 0.7  0.0 - 8.0 % Final    Basophil % 03/03/2024 0.6  0.0 - 1.9 % Final    Differential Method 03/03/2024 Automated   Final    CRP 03/03/2024 7.5  0.0 - 8.2 mg/L Final   Office Visit on 02/26/2024   Component Date Value Ref Range Status    Glucose 02/26/2024 134 (H)  65 - 99 mg/dL Final    BUN 02/26/2024 10  7 - 25 mg/dL Final    Creatinine 02/26/2024 0.81  0.70 - 1.30 mg/dL Final    eGFR 02/26/2024 105  > OR = 60 mL/min/1.73m2 Final    BUN/Creatinine Ratio 02/26/2024 SEE NOTE:  6 - 22 (calc) Final    Sodium  02/26/2024 140  135 - 146 mmol/L Final    Potassium 02/26/2024 4.8  3.5 - 5.3 mmol/L Final    Chloride 02/26/2024 108  98 - 110 mmol/L Final    CO2 02/26/2024 28  20 - 32 mmol/L Final    Calcium 02/26/2024 8.4 (L)  8.6 - 10.3 mg/dL Final    Total Protein 02/26/2024 6.2  6.1 - 8.1 g/dL Final    Albumin 02/26/2024 2.9 (L)  3.6 - 5.1 g/dL Final    Globulin, Total 02/26/2024 3.3  1.9 - 3.7 g/dL (calc) Final    Albumin/Globulin Ratio 02/26/2024 0.9 (L)  1.0 - 2.5 (calc) Final    Total Bilirubin 02/26/2024 0.2  0.2 - 1.2 mg/dL Final    Alkaline Phosphatase 02/26/2024 59  35 - 144 U/L Final    AST 02/26/2024 9 (L)  10 - 35 U/L Final    ALT 02/26/2024 13  9 - 46 U/L Final    WBC 02/26/2024 7.9  3.8 - 10.8 Thousand/uL Final    RBC 02/26/2024 3.37 (L)  4.20 - 5.80 Million/uL Final    Hemoglobin 02/26/2024 8.6 (L)  13.2 - 17.1 g/dL Final    Hematocrit 02/26/2024 28.8 (L)  38.5 - 50.0 % Final    MCV 02/26/2024 85.5  80.0 - 100.0 fL Final    MCH 02/26/2024 25.5 (L)  27.0 - 33.0 pg Final    MCHC 02/26/2024 29.9 (L)  32.0 - 36.0 g/dL Final    RDW 02/26/2024 14.3  11.0 - 15.0 % Final    Platelets 02/26/2024 399  140 - 400 Thousand/uL Final    MPV 02/26/2024 9.3  7.5 - 12.5 fL Final    Neutrophils, Abs 02/26/2024 4,377  1,500 - 7,800 cells/uL Final    Lymph # 02/26/2024 2,236  850 - 3,900 cells/uL Final    Mono # 02/26/2024 545  200 - 950 cells/uL Final    Eos # 02/26/2024 695 (H)  15 - 500 cells/uL Final    Baso # 02/26/2024 47  0 - 200 cells/uL Final    Neutrophils Relative 02/26/2024 55.4  % Final    Lymph % 02/26/2024 28.3  % Final    Mono % 02/26/2024 6.9  % Final    Eosinophil % 02/26/2024 8.8  % Final    Basophil % 02/26/2024 0.6  % Final    Ferritin 02/26/2024 14 (L)  38 - 380 ng/mL Final   There may be more visits with results that are not included.       Past Medical History:   Diagnosis Date    Colon polyp     Diabetes mellitus     Hypothyroid 07/05/2016    Sleep apnea      Thrombocytopenia     Ulcerative colitis      Past Surgical History:   Procedure Laterality Date    COLONOSCOPY N/A 12/04/2020    Procedure: COLONOSCOPY;  Surgeon: Ventura Warren MD;  Location: [facility] ENDO;  Service: Endoscopy;  Laterality: N/A;    COLONOSCOPY N/A 07/19/2021    Procedure: COLONOSCOPY;  Surgeon: Clarence Ogden MD;  Location: [facility] ENDO;  Service: Endoscopy;  Laterality: N/A;    COLONOSCOPY N/A 11/10/2022    Procedure: COLONOSCOPY;  Surgeon: Kath Hernandez MD;  Location: [facility] ENDO;  Service: Endoscopy;  Laterality: N/A;    COLONOSCOPY N/A 2/29/2024    Procedure: COLONOSCOPY;  Surgeon: Kath Hernandez MD;  Location: [facility];  Service: Endoscopy;  Laterality: N/A;    FOOT SURGERY      left   cyst removed    HAND SURGERY      right   tendon repair     Family History   Problem Relation Name Age of Onset    Ulcerative colitis Mother          in 70s    Colon cancer Neg Hx      Colon polyps Neg Hx      Crohn's disease Neg Hx         Marital Status:   Alcohol History:  reports current alcohol use.  Tobacco History:  reports that he quit smoking about 10 years ago. His smoking use included cigarettes. He started smoking about 40 years ago. He has a 30 pack-year smoking history. He has been exposed to tobacco smoke. He quit smokeless tobacco use about 4 years ago.  His smokeless tobacco use included snuff.  Drug History:  reports no history of drug use.    Review of patient's allergies indicates:  No Known Allergies    Current Outpatient Medications:     (Magic mouthwash) 1:1:1 diphenhydrAMINE(Benadryl) 12.5mg/5ml liq, aluminum & magnesium hydroxide-simethicone (Maalox), LIDOcaine viscous 2%, Swish and spit 10 mLs every 4 (four) hours as needed (oral pain). for mouth sores, Disp: 250 mL, Rfl: 2    pneumoc 20-nora conj-dip cr,PF, (PREVNAR 20, PF,) 0.5 mL Syrg injection, , Disp: , Rfl:     testosterone cypionate (DEPOTESTOTERONE CYPIONATE) 200 mg/mL injection, Inject 100 mg into  "the muscle every 14 (fourteen) days., Disp: , Rfl:     citalopram (CELEXA) 40 MG tablet, Take 1 tablet (40 mg total) by mouth once daily., Disp: 90 tablet, Rfl: 1    clotrimazole-betamethasone 1-0.05% (LOTRISONE) cream, Apply topically 2 (two) times daily., Disp: 45 g, Rfl: 2    ergocalciferol (ERGOCALCIFEROL) 50,000 unit Cap, Take 1 capsule (50,000 Units total) by mouth every 7 days., Disp: 12 capsule, Rfl: 3    folic acid (FOLVITE) 1 MG tablet, Take 1 tablet (1 mg total) by mouth once daily., Disp: 90 tablet, Rfl: 3    levothyroxine (SYNTHROID) 200 MCG tablet, Take 1 tablet (200 mcg total) by mouth before breakfast., Disp: 90 tablet, Rfl: 3    omega-3 acid ethyl esters (LOVAZA) 1 gram capsule, Take 1 capsule (1 g total) by mouth 2 (two) times daily., Disp: 180 capsule, Rfl: 3    sildenafiL (VIAGRA) 100 MG tablet, Take 1 tablet (100 mg total) by mouth daily as needed for Erectile Dysfunction., Disp: 10 tablet, Rfl: 6    Review of Systems   Constitutional:  Negative for activity change and unexpected weight change.   HENT:  Positive for rhinorrhea. Negative for hearing loss and trouble swallowing.    Eyes:  Negative for discharge and visual disturbance.   Respiratory:  Negative for chest tightness and wheezing.    Cardiovascular:  Negative for chest pain and palpitations.   Gastrointestinal:  Positive for diarrhea. Negative for blood in stool, constipation and vomiting.   Endocrine: Negative for polydipsia and polyuria.   Genitourinary:  Negative for difficulty urinating, hematuria and urgency.   Musculoskeletal:  Negative for arthralgias, joint swelling and neck pain.   Neurological:  Negative for weakness and headaches.   Psychiatric/Behavioral:  Negative for confusion and dysphoric mood.           Objective:      Vitals:    08/12/24 0738   BP: 110/68   Pulse: 76   Resp: 18   SpO2: 95%   Weight: 127 kg (280 lb)   Height: 5' 11" (1.803 m)     Physical Exam  Constitutional:       General: He is not in acute " distress.     Appearance: He is well-developed.   HENT:      Head: Normocephalic and atraumatic.   Eyes:      Pupils: Pupils are equal, round, and reactive to light.   Neck:      Thyroid: No thyromegaly.   Cardiovascular:      Rate and Rhythm: Normal rate and regular rhythm.      Heart sounds: Normal heart sounds.   Pulmonary:      Effort: Pulmonary effort is normal.      Breath sounds: Normal breath sounds.   Abdominal:      General: Bowel sounds are normal. There is no distension.      Palpations: Abdomen is soft.      Tenderness: There is no abdominal tenderness.   Musculoskeletal:         General: Normal range of motion.      Cervical back: Normal range of motion and neck supple.   Skin:     General: Skin is warm and dry.      Findings: No erythema or rash.   Neurological:      Mental Status: He is alert and oriented to person, place, and time.      Cranial Nerves: No cranial nerve deficit.         Assessment:       1. Tinea corporis    2. Hypovitaminosis D    3. Hypogonadism in male    4. Hypertriglyceridemia    5. Anxiety    6. Hypothyroidism (acquired)    7. Prediabetes         Plan:       Tinea corporis  Comments:  lotrisone to be applied BID. mentioned that he can try to apply to feet if needed as well.  Orders:  -     clotrimazole-betamethasone 1-0.05% (LOTRISONE) cream; Apply topically 2 (two) times daily.  Dispense: 45 g; Refill: 2    Hypovitaminosis D  -     ergocalciferol (ERGOCALCIFEROL) 50,000 unit Cap; Take 1 capsule (50,000 Units total) by mouth every 7 days.  Dispense: 12 capsule; Refill: 3    Hypogonadism in male  -     sildenafiL (VIAGRA) 100 MG tablet; Take 1 tablet (100 mg total) by mouth daily as needed for Erectile Dysfunction.  Dispense: 10 tablet; Refill: 6    Hypertriglyceridemia  Comments:  refills as needed. lipid panel looks good.  Orders:  -     omega-3 acid ethyl esters (LOVAZA) 1 gram capsule; Take 1 capsule (1 g total) by mouth 2 (two) times daily.  Dispense: 180 capsule; Refill:  3    Anxiety  Comments:  Some stress now with new job. wishes to keep dose of celexa the same now and consider intensifying at the next visit if still not at goal.  Orders:  -     citalopram (CELEXA) 40 MG tablet; Take 1 tablet (40 mg total) by mouth once daily.  Dispense: 90 tablet; Refill: 1    Hypothyroidism (acquired)  Comments:  refills as needed.  Orders:  -     levothyroxine (SYNTHROID) 200 MCG tablet; Take 1 tablet (200 mcg total) by mouth before breakfast.  Dispense: 90 tablet; Refill: 3    Prediabetes  -     POCT HEMOGLOBIN A1C    Other orders  -     folic acid (FOLVITE) 1 MG tablet; Take 1 tablet (1 mg total) by mouth once daily.  Dispense: 90 tablet; Refill: 3      Follow up in about 6 months (around 2/12/2025).        8/12/2024 Vinh Noyola PA-C

## 2024-08-15 ENCOUNTER — INFUSION (OUTPATIENT)
Dept: INFUSION THERAPY | Facility: HOSPITAL | Age: 54
End: 2024-08-15
Attending: INTERNAL MEDICINE
Payer: OTHER GOVERNMENT

## 2024-08-15 VITALS
HEART RATE: 77 BPM | RESPIRATION RATE: 15 BRPM | DIASTOLIC BLOOD PRESSURE: 65 MMHG | BODY MASS INDEX: 39.27 KG/M2 | TEMPERATURE: 98 F | HEIGHT: 71 IN | SYSTOLIC BLOOD PRESSURE: 112 MMHG | WEIGHT: 280.5 LBS | OXYGEN SATURATION: 96 %

## 2024-08-15 DIAGNOSIS — D50.0 IRON DEFICIENCY ANEMIA DUE TO CHRONIC BLOOD LOSS: Primary | ICD-10-CM

## 2024-08-15 PROCEDURE — A4216 STERILE WATER/SALINE, 10 ML: HCPCS | Performed by: INTERNAL MEDICINE

## 2024-08-15 PROCEDURE — 63600175 PHARM REV CODE 636 W HCPCS: Mod: JZ,JG | Performed by: INTERNAL MEDICINE

## 2024-08-15 PROCEDURE — 96365 THER/PROPH/DIAG IV INF INIT: CPT

## 2024-08-15 PROCEDURE — 25000003 PHARM REV CODE 250: Performed by: INTERNAL MEDICINE

## 2024-08-15 RX ORDER — SODIUM CHLORIDE 0.9 % (FLUSH) 0.9 %
10 SYRINGE (ML) INJECTION
OUTPATIENT
Start: 2024-08-22

## 2024-08-15 RX ORDER — HEPARIN 100 UNIT/ML
5 SYRINGE INTRAVENOUS
Status: DISCONTINUED | OUTPATIENT
Start: 2024-08-15 | End: 2024-08-15 | Stop reason: HOSPADM

## 2024-08-15 RX ORDER — DIPHENHYDRAMINE HYDROCHLORIDE 50 MG/ML
50 INJECTION INTRAMUSCULAR; INTRAVENOUS ONCE AS NEEDED
Status: DISCONTINUED | OUTPATIENT
Start: 2024-08-15 | End: 2024-08-15 | Stop reason: HOSPADM

## 2024-08-15 RX ORDER — SODIUM CHLORIDE 9 MG/ML
INJECTION, SOLUTION INTRAVENOUS CONTINUOUS
Status: DISCONTINUED | OUTPATIENT
Start: 2024-08-15 | End: 2024-08-15 | Stop reason: HOSPADM

## 2024-08-15 RX ORDER — DIPHENHYDRAMINE HYDROCHLORIDE 50 MG/ML
50 INJECTION INTRAMUSCULAR; INTRAVENOUS ONCE AS NEEDED
OUTPATIENT
Start: 2024-08-22

## 2024-08-15 RX ORDER — HEPARIN 100 UNIT/ML
5 SYRINGE INTRAVENOUS
OUTPATIENT
Start: 2024-08-22

## 2024-08-15 RX ORDER — EPINEPHRINE 0.3 MG/.3ML
0.3 INJECTION SUBCUTANEOUS ONCE AS NEEDED
OUTPATIENT
Start: 2024-08-22

## 2024-08-15 RX ORDER — SODIUM CHLORIDE 0.9 % (FLUSH) 0.9 %
10 SYRINGE (ML) INJECTION
Status: DISCONTINUED | OUTPATIENT
Start: 2024-08-15 | End: 2024-08-15 | Stop reason: HOSPADM

## 2024-08-15 RX ORDER — EPINEPHRINE 0.3 MG/.3ML
0.3 INJECTION SUBCUTANEOUS ONCE AS NEEDED
Status: DISCONTINUED | OUTPATIENT
Start: 2024-08-15 | End: 2024-08-15 | Stop reason: HOSPADM

## 2024-08-15 RX ORDER — SODIUM CHLORIDE 9 MG/ML
INJECTION, SOLUTION INTRAVENOUS CONTINUOUS
OUTPATIENT
Start: 2024-08-22

## 2024-08-15 RX ADMIN — Medication 10 ML: at 01:08

## 2024-08-15 RX ADMIN — SODIUM CHLORIDE: 9 INJECTION, SOLUTION INTRAVENOUS at 01:08

## 2024-08-15 RX ADMIN — FERRIC CARBOXYMALTOSE INJECTION 750 MG: 50 INJECTION, SOLUTION INTRAVENOUS at 01:08

## 2024-08-22 ENCOUNTER — INFUSION (OUTPATIENT)
Dept: INFUSION THERAPY | Facility: HOSPITAL | Age: 54
End: 2024-08-22
Attending: INTERNAL MEDICINE
Payer: OTHER GOVERNMENT

## 2024-08-22 VITALS
RESPIRATION RATE: 16 BRPM | TEMPERATURE: 98 F | SYSTOLIC BLOOD PRESSURE: 122 MMHG | HEART RATE: 83 BPM | DIASTOLIC BLOOD PRESSURE: 72 MMHG | OXYGEN SATURATION: 97 %

## 2024-08-22 DIAGNOSIS — D50.0 IRON DEFICIENCY ANEMIA DUE TO CHRONIC BLOOD LOSS: Primary | ICD-10-CM

## 2024-08-22 PROCEDURE — 63600175 PHARM REV CODE 636 W HCPCS: Mod: JZ,JG | Performed by: INTERNAL MEDICINE

## 2024-08-22 PROCEDURE — 25000003 PHARM REV CODE 250: Performed by: INTERNAL MEDICINE

## 2024-08-22 PROCEDURE — 96365 THER/PROPH/DIAG IV INF INIT: CPT

## 2024-08-22 RX ORDER — SODIUM CHLORIDE 0.9 % (FLUSH) 0.9 %
10 SYRINGE (ML) INJECTION
Status: CANCELLED | OUTPATIENT
Start: 2024-08-22

## 2024-08-22 RX ORDER — SODIUM CHLORIDE 9 MG/ML
INJECTION, SOLUTION INTRAVENOUS CONTINUOUS
Status: CANCELLED | OUTPATIENT
Start: 2024-08-22

## 2024-08-22 RX ORDER — SODIUM CHLORIDE 0.9 % (FLUSH) 0.9 %
10 SYRINGE (ML) INJECTION
Status: DISCONTINUED | OUTPATIENT
Start: 2024-08-22 | End: 2024-08-22 | Stop reason: HOSPADM

## 2024-08-22 RX ORDER — DIPHENHYDRAMINE HYDROCHLORIDE 50 MG/ML
50 INJECTION INTRAMUSCULAR; INTRAVENOUS ONCE AS NEEDED
OUTPATIENT
Start: 2024-08-22

## 2024-08-22 RX ORDER — HEPARIN 100 UNIT/ML
5 SYRINGE INTRAVENOUS
Status: CANCELLED | OUTPATIENT
Start: 2024-08-22

## 2024-08-22 RX ORDER — HEPARIN 100 UNIT/ML
5 SYRINGE INTRAVENOUS
Status: DISCONTINUED | OUTPATIENT
Start: 2024-08-22 | End: 2024-08-22 | Stop reason: HOSPADM

## 2024-08-22 RX ORDER — EPINEPHRINE 0.3 MG/.3ML
0.3 INJECTION SUBCUTANEOUS ONCE AS NEEDED
OUTPATIENT
Start: 2024-08-22

## 2024-08-22 RX ORDER — SODIUM CHLORIDE 9 MG/ML
INJECTION, SOLUTION INTRAVENOUS CONTINUOUS
Status: DISCONTINUED | OUTPATIENT
Start: 2024-08-22 | End: 2024-08-22 | Stop reason: HOSPADM

## 2024-08-22 RX ADMIN — SODIUM CHLORIDE: 9 INJECTION, SOLUTION INTRAVENOUS at 01:08

## 2024-08-22 RX ADMIN — FERRIC CARBOXYMALTOSE INJECTION 750 MG: 50 INJECTION, SOLUTION INTRAVENOUS at 01:08

## 2024-08-22 NOTE — PLAN OF CARE
Problem: Adult Inpatient Plan of Care  Goal: Plan of Care Review  8/22/2024 1337 by Danni Rashid RN  Outcome: Met  8/22/2024 1337 by Danni Rashid RN  Outcome: Progressing  Goal: Patient-Specific Goal (Individualized)  8/22/2024 1337 by Danni Rashid RN  Outcome: Met  8/22/2024 1337 by Danni Rashid RN  Outcome: Progressing  Goal: Absence of Hospital-Acquired Illness or Injury  8/22/2024 1337 by Danni Rashid RN  Outcome: Met  8/22/2024 1337 by Danni Rashid RN  Outcome: Progressing  Goal: Optimal Comfort and Wellbeing  8/22/2024 1337 by Danni Rashid RN  Outcome: Met  8/22/2024 1337 by Danni Rashid RN  Outcome: Progressing  Goal: Readiness for Transition of Care  8/22/2024 1337 by Danni Rashid RN  Outcome: Met  8/22/2024 1337 by Danni Rashid RN  Outcome: Progressing

## 2024-08-23 ENCOUNTER — PATIENT MESSAGE (OUTPATIENT)
Dept: HEMATOLOGY/ONCOLOGY | Facility: CLINIC | Age: 54
End: 2024-08-23
Payer: OTHER GOVERNMENT

## 2024-09-03 ENCOUNTER — PATIENT MESSAGE (OUTPATIENT)
Dept: ADMINISTRATIVE | Facility: HOSPITAL | Age: 54
End: 2024-09-03
Payer: OTHER GOVERNMENT

## 2024-09-05 ENCOUNTER — TELEPHONE (OUTPATIENT)
Dept: FAMILY MEDICINE | Facility: CLINIC | Age: 54
End: 2024-09-05
Payer: OTHER GOVERNMENT

## 2024-09-05 DIAGNOSIS — E29.1 HYPOGONADISM IN MALE: Primary | ICD-10-CM

## 2024-09-05 NOTE — TELEPHONE ENCOUNTER
----- Message from Suzy Ge sent at 9/5/2024 11:26 AM CDT -----  The patient needs a referral to see Dr. Hopson.  Prime. Annual for Testosterone replacement. His appointment is Monday at 10:40. Pt's # [phone] GH

## 2024-09-10 ENCOUNTER — INFUSION (OUTPATIENT)
Dept: INFUSION THERAPY | Facility: HOSPITAL | Age: 54
End: 2024-09-10
Attending: INTERNAL MEDICINE
Payer: OTHER GOVERNMENT

## 2024-09-10 VITALS
WEIGHT: 287.13 LBS | HEIGHT: 71 IN | BODY MASS INDEX: 40.2 KG/M2 | SYSTOLIC BLOOD PRESSURE: 120 MMHG | TEMPERATURE: 98 F | HEART RATE: 80 BPM | DIASTOLIC BLOOD PRESSURE: 73 MMHG | OXYGEN SATURATION: 96 % | RESPIRATION RATE: 16 BRPM

## 2024-09-10 DIAGNOSIS — K51.00 ULCERATIVE PANCOLITIS: Primary | ICD-10-CM

## 2024-09-10 LAB
ALBUMIN SERPL BCP-MCNC: 4.4 G/DL (ref 3.5–5.2)
ALP SERPL-CCNC: 68 U/L (ref 55–135)
ALT SERPL W/O P-5'-P-CCNC: 66 U/L (ref 10–44)
ANION GAP SERPL CALC-SCNC: 9 MMOL/L (ref 8–16)
AST SERPL-CCNC: 27 U/L (ref 10–40)
BASOPHILS # BLD AUTO: 0.05 K/UL (ref 0–0.2)
BASOPHILS NFR BLD: 0.9 % (ref 0–1.9)
BILIRUB SERPL-MCNC: 0.4 MG/DL (ref 0.1–1)
BUN SERPL-MCNC: 15 MG/DL (ref 6–20)
CALCIUM SERPL-MCNC: 9.5 MG/DL (ref 8.7–10.5)
CHLORIDE SERPL-SCNC: 104 MMOL/L (ref 95–110)
CO2 SERPL-SCNC: 24 MMOL/L (ref 23–29)
CREAT SERPL-MCNC: 1 MG/DL (ref 0.5–1.4)
DIFFERENTIAL METHOD BLD: ABNORMAL
EOSINOPHIL # BLD AUTO: 0.1 K/UL (ref 0–0.5)
EOSINOPHIL NFR BLD: 2.1 % (ref 0–8)
ERYTHROCYTE [DISTWIDTH] IN BLOOD BY AUTOMATED COUNT: 16.8 % (ref 11.5–14.5)
EST. GFR  (NO RACE VARIABLE): >60 ML/MIN/1.73 M^2
GLUCOSE SERPL-MCNC: 131 MG/DL (ref 70–110)
HCT VFR BLD AUTO: 43 % (ref 40–54)
HGB BLD-MCNC: 14.2 G/DL (ref 14–18)
IMM GRANULOCYTES # BLD AUTO: 0.01 K/UL (ref 0–0.04)
IMM GRANULOCYTES NFR BLD AUTO: 0.2 % (ref 0–0.5)
LYMPHOCYTES # BLD AUTO: 1.8 K/UL (ref 1–4.8)
LYMPHOCYTES NFR BLD: 33.1 % (ref 18–48)
MCH RBC QN AUTO: 30.7 PG (ref 27–31)
MCHC RBC AUTO-ENTMCNC: 33 G/DL (ref 32–36)
MCV RBC AUTO: 93 FL (ref 82–98)
MONOCYTES # BLD AUTO: 0.4 K/UL (ref 0.3–1)
MONOCYTES NFR BLD: 7 % (ref 4–15)
NEUTROPHILS # BLD AUTO: 3 K/UL (ref 1.8–7.7)
NEUTROPHILS NFR BLD: 56.7 % (ref 38–73)
NRBC BLD-RTO: 0 /100 WBC
PLATELET # BLD AUTO: 180 K/UL (ref 150–450)
PMV BLD AUTO: 10.8 FL (ref 9.2–12.9)
POTASSIUM SERPL-SCNC: 4 MMOL/L (ref 3.5–5.1)
PROT SERPL-MCNC: 7.5 G/DL (ref 6–8.4)
RBC # BLD AUTO: 4.62 M/UL (ref 4.6–6.2)
SODIUM SERPL-SCNC: 137 MMOL/L (ref 136–145)
WBC # BLD AUTO: 5.31 K/UL (ref 3.9–12.7)

## 2024-09-10 PROCEDURE — 96415 CHEMO IV INFUSION ADDL HR: CPT

## 2024-09-10 PROCEDURE — 80053 COMPREHEN METABOLIC PANEL: CPT | Performed by: INTERNAL MEDICINE

## 2024-09-10 PROCEDURE — 85025 COMPLETE CBC W/AUTO DIFF WBC: CPT | Performed by: INTERNAL MEDICINE

## 2024-09-10 PROCEDURE — 96413 CHEMO IV INFUSION 1 HR: CPT

## 2024-09-10 PROCEDURE — 96367 TX/PROPH/DG ADDL SEQ IV INF: CPT

## 2024-09-10 PROCEDURE — 63600175 PHARM REV CODE 636 W HCPCS: Mod: JZ,JG | Performed by: INTERNAL MEDICINE

## 2024-09-10 PROCEDURE — 96375 TX/PRO/DX INJ NEW DRUG ADDON: CPT

## 2024-09-10 PROCEDURE — 25000003 PHARM REV CODE 250: Performed by: INTERNAL MEDICINE

## 2024-09-10 RX ORDER — HEPARIN 100 UNIT/ML
500 SYRINGE INTRAVENOUS
OUTPATIENT
Start: 2024-11-05

## 2024-09-10 RX ORDER — IPRATROPIUM BROMIDE AND ALBUTEROL SULFATE 2.5; .5 MG/3ML; MG/3ML
3 SOLUTION RESPIRATORY (INHALATION)
OUTPATIENT
Start: 2024-11-05

## 2024-09-10 RX ORDER — SODIUM CHLORIDE 0.9 % (FLUSH) 0.9 %
10 SYRINGE (ML) INJECTION
Status: DISCONTINUED | OUTPATIENT
Start: 2024-09-10 | End: 2024-09-10 | Stop reason: HOSPADM

## 2024-09-10 RX ORDER — SODIUM CHLORIDE 0.9 % (FLUSH) 0.9 %
10 SYRINGE (ML) INJECTION
OUTPATIENT
Start: 2024-11-05

## 2024-09-10 RX ORDER — DIPHENHYDRAMINE HYDROCHLORIDE 50 MG/ML
50 INJECTION INTRAMUSCULAR; INTRAVENOUS ONCE AS NEEDED
OUTPATIENT
Start: 2024-11-05

## 2024-09-10 RX ORDER — METHYLPREDNISOLONE SOD SUCC 125 MG
40 VIAL (EA) INJECTION
OUTPATIENT
Start: 2024-11-05

## 2024-09-10 RX ORDER — ACETAMINOPHEN 325 MG/1
650 TABLET ORAL
OUTPATIENT
Start: 2024-11-05

## 2024-09-10 RX ORDER — EPINEPHRINE 0.3 MG/.3ML
0.3 INJECTION SUBCUTANEOUS ONCE AS NEEDED
OUTPATIENT
Start: 2024-11-05

## 2024-09-10 RX ORDER — DIPHENHYDRAMINE HYDROCHLORIDE 50 MG/ML
50 INJECTION INTRAMUSCULAR; INTRAVENOUS ONCE AS NEEDED
Status: DISCONTINUED | OUTPATIENT
Start: 2024-09-10 | End: 2024-09-10 | Stop reason: HOSPADM

## 2024-09-10 RX ORDER — DIPHENHYDRAMINE HYDROCHLORIDE 50 MG/ML
25 INJECTION INTRAMUSCULAR; INTRAVENOUS
OUTPATIENT
Start: 2024-11-05

## 2024-09-10 RX ORDER — ACETAMINOPHEN 325 MG/1
650 TABLET ORAL
Status: COMPLETED | OUTPATIENT
Start: 2024-09-10 | End: 2024-09-10

## 2024-09-10 RX ORDER — EPINEPHRINE 0.3 MG/.3ML
0.3 INJECTION SUBCUTANEOUS ONCE AS NEEDED
Status: DISCONTINUED | OUTPATIENT
Start: 2024-09-10 | End: 2024-09-10 | Stop reason: HOSPADM

## 2024-09-10 RX ADMIN — METHYLPREDNISOLONE SODIUM SUCCINATE 40 MG: 40 INJECTION, POWDER, FOR SOLUTION INTRAMUSCULAR; INTRAVENOUS at 01:09

## 2024-09-10 RX ADMIN — DIPHENHYDRAMINE HYDROCHLORIDE 25 MG: 50 INJECTION, SOLUTION INTRAMUSCULAR; INTRAVENOUS at 01:09

## 2024-09-10 RX ADMIN — SODIUM CHLORIDE 1200 MG: 9 INJECTION, SOLUTION INTRAVENOUS at 01:09

## 2024-09-10 RX ADMIN — ACETAMINOPHEN 650 MG: 325 TABLET ORAL at 01:09

## 2024-09-19 ENCOUNTER — OFFICE VISIT (OUTPATIENT)
Dept: PULMONOLOGY | Facility: CLINIC | Age: 54
End: 2024-09-19
Payer: OTHER GOVERNMENT

## 2024-09-19 VITALS
BODY MASS INDEX: 41.16 KG/M2 | HEIGHT: 71 IN | WEIGHT: 294 LBS | SYSTOLIC BLOOD PRESSURE: 120 MMHG | OXYGEN SATURATION: 95 % | HEART RATE: 88 BPM | DIASTOLIC BLOOD PRESSURE: 70 MMHG

## 2024-09-19 DIAGNOSIS — G47.33 OSA (OBSTRUCTIVE SLEEP APNEA): Primary | ICD-10-CM

## 2024-09-19 PROCEDURE — 99214 OFFICE O/P EST MOD 30 MIN: CPT | Mod: PBBFAC,PN | Performed by: NURSE PRACTITIONER

## 2024-09-19 PROCEDURE — 99999 PR PBB SHADOW E&M-EST. PATIENT-LVL IV: CPT | Mod: PBBFAC,,, | Performed by: NURSE PRACTITIONER

## 2024-09-19 NOTE — PROGRESS NOTES
SUBJECTIVE:    Patient ID: Isidoro Singh is a 54 y.o. male.    Chief Complaint: Sleep Apnea and Follow-up    Follow-up        Patient here today feeling well.  He is sleeping on his CPAP every night and does feel great benefit from it. His compliance report shows he is 100% compliant, sleeps an average of 7 hours and 29 minutes with an AHI of 0.4.   Past Medical History:   Diagnosis Date    Colon polyp     Diabetes mellitus     Hypothyroid 07/05/2016    Sleep apnea     Thrombocytopenia     Ulcerative colitis      Past Surgical History:   Procedure Laterality Date    COLONOSCOPY N/A 12/04/2020    Procedure: COLONOSCOPY;  Surgeon: Ventura Warren MD;  Location: [facility] ENDO;  Service: Endoscopy;  Laterality: N/A;    COLONOSCOPY N/A 07/19/2021    Procedure: COLONOSCOPY;  Surgeon: Clarence Ogden MD;  Location: [facility] ENDO;  Service: Endoscopy;  Laterality: N/A;    COLONOSCOPY N/A 11/10/2022    Procedure: COLONOSCOPY;  Surgeon: Kath Hernandez MD;  Location: [facility] ENDO;  Service: Endoscopy;  Laterality: N/A;    COLONOSCOPY N/A 2/29/2024    Procedure: COLONOSCOPY;  Surgeon: Kath Hernandez MD;  Location: [facility];  Service: Endoscopy;  Laterality: N/A;    FOOT SURGERY      left   cyst removed    HAND SURGERY      right   tendon repair     Family History   Problem Relation Name Age of Onset    Ulcerative colitis Mother          in 70s    Colon cancer Neg Hx      Colon polyps Neg Hx      Crohn's disease Neg Hx          Social History:   Marital Status:   Occupation:    Alcohol History:  reports current alcohol use.  Tobacco History:  reports that he quit smoking about 10 years ago. His smoking use included cigarettes. He started smoking about 40 years ago. He has a 30 pack-year smoking history. He has been exposed to tobacco smoke. He quit smokeless tobacco use about 4 years ago.  His smokeless tobacco use included snuff.  Drug History:  reports no history of drug use.    Review of  patient's allergies indicates:  No Known Allergies    Current Outpatient Medications   Medication Sig Dispense Refill    (Magic mouthwash) 1:1:1 diphenhydrAMINE(Benadryl) 12.5mg/5ml liq, aluminum & magnesium hydroxide-simethicone (Maalox), LIDOcaine viscous 2% Swish and spit 10 mLs every 4 (four) hours as needed (oral pain). for mouth sores 250 mL 2    citalopram (CELEXA) 40 MG tablet Take 1 tablet (40 mg total) by mouth once daily. 90 tablet 1    clotrimazole-betamethasone 1-0.05% (LOTRISONE) cream Apply topically 2 (two) times daily. 45 g 2    ergocalciferol (ERGOCALCIFEROL) 50,000 unit Cap Take 1 capsule (50,000 Units total) by mouth every 7 days. 12 capsule 3    folic acid (FOLVITE) 1 MG tablet Take 1 tablet (1 mg total) by mouth once daily. 90 tablet 3    levothyroxine (SYNTHROID) 200 MCG tablet Take 1 tablet (200 mcg total) by mouth before breakfast. 90 tablet 3    omega-3 acid ethyl esters (LOVAZA) 1 gram capsule Take 1 capsule (1 g total) by mouth 2 (two) times daily. 180 capsule 3    sildenafiL (VIAGRA) 100 MG tablet Take 1 tablet (100 mg total) by mouth daily as needed for Erectile Dysfunction. 10 tablet 6    testosterone cypionate (DEPOTESTOTERONE CYPIONATE) 200 mg/mL injection Inject 100 mg into the muscle every 14 (fourteen) days.      pneumoc 20-nora conj-dip cr,PF, (PREVNAR 20, PF,) 0.5 mL Syrg injection        No current facility-administered medications for this visit.           Review of Systems  General: Feeling Well. No fatigue, no napping   Eyes: Vision is good.  ENT:  No sinusitis or pharyngitis.   Heart:: No chest pain or palpitations.  Lungs: No cough, sputum, or wheezing.  GI: denies discomfort at present time   : No dysuria, hesitancy, or nocturia.  Musculoskeletal: No joint pain or myalgias.  Skin: No lesions or rashes.  Neuro:no complaints .  Lymph: No edema or adenopathy.  Psych: No anxiety or depression.  Endo: weight gain    OBJECTIVE:      /70 (BP Location: Right arm, Patient  "Position: Sitting, BP Method: Large (Manual))   Pulse 88   Ht 5' 11" (1.803 m)   Wt 133.4 kg (294 lb)   SpO2 95%   BMI 41.00 kg/m²     Physical Exam  GENERAL:middle aged patient in no distress.  HEENT: Pupils equal and reactive. Extraocular movements intact. Nose intact.      Pharynx moist.   NECK: Supple.   HEART: Regular rate and rhythm. No murmur or gallop auscultated.  LUNGS: Clear to auscultation and percussion. Lung excursion symmetrical. No change in fremitus. No adventitial noises.  ABDOMEN: Bowel sounds present. Non-tender, no masses palpated.  EXTREMITIES: Normal muscle tone and joint movement, no cyanosis or clubbing.   LYMPHATICS: No adenopathy palpated, no edema.  SKIN: Dry, intact, no lesions.   NEURO: Cranial nerves II-XII intact. Motor strength 5/5 bilaterally, upper and lower extremities.  PSYCH: Appropriate affect.    Assessment:       1. ANTONY (obstructive sleep apnea)              Plan:       Keep sleeping on your cpap  Call if you need anything  Follow up in about 1 year (around 9/19/2025).            "

## 2024-09-20 ENCOUNTER — LAB VISIT (OUTPATIENT)
Dept: LAB | Facility: HOSPITAL | Age: 54
End: 2024-09-20
Attending: INTERNAL MEDICINE
Payer: OTHER GOVERNMENT

## 2024-09-20 DIAGNOSIS — D50.0 IRON DEFICIENCY ANEMIA DUE TO CHRONIC BLOOD LOSS: ICD-10-CM

## 2024-09-20 LAB
ALBUMIN SERPL BCP-MCNC: 4.3 G/DL (ref 3.5–5.2)
ALP SERPL-CCNC: 71 U/L (ref 55–135)
ALT SERPL W/O P-5'-P-CCNC: 71 U/L (ref 10–44)
ANION GAP SERPL CALC-SCNC: 9 MMOL/L (ref 8–16)
AST SERPL-CCNC: 25 U/L (ref 10–40)
BASOPHILS # BLD AUTO: 0.06 K/UL (ref 0–0.2)
BASOPHILS NFR BLD: 1.1 % (ref 0–1.9)
BILIRUB SERPL-MCNC: 0.4 MG/DL (ref 0.1–1)
BUN SERPL-MCNC: 16 MG/DL (ref 6–20)
CALCIUM SERPL-MCNC: 9.3 MG/DL (ref 8.7–10.5)
CHLORIDE SERPL-SCNC: 104 MMOL/L (ref 95–110)
CO2 SERPL-SCNC: 23 MMOL/L (ref 23–29)
CREAT SERPL-MCNC: 1 MG/DL (ref 0.5–1.4)
DIFFERENTIAL METHOD BLD: ABNORMAL
EOSINOPHIL # BLD AUTO: 0.1 K/UL (ref 0–0.5)
EOSINOPHIL NFR BLD: 2 % (ref 0–8)
ERYTHROCYTE [DISTWIDTH] IN BLOOD BY AUTOMATED COUNT: 15.8 % (ref 11.5–14.5)
EST. GFR  (NO RACE VARIABLE): >60 ML/MIN/1.73 M^2
FERRITIN SERPL-MCNC: 449 NG/ML (ref 20–300)
GLUCOSE SERPL-MCNC: 149 MG/DL (ref 70–110)
HCT VFR BLD AUTO: 44.8 % (ref 40–54)
HGB BLD-MCNC: 14.5 G/DL (ref 14–18)
IMM GRANULOCYTES # BLD AUTO: 0.01 K/UL (ref 0–0.04)
IMM GRANULOCYTES NFR BLD AUTO: 0.2 % (ref 0–0.5)
IRON SERPL-MCNC: 110 UG/DL (ref 45–160)
LYMPHOCYTES # BLD AUTO: 2.5 K/UL (ref 1–4.8)
LYMPHOCYTES NFR BLD: 44.8 % (ref 18–48)
MCH RBC QN AUTO: 31 PG (ref 27–31)
MCHC RBC AUTO-ENTMCNC: 32.4 G/DL (ref 32–36)
MCV RBC AUTO: 96 FL (ref 82–98)
MONOCYTES # BLD AUTO: 0.5 K/UL (ref 0.3–1)
MONOCYTES NFR BLD: 8.5 % (ref 4–15)
NEUTROPHILS # BLD AUTO: 2.4 K/UL (ref 1.8–7.7)
NEUTROPHILS NFR BLD: 43.4 % (ref 38–73)
NRBC BLD-RTO: 0 /100 WBC
PLATELET # BLD AUTO: 178 K/UL (ref 150–450)
PMV BLD AUTO: 10 FL (ref 9.2–12.9)
POTASSIUM SERPL-SCNC: 4.5 MMOL/L (ref 3.5–5.1)
PROT SERPL-MCNC: 7.6 G/DL (ref 6–8.4)
RBC # BLD AUTO: 4.67 M/UL (ref 4.6–6.2)
SATURATED IRON: 32 % (ref 20–50)
SODIUM SERPL-SCNC: 136 MMOL/L (ref 136–145)
TOTAL IRON BINDING CAPACITY: 342 UG/DL (ref 250–450)
TRANSFERRIN SERPL-MCNC: 244 MG/DL (ref 200–375)
WBC # BLD AUTO: 5.54 K/UL (ref 3.9–12.7)

## 2024-09-20 PROCEDURE — 85025 COMPLETE CBC W/AUTO DIFF WBC: CPT | Performed by: INTERNAL MEDICINE

## 2024-09-20 PROCEDURE — 80053 COMPREHEN METABOLIC PANEL: CPT | Performed by: INTERNAL MEDICINE

## 2024-09-20 PROCEDURE — 36415 COLL VENOUS BLD VENIPUNCTURE: CPT | Performed by: INTERNAL MEDICINE

## 2024-09-20 PROCEDURE — 83540 ASSAY OF IRON: CPT | Performed by: INTERNAL MEDICINE

## 2024-09-20 PROCEDURE — 82728 ASSAY OF FERRITIN: CPT | Performed by: INTERNAL MEDICINE

## 2024-09-24 ENCOUNTER — OFFICE VISIT (OUTPATIENT)
Dept: HEMATOLOGY/ONCOLOGY | Facility: CLINIC | Age: 54
End: 2024-09-24
Payer: OTHER GOVERNMENT

## 2024-09-24 VITALS
HEIGHT: 71 IN | BODY MASS INDEX: 40.84 KG/M2 | SYSTOLIC BLOOD PRESSURE: 124 MMHG | HEART RATE: 76 BPM | WEIGHT: 291.69 LBS | TEMPERATURE: 97 F | RESPIRATION RATE: 16 BRPM | DIASTOLIC BLOOD PRESSURE: 73 MMHG | OXYGEN SATURATION: 97 %

## 2024-09-24 DIAGNOSIS — K51.00 ULCERATIVE PANCOLITIS: ICD-10-CM

## 2024-09-24 DIAGNOSIS — E08.00 DIABETES MELLITUS DUE TO UNDERLYING CONDITION WITH HYPEROSMOLARITY WITHOUT COMA, WITH LONG-TERM CURRENT USE OF INSULIN: Primary | ICD-10-CM

## 2024-09-24 DIAGNOSIS — D50.0 IRON DEFICIENCY ANEMIA DUE TO CHRONIC BLOOD LOSS: ICD-10-CM

## 2024-09-24 DIAGNOSIS — E06.3 HASHIMOTO'S THYROIDITIS: ICD-10-CM

## 2024-09-24 DIAGNOSIS — Z79.4 DIABETES MELLITUS DUE TO UNDERLYING CONDITION WITH HYPEROSMOLARITY WITHOUT COMA, WITH LONG-TERM CURRENT USE OF INSULIN: Primary | ICD-10-CM

## 2024-09-24 DIAGNOSIS — E78.00 PURE HYPERCHOLESTEROLEMIA: ICD-10-CM

## 2024-09-24 PROCEDURE — 99999 PR PBB SHADOW E&M-EST. PATIENT-LVL IV: CPT | Mod: PBBFAC,,, | Performed by: INTERNAL MEDICINE

## 2024-09-24 PROCEDURE — 99214 OFFICE O/P EST MOD 30 MIN: CPT | Mod: PBBFAC,PN | Performed by: INTERNAL MEDICINE

## 2024-09-24 PROCEDURE — 99214 OFFICE O/P EST MOD 30 MIN: CPT | Mod: S$PBB,,, | Performed by: INTERNAL MEDICINE

## 2024-09-24 NOTE — PROGRESS NOTES
Subjective:       Patient ID: Isidoro Singh is a 54 y.o. male.    Chief Complaint: YESENIA    HPI    54 years old male with history of ulcerative colitis.  Seen me previously but was lost to follow-up upper 2020 reestablished this year in March2024  He had ulcerative colitis flare with iron loss status post transfusion packed red blood cell and IV iron in hospital.  He was referred to Hematology for IV iron therapy.  History of thrombocytopenia suspect ITP under control.       Review of Systems   Constitutional:  Negative for appetite change and unexpected weight change.   HENT:  Positive for mouth sores.    Eyes:  Negative for visual disturbance.   Respiratory:  Negative for cough and shortness of breath.    Cardiovascular:  Negative for chest pain.   Gastrointestinal:  Positive for diarrhea. Negative for abdominal pain.   Genitourinary:  Negative for frequency.   Musculoskeletal:  Negative for back pain.   Skin:  Negative for rash.   Neurological:  Negative for headaches.   Hematological:  Negative for adenopathy.   Psychiatric/Behavioral:  The patient is not nervous/anxious.              Past Medical History:   Diagnosis Date    Colon polyp     Diabetes mellitus     Hypothyroid 07/05/2016    Sleep apnea     Thrombocytopenia     Ulcerative colitis      Past Surgical History:   Procedure Laterality Date    COLONOSCOPY N/A 12/04/2020    Procedure: COLONOSCOPY;  Surgeon: Ventura Warren MD;  Location: [facility];  Service: Endoscopy;  Laterality: N/A;    COLONOSCOPY N/A 07/19/2021    Procedure: COLONOSCOPY;  Surgeon: Clarence Ogden MD;  Location: [facility];  Service: Endoscopy;  Laterality: N/A;    COLONOSCOPY N/A 11/10/2022    Procedure: COLONOSCOPY;  Surgeon: Kath Hernandez MD;  Location: [facility];  Service: Endoscopy;  Laterality: N/A;    COLONOSCOPY N/A 2/29/2024    Procedure: COLONOSCOPY;  Surgeon: Kath Hernandez MD;  Location: [facility];  Service: Endoscopy;  Laterality: N/A;    FOOT SURGERY       left   cyst removed    HAND SURGERY      right   tendon repair     Family History   Problem Relation Name Age of Onset    Ulcerative colitis Mother          in 70s    Colon cancer Neg Hx      Colon polyps Neg Hx      Crohn's disease Neg Hx        Social History     Socioeconomic History    Marital status:    Tobacco Use    Smoking status: Former     Current packs/day: 0.00     Average packs/day: 1 pack/day for 30.0 years (30.0 ttl pk-yrs)     Types: Cigarettes     Start date: 1984     Quit date: 12/2013     Years since quitting: 10.8     Passive exposure: Past    Smokeless tobacco: Former     Types: Snuff     Quit date: 2020   Substance and Sexual Activity    Alcohol use: Yes     Comment: occasional    Drug use: No    Sexual activity: Yes     Social Determinants of Health     Financial Resource Strain: Low Risk  (11/10/2023)    Overall Financial Resource Strain (CARDIA)     Difficulty of Paying Living Expenses: Not hard at all   Food Insecurity: No Food Insecurity (11/10/2023)    Hunger Vital Sign     Worried About Running Out of Food in the Last Year: Never true     Ran Out of Food in the Last Year: Never true   Transportation Needs: No Transportation Needs (11/10/2023)    PRAPARE - Transportation     Lack of Transportation (Medical): No     Lack of Transportation (Non-Medical): No   Physical Activity: Insufficiently Active (11/10/2023)    Exercise Vital Sign     Days of Exercise per Week: 1 day     Minutes of Exercise per Session: 30 min   Stress: No Stress Concern Present (11/10/2023)    [employer] [place] [employer] - Occupational Stress Questionnaire     Feeling of Stress : Not at all   Housing Stability: Low Risk  (11/10/2023)    Housing Stability Vital Sign     Unable to Pay for Housing in the Last Year: No     Number of Places Lived in the Last Year: 1     Unstable Housing in the Last Year: No     Review of patient's allergies indicates:  No Known Allergies    Current Outpatient  Medications:     (Magic mouthwash) 1:1:1 diphenhydrAMINE(Benadryl) 12.5mg/5ml liq, aluminum & magnesium hydroxide-simethicone (Maalox), LIDOcaine viscous 2%, Swish and spit 10 mLs every 4 (four) hours as needed (oral pain). for mouth sores, Disp: 250 mL, Rfl: 2    citalopram (CELEXA) 40 MG tablet, Take 1 tablet (40 mg total) by mouth once daily., Disp: 90 tablet, Rfl: 1    clotrimazole-betamethasone 1-0.05% (LOTRISONE) cream, Apply topically 2 (two) times daily., Disp: 45 g, Rfl: 2    ergocalciferol (ERGOCALCIFEROL) 50,000 unit Cap, Take 1 capsule (50,000 Units total) by mouth every 7 days., Disp: 12 capsule, Rfl: 3    folic acid (FOLVITE) 1 MG tablet, Take 1 tablet (1 mg total) by mouth once daily., Disp: 90 tablet, Rfl: 3    levothyroxine (SYNTHROID) 200 MCG tablet, Take 1 tablet (200 mcg total) by mouth before breakfast., Disp: 90 tablet, Rfl: 3    omega-3 acid ethyl esters (LOVAZA) 1 gram capsule, Take 1 capsule (1 g total) by mouth 2 (two) times daily., Disp: 180 capsule, Rfl: 3    pneumoc 20-nora conj-dip cr,PF, (PREVNAR 20, PF,) 0.5 mL Syrg injection, , Disp: , Rfl:     sildenafiL (VIAGRA) 100 MG tablet, Take 1 tablet (100 mg total) by mouth daily as needed for Erectile Dysfunction., Disp: 10 tablet, Rfl: 6    testosterone cypionate (DEPOTESTOTERONE CYPIONATE) 200 mg/mL injection, Inject 100 mg into the muscle every 14 (fourteen) days., Disp: , Rfl:     Physical Exam    VITAL SIGNS:  as above   GENERAL: appears well-built, well-nourished.  No anxiety, no agitation, and in no distress.  Patient is awake, alert, oriented and cooperative.  HEENT:  Showed no congestion. Trachea is central no obvious icterus or pallor noted no hoarseness. no obvious JVD   NECK:  Supple.  No JVD. No obvious cervical submental or supraclavicular adenopathy.  RS:the visualized portion of  Chest expands well. chest appears symmetric, no audible wheezes.  No dyspnea recognized  ABDOMEN:  abdomen appears undistended.  EXTREMITIES:   Without edema.  NEUROLOGICAL:  The patient is appropriate, higher functions are normal.  No  obvious neurological deficits.  normal judgement normal thought content  No confusion, no speech impediment. Cranial nerves are intact and show no deficit. No gross motor deficits noted   SKIN MUSCULOSKELETAL: no joint or skeletal deformity, no clubbing of nails.  No visible rash ecchymosis or petechiae     Lab Results   Component Value Date    WBC 5.54 09/20/2024    HGB 14.5 09/20/2024    HCT 44.8 09/20/2024    MCV 96 09/20/2024     09/20/2024       BMP  Lab Results   Component Value Date     09/20/2024    K 4.5 09/20/2024     09/20/2024    CO2 23 09/20/2024    BUN 16 09/20/2024    CREATININE 1.0 09/20/2024    CALCIUM 9.3 09/20/2024    ANIONGAP 9 09/20/2024    ESTGFRAFRICA 96 11/09/2021    EGFRNONAA 83 11/09/2021     Lab Results   Component Value Date    IRON 110 09/20/2024    TIBC 342 09/20/2024    FERRITIN 449 (H) 09/20/2024         Patient Active Problem List   Diagnosis    Thrombocytopenia    Fatty liver    Hypothyroidism (acquired)    Thyroid disease    Abnormal thyroid blood test    Goiter    NAFLD (nonalcoholic fatty liver disease)    Obesity (BMI 35.0-39.9 without comorbidity)    Obstructive sleep apnea    Hashimoto's thyroiditis    Nodular thyroid disease    Dysmetabolic syndrome    Prediabetes    Hypovitaminosis D    Hyperlipidemia    Hypertriglyceridemia    History of colon polyps    Ulcerative pancolitis    Anemia    Diabetes mellitus    Iron deficiency anemia        Assessment and Plan      Ulcertive colitis: on remicade q 2 weeks and mtx weekly with DR Hernandez   No falre ups recently    Iron deficiency anemia/ulcerative colitis status post infusional iron in February 24 ferritin doing good but declining steadily again therefore repeated with good response  See me 3 month after with CBC CMP iron studies      ITP by history :stable and normal platelet counts    Dyslipidemia; continue medical  management follow-up with primary care dietary compliance    ANTONY:  Continue follow-up primary wear CPAP regularly    Diabetes mellitus:  Continue follow-up PCP monitoring hemoglobin A1c regularly diet restrictions and good routine exercise regimen recommended    NAFLD:  explained to patient fat restriction alcohol restriction and protecting liver from progressing     BMI of 35 pt aware, discussed life style changes    MDM includes  :    - Acute or chronic illness or injury that poses a threat to life or bodily function  - Independent review and explanation of 3+ results from unique tests  - Discussion of management and ordering 3+ unique tests  - Extensive discussion of treatment and management  - Prescription drug management  - Drug therapy requiring intensive monitoring for toxicity

## 2024-10-15 ENCOUNTER — PATIENT MESSAGE (OUTPATIENT)
Dept: FAMILY MEDICINE | Facility: CLINIC | Age: 54
End: 2024-10-15
Payer: OTHER GOVERNMENT

## 2024-10-29 ENCOUNTER — TELEPHONE (OUTPATIENT)
Dept: FAMILY MEDICINE | Facility: CLINIC | Age: 54
End: 2024-10-29
Payer: OTHER GOVERNMENT

## 2024-11-05 ENCOUNTER — INFUSION (OUTPATIENT)
Dept: INFUSION THERAPY | Facility: HOSPITAL | Age: 54
End: 2024-11-05
Attending: INTERNAL MEDICINE
Payer: OTHER GOVERNMENT

## 2024-11-05 VITALS
BODY MASS INDEX: 41.4 KG/M2 | DIASTOLIC BLOOD PRESSURE: 76 MMHG | WEIGHT: 295.69 LBS | SYSTOLIC BLOOD PRESSURE: 114 MMHG | TEMPERATURE: 98 F | HEIGHT: 71 IN | HEART RATE: 68 BPM | RESPIRATION RATE: 18 BRPM | OXYGEN SATURATION: 95 %

## 2024-11-05 DIAGNOSIS — K51.00 ULCERATIVE PANCOLITIS: Primary | ICD-10-CM

## 2024-11-05 LAB
ALBUMIN SERPL BCP-MCNC: 4.6 G/DL (ref 3.5–5.2)
ALP SERPL-CCNC: 66 U/L (ref 55–135)
ALT SERPL W/O P-5'-P-CCNC: 69 U/L (ref 10–44)
ANION GAP SERPL CALC-SCNC: 5 MMOL/L (ref 8–16)
AST SERPL-CCNC: 28 U/L (ref 10–40)
BASOPHILS # BLD AUTO: 0.05 K/UL (ref 0–0.2)
BASOPHILS NFR BLD: 0.9 % (ref 0–1.9)
BILIRUB SERPL-MCNC: 0.5 MG/DL (ref 0.1–1)
BUN SERPL-MCNC: 13 MG/DL (ref 6–20)
CALCIUM SERPL-MCNC: 9.4 MG/DL (ref 8.7–10.5)
CHLORIDE SERPL-SCNC: 101 MMOL/L (ref 95–110)
CO2 SERPL-SCNC: 29 MMOL/L (ref 23–29)
CREAT SERPL-MCNC: 0.9 MG/DL (ref 0.5–1.4)
DIFFERENTIAL METHOD BLD: ABNORMAL
EOSINOPHIL # BLD AUTO: 0.1 K/UL (ref 0–0.5)
EOSINOPHIL NFR BLD: 1 % (ref 0–8)
ERYTHROCYTE [DISTWIDTH] IN BLOOD BY AUTOMATED COUNT: 14.5 % (ref 11.5–14.5)
EST. GFR  (NO RACE VARIABLE): >60 ML/MIN/1.73 M^2
GLUCOSE SERPL-MCNC: 82 MG/DL (ref 70–110)
HCT VFR BLD AUTO: 47.7 % (ref 40–54)
HGB BLD-MCNC: 15.8 G/DL (ref 14–18)
IMM GRANULOCYTES # BLD AUTO: 0.01 K/UL (ref 0–0.04)
IMM GRANULOCYTES NFR BLD AUTO: 0.2 % (ref 0–0.5)
LYMPHOCYTES # BLD AUTO: 1.6 K/UL (ref 1–4.8)
LYMPHOCYTES NFR BLD: 28.4 % (ref 18–48)
MCH RBC QN AUTO: 32.1 PG (ref 27–31)
MCHC RBC AUTO-ENTMCNC: 33.1 G/DL (ref 32–36)
MCV RBC AUTO: 97 FL (ref 82–98)
MONOCYTES # BLD AUTO: 0.4 K/UL (ref 0.3–1)
MONOCYTES NFR BLD: 7.7 % (ref 4–15)
NEUTROPHILS # BLD AUTO: 3.6 K/UL (ref 1.8–7.7)
NEUTROPHILS NFR BLD: 61.8 % (ref 38–73)
NRBC BLD-RTO: 0 /100 WBC
PLATELET # BLD AUTO: 193 K/UL (ref 150–450)
PMV BLD AUTO: 10.5 FL (ref 9.2–12.9)
POTASSIUM SERPL-SCNC: 3.8 MMOL/L (ref 3.5–5.1)
PROT SERPL-MCNC: 8 G/DL (ref 6–8.4)
RBC # BLD AUTO: 4.92 M/UL (ref 4.6–6.2)
SODIUM SERPL-SCNC: 135 MMOL/L (ref 136–145)
WBC # BLD AUTO: 5.74 K/UL (ref 3.9–12.7)

## 2024-11-05 PROCEDURE — 96413 CHEMO IV INFUSION 1 HR: CPT

## 2024-11-05 PROCEDURE — 25000003 PHARM REV CODE 250: Performed by: INTERNAL MEDICINE

## 2024-11-05 PROCEDURE — 96415 CHEMO IV INFUSION ADDL HR: CPT

## 2024-11-05 PROCEDURE — 96367 TX/PROPH/DG ADDL SEQ IV INF: CPT

## 2024-11-05 PROCEDURE — 63600175 PHARM REV CODE 636 W HCPCS: Performed by: INTERNAL MEDICINE

## 2024-11-05 PROCEDURE — 96375 TX/PRO/DX INJ NEW DRUG ADDON: CPT

## 2024-11-05 PROCEDURE — 85025 COMPLETE CBC W/AUTO DIFF WBC: CPT | Performed by: INTERNAL MEDICINE

## 2024-11-05 PROCEDURE — 80053 COMPREHEN METABOLIC PANEL: CPT | Performed by: INTERNAL MEDICINE

## 2024-11-05 RX ORDER — DIPHENHYDRAMINE HYDROCHLORIDE 50 MG/ML
25 INJECTION INTRAMUSCULAR; INTRAVENOUS
OUTPATIENT
Start: 2024-12-31

## 2024-11-05 RX ORDER — ACETAMINOPHEN 325 MG/1
650 TABLET ORAL
OUTPATIENT
Start: 2024-12-31

## 2024-11-05 RX ORDER — IPRATROPIUM BROMIDE AND ALBUTEROL SULFATE 2.5; .5 MG/3ML; MG/3ML
3 SOLUTION RESPIRATORY (INHALATION)
OUTPATIENT
Start: 2024-12-31

## 2024-11-05 RX ORDER — METHYLPREDNISOLONE SOD SUCC 125 MG
40 VIAL (EA) INJECTION
OUTPATIENT
Start: 2024-12-31

## 2024-11-05 RX ORDER — EPINEPHRINE 0.3 MG/.3ML
0.3 INJECTION SUBCUTANEOUS ONCE AS NEEDED
OUTPATIENT
Start: 2024-12-31

## 2024-11-05 RX ORDER — DIPHENHYDRAMINE HYDROCHLORIDE 50 MG/ML
50 INJECTION INTRAMUSCULAR; INTRAVENOUS ONCE AS NEEDED
OUTPATIENT
Start: 2024-12-31

## 2024-11-05 RX ORDER — EPINEPHRINE 0.3 MG/.3ML
0.3 INJECTION SUBCUTANEOUS ONCE AS NEEDED
Status: DISCONTINUED | OUTPATIENT
Start: 2024-11-05 | End: 2024-11-05 | Stop reason: HOSPADM

## 2024-11-05 RX ORDER — ACETAMINOPHEN 325 MG/1
650 TABLET ORAL
Status: COMPLETED | OUTPATIENT
Start: 2024-11-05 | End: 2024-11-05

## 2024-11-05 RX ORDER — SODIUM CHLORIDE 0.9 % (FLUSH) 0.9 %
10 SYRINGE (ML) INJECTION
OUTPATIENT
Start: 2024-12-31

## 2024-11-05 RX ORDER — SODIUM CHLORIDE 0.9 % (FLUSH) 0.9 %
10 SYRINGE (ML) INJECTION
Status: DISCONTINUED | OUTPATIENT
Start: 2024-11-05 | End: 2024-11-05 | Stop reason: HOSPADM

## 2024-11-05 RX ORDER — DIPHENHYDRAMINE HYDROCHLORIDE 50 MG/ML
50 INJECTION INTRAMUSCULAR; INTRAVENOUS ONCE AS NEEDED
Status: DISCONTINUED | OUTPATIENT
Start: 2024-11-05 | End: 2024-11-05 | Stop reason: HOSPADM

## 2024-11-05 RX ORDER — HEPARIN 100 UNIT/ML
500 SYRINGE INTRAVENOUS
OUTPATIENT
Start: 2024-12-31

## 2024-11-05 RX ADMIN — SODIUM CHLORIDE 1300 MG: 9 INJECTION, SOLUTION INTRAVENOUS at 02:11

## 2024-11-05 RX ADMIN — ACETAMINOPHEN 650 MG: 325 TABLET ORAL at 01:11

## 2024-11-05 RX ADMIN — DIPHENHYDRAMINE HYDROCHLORIDE 25 MG: 50 INJECTION INTRAMUSCULAR; INTRAVENOUS at 01:11

## 2024-11-05 RX ADMIN — METHYLPREDNISOLONE SODIUM SUCCINATE 40 MG: 40 INJECTION, POWDER, FOR SOLUTION INTRAMUSCULAR; INTRAVENOUS at 01:11

## 2024-11-05 NOTE — PLAN OF CARE
Problem: Fatigue  Goal: Improved Activity Tolerance  Outcome: Progressing  Intervention: Promote Improved Energy  Flowsheets (Taken 11/5/2024 1310)  Fatigue Management:   fatigue-related activity identified   paced activity encouraged   frequent rest breaks encouraged  Sleep/Rest Enhancement:   noise level reduced   relaxation techniques promoted   regular sleep/rest pattern promoted  Activity Management:   Ambulated -L4   Up in chair - L3  Environmental Support:   calm environment promoted   distractions minimized   rest periods encouraged

## 2024-12-26 ENCOUNTER — LAB VISIT (OUTPATIENT)
Dept: LAB | Facility: HOSPITAL | Age: 54
End: 2024-12-26
Attending: INTERNAL MEDICINE
Payer: OTHER GOVERNMENT

## 2024-12-26 DIAGNOSIS — K51.00 ULCERATIVE PANCOLITIS: ICD-10-CM

## 2024-12-26 DIAGNOSIS — D50.0 IRON DEFICIENCY ANEMIA DUE TO CHRONIC BLOOD LOSS: ICD-10-CM

## 2024-12-26 LAB
ALBUMIN SERPL BCP-MCNC: 4.2 G/DL (ref 3.5–5.2)
ALP SERPL-CCNC: 62 U/L (ref 40–150)
ALT SERPL W/O P-5'-P-CCNC: 41 U/L (ref 10–44)
ANION GAP SERPL CALC-SCNC: 11 MMOL/L (ref 8–16)
AST SERPL-CCNC: 23 U/L (ref 10–40)
BASOPHILS # BLD AUTO: 0.05 K/UL (ref 0–0.2)
BASOPHILS NFR BLD: 0.8 % (ref 0–1.9)
BILIRUB SERPL-MCNC: 0.7 MG/DL (ref 0.1–1)
BUN SERPL-MCNC: 20 MG/DL (ref 6–20)
CALCIUM SERPL-MCNC: 9.3 MG/DL (ref 8.7–10.5)
CHLORIDE SERPL-SCNC: 103 MMOL/L (ref 95–110)
CO2 SERPL-SCNC: 24 MMOL/L (ref 23–29)
CREAT SERPL-MCNC: 1.2 MG/DL (ref 0.5–1.4)
DIFFERENTIAL METHOD BLD: ABNORMAL
EOSINOPHIL # BLD AUTO: 0.2 K/UL (ref 0–0.5)
EOSINOPHIL NFR BLD: 2.3 % (ref 0–8)
ERYTHROCYTE [DISTWIDTH] IN BLOOD BY AUTOMATED COUNT: 12.7 % (ref 11.5–14.5)
EST. GFR  (NO RACE VARIABLE): >60 ML/MIN/1.73 M^2
FERRITIN SERPL-MCNC: 248 NG/ML (ref 20–300)
GLUCOSE SERPL-MCNC: 113 MG/DL (ref 70–110)
HCT VFR BLD AUTO: 47.8 % (ref 40–54)
HGB BLD-MCNC: 16 G/DL (ref 14–18)
IMM GRANULOCYTES # BLD AUTO: 0.02 K/UL (ref 0–0.04)
IMM GRANULOCYTES NFR BLD AUTO: 0.3 % (ref 0–0.5)
IRON SERPL-MCNC: 99 UG/DL (ref 45–160)
LYMPHOCYTES # BLD AUTO: 1.7 K/UL (ref 1–4.8)
LYMPHOCYTES NFR BLD: 25.6 % (ref 18–48)
MCH RBC QN AUTO: 32.6 PG (ref 27–31)
MCHC RBC AUTO-ENTMCNC: 33.5 G/DL (ref 32–36)
MCV RBC AUTO: 97 FL (ref 82–98)
MONOCYTES # BLD AUTO: 0.6 K/UL (ref 0.3–1)
MONOCYTES NFR BLD: 8.3 % (ref 4–15)
NEUTROPHILS # BLD AUTO: 4.1 K/UL (ref 1.8–7.7)
NEUTROPHILS NFR BLD: 62.7 % (ref 38–73)
NRBC BLD-RTO: 0 /100 WBC
PLATELET # BLD AUTO: 187 K/UL (ref 150–450)
PMV BLD AUTO: 10.7 FL (ref 9.2–12.9)
POTASSIUM SERPL-SCNC: 4.3 MMOL/L (ref 3.5–5.1)
PROT SERPL-MCNC: 7.8 G/DL (ref 6–8.4)
RBC # BLD AUTO: 4.91 M/UL (ref 4.6–6.2)
SATURATED IRON: 30 % (ref 20–50)
SODIUM SERPL-SCNC: 138 MMOL/L (ref 136–145)
TOTAL IRON BINDING CAPACITY: 335 UG/DL (ref 250–450)
TRANSFERRIN SERPL-MCNC: 239 MG/DL (ref 200–375)
VIT B12 SERPL-MCNC: 287 PG/ML (ref 210–950)
WBC # BLD AUTO: 6.6 K/UL (ref 3.9–12.7)

## 2024-12-26 PROCEDURE — 82607 VITAMIN B-12: CPT | Performed by: INTERNAL MEDICINE

## 2024-12-26 PROCEDURE — 83540 ASSAY OF IRON: CPT | Performed by: INTERNAL MEDICINE

## 2024-12-26 PROCEDURE — 36415 COLL VENOUS BLD VENIPUNCTURE: CPT | Performed by: INTERNAL MEDICINE

## 2024-12-26 PROCEDURE — 82728 ASSAY OF FERRITIN: CPT | Performed by: INTERNAL MEDICINE

## 2024-12-26 PROCEDURE — 80053 COMPREHEN METABOLIC PANEL: CPT | Performed by: INTERNAL MEDICINE

## 2024-12-26 PROCEDURE — 85025 COMPLETE CBC W/AUTO DIFF WBC: CPT | Performed by: INTERNAL MEDICINE

## 2024-12-27 ENCOUNTER — OFFICE VISIT (OUTPATIENT)
Dept: HEMATOLOGY/ONCOLOGY | Facility: CLINIC | Age: 54
End: 2024-12-27
Payer: OTHER GOVERNMENT

## 2024-12-27 DIAGNOSIS — D69.6 THROMBOCYTOPENIA: ICD-10-CM

## 2024-12-27 DIAGNOSIS — K76.0 NAFLD (NONALCOHOLIC FATTY LIVER DISEASE): ICD-10-CM

## 2024-12-27 DIAGNOSIS — E53.8 B12 DEFICIENCY: ICD-10-CM

## 2024-12-27 DIAGNOSIS — G47.33 OBSTRUCTIVE SLEEP APNEA: ICD-10-CM

## 2024-12-27 DIAGNOSIS — E04.1 NODULAR THYROID DISEASE: ICD-10-CM

## 2024-12-27 DIAGNOSIS — K51.00 ULCERATIVE PANCOLITIS: ICD-10-CM

## 2024-12-27 DIAGNOSIS — D50.0 IRON DEFICIENCY ANEMIA DUE TO CHRONIC BLOOD LOSS: Primary | ICD-10-CM

## 2024-12-28 NOTE — PROGRESS NOTES
Subjective:    Pt logged in to virtual visit , but we didn't connect due to issues and I called pt to discuss    Patient ID: Isidoro Singh is a 54 y.o. male.    Chief Complaint:  follow up    HPI    54 years old male with history of ulcerative colitis.  Seen me previously but was lost to follow-up upper 2020 reestablished this year in March2024  He had ulcerative colitis flare with iron loss status post transfusion packed red blood cell and IV iron in hospital.  He was referred to Hematology for IV iron therapy.  History of thrombocytopenia suspect ITP under control.       Review of Systems   Constitutional:  Negative for appetite change and unexpected weight change.   HENT:  Positive for mouth sores.    Eyes:  Negative for visual disturbance.   Respiratory:  Negative for cough and shortness of breath.    Cardiovascular:  Negative for chest pain.   Gastrointestinal:  Positive for diarrhea. Negative for abdominal pain.   Genitourinary:  Negative for frequency.   Musculoskeletal:  Negative for back pain.   Skin:  Negative for rash.   Neurological:  Negative for headaches.   Hematological:  Negative for adenopathy.   Psychiatric/Behavioral:  The patient is not nervous/anxious.              Past Medical History:   Diagnosis Date    Colon polyp     Diabetes mellitus     Hypothyroid 07/05/2016    Sleep apnea     Thrombocytopenia     Ulcerative colitis      Past Surgical History:   Procedure Laterality Date    COLONOSCOPY N/A 12/04/2020    Procedure: COLONOSCOPY;  Surgeon: Ventura Warren MD;  Location: [facility];  Service: Endoscopy;  Laterality: N/A;    COLONOSCOPY N/A 07/19/2021    Procedure: COLONOSCOPY;  Surgeon: Clarence Ogden MD;  Location: [facility];  Service: Endoscopy;  Laterality: N/A;    COLONOSCOPY N/A 11/10/2022    Procedure: COLONOSCOPY;  Surgeon: Kath Hernandez MD;  Location: [facility];  Service: Endoscopy;  Laterality: N/A;    COLONOSCOPY N/A 2/29/2024    Procedure: COLONOSCOPY;  Surgeon:  Kath Hernandez MD;  Location: [facility];  Service: Endoscopy;  Laterality: N/A;    FOOT SURGERY      left   cyst removed    HAND SURGERY      right   tendon repair     Family History   Problem Relation Name Age of Onset    Ulcerative colitis Mother          in 70s    Colon cancer Neg Hx      Colon polyps Neg Hx      Crohn's disease Neg Hx        Social History     Socioeconomic History    Marital status:    Tobacco Use    Smoking status: Former     Current packs/day: 0.00     Average packs/day: 1 pack/day for 30.0 years (30.0 ttl pk-yrs)     Types: Cigarettes     Start date:      Quit date: 2013     Years since quittin.0     Passive exposure: Past    Smokeless tobacco: Former     Types: Snuff     Quit date:    Substance and Sexual Activity    Alcohol use: Yes     Comment: occasional    Drug use: No    Sexual activity: Yes     Social Drivers of Health     Financial Resource Strain: Low Risk  (11/10/2023)    Overall Financial Resource Strain (CARDIA)     Difficulty of Paying Living Expenses: Not hard at all   Food Insecurity: No Food Insecurity (11/10/2023)    Hunger Vital Sign     Worried About Running Out of Food in the Last Year: Never true     Ran Out of Food in the Last Year: Never true   Transportation Needs: No Transportation Needs (11/10/2023)    PRAPARE - Transportation     Lack of Transportation (Medical): No     Lack of Transportation (Non-Medical): No   Physical Activity: Insufficiently Active (11/10/2023)    Exercise Vital Sign     Days of Exercise per Week: 1 day     Minutes of Exercise per Session: 30 min   Stress: No Stress Concern Present (11/10/2023)    [employer] [place] [employer] - Occupational Stress Questionnaire     Feeling of Stress : Not at all   Housing Stability: Low Risk  (11/10/2023)    Housing Stability Vital Sign     Unable to Pay for Housing in the Last Year: No     Number of Places Lived in the Last Year: 1     Unstable Housing in the Last Year:  No     Review of patient's allergies indicates:  No Known Allergies    Current Outpatient Medications:     (Magic mouthwash) 1:1:1 diphenhydrAMINE(Benadryl) 12.5mg/5ml liq, aluminum & magnesium hydroxide-simethicone (Maalox), LIDOcaine viscous 2%, Swish and spit 10 mLs every 4 (four) hours as needed (oral pain). for mouth sores, Disp: 250 mL, Rfl: 2    citalopram (CELEXA) 40 MG tablet, Take 1 tablet (40 mg total) by mouth once daily., Disp: 90 tablet, Rfl: 1    ergocalciferol (ERGOCALCIFEROL) 50,000 unit Cap, Take 1 capsule (50,000 Units total) by mouth every 7 days., Disp: 12 capsule, Rfl: 3    folic acid (FOLVITE) 1 MG tablet, Take 1 tablet (1 mg total) by mouth once daily., Disp: 90 tablet, Rfl: 3    levothyroxine (SYNTHROID) 200 MCG tablet, Take 1 tablet (200 mcg total) by mouth before breakfast., Disp: 90 tablet, Rfl: 3    omega-3 acid ethyl esters (LOVAZA) 1 gram capsule, Take 1 capsule (1 g total) by mouth 2 (two) times daily., Disp: 180 capsule, Rfl: 3    pneumoc 20-nora conj-dip cr,PF, (PREVNAR 20, PF,) 0.5 mL Syrg injection, , Disp: , Rfl:     sildenafiL (VIAGRA) 100 MG tablet, Take 1 tablet (100 mg total) by mouth daily as needed for Erectile Dysfunction., Disp: 10 tablet, Rfl: 6    testosterone cypionate (DEPOTESTOTERONE CYPIONATE) 200 mg/mL injection, Inject 100 mg into the muscle every 14 (fourteen) days., Disp: , Rfl:     Physical Exam    VITAL SIGNS:  as above   GENERAL: appears well-built, well-nourished.  No anxiety, no agitation, and in no distress.  Patient is awake, alert, oriented and cooperative.  HEENT:  Showed no congestion. Trachea is central no obvious icterus or pallor noted no hoarseness. no obvious JVD   NECK:  Supple.  No JVD. No obvious cervical submental or supraclavicular adenopathy.  RS:the visualized portion of  Chest expands well. chest appears symmetric, no audible wheezes.  No dyspnea recognized  ABDOMEN:  abdomen appears undistended.  EXTREMITIES:  Without edema.  NEUROLOGICAL:   The patient is appropriate, higher functions are normal.  No  obvious neurological deficits.  normal judgement normal thought content  No confusion, no speech impediment. Cranial nerves are intact and show no deficit. No gross motor deficits noted   SKIN MUSCULOSKELETAL: no joint or skeletal deformity, no clubbing of nails.  No visible rash ecchymosis or petechiae     Lab Results   Component Value Date    WBC 6.60 12/26/2024    HGB 16.0 12/26/2024    HCT 47.8 12/26/2024    MCV 97 12/26/2024     12/26/2024       BMP  Lab Results   Component Value Date     12/26/2024    K 4.3 12/26/2024     12/26/2024    CO2 24 12/26/2024    BUN 20 12/26/2024    CREATININE 1.2 12/26/2024    CALCIUM 9.3 12/26/2024    ANIONGAP 11 12/26/2024    ESTGFRAFRICA 96 11/09/2021    EGFRNONAA 83 11/09/2021     Lab Results   Component Value Date    IRON 99 12/26/2024    TIBC 335 12/26/2024    FERRITIN 248 12/26/2024         Patient Active Problem List   Diagnosis    Thrombocytopenia    Fatty liver    Hypothyroidism (acquired)    Thyroid disease    Abnormal thyroid blood test    Goiter    NAFLD (nonalcoholic fatty liver disease)    Obesity (BMI 35.0-39.9 without comorbidity)    Obstructive sleep apnea    Hashimoto's thyroiditis    Nodular thyroid disease    Dysmetabolic syndrome    Prediabetes    Hypovitaminosis D    Hyperlipidemia    Hypertriglyceridemia    History of colon polyps    Ulcerative pancolitis    Anemia    Diabetes mellitus    Iron deficiency anemia        Assessment and Plan      Ulcertive colitis: on remicade q 2 weeks and mtx weekly with DR Hernandez   No falre ups recently    Iron deficiency anemia/ulcerative colitis status post infusional iron in February 24 ferritin doing good but declining steadily again therefore repeated with good response  See me 3 month after with CBC CMP iron studies    B12 def: start SL b12 and recehck  ITP by history :stable and normal platelet counts    Dyslipidemia; continue medical  management follow-up with primary care dietary compliance    ANTONY:  Continue follow-up primary wear CPAP regularly    Diabetes mellitus:  Continue follow-up PCP monitoring hemoglobin A1c regularly diet restrictions and good routine exercise regimen recommended    NAFLD:  explained to patient fat restriction alcohol restriction and protecting liver from progressing   Hypothyroid: on synthyroid 200 mcg daily    BMI of 35 pt aware, discussed life style changes    MDM includes  :    - Acute or chronic illness or injury that poses a threat to life or bodily function  - Independent review and explanation of 3+ results from unique tests  - Discussion of management and ordering 3+ unique tests  - Extensive discussion of treatment and management  - Prescription drug management  - Drug therapy requiring intensive monitoring for toxicity

## 2024-12-31 ENCOUNTER — INFUSION (OUTPATIENT)
Dept: INFUSION THERAPY | Facility: HOSPITAL | Age: 54
End: 2024-12-31
Attending: INTERNAL MEDICINE
Payer: OTHER GOVERNMENT

## 2024-12-31 VITALS
HEIGHT: 71 IN | BODY MASS INDEX: 40.46 KG/M2 | SYSTOLIC BLOOD PRESSURE: 106 MMHG | WEIGHT: 289 LBS | DIASTOLIC BLOOD PRESSURE: 72 MMHG | HEART RATE: 71 BPM | OXYGEN SATURATION: 96 % | TEMPERATURE: 97 F | RESPIRATION RATE: 17 BRPM

## 2024-12-31 DIAGNOSIS — K51.00 ULCERATIVE PANCOLITIS: Primary | ICD-10-CM

## 2024-12-31 LAB
ALBUMIN SERPL BCP-MCNC: 4.6 G/DL (ref 3.5–5.2)
ALP SERPL-CCNC: 62 U/L (ref 55–135)
ALT SERPL W/O P-5'-P-CCNC: 35 U/L (ref 10–44)
ANION GAP SERPL CALC-SCNC: 9 MMOL/L (ref 8–16)
AST SERPL-CCNC: 27 U/L (ref 10–40)
BASOPHILS # BLD AUTO: 0.07 K/UL (ref 0–0.2)
BASOPHILS NFR BLD: 1.1 % (ref 0–1.9)
BILIRUB SERPL-MCNC: 0.7 MG/DL (ref 0.1–1)
BUN SERPL-MCNC: 20 MG/DL (ref 6–20)
CALCIUM SERPL-MCNC: 9.6 MG/DL (ref 8.7–10.5)
CHLORIDE SERPL-SCNC: 101 MMOL/L (ref 95–110)
CO2 SERPL-SCNC: 25 MMOL/L (ref 23–29)
CREAT SERPL-MCNC: 1.1 MG/DL (ref 0.5–1.4)
DIFFERENTIAL METHOD BLD: ABNORMAL
EOSINOPHIL # BLD AUTO: 0.1 K/UL (ref 0–0.5)
EOSINOPHIL NFR BLD: 1.9 % (ref 0–8)
ERYTHROCYTE [DISTWIDTH] IN BLOOD BY AUTOMATED COUNT: 13 % (ref 11.5–14.5)
EST. GFR  (NO RACE VARIABLE): >60 ML/MIN/1.73 M^2
GLUCOSE SERPL-MCNC: 111 MG/DL (ref 70–110)
HCT VFR BLD AUTO: 47.3 % (ref 40–54)
HGB BLD-MCNC: 15.9 G/DL (ref 14–18)
IMM GRANULOCYTES # BLD AUTO: 0.03 K/UL (ref 0–0.04)
IMM GRANULOCYTES NFR BLD AUTO: 0.5 % (ref 0–0.5)
LYMPHOCYTES # BLD AUTO: 1.6 K/UL (ref 1–4.8)
LYMPHOCYTES NFR BLD: 25.5 % (ref 18–48)
MCH RBC QN AUTO: 32.1 PG (ref 27–31)
MCHC RBC AUTO-ENTMCNC: 33.6 G/DL (ref 32–36)
MCV RBC AUTO: 96 FL (ref 82–98)
MONOCYTES # BLD AUTO: 0.5 K/UL (ref 0.3–1)
MONOCYTES NFR BLD: 8.2 % (ref 4–15)
NEUTROPHILS # BLD AUTO: 4 K/UL (ref 1.8–7.7)
NEUTROPHILS NFR BLD: 62.8 % (ref 38–73)
NRBC BLD-RTO: 0 /100 WBC
PLATELET # BLD AUTO: 179 K/UL (ref 150–450)
PMV BLD AUTO: 10.8 FL (ref 9.2–12.9)
POTASSIUM SERPL-SCNC: 4.2 MMOL/L (ref 3.5–5.1)
PROT SERPL-MCNC: 8.1 G/DL (ref 6–8.4)
RBC # BLD AUTO: 4.95 M/UL (ref 4.6–6.2)
SODIUM SERPL-SCNC: 135 MMOL/L (ref 136–145)
WBC # BLD AUTO: 6.43 K/UL (ref 3.9–12.7)

## 2024-12-31 PROCEDURE — 96365 THER/PROPH/DIAG IV INF INIT: CPT

## 2024-12-31 PROCEDURE — 80053 COMPREHEN METABOLIC PANEL: CPT | Performed by: INTERNAL MEDICINE

## 2024-12-31 PROCEDURE — 96366 THER/PROPH/DIAG IV INF ADDON: CPT

## 2024-12-31 PROCEDURE — 96367 TX/PROPH/DG ADDL SEQ IV INF: CPT

## 2024-12-31 PROCEDURE — 85025 COMPLETE CBC W/AUTO DIFF WBC: CPT | Performed by: INTERNAL MEDICINE

## 2024-12-31 PROCEDURE — 96375 TX/PRO/DX INJ NEW DRUG ADDON: CPT

## 2024-12-31 PROCEDURE — 63600175 PHARM REV CODE 636 W HCPCS: Performed by: INTERNAL MEDICINE

## 2024-12-31 PROCEDURE — 25000003 PHARM REV CODE 250: Performed by: INTERNAL MEDICINE

## 2024-12-31 RX ORDER — IPRATROPIUM BROMIDE AND ALBUTEROL SULFATE 2.5; .5 MG/3ML; MG/3ML
3 SOLUTION RESPIRATORY (INHALATION)
OUTPATIENT
Start: 2025-02-25

## 2024-12-31 RX ORDER — EPINEPHRINE 0.3 MG/.3ML
0.3 INJECTION SUBCUTANEOUS ONCE AS NEEDED
Status: DISCONTINUED | OUTPATIENT
Start: 2024-12-31 | End: 2024-12-31 | Stop reason: HOSPADM

## 2024-12-31 RX ORDER — HEPARIN 100 UNIT/ML
500 SYRINGE INTRAVENOUS
OUTPATIENT
Start: 2025-02-25

## 2024-12-31 RX ORDER — SODIUM CHLORIDE 0.9 % (FLUSH) 0.9 %
10 SYRINGE (ML) INJECTION
OUTPATIENT
Start: 2025-02-25

## 2024-12-31 RX ORDER — DIPHENHYDRAMINE HYDROCHLORIDE 50 MG/ML
50 INJECTION INTRAMUSCULAR; INTRAVENOUS ONCE AS NEEDED
Status: DISCONTINUED | OUTPATIENT
Start: 2024-12-31 | End: 2024-12-31 | Stop reason: HOSPADM

## 2024-12-31 RX ORDER — DIPHENHYDRAMINE HYDROCHLORIDE 50 MG/ML
25 INJECTION INTRAMUSCULAR; INTRAVENOUS
OUTPATIENT
Start: 2025-02-25

## 2024-12-31 RX ORDER — EPINEPHRINE 0.3 MG/.3ML
0.3 INJECTION SUBCUTANEOUS ONCE AS NEEDED
OUTPATIENT
Start: 2025-02-25

## 2024-12-31 RX ORDER — METHYLPREDNISOLONE SOD SUCC 125 MG
40 VIAL (EA) INJECTION
OUTPATIENT
Start: 2025-02-25

## 2024-12-31 RX ORDER — ACETAMINOPHEN 325 MG/1
650 TABLET ORAL
OUTPATIENT
Start: 2025-02-25

## 2024-12-31 RX ORDER — METHYLPREDNISOLONE SOD SUCC 125 MG
40 VIAL (EA) INJECTION
Status: COMPLETED | OUTPATIENT
Start: 2024-12-31 | End: 2024-12-31

## 2024-12-31 RX ORDER — DIPHENHYDRAMINE HYDROCHLORIDE 50 MG/ML
50 INJECTION INTRAMUSCULAR; INTRAVENOUS ONCE AS NEEDED
OUTPATIENT
Start: 2025-02-25

## 2024-12-31 RX ORDER — SODIUM CHLORIDE 0.9 % (FLUSH) 0.9 %
10 SYRINGE (ML) INJECTION
Status: DISCONTINUED | OUTPATIENT
Start: 2024-12-31 | End: 2024-12-31 | Stop reason: HOSPADM

## 2024-12-31 RX ORDER — ACETAMINOPHEN 325 MG/1
650 TABLET ORAL
Status: COMPLETED | OUTPATIENT
Start: 2024-12-31 | End: 2024-12-31

## 2024-12-31 RX ADMIN — METHYLPREDNISOLONE SODIUM SUCCINATE 40 MG: 125 INJECTION INTRAMUSCULAR; INTRAVENOUS at 09:12

## 2024-12-31 RX ADMIN — SODIUM CHLORIDE 1300 MG: 9 INJECTION, SOLUTION INTRAVENOUS at 10:12

## 2024-12-31 RX ADMIN — ACETAMINOPHEN 650 MG: 325 TABLET ORAL at 09:12

## 2024-12-31 RX ADMIN — SODIUM CHLORIDE 25 MG: 9 INJECTION, SOLUTION INTRAVENOUS at 09:12

## 2024-12-31 NOTE — PLAN OF CARE
Problem: Fatigue  Goal: Improved Activity Tolerance  Outcome: Progressing  Intervention: Promote Improved Energy  Flowsheets (Taken 12/31/2024 9553)  Fatigue Management: frequent rest breaks encouraged  Sleep/Rest Enhancement: regular sleep/rest pattern promoted  Activity Management: Ambulated -L4  Environmental Support: calm environment promoted

## 2025-01-15 DIAGNOSIS — E11.9 TYPE 2 DIABETES MELLITUS WITHOUT COMPLICATION: ICD-10-CM

## 2025-01-22 ENCOUNTER — PATIENT MESSAGE (OUTPATIENT)
Dept: ADMINISTRATIVE | Facility: HOSPITAL | Age: 55
End: 2025-01-22
Payer: OTHER GOVERNMENT

## 2025-01-28 ENCOUNTER — LAB VISIT (OUTPATIENT)
Dept: LAB | Facility: HOSPITAL | Age: 55
End: 2025-01-28
Payer: OTHER GOVERNMENT

## 2025-01-28 DIAGNOSIS — K51.218 ULCERATIVE PROCTITIS WITH OTHER COMPLICATION: ICD-10-CM

## 2025-01-28 LAB
CRP SERPL-MCNC: 1.3 MG/L (ref 0–8.2)
LIPASE SERPL-CCNC: 34 U/L (ref 4–60)

## 2025-01-28 PROCEDURE — 36415 COLL VENOUS BLD VENIPUNCTURE: CPT

## 2025-01-28 PROCEDURE — 86140 C-REACTIVE PROTEIN: CPT

## 2025-01-28 PROCEDURE — 83690 ASSAY OF LIPASE: CPT

## 2025-02-17 ENCOUNTER — PATIENT MESSAGE (OUTPATIENT)
Dept: ADMINISTRATIVE | Facility: HOSPITAL | Age: 55
End: 2025-02-17
Payer: OTHER GOVERNMENT

## 2025-03-05 ENCOUNTER — PATIENT MESSAGE (OUTPATIENT)
Dept: GASTROENTEROLOGY | Facility: CLINIC | Age: 55
End: 2025-03-05
Payer: OTHER GOVERNMENT

## 2025-03-21 ENCOUNTER — INFUSION (OUTPATIENT)
Dept: INFUSION THERAPY | Facility: HOSPITAL | Age: 55
End: 2025-03-21
Attending: INTERNAL MEDICINE
Payer: OTHER GOVERNMENT

## 2025-03-21 ENCOUNTER — RESULTS FOLLOW-UP (OUTPATIENT)
Dept: GASTROENTEROLOGY | Facility: CLINIC | Age: 55
End: 2025-03-21

## 2025-03-21 VITALS
HEART RATE: 75 BPM | SYSTOLIC BLOOD PRESSURE: 132 MMHG | OXYGEN SATURATION: 95 % | BODY MASS INDEX: 41.86 KG/M2 | WEIGHT: 299 LBS | DIASTOLIC BLOOD PRESSURE: 83 MMHG | HEIGHT: 71 IN | TEMPERATURE: 98 F | RESPIRATION RATE: 18 BRPM

## 2025-03-21 DIAGNOSIS — K51.00 ULCERATIVE PANCOLITIS: Primary | ICD-10-CM

## 2025-03-21 LAB
BASOPHILS # BLD AUTO: 0.06 K/UL (ref 0–0.2)
BASOPHILS NFR BLD: 0.9 % (ref 0–1.9)
DIFFERENTIAL METHOD BLD: ABNORMAL
EOSINOPHIL # BLD AUTO: 0.2 K/UL (ref 0–0.5)
EOSINOPHIL NFR BLD: 2.3 % (ref 0–8)
ERYTHROCYTE [DISTWIDTH] IN BLOOD BY AUTOMATED COUNT: 13.3 % (ref 11.5–14.5)
HCT VFR BLD AUTO: 47 % (ref 40–54)
HGB BLD-MCNC: 16 G/DL (ref 14–18)
IMM GRANULOCYTES # BLD AUTO: 0.04 K/UL (ref 0–0.04)
IMM GRANULOCYTES NFR BLD AUTO: 0.6 % (ref 0–0.5)
LYMPHOCYTES # BLD AUTO: 1.8 K/UL (ref 1–4.8)
LYMPHOCYTES NFR BLD: 25.4 % (ref 18–48)
MCH RBC QN AUTO: 32.4 PG (ref 27–31)
MCHC RBC AUTO-ENTMCNC: 34 G/DL (ref 32–36)
MCV RBC AUTO: 95 FL (ref 82–98)
MONOCYTES # BLD AUTO: 0.4 K/UL (ref 0.3–1)
MONOCYTES NFR BLD: 6.4 % (ref 4–15)
NEUTROPHILS # BLD AUTO: 4.5 K/UL (ref 1.8–7.7)
NEUTROPHILS NFR BLD: 64.4 % (ref 38–73)
NRBC BLD-RTO: 0 /100 WBC
PLATELET # BLD AUTO: 157 K/UL (ref 150–450)
PMV BLD AUTO: 11.6 FL (ref 9.2–12.9)
RBC # BLD AUTO: 4.94 M/UL (ref 4.6–6.2)
WBC # BLD AUTO: 6.92 K/UL (ref 3.9–12.7)

## 2025-03-21 PROCEDURE — 25000003 PHARM REV CODE 250

## 2025-03-21 PROCEDURE — 96367 TX/PROPH/DG ADDL SEQ IV INF: CPT

## 2025-03-21 PROCEDURE — 96413 CHEMO IV INFUSION 1 HR: CPT

## 2025-03-21 PROCEDURE — 96375 TX/PRO/DX INJ NEW DRUG ADDON: CPT

## 2025-03-21 PROCEDURE — 85025 COMPLETE CBC W/AUTO DIFF WBC: CPT

## 2025-03-21 PROCEDURE — 96415 CHEMO IV INFUSION ADDL HR: CPT

## 2025-03-21 PROCEDURE — 63600175 PHARM REV CODE 636 W HCPCS

## 2025-03-21 RX ORDER — IPRATROPIUM BROMIDE AND ALBUTEROL SULFATE 2.5; .5 MG/3ML; MG/3ML
3 SOLUTION RESPIRATORY (INHALATION)
OUTPATIENT
Start: 2025-04-25

## 2025-03-21 RX ORDER — ACETAMINOPHEN 325 MG/1
650 TABLET ORAL
Status: COMPLETED | OUTPATIENT
Start: 2025-03-21 | End: 2025-03-21

## 2025-03-21 RX ORDER — EPINEPHRINE 0.3 MG/.3ML
0.3 INJECTION SUBCUTANEOUS ONCE AS NEEDED
Status: DISCONTINUED | OUTPATIENT
Start: 2025-03-21 | End: 2025-03-21 | Stop reason: HOSPADM

## 2025-03-21 RX ORDER — ACETAMINOPHEN 325 MG/1
650 TABLET ORAL
OUTPATIENT
Start: 2025-04-25

## 2025-03-21 RX ORDER — DIPHENHYDRAMINE HYDROCHLORIDE 50 MG/ML
50 INJECTION, SOLUTION INTRAMUSCULAR; INTRAVENOUS ONCE AS NEEDED
Status: DISCONTINUED | OUTPATIENT
Start: 2025-03-21 | End: 2025-03-21 | Stop reason: HOSPADM

## 2025-03-21 RX ORDER — IPRATROPIUM BROMIDE AND ALBUTEROL SULFATE 2.5; .5 MG/3ML; MG/3ML
3 SOLUTION RESPIRATORY (INHALATION)
Status: DISCONTINUED | OUTPATIENT
Start: 2025-03-21 | End: 2025-03-21

## 2025-03-21 RX ORDER — SODIUM CHLORIDE 0.9 % (FLUSH) 0.9 %
10 SYRINGE (ML) INJECTION
OUTPATIENT
Start: 2025-04-25

## 2025-03-21 RX ORDER — EPINEPHRINE 0.3 MG/.3ML
0.3 INJECTION SUBCUTANEOUS ONCE AS NEEDED
OUTPATIENT
Start: 2025-04-25

## 2025-03-21 RX ORDER — METHYLPREDNISOLONE SOD SUCC 125 MG
40 VIAL (EA) INJECTION
Status: COMPLETED | OUTPATIENT
Start: 2025-03-21 | End: 2025-03-21

## 2025-03-21 RX ORDER — DIPHENHYDRAMINE HYDROCHLORIDE 50 MG/ML
25 INJECTION, SOLUTION INTRAMUSCULAR; INTRAVENOUS
OUTPATIENT
Start: 2025-04-25

## 2025-03-21 RX ORDER — ACETAMINOPHEN 325 MG/1
650 TABLET ORAL
Status: ACTIVE | OUTPATIENT
Start: 2025-03-21 | End: 2025-03-21

## 2025-03-21 RX ORDER — METHYLPREDNISOLONE SOD SUCC 125 MG
40 VIAL (EA) INJECTION
OUTPATIENT
Start: 2025-04-25

## 2025-03-21 RX ORDER — HEPARIN 100 UNIT/ML
500 SYRINGE INTRAVENOUS
OUTPATIENT
Start: 2025-04-25

## 2025-03-21 RX ORDER — DIPHENHYDRAMINE HYDROCHLORIDE 50 MG/ML
50 INJECTION, SOLUTION INTRAMUSCULAR; INTRAVENOUS ONCE AS NEEDED
OUTPATIENT
Start: 2025-04-25

## 2025-03-21 RX ORDER — SODIUM CHLORIDE 0.9 % (FLUSH) 0.9 %
10 SYRINGE (ML) INJECTION
Status: DISCONTINUED | OUTPATIENT
Start: 2025-03-21 | End: 2025-03-21 | Stop reason: HOSPADM

## 2025-03-21 RX ADMIN — SODIUM CHLORIDE: 9 INJECTION, SOLUTION INTRAVENOUS at 01:03

## 2025-03-21 RX ADMIN — SODIUM CHLORIDE 1300 MG: 9 INJECTION, SOLUTION INTRAVENOUS at 02:03

## 2025-03-21 RX ADMIN — SODIUM CHLORIDE 25 MG: 9 INJECTION, SOLUTION INTRAVENOUS at 01:03

## 2025-03-21 RX ADMIN — ACETAMINOPHEN 650 MG: 325 TABLET ORAL at 01:03

## 2025-03-21 RX ADMIN — METHYLPREDNISOLONE SODIUM SUCCINATE 40 MG: 125 INJECTION, POWDER, FOR SOLUTION INTRAMUSCULAR; INTRAVENOUS at 01:03

## 2025-03-21 NOTE — PLAN OF CARE
Problem: Fatigue  Goal: Improved Activity Tolerance  Outcome: Progressing  Intervention: Promote Improved Energy  Flowsheets (Taken 3/21/2025 3221)  Fatigue Management: activity schedule adjusted  Sleep/Rest Enhancement: regular sleep/rest pattern promoted  Activity Management: Ambulated -L4  Environmental Support:   calm environment promoted   environmental consistency promoted

## 2025-03-25 ENCOUNTER — LAB VISIT (OUTPATIENT)
Dept: LAB | Facility: HOSPITAL | Age: 55
End: 2025-03-25
Attending: INTERNAL MEDICINE
Payer: OTHER GOVERNMENT

## 2025-03-25 DIAGNOSIS — E53.8 B12 DEFICIENCY: ICD-10-CM

## 2025-03-25 DIAGNOSIS — D50.0 IRON DEFICIENCY ANEMIA DUE TO CHRONIC BLOOD LOSS: ICD-10-CM

## 2025-03-25 LAB
ABSOLUTE EOSINOPHIL (SMH): 0.13 K/UL
ABSOLUTE MONOCYTE (SMH): 0.47 K/UL (ref 0.3–1)
ABSOLUTE NEUTROPHIL COUNT (SMH): 2.7 K/UL (ref 1.8–7.7)
ALBUMIN SERPL-MCNC: 4.3 G/DL (ref 3.5–5.2)
ALP SERPL-CCNC: 57 UNIT/L (ref 40–150)
ALT SERPL-CCNC: 55 UNIT/L (ref 10–44)
ANION GAP (SMH): 6 MMOL/L (ref 8–16)
AST SERPL-CCNC: 30 UNIT/L (ref 11–45)
BASOPHILS # BLD AUTO: 0.06 K/UL
BASOPHILS NFR BLD AUTO: 1 %
BILIRUB SERPL-MCNC: 0.4 MG/DL (ref 0.1–1)
BUN SERPL-MCNC: 14 MG/DL (ref 6–20)
CALCIUM SERPL-MCNC: 9.1 MG/DL (ref 8.7–10.5)
CHLORIDE SERPL-SCNC: 102 MMOL/L (ref 95–110)
CO2 SERPL-SCNC: 26 MMOL/L (ref 23–29)
CREAT SERPL-MCNC: 1 MG/DL (ref 0.5–1.4)
ERYTHROCYTE [DISTWIDTH] IN BLOOD BY AUTOMATED COUNT: 12.9 % (ref 11.5–14.5)
FERRITIN SERPL-MCNC: 374.7 NG/ML (ref 20–300)
GFR SERPLBLD CREATININE-BSD FMLA CKD-EPI: >60 ML/MIN/1.73/M2
GLUCOSE SERPL-MCNC: 111 MG/DL (ref 70–110)
HCT VFR BLD AUTO: 48.6 % (ref 40–54)
HGB BLD-MCNC: 16.1 GM/DL (ref 14–18)
IMM GRANULOCYTES # BLD AUTO: 0.02 K/UL (ref 0–0.04)
IMM GRANULOCYTES NFR BLD AUTO: 0.3 % (ref 0–0.5)
IRON SATN MFR SERPL: 28 % (ref 20–50)
IRON SERPL-MCNC: 104 UG/DL (ref 45–160)
LYMPHOCYTES # BLD AUTO: 2.63 K/UL (ref 1–4.8)
MCH RBC QN AUTO: 32.1 PG (ref 27–31)
MCHC RBC AUTO-ENTMCNC: 33.1 G/DL (ref 32–36)
MCV RBC AUTO: 97 FL (ref 82–98)
NUCLEATED RBC (/100WBC) (SMH): 0 /100 WBC
PLATELET # BLD AUTO: 183 K/UL (ref 150–450)
PMV BLD AUTO: 10.3 FL (ref 9.2–12.9)
POTASSIUM SERPL-SCNC: 4.9 MMOL/L (ref 3.5–5.1)
PROT SERPL-MCNC: 7.6 GM/DL (ref 6–8.4)
RBC # BLD AUTO: 5.01 M/UL (ref 4.6–6.2)
RELATIVE EOSINOPHIL (SMH): 2.1 % (ref 0–8)
RELATIVE LYMPHOCYTE (SMH): 43.5 % (ref 18–48)
RELATIVE MONOCYTE (SMH): 7.8 % (ref 4–15)
RELATIVE NEUTROPHIL (SMH): 45.3 % (ref 38–73)
SODIUM SERPL-SCNC: 134 MMOL/L (ref 136–145)
TIBC SERPL-MCNC: 377 UG/DL (ref 250–450)
TRANSFERRIN SERPL-MCNC: 255 MG/DL (ref 200–375)
VIT B12 SERPL-MCNC: >2000 PG/ML (ref 210–950)
WBC # BLD AUTO: 6.05 K/UL (ref 3.9–12.7)

## 2025-03-25 PROCEDURE — 36415 COLL VENOUS BLD VENIPUNCTURE: CPT

## 2025-03-25 PROCEDURE — 85025 COMPLETE CBC W/AUTO DIFF WBC: CPT

## 2025-03-25 PROCEDURE — 82728 ASSAY OF FERRITIN: CPT

## 2025-03-25 PROCEDURE — 82607 VITAMIN B-12: CPT

## 2025-03-25 PROCEDURE — 82040 ASSAY OF SERUM ALBUMIN: CPT

## 2025-03-25 PROCEDURE — 83540 ASSAY OF IRON: CPT

## 2025-03-28 ENCOUNTER — OFFICE VISIT (OUTPATIENT)
Dept: HEMATOLOGY/ONCOLOGY | Facility: CLINIC | Age: 55
End: 2025-03-28
Payer: OTHER GOVERNMENT

## 2025-03-28 DIAGNOSIS — E04.1 NODULAR THYROID DISEASE: ICD-10-CM

## 2025-03-28 DIAGNOSIS — E53.8 B12 DEFICIENCY: ICD-10-CM

## 2025-03-28 DIAGNOSIS — D50.0 IRON DEFICIENCY ANEMIA DUE TO CHRONIC BLOOD LOSS: Primary | ICD-10-CM

## 2025-03-28 DIAGNOSIS — K51.00 ULCERATIVE PANCOLITIS: ICD-10-CM

## 2025-03-28 DIAGNOSIS — E78.00 PURE HYPERCHOLESTEROLEMIA: ICD-10-CM

## 2025-03-28 DIAGNOSIS — D69.6 THROMBOCYTOPENIA: ICD-10-CM

## 2025-03-28 DIAGNOSIS — E03.9 HYPOTHYROIDISM (ACQUIRED): ICD-10-CM

## 2025-03-28 NOTE — PROGRESS NOTES
Subjective:    Pt logged in to virtual visit , but we didn't connect due to issues and I called pt to discuss   Virtual visit issues therefore phone call only:  Total time spent on and for  this encounter which includes non face-to-face time preparing to see patient, review of tests, obtaining and or reviewing separately obtained records documenting clinical information in the electronic or other health records, independently interpreting results which is not separately reported ,and communicating results to the patient/family/caregiver and in care coordination and treatment planning/communicating with pharmacy for prescriptions/addressing social needs/arranging follow-up and or referrals :20 min    Each patient I provide medical services by telemedicine is:  (1) informed of the relationship between the physician and patient and the respective role of any other health care provider with respect to management of the patient; and (2) notified that he or she may decline to receive medical services by telemedicine and may withdraw from such care at any time.  This is a video visit therefore some elements of the physical exam such as vital signs, heart sounds are breath sounds are not included and may be included if found in recent clinic notes of other providers assessing same patient. Any symptoms or signs that were visualized were stated by the patient may be included in this note.    Patient ID: Isidoro Singh is a 55 y.o. male.    Chief Complaint:  follow up    HPI    54 years old male with history of ulcerative colitis.  Seen me previously but was lost to follow-up upper 2020 reestablished this year in March2024  He had ulcerative colitis flare with iron loss status post transfusion packed red blood cell and IV iron in hospital.  He was referred to Hematology for IV iron therapy.  History of thrombocytopenia suspect ITP under control.       Review of Systems   Constitutional:  Negative for appetite change and  unexpected weight change.   HENT:  Positive for mouth sores.    Eyes:  Negative for visual disturbance.   Respiratory:  Negative for cough and shortness of breath.    Cardiovascular:  Negative for chest pain.   Gastrointestinal:  Positive for diarrhea. Negative for abdominal pain.   Genitourinary:  Negative for frequency.   Musculoskeletal:  Negative for back pain.   Skin:  Negative for rash.   Neurological:  Negative for headaches.   Hematological:  Negative for adenopathy.   Psychiatric/Behavioral:  The patient is not nervous/anxious.              Past Medical History:   Diagnosis Date    Colon polyp     Diabetes mellitus     Hypothyroid 2016    Sleep apnea     Thrombocytopenia     Ulcerative colitis      Past Surgical History:   Procedure Laterality Date    COLONOSCOPY N/A 2020    Procedure: COLONOSCOPY;  Surgeon: Ventura Warren MD;  Location: [facility];  Service: Endoscopy;  Laterality: N/A;    COLONOSCOPY N/A 2021    Procedure: COLONOSCOPY;  Surgeon: Clarence Ogden MD;  Location: [facility];  Service: Endoscopy;  Laterality: N/A;    COLONOSCOPY N/A 11/10/2022    Procedure: COLONOSCOPY;  Surgeon: Kath Hernandez MD;  Location: [facility];  Service: Endoscopy;  Laterality: N/A;    COLONOSCOPY N/A 2024    Procedure: COLONOSCOPY;  Surgeon: Kath Hernandez MD;  Location: [facility];  Service: Endoscopy;  Laterality: N/A;    FOOT SURGERY      left   cyst removed    HAND SURGERY      right   tendon repair     Family History   Problem Relation Name Age of Onset    Ulcerative colitis Mother          in 70s    Colon cancer Neg Hx      Colon polyps Neg Hx      Crohn's disease Neg Hx        Social History     Socioeconomic History    Marital status:    Tobacco Use    Smoking status: Former     Current packs/day: 0.00     Average packs/day: 1 pack/day for 30.0 years (30.0 ttl pk-yrs)     Types: Cigarettes     Start date:      Quit date: 2013     Years since quittin.3      Passive exposure: Past    Smokeless tobacco: Former     Types: Snuff     Quit date: 2020   Substance and Sexual Activity    Alcohol use: Yes     Comment: occasional    Drug use: No    Sexual activity: Yes     Social Drivers of Health     Financial Resource Strain: Low Risk  (3/21/2025)    Overall Financial Resource Strain (CARDIA)     Difficulty of Paying Living Expenses: Not hard at all   Food Insecurity: No Food Insecurity (3/21/2025)    Hunger Vital Sign     Worried About Running Out of Food in the Last Year: Never true     Ran Out of Food in the Last Year: Never true   Transportation Needs: No Transportation Needs (3/21/2025)    PRAPARE - Transportation     Lack of Transportation (Medical): No     Lack of Transportation (Non-Medical): No   Physical Activity: Insufficiently Active (3/21/2025)    Exercise Vital Sign     Days of Exercise per Week: 2 days     Minutes of Exercise per Session: 30 min   Stress: No Stress Concern Present (3/21/2025)    [employer] - Occupational Stress Questionnaire     Feeling of Stress : Only a little   Housing Stability: Low Risk  (3/21/2025)    Housing Stability Vital Sign     Unable to Pay for Housing in the Last Year: No     Number of Times Moved in the Last Year: 0     Homeless in the Last Year: No     Review of patient's allergies indicates:  No Known Allergies    Current Outpatient Medications:     (Magic mouthwash) 1:1:1 diphenhydrAMINE(Benadryl) 12.5mg/5ml liq, aluminum & magnesium hydroxide-simethicone (Maalox), LIDOcaine viscous 2%, Swish and spit 10 mLs every 4 (four) hours as needed (oral pain). for mouth sores, Disp: 250 mL, Rfl: 2    citalopram (CELEXA) 40 MG tablet, Take 1 tablet (40 mg total) by mouth once daily., Disp: 90 tablet, Rfl: 1    ergocalciferol (ERGOCALCIFEROL) 50,000 unit Cap, Take 1 capsule (50,000 Units total) by mouth every 7 days., Disp: 12 capsule, Rfl: 3    folic acid (FOLVITE) 1 MG tablet, Take 1 tablet (1 mg total) by  mouth once daily., Disp: 90 tablet, Rfl: 3    levothyroxine (SYNTHROID) 200 MCG tablet, Take 1 tablet (200 mcg total) by mouth before breakfast., Disp: 90 tablet, Rfl: 3    omega-3 acid ethyl esters (LOVAZA) 1 gram capsule, Take 1 capsule (1 g total) by mouth 2 (two) times daily., Disp: 180 capsule, Rfl: 3    pneumoc 20-nora conj-dip cr,PF, (PREVNAR 20, PF,) 0.5 mL Syrg injection, , Disp: , Rfl:     sildenafiL (VIAGRA) 100 MG tablet, Take 1 tablet (100 mg total) by mouth daily as needed for Erectile Dysfunction., Disp: 10 tablet, Rfl: 6    testosterone cypionate (DEPOTESTOTERONE CYPIONATE) 200 mg/mL injection, Inject 100 mg into the muscle every 14 (fourteen) days., Disp: , Rfl:     Physical Exam    VITAL SIGNS:  as above   GENERAL: appears well-built, well-nourished.  No anxiety, no agitation, and in no distress.  Patient is awake, alert, oriented and cooperative.  HEENT:  Showed no congestion. Trachea is central no obvious icterus or pallor noted no hoarseness. no obvious JVD   NECK:  Supple.  No JVD. No obvious cervical submental or supraclavicular adenopathy.  RS:the visualized portion of  Chest expands well. chest appears symmetric, no audible wheezes.  No dyspnea recognized  ABDOMEN:  abdomen appears undistended.  EXTREMITIES:  Without edema.  NEUROLOGICAL:  The patient is appropriate, higher functions are normal.  No  obvious neurological deficits.  normal judgement normal thought content  No confusion, no speech impediment. Cranial nerves are intact and show no deficit. No gross motor deficits noted   SKIN MUSCULOSKELETAL: no joint or skeletal deformity, no clubbing of nails.  No visible rash ecchymosis or petechiae     Lab Results   Component Value Date    WBC 6.05 03/25/2025    HGB 16.1 03/25/2025    HCT 48.6 03/25/2025    MCV 97 03/25/2025     03/25/2025       BMP  Lab Results   Component Value Date     (L) 03/25/2025    K 4.9 03/25/2025     12/31/2024    CO2 26 03/25/2025    BUN 14  03/25/2025    CREATININE 1.0 03/25/2025    CALCIUM 9.1 03/25/2025    ANIONGAP 9 12/31/2024    ESTGFRAFRICA 96 11/09/2021    EGFRNONAA 83 11/09/2021     Lab Results   Component Value Date    IRON 104 03/25/2025    TIBC 377 03/25/2025    FERRITIN 374.7 (H) 03/25/2025         Patient Active Problem List   Diagnosis    Thrombocytopenia    Fatty liver    Hypothyroidism (acquired)    Thyroid disease    Abnormal thyroid blood test    Goiter    NAFLD (nonalcoholic fatty liver disease)    Obesity (BMI 35.0-39.9 without comorbidity)    Obstructive sleep apnea    Hashimoto's thyroiditis    Nodular thyroid disease    Dysmetabolic syndrome    Prediabetes    Hypovitaminosis D    Hyperlipidemia    Hypertriglyceridemia    History of colon polyps    Ulcerative pancolitis    Anemia    Diabetes mellitus    YESENIA (iron deficiency anemia)    B12 deficiency        Assessment and Plan      Ulcertive colitis: on remicade q 2 weeks and mtx weekly with DR Mary medellin ups recently    Iron deficiency anemia/ulcerative colitis status post infusional iron in February 24 ferritin doing good but declining steadily again therefore repeated with good response  See me 3 month after with CBC CMP iron studies  b12 deficiency slightly high since replacing cut back to 3 times a week    B12 def: start SL b12 and recehck  ITP by history :stable and normal platelet counts    Dyslipidemia; continue medical management follow-up with primary care dietary compliance    ANTONY:  Continue follow-up primary wear CPAP regularly    Diabetes mellitus:  Continue follow-up PCP monitoring hemoglobin A1c regularly diet restrictions and good routine exercise regimen recommended    NAFLD:  explained to patient fat restriction alcohol restriction and protecting liver from progressing   Hypothyroid: on synthyroid 200 mcg daily    BMI of 35 pt aware, discussed life style changes    MDM includes  :    - Acute or chronic illness or injury that poses a threat to life or bodily  function  - Independent review and explanation of 3+ results from unique tests  - Discussion of management and ordering 3+ unique tests  - Extensive discussion of treatment and management  - Prescription drug management  - Drug therapy requiring intensive monitoring for toxicity

## 2025-04-09 ENCOUNTER — TELEPHONE (OUTPATIENT)
Dept: FAMILY MEDICINE | Facility: CLINIC | Age: 55
End: 2025-04-09
Payer: OTHER GOVERNMENT

## 2025-04-30 ENCOUNTER — OFFICE VISIT (OUTPATIENT)
Dept: GASTROENTEROLOGY | Facility: CLINIC | Age: 55
End: 2025-04-30
Payer: OTHER GOVERNMENT

## 2025-04-30 DIAGNOSIS — K51.90 ULCERATIVE COLITIS WITHOUT COMPLICATIONS, UNSPECIFIED LOCATION: Primary | ICD-10-CM

## 2025-04-30 DIAGNOSIS — R10.11 RUQ PAIN: ICD-10-CM

## 2025-04-30 PROCEDURE — 99999 PR PBB SHADOW E&M-EST. PATIENT-LVL III: CPT | Mod: PBBFAC,,, | Performed by: INTERNAL MEDICINE

## 2025-04-30 PROCEDURE — 99215 OFFICE O/P EST HI 40 MIN: CPT | Mod: S$PBB,,, | Performed by: INTERNAL MEDICINE

## 2025-04-30 PROCEDURE — 99213 OFFICE O/P EST LOW 20 MIN: CPT | Mod: PBBFAC,PN | Performed by: INTERNAL MEDICINE

## 2025-04-30 RX ORDER — MESALAMINE 1000 MG/1
1000 SUPPOSITORY RECTAL NIGHTLY
Qty: 30 SUPPOSITORY | Refills: 11 | Status: SHIPPED | OUTPATIENT
Start: 2025-04-30 | End: 2026-04-30

## 2025-04-30 NOTE — H&P (VIEW-ONLY)
[facility]    CC: Ulcerative Colitis     HPI 55 y.o. male presents for follow up of severe, pan=colonic ulcerative colitis. He was started in Inflectra 10 mg/kg every 8 weeks in April 2024. He has responded well though has not had a colonoscopy since beginning this therapy. He missed 1-2 doses of Inflectra due to insurance issues however is now back on schedule (to receive his 2nd dose in a row). He is having 2 bowel movements a day with mild urgency and small amount of blood in stool. He stopped Methotrexate due to mouth sores which have now resolved. He is not currently taking Canasa suppositories.He also notes recently intermittent RUQ pain not associated with nausea or vomiting. He continues to follow with hematology for IV iron.     His last colonoscopy was in February 2024 and was notable for severe pan-colonic UC (May score of 3).   Past Medical History:   Diagnosis Date    Colon polyp     Diabetes mellitus     Hypothyroid 07/05/2016    Sleep apnea     Thrombocytopenia     Ulcerative colitis        Allergies and Medications reviewed     Review of Systems  General ROS: negative for - chills, fever or weight loss  Cardiovascular ROS: no chest pain or dyspnea on exertion  Gastrointestinal ROS: + mild fecal urgency, + RUQ pain, intermittent blood in stool     Physical Examination  There were no vitals taken for this visit.  General appearance: alert, cooperative, no distress  HENT: Normocephalic, atraumatic, neck symmetrical, no nasal discharge, sclera anicteric   Lungs: clear to auscultation bilaterally, symmetric chest wall expansion bilaterally  Heart: regular rate and rhythm without rub; no displacement of the PMI   Abdomen: soft, ttp RUQ with equivocal Carnett sign, no masses appreciated, BS Active  Extremities: extremities symmetric; no clubbing, cyanosis, or edema        Labs:  Lab Results   Component Value Date    WBC 6.05 03/25/2025    HGB 16.1 03/25/2025    HCT 48.6 03/25/2025    MCV  97 03/25/2025     03/25/2025       CMP  Sodium   Date Value Ref Range Status   03/25/2025 134 (L) 136 - 145 mmol/L Final     Potassium   Date Value Ref Range Status   03/25/2025 4.9 3.5 - 5.1 mmol/L Final     Chloride   Date Value Ref Range Status   03/25/2025 102 95 - 110 mmol/L Final     CO2   Date Value Ref Range Status   03/25/2025 26 23 - 29 mmol/L Final     Glucose   Date Value Ref Range Status   03/25/2025 111 (H) 70 - 110 mg/dL Final     BUN   Date Value Ref Range Status   03/25/2025 14 6 - 20 mg/dL Final     Creatinine   Date Value Ref Range Status   03/25/2025 1.0 0.5 - 1.4 mg/dL Final     Calcium   Date Value Ref Range Status   03/25/2025 9.1 8.7 - 10.5 mg/dL Final     Protein Total   Date Value Ref Range Status   03/25/2025 7.6 6.0 - 8.4 gm/dL Final     Albumin   Date Value Ref Range Status   03/25/2025 4.3 3.5 - 5.2 g/dL Final     Bilirubin Total   Date Value Ref Range Status   03/25/2025 0.4 0.1 - 1.0 mg/dL Final     Comment:     For infants and newborns, interpretation of results should be based   on gestational age, weight and in agreement with clinical   observations.    Premature Infant recommended reference ranges:   0-24 hours:  <8.0 mg/dL   24-48 hours: <12.0 mg/dL   3-5 days:    <15.0 mg/dL   6-29 days:   <15.0 mg/dL     ALP   Date Value Ref Range Status   03/25/2025 57 40 - 150 unit/L Final     AST   Date Value Ref Range Status   03/25/2025 30 11 - 45 unit/L Final     ALT   Date Value Ref Range Status   03/25/2025 55 (H) 10 - 44 unit/L Final     Anion Gap   Date Value Ref Range Status   03/25/2025 6 (L) 8 - 16 mmol/L Final     eGFR   Date Value Ref Range Status   03/25/2025 >60 >60 mL/min/1.73/m2 Final   12/31/2024 >60.0 >60 mL/min/1.73 m^2 Final   02/26/2024 105 > OR = 60 mL/min/1.73m2 Final     March 2025- Iron- 104, TIBC- 377, Ferritin- 374, Vitamin B12 > 2000    May 2024 -Infliximab- 8.6     Imaging:  Abdominal ultrasound 2023 was independently visualized and reviewed by me and  showed no abnormality    Assessment:   55 y.o. male with severe pancolonic ulcerative colitis and YESENIA presents for follow up. He was doing very well on Inflectra 10 mg/kg every 8 weeks however had recent worsening symptoms (mild urgency, small amount of blood in stool) after several doses were missed due to insurance issues. He has new intermittent RUQ pain and is tender on exam.     Plan:  1.UC  -Continue Inflectra- check level prior to next dose  -Restart Canasa suppostories at night  -Discussed vaccines, diet, exercise, weight loss  -Schedule colonsocopy for disease surveillance after beginning biologic    2.RUQ pain  -Abdominal ultrasound    Kath Hernandez MD  [facility]  [address], [address]  [place] [address]  Office: [phone]  Fax: [phone]

## 2025-04-30 NOTE — PROGRESS NOTES
[facility]    CC: Ulcerative Colitis     HPI 55 y.o. male presents for follow up of severe, pan=colonic ulcerative colitis. He was started in Inflectra 10 mg/kg every 8 weeks in April 2024. He has responded well though has not had a colonoscopy since beginning this therapy. He missed 1-2 doses of Inflectra due to insurance issues however is now back on schedule (to receive his 2nd dose in a row). He is having 2 bowel movements a day with mild urgency and small amount of blood in stool. He stopped Methotrexate due to mouth sores which have now resolved. He is not currently taking Canasa suppositories.He also notes recently intermittent RUQ pain not associated with nausea or vomiting. He continues to follow with hematology for IV iron.     His last colonoscopy was in February 2024 and was notable for severe pan-colonic UC (May score of 3).   Past Medical History:   Diagnosis Date    Colon polyp     Diabetes mellitus     Hypothyroid 07/05/2016    Sleep apnea     Thrombocytopenia     Ulcerative colitis        Allergies and Medications reviewed     Review of Systems  General ROS: negative for - chills, fever or weight loss  Cardiovascular ROS: no chest pain or dyspnea on exertion  Gastrointestinal ROS: + mild fecal urgency, + RUQ pain, intermittent blood in stool     Physical Examination  There were no vitals taken for this visit.  General appearance: alert, cooperative, no distress  HENT: Normocephalic, atraumatic, neck symmetrical, no nasal discharge, sclera anicteric   Lungs: clear to auscultation bilaterally, symmetric chest wall expansion bilaterally  Heart: regular rate and rhythm without rub; no displacement of the PMI   Abdomen: soft, ttp RUQ with equivocal Carnett sign, no masses appreciated, BS Active  Extremities: extremities symmetric; no clubbing, cyanosis, or edema        Labs:  Lab Results   Component Value Date    WBC 6.05 03/25/2025    HGB 16.1 03/25/2025    HCT 48.6 03/25/2025    MCV  97 03/25/2025     03/25/2025       CMP  Sodium   Date Value Ref Range Status   03/25/2025 134 (L) 136 - 145 mmol/L Final     Potassium   Date Value Ref Range Status   03/25/2025 4.9 3.5 - 5.1 mmol/L Final     Chloride   Date Value Ref Range Status   03/25/2025 102 95 - 110 mmol/L Final     CO2   Date Value Ref Range Status   03/25/2025 26 23 - 29 mmol/L Final     Glucose   Date Value Ref Range Status   03/25/2025 111 (H) 70 - 110 mg/dL Final     BUN   Date Value Ref Range Status   03/25/2025 14 6 - 20 mg/dL Final     Creatinine   Date Value Ref Range Status   03/25/2025 1.0 0.5 - 1.4 mg/dL Final     Calcium   Date Value Ref Range Status   03/25/2025 9.1 8.7 - 10.5 mg/dL Final     Protein Total   Date Value Ref Range Status   03/25/2025 7.6 6.0 - 8.4 gm/dL Final     Albumin   Date Value Ref Range Status   03/25/2025 4.3 3.5 - 5.2 g/dL Final     Bilirubin Total   Date Value Ref Range Status   03/25/2025 0.4 0.1 - 1.0 mg/dL Final     Comment:     For infants and newborns, interpretation of results should be based   on gestational age, weight and in agreement with clinical   observations.    Premature Infant recommended reference ranges:   0-24 hours:  <8.0 mg/dL   24-48 hours: <12.0 mg/dL   3-5 days:    <15.0 mg/dL   6-29 days:   <15.0 mg/dL     ALP   Date Value Ref Range Status   03/25/2025 57 40 - 150 unit/L Final     AST   Date Value Ref Range Status   03/25/2025 30 11 - 45 unit/L Final     ALT   Date Value Ref Range Status   03/25/2025 55 (H) 10 - 44 unit/L Final     Anion Gap   Date Value Ref Range Status   03/25/2025 6 (L) 8 - 16 mmol/L Final     eGFR   Date Value Ref Range Status   03/25/2025 >60 >60 mL/min/1.73/m2 Final   12/31/2024 >60.0 >60 mL/min/1.73 m^2 Final   02/26/2024 105 > OR = 60 mL/min/1.73m2 Final     March 2025- Iron- 104, TIBC- 377, Ferritin- 374, Vitamin B12 > 2000    May 2024 -Infliximab- 8.6     Imaging:  Abdominal ultrasound 2023 was independently visualized and reviewed by me and  showed no abnormality    Assessment:   55 y.o. male with severe pancolonic ulcerative colitis and YESENIA presents for follow up. He was doing very well on Inflectra 10 mg/kg every 8 weeks however had recent worsening symptoms (mild urgency, small amount of blood in stool) after several doses were missed due to insurance issues. He has new intermittent RUQ pain and is tender on exam.     Plan:  1.UC  -Continue Inflectra- check level prior to next dose  -Restart Canasa suppostories at night  -Discussed vaccines, diet, exercise, weight loss  -Schedule colonsocopy for disease surveillance after beginning biologic    2.RUQ pain  -Abdominal ultrasound    Kath Hernandez MD  [facility]  [address], [address]  [place] [address]  Office: [phone]  Fax: [phone]

## 2025-05-02 ENCOUNTER — HOSPITAL ENCOUNTER (OUTPATIENT)
Dept: RADIOLOGY | Facility: HOSPITAL | Age: 55
Discharge: HOME | End: 2025-05-02
Attending: INTERNAL MEDICINE
Payer: OTHER GOVERNMENT

## 2025-05-02 DIAGNOSIS — R10.11 RUQ PAIN: ICD-10-CM

## 2025-05-02 PROCEDURE — 76700 US EXAM ABDOM COMPLETE: CPT | Mod: 26,,, | Performed by: RADIOLOGY

## 2025-05-02 PROCEDURE — 76700 US EXAM ABDOM COMPLETE: CPT | Mod: TC

## 2025-05-03 ENCOUNTER — RESULTS FOLLOW-UP (OUTPATIENT)
Dept: GASTROENTEROLOGY | Facility: HOSPITAL | Age: 55
End: 2025-05-03

## 2025-05-05 ENCOUNTER — TELEPHONE (OUTPATIENT)
Dept: GASTROENTEROLOGY | Facility: CLINIC | Age: 55
End: 2025-05-05
Payer: OTHER GOVERNMENT

## 2025-05-05 NOTE — TELEPHONE ENCOUNTER
----- Message from Adrianne sent at 5/5/2025  8:46 AM CDT -----  Regarding: Appointment Reschedule Request  Contact: patient at [phone]  Type:  Appointment Reschedule RequestName of Caller:  patient at [phone]:  colonoscopyAdditional Information:  Please call and advise. Thank you

## 2025-05-05 NOTE — TELEPHONE ENCOUNTER
Call placed to Mr. Stewart in regards to message received. He requested to reschedule his colonoscopy to 5/23. No further issues noted.

## 2025-05-15 ENCOUNTER — TELEPHONE (OUTPATIENT)
Dept: INFUSION THERAPY | Facility: HOSPITAL | Age: 55
End: 2025-05-15

## 2025-05-16 ENCOUNTER — INFUSION (OUTPATIENT)
Dept: INFUSION THERAPY | Facility: HOSPITAL | Age: 55
End: 2025-05-16
Attending: INTERNAL MEDICINE
Payer: OTHER GOVERNMENT

## 2025-05-16 ENCOUNTER — RESULTS FOLLOW-UP (OUTPATIENT)
Dept: GASTROENTEROLOGY | Facility: CLINIC | Age: 55
End: 2025-05-16

## 2025-05-16 VITALS
RESPIRATION RATE: 16 BRPM | BODY MASS INDEX: 43.07 KG/M2 | WEIGHT: 307.63 LBS | SYSTOLIC BLOOD PRESSURE: 127 MMHG | OXYGEN SATURATION: 96 % | TEMPERATURE: 98 F | HEART RATE: 82 BPM | HEIGHT: 71 IN | DIASTOLIC BLOOD PRESSURE: 90 MMHG

## 2025-05-16 DIAGNOSIS — K51.00 ULCERATIVE PANCOLITIS: Primary | ICD-10-CM

## 2025-05-16 LAB
ABSOLUTE EOSINOPHIL (SMH): 0.19 K/UL
ABSOLUTE MONOCYTE (SMH): 0.64 K/UL (ref 0.3–1)
ABSOLUTE NEUTROPHIL COUNT (SMH): 3.8 K/UL (ref 1.8–7.7)
ALBUMIN SERPL-MCNC: 4.6 G/DL (ref 3.5–5.2)
ALP SERPL-CCNC: 53 UNIT/L (ref 55–135)
ALT SERPL-CCNC: 57 UNIT/L (ref 10–44)
ANION GAP (SMH): 8 MMOL/L (ref 8–16)
AST SERPL-CCNC: 31 UNIT/L (ref 10–40)
BASOPHILS # BLD AUTO: 0.08 K/UL
BASOPHILS NFR BLD AUTO: 1.2 %
BILIRUB SERPL-MCNC: 0.5 MG/DL (ref 0.1–1)
BUN SERPL-MCNC: 9 MG/DL (ref 6–20)
CALCIUM SERPL-MCNC: 9.4 MG/DL (ref 8.7–10.5)
CHLORIDE SERPL-SCNC: 100 MMOL/L (ref 95–110)
CO2 SERPL-SCNC: 26 MMOL/L (ref 23–29)
CREAT SERPL-MCNC: 1 MG/DL (ref 0.5–1.4)
ERYTHROCYTE [DISTWIDTH] IN BLOOD BY AUTOMATED COUNT: 13.2 % (ref 11.5–14.5)
GFR SERPLBLD CREATININE-BSD FMLA CKD-EPI: >60 ML/MIN/1.73/M2
GLUCOSE SERPL-MCNC: 104 MG/DL (ref 70–110)
HCT VFR BLD AUTO: 51.4 % (ref 40–54)
HGB BLD-MCNC: 17.3 GM/DL (ref 14–18)
IMM GRANULOCYTES # BLD AUTO: 0.04 K/UL (ref 0–0.04)
IMM GRANULOCYTES NFR BLD AUTO: 0.6 % (ref 0–0.5)
LYMPHOCYTES # BLD AUTO: 2.13 K/UL (ref 1–4.8)
MCH RBC QN AUTO: 32.5 PG (ref 27–31)
MCHC RBC AUTO-ENTMCNC: 33.7 G/DL (ref 32–36)
MCV RBC AUTO: 97 FL (ref 82–98)
NUCLEATED RBC (/100WBC) (SMH): 0 /100 WBC
PLATELET # BLD AUTO: 201 K/UL (ref 150–450)
PMV BLD AUTO: 11 FL (ref 9.2–12.9)
POTASSIUM SERPL-SCNC: 4.5 MMOL/L (ref 3.5–5.1)
PROT SERPL-MCNC: 8.1 GM/DL (ref 6–8.4)
RBC # BLD AUTO: 5.32 M/UL (ref 4.6–6.2)
RELATIVE EOSINOPHIL (SMH): 2.8 % (ref 0–8)
RELATIVE LYMPHOCYTE (SMH): 31.2 % (ref 18–48)
RELATIVE MONOCYTE (SMH): 9.4 % (ref 4–15)
RELATIVE NEUTROPHIL (SMH): 54.8 % (ref 38–73)
SODIUM SERPL-SCNC: 134 MMOL/L (ref 136–145)
WBC # BLD AUTO: 6.83 K/UL (ref 3.9–12.7)

## 2025-05-16 PROCEDURE — 96367 TX/PROPH/DG ADDL SEQ IV INF: CPT

## 2025-05-16 PROCEDURE — 82374 ASSAY BLOOD CARBON DIOXIDE: CPT

## 2025-05-16 PROCEDURE — 96413 CHEMO IV INFUSION 1 HR: CPT

## 2025-05-16 PROCEDURE — 96415 CHEMO IV INFUSION ADDL HR: CPT

## 2025-05-16 PROCEDURE — 96375 TX/PRO/DX INJ NEW DRUG ADDON: CPT

## 2025-05-16 PROCEDURE — 25000003 PHARM REV CODE 250

## 2025-05-16 PROCEDURE — 63600175 PHARM REV CODE 636 W HCPCS

## 2025-05-16 PROCEDURE — 85025 COMPLETE CBC W/AUTO DIFF WBC: CPT

## 2025-05-16 RX ORDER — METHYLPREDNISOLONE SOD SUCC 125 MG
40 VIAL (EA) INJECTION
Status: COMPLETED | OUTPATIENT
Start: 2025-05-16 | End: 2025-05-16

## 2025-05-16 RX ORDER — HEPARIN 100 UNIT/ML
500 SYRINGE INTRAVENOUS
Status: CANCELLED | OUTPATIENT
Start: 2025-07-11

## 2025-05-16 RX ORDER — EPINEPHRINE 0.3 MG/.3ML
0.3 INJECTION SUBCUTANEOUS ONCE AS NEEDED
OUTPATIENT
Start: 2025-07-11

## 2025-05-16 RX ORDER — IPRATROPIUM BROMIDE AND ALBUTEROL SULFATE 2.5; .5 MG/3ML; MG/3ML
3 SOLUTION RESPIRATORY (INHALATION)
OUTPATIENT
Start: 2025-07-11

## 2025-05-16 RX ORDER — DIPHENHYDRAMINE HYDROCHLORIDE 50 MG/ML
25 INJECTION, SOLUTION INTRAMUSCULAR; INTRAVENOUS
OUTPATIENT
Start: 2025-07-11

## 2025-05-16 RX ORDER — ACETAMINOPHEN 325 MG/1
650 TABLET ORAL
Status: CANCELLED | OUTPATIENT
Start: 2025-07-11

## 2025-05-16 RX ORDER — IPRATROPIUM BROMIDE AND ALBUTEROL SULFATE 2.5; .5 MG/3ML; MG/3ML
3 SOLUTION RESPIRATORY (INHALATION)
Status: CANCELLED | OUTPATIENT
Start: 2025-07-11

## 2025-05-16 RX ORDER — DIPHENHYDRAMINE HYDROCHLORIDE 50 MG/ML
50 INJECTION, SOLUTION INTRAMUSCULAR; INTRAVENOUS ONCE AS NEEDED
Status: DISCONTINUED | OUTPATIENT
Start: 2025-05-16 | End: 2025-05-16 | Stop reason: HOSPADM

## 2025-05-16 RX ORDER — METHYLPREDNISOLONE SOD SUCC 125 MG
40 VIAL (EA) INJECTION
OUTPATIENT
Start: 2025-07-11

## 2025-05-16 RX ORDER — DIPHENHYDRAMINE HYDROCHLORIDE 50 MG/ML
50 INJECTION, SOLUTION INTRAMUSCULAR; INTRAVENOUS ONCE AS NEEDED
OUTPATIENT
Start: 2025-07-11

## 2025-05-16 RX ORDER — SODIUM CHLORIDE 0.9 % (FLUSH) 0.9 %
10 SYRINGE (ML) INJECTION
OUTPATIENT
Start: 2025-07-11

## 2025-05-16 RX ORDER — ACETAMINOPHEN 325 MG/1
650 TABLET ORAL
OUTPATIENT
Start: 2025-07-11

## 2025-05-16 RX ORDER — HEPARIN 100 UNIT/ML
500 SYRINGE INTRAVENOUS
OUTPATIENT
Start: 2025-07-11

## 2025-05-16 RX ORDER — ACETAMINOPHEN 325 MG/1
650 TABLET ORAL
Status: COMPLETED | OUTPATIENT
Start: 2025-05-16 | End: 2025-05-16

## 2025-05-16 RX ORDER — EPINEPHRINE 0.3 MG/.3ML
0.3 INJECTION SUBCUTANEOUS ONCE AS NEEDED
Status: DISCONTINUED | OUTPATIENT
Start: 2025-05-16 | End: 2025-05-16 | Stop reason: HOSPADM

## 2025-05-16 RX ORDER — SODIUM CHLORIDE 0.9 % (FLUSH) 0.9 %
10 SYRINGE (ML) INJECTION
Status: DISCONTINUED | OUTPATIENT
Start: 2025-05-16 | End: 2025-05-16 | Stop reason: HOSPADM

## 2025-05-16 RX ADMIN — METHYLPREDNISOLONE SODIUM SUCCINATE 40 MG: 125 INJECTION, POWDER, FOR SOLUTION INTRAMUSCULAR; INTRAVENOUS at 11:05

## 2025-05-16 RX ADMIN — SODIUM CHLORIDE: 9 INJECTION, SOLUTION INTRAVENOUS at 11:05

## 2025-05-16 RX ADMIN — SODIUM CHLORIDE 25 MG: 9 INJECTION, SOLUTION INTRAVENOUS at 11:05

## 2025-05-16 RX ADMIN — ACETAMINOPHEN 650 MG: 325 TABLET ORAL at 11:05

## 2025-05-16 RX ADMIN — SODIUM CHLORIDE 1400 MG: 9 INJECTION, SOLUTION INTRAVENOUS at 11:05

## 2025-05-16 NOTE — PLAN OF CARE
Problem: Fatigue  Goal: Improved Activity Tolerance  Outcome: Progressing  Intervention: Promote Improved Energy  Flowsheets (Taken 5/16/2025 1152)  Fatigue Management: frequent rest breaks encouraged

## 2025-05-23 ENCOUNTER — ANESTHESIA (OUTPATIENT)
Dept: ENDOSCOPY | Facility: HOSPITAL | Age: 55
End: 2025-05-23
Payer: OTHER GOVERNMENT

## 2025-05-23 ENCOUNTER — HOSPITAL ENCOUNTER (OUTPATIENT)
Facility: HOSPITAL | Age: 55
Discharge: HOME | End: 2025-05-23
Attending: INTERNAL MEDICINE | Admitting: INTERNAL MEDICINE
Payer: OTHER GOVERNMENT

## 2025-05-23 ENCOUNTER — ANESTHESIA EVENT (OUTPATIENT)
Dept: ENDOSCOPY | Facility: HOSPITAL | Age: 55
End: 2025-05-23
Payer: OTHER GOVERNMENT

## 2025-05-23 VITALS
TEMPERATURE: 98 F | SYSTOLIC BLOOD PRESSURE: 116 MMHG | DIASTOLIC BLOOD PRESSURE: 68 MMHG | RESPIRATION RATE: 20 BRPM | WEIGHT: 300 LBS | HEIGHT: 71 IN | OXYGEN SATURATION: 92 % | HEART RATE: 71 BPM | BODY MASS INDEX: 42 KG/M2

## 2025-05-23 DIAGNOSIS — Z87.19 HISTORY OF ULCERATIVE COLITIS: ICD-10-CM

## 2025-05-23 PROCEDURE — 63600175 PHARM REV CODE 636 W HCPCS: Performed by: NURSE ANESTHETIST, CERTIFIED REGISTERED

## 2025-05-23 PROCEDURE — 27201012 HC FORCEPS, HOT/COLD, DISP: Performed by: INTERNAL MEDICINE

## 2025-05-23 PROCEDURE — 45380 COLONOSCOPY AND BIOPSY: CPT | Mod: ,,, | Performed by: INTERNAL MEDICINE

## 2025-05-23 PROCEDURE — 37000008 HC ANESTHESIA 1ST 15 MINUTES: Performed by: INTERNAL MEDICINE

## 2025-05-23 PROCEDURE — 25000003 PHARM REV CODE 250: Performed by: INTERNAL MEDICINE

## 2025-05-23 PROCEDURE — 88305 TISSUE EXAM BY PATHOLOGIST: CPT | Mod: TC,59 | Performed by: PATHOLOGY

## 2025-05-23 PROCEDURE — 37000009 HC ANESTHESIA EA ADD 15 MINS: Performed by: INTERNAL MEDICINE

## 2025-05-23 PROCEDURE — 45380 COLONOSCOPY AND BIOPSY: CPT | Performed by: INTERNAL MEDICINE

## 2025-05-23 RX ORDER — PROPOFOL 10 MG/ML
VIAL (ML) INTRAVENOUS
Status: DISCONTINUED | OUTPATIENT
Start: 2025-05-23 | End: 2025-05-23

## 2025-05-23 RX ORDER — SODIUM CHLORIDE 9 MG/ML
INJECTION, SOLUTION INTRAVENOUS CONTINUOUS
Status: DISCONTINUED | OUTPATIENT
Start: 2025-05-23 | End: 2025-05-23 | Stop reason: HOSPADM

## 2025-05-23 RX ORDER — LIDOCAINE HYDROCHLORIDE 20 MG/ML
INJECTION INTRAVENOUS
Status: DISCONTINUED | OUTPATIENT
Start: 2025-05-23 | End: 2025-05-23

## 2025-05-23 RX ADMIN — PROPOFOL 80 MG: 10 INJECTION, EMULSION INTRAVENOUS at 02:05

## 2025-05-23 RX ADMIN — SODIUM CHLORIDE: 0.9 INJECTION, SOLUTION INTRAVENOUS at 01:05

## 2025-05-23 RX ADMIN — PROPOFOL 40 MG: 10 INJECTION, EMULSION INTRAVENOUS at 02:05

## 2025-05-23 RX ADMIN — LIDOCAINE HYDROCHLORIDE 75 MG: 20 INJECTION, SOLUTION INTRAVENOUS at 02:05

## 2025-05-23 NOTE — PLAN OF CARE
Vss, maylin po fluids, denies pain, ambulates easily. IV removed, catheter intact. Discharge instructions provided and states understanding. States ready to go home.  Discharged from facility with family per wheelchair.

## 2025-05-23 NOTE — PROVATION PATIENT INSTRUCTIONS
Discharge Summary/Instructions after an Endoscopic Procedure  Patient Name: Isidoro Singh  Patient MRN: [id number]  Patient YOB: 1970  Friday, May 23, 2025  Kath Hernandez MD  Dear patient,  As a result of recent federal legislation (The Federal Cures Act), you may   receive lab or pathology results from your procedure in your [facility]   account before your physician is able to contact you. Your physician or   their representative will relay the results to you with their   recommendations at their soonest availability.  Thank you,  RESTRICTIONS:  During your procedure today, you received medications for sedation.  These   medications may affect your judgment, balance and coordination.  Therefore,   for 24 hours, you have the following restrictions:   - DO NOT drive a car, operate machinery, make legal/financial decisions,   sign important papers or drink alcohol.    ACTIVITY:  Today: no heavy lifting, straining or running due to procedural   sedation/anesthesia.  The following day: return to full activity including work.  DIET:  Eat and drink normally unless instructed otherwise.     TREATMENT FOR COMMON SIDE EFFECTS:  - Mild abdominal pain, nausea, belching, bloating or excessive gas:  rest,   eat lightly and use a heating pad.  - Sore Throat: treat with throat lozenges and/or gargle with warm salt   water.  - Because air was used during the procedure, expelling large amounts of air   from your rectum or belching is normal.  - If a bowel prep was taken, you may not have a bowel movement for 1-3 days.    This is normal.  SYMPTOMS TO WATCH FOR AND REPORT TO YOUR PHYSICIAN:  1. Abdominal pain or bloating, other than gas cramps.  2. Chest pain.  3. Back pain.  4. Signs of infection such as: chills or fever occurring within 24 hours   after the procedure.  5. Rectal bleeding, which would show as bright red, maroon, or black stools.   (A tablespoon of blood from the rectum is not serious,  especially if   hemorrhoids are present.)  6. Vomiting.  7. Weakness or dizziness.  GO DIRECTLY TO THE NEAREST EMERGENCY ROOM IF YOU HAVE ANY OF THE FOLLOWING:      Difficulty breathing              Chills and/or fever over 101 F   Persistent vomiting and/or vomiting blood   Severe abdominal pain   Severe chest pain   Black, tarry stools   Bleeding- more than one tablespoon   Any other symptom or condition that you feel may need urgent attention  Your doctor recommends these additional instructions:  If any biopsies were taken, your doctors clinic will contact you in 1 to 2   weeks with any results.  - Discharge patient to home (with escort).   - Patient has a contact number available for emergencies.  The signs and   symptoms of potential delayed complications were discussed with the   patient.  Return to normal activities tomorrow.  Written discharge   instructions were provided to the patient.   - Resume previous diet.   - Continue present medications.   - Await pathology results.   - Repeat colonoscopy date to be determined after pending pathology results   are reviewed for surveillance based on pathology results.   -Follow up pending Infliximab level  - Return to my office PRN.  For questions, problems or results please call your physician - Kath Hernandez MD at Work:  [phone].  [facility], EMERGENCY ROOM PHONE NUMBER: [phone]  IF A COMPLICATION OR EMERGENCY SITUATION ARISES AND YOU ARE UNABLE TO REACH   YOUR PHYSICIAN - GO DIRECTLY TO THE EMERGENCY ROOM.  Kath Hernandez MD  5/23/2025 2:38:32 PM  This report has been verified and signed electronically.  Dear patient,  As a result of recent federal legislation (The Federal Cures Act), you may   receive lab or pathology results from your procedure in your [facility]   account before your physician is able to contact you. Your physician or   their representative will relay the results to you with their    recommendations at their soonest availability.  Thank you,  PROVATION

## 2025-05-23 NOTE — TRANSFER OF CARE
"Anesthesia Transfer of Care Note    Patient: Isidoro Singh    Procedure(s) Performed: Procedure(s) (LRB):  COLONOSCOPY (N/A)    Patient location: PACU    Anesthesia Type: general    Transport from OR: Transported from OR on 2-3 L/min O2 by NC with adequate spontaneous ventilation    Post pain: adequate analgesia    Post assessment: no apparent anesthetic complications and tolerated procedure well    Post vital signs: stable    Level of consciousness: sedated and responds to stimulation    Nausea/Vomiting: no nausea/vomiting    Complications: none    Transfer of care protocol was followed      Last vitals: Visit Vitals  /74   Pulse 78   Temp 36.8 °C (98.2 °F) (Skin)   Resp 18   Ht 5' 11" (1.803 m)   Wt 136.1 kg (300 lb)   SpO2 95%   BMI 41.84 kg/m²     "

## 2025-05-23 NOTE — ANESTHESIA PREPROCEDURE EVALUATION
05/23/2025  Isidoro Singh is a 55 y.o., male.      Pre-op Assessment    I have reviewed the Patient Summary Reports.     I have reviewed the Nursing Notes. I have reviewed the NPO Status.   I have reviewed the Medications.     Review of Systems  Anesthesia Hx:  No problems with previous Anesthesia                Social:  Former Smoker       Cardiovascular:  Cardiovascular Normal                                              Pulmonary:        Sleep Apnea                Renal/:  Renal/ Normal                 Hepatic/GI:   PUD, (ulcerative colitis)   Liver Disease,               Neurological:  Neurology Normal                                      Endocrine:  Diabetes, well controlled, type 2 Hypothyroidism        Obesity / BMI > 30, Morbid Obesity / BMI > 40      Physical Exam  General: Well nourished, Cooperative, Alert and Oriented    Airway:  Mallampati: II   Mouth Opening: Normal  TM Distance: Normal  Neck ROM: Normal ROM      Anesthesia Plan  Type of Anesthesia, risks & benefits discussed:    Anesthesia Type: Gen Natural Airway  Intra-op Monitoring Plan: Standard ASA Monitors  Induction:  IV  Airway Plan: , Post-Induction  Informed Consent: Informed consent signed with the Patient and all parties understand the risks and agree with anesthesia plan.  All questions answered.   ASA Score: 3    Ready For Surgery From Anesthesia Perspective.     .

## 2025-05-23 NOTE — ANESTHESIA POSTPROCEDURE EVALUATION
Anesthesia Post Evaluation    Patient: Isidoro Singh    Procedure(s) Performed: Procedure(s) (LRB):  COLONOSCOPY (N/A)    Final Anesthesia Type: general      Patient location during evaluation: PACU  Patient participation: Yes- Able to Participate  Level of consciousness: sedated and awake  Post-procedure vital signs: reviewed and stable  Pain management: adequate  Airway patency: patent    PONV status at discharge: No PONV  Anesthetic complications: no      Cardiovascular status: blood pressure returned to baseline and hemodynamically stable  Respiratory status: spontaneous ventilation  Hydration status: euvolemic  Follow-up not needed.              Vitals Value Taken Time   /68 05/23/25 14:50   Temp 36.5 °C (97.7 °F) 05/23/25 14:50   Pulse 71 05/23/25 14:50   Resp 20 05/23/25 14:50   SpO2 94 % 05/23/25 14:50         Event Time   Out of Recovery 15:01:29         Pain/Flores Score: Flores Score: 10 (5/23/2025  2:53 PM)

## 2025-06-24 ENCOUNTER — TELEPHONE (OUTPATIENT)
Dept: HEMATOLOGY/ONCOLOGY | Facility: CLINIC | Age: 55
End: 2025-06-24
Payer: OTHER GOVERNMENT

## 2025-06-27 ENCOUNTER — LAB VISIT (OUTPATIENT)
Dept: LAB | Facility: HOSPITAL | Age: 55
End: 2025-06-27
Attending: INTERNAL MEDICINE
Payer: OTHER GOVERNMENT

## 2025-06-27 DIAGNOSIS — D69.6 THROMBOCYTOPENIA: ICD-10-CM

## 2025-06-27 DIAGNOSIS — E53.8 B12 DEFICIENCY: ICD-10-CM

## 2025-06-27 DIAGNOSIS — E78.00 PURE HYPERCHOLESTEROLEMIA: ICD-10-CM

## 2025-06-27 DIAGNOSIS — E04.1 NODULAR THYROID DISEASE: ICD-10-CM

## 2025-06-27 DIAGNOSIS — E03.9 HYPOTHYROIDISM (ACQUIRED): ICD-10-CM

## 2025-06-27 DIAGNOSIS — K51.00 ULCERATIVE PANCOLITIS: ICD-10-CM

## 2025-06-27 DIAGNOSIS — D50.0 IRON DEFICIENCY ANEMIA DUE TO CHRONIC BLOOD LOSS: ICD-10-CM

## 2025-06-27 LAB
ABSOLUTE EOSINOPHIL (SMH): 0.15 K/UL
ABSOLUTE MONOCYTE (SMH): 0.56 K/UL (ref 0.3–1)
ABSOLUTE NEUTROPHIL COUNT (SMH): 2.6 K/UL (ref 1.8–7.7)
ALBUMIN SERPL-MCNC: 4.3 G/DL (ref 3.5–5.2)
ALP SERPL-CCNC: 60 UNIT/L (ref 40–150)
ALT SERPL-CCNC: 60 UNIT/L (ref 10–44)
ANION GAP (SMH): 9 MMOL/L (ref 8–16)
AST SERPL-CCNC: 26 UNIT/L (ref 11–45)
BASOPHILS # BLD AUTO: 0.05 K/UL
BASOPHILS NFR BLD AUTO: 1 %
BILIRUB SERPL-MCNC: 0.3 MG/DL (ref 0.1–1)
BUN SERPL-MCNC: 14 MG/DL (ref 6–20)
CALCIUM SERPL-MCNC: 9.6 MG/DL (ref 8.7–10.5)
CHLORIDE SERPL-SCNC: 103 MMOL/L (ref 95–110)
CO2 SERPL-SCNC: 27 MMOL/L (ref 23–29)
CREAT SERPL-MCNC: 1.1 MG/DL (ref 0.5–1.4)
ERYTHROCYTE [DISTWIDTH] IN BLOOD BY AUTOMATED COUNT: 12.1 % (ref 11.5–14.5)
FERRITIN SERPL-MCNC: 206.2 NG/ML (ref 20–300)
GFR SERPLBLD CREATININE-BSD FMLA CKD-EPI: >60 ML/MIN/1.73/M2
GLUCOSE SERPL-MCNC: 193 MG/DL (ref 70–110)
HCT VFR BLD AUTO: 52.9 % (ref 40–54)
HGB BLD-MCNC: 17.4 GM/DL (ref 14–18)
IMM GRANULOCYTES # BLD AUTO: 0.02 K/UL (ref 0–0.04)
IMM GRANULOCYTES NFR BLD AUTO: 0.4 % (ref 0–0.5)
IRON SATN MFR SERPL: 16 % (ref 20–50)
IRON SERPL-MCNC: 68 UG/DL (ref 45–160)
LYMPHOCYTES # BLD AUTO: 1.85 K/UL (ref 1–4.8)
MCH RBC QN AUTO: 31.9 PG (ref 27–31)
MCHC RBC AUTO-ENTMCNC: 32.9 G/DL (ref 32–36)
MCV RBC AUTO: 97 FL (ref 82–98)
NUCLEATED RBC (/100WBC) (SMH): 0 /100 WBC
PLATELET # BLD AUTO: 164 K/UL (ref 150–450)
PMV BLD AUTO: 10.2 FL (ref 9.2–12.9)
POTASSIUM SERPL-SCNC: 5.1 MMOL/L (ref 3.5–5.1)
PROT SERPL-MCNC: 7.9 GM/DL (ref 6–8.4)
RBC # BLD AUTO: 5.46 M/UL (ref 4.6–6.2)
RELATIVE EOSINOPHIL (SMH): 2.9 % (ref 0–8)
RELATIVE LYMPHOCYTE (SMH): 35.2 % (ref 18–48)
RELATIVE MONOCYTE (SMH): 10.7 % (ref 4–15)
RELATIVE NEUTROPHIL (SMH): 49.8 % (ref 38–73)
SODIUM SERPL-SCNC: 139 MMOL/L (ref 136–145)
TIBC SERPL-MCNC: 413 UG/DL (ref 250–450)
TRANSFERRIN SERPL-MCNC: 279 MG/DL (ref 200–375)
VIT B12 SERPL-MCNC: 1428 PG/ML (ref 210–950)
WBC # BLD AUTO: 5.25 K/UL (ref 3.9–12.7)

## 2025-06-27 PROCEDURE — 85025 COMPLETE CBC W/AUTO DIFF WBC: CPT

## 2025-06-27 PROCEDURE — 84450 TRANSFERASE (AST) (SGOT): CPT

## 2025-06-27 PROCEDURE — 82728 ASSAY OF FERRITIN: CPT

## 2025-06-27 PROCEDURE — 82607 VITAMIN B-12: CPT

## 2025-06-27 PROCEDURE — 36415 COLL VENOUS BLD VENIPUNCTURE: CPT

## 2025-06-27 PROCEDURE — 84466 ASSAY OF TRANSFERRIN: CPT

## 2025-06-30 ENCOUNTER — OFFICE VISIT (OUTPATIENT)
Dept: HEMATOLOGY/ONCOLOGY | Facility: CLINIC | Age: 55
End: 2025-06-30
Payer: OTHER GOVERNMENT

## 2025-06-30 DIAGNOSIS — K76.0 NAFLD (NONALCOHOLIC FATTY LIVER DISEASE): ICD-10-CM

## 2025-06-30 DIAGNOSIS — E78.00 PURE HYPERCHOLESTEROLEMIA: ICD-10-CM

## 2025-06-30 DIAGNOSIS — D69.6 THROMBOCYTOPENIA: ICD-10-CM

## 2025-06-30 DIAGNOSIS — E03.9 HYPOTHYROIDISM (ACQUIRED): ICD-10-CM

## 2025-06-30 DIAGNOSIS — E53.8 B12 DEFICIENCY: ICD-10-CM

## 2025-06-30 DIAGNOSIS — K51.00 ULCERATIVE PANCOLITIS: ICD-10-CM

## 2025-06-30 DIAGNOSIS — D50.0 IRON DEFICIENCY ANEMIA DUE TO CHRONIC BLOOD LOSS: Primary | ICD-10-CM

## 2025-06-30 PROCEDURE — 98006 SYNCH AUDIO-VIDEO EST MOD 30: CPT | Mod: 95,,, | Performed by: INTERNAL MEDICINE

## 2025-06-30 NOTE — PROGRESS NOTES
Subjective:   The patient location is:  [place]  Visit type: Virtual visit with synchronous audio and video  Face-to-face or time spent with patient on the encounter:25 min  Total time spent on and for  this encounter which includes non face-to-face time preparing to see patient, review of tests, obtaining and or reviewing separately obtained records documenting clinical information in the electronic or other health records, independently interpreting results which is not separately reported ,and communicating results to the patient/family/caregiver and in care coordination and treatment planning/communicating with pharmacy for prescriptions/addressing social needs/arranging follow-up and or referrals :25 min    Each patient I provide medical services by telemedicine is:  (1) informed of the relationship between the physician and patient and the respective role of any other health care provider with respect to management of the patient; and (2) notified that he or she may decline to receive medical services by telemedicine and may withdraw from such care at any time.  This is a video visit therefore some elements of the physical exam such as vital signs, heart sounds are breath sounds are not included and may be included if found in recent clinic notes of other providers assessing same patient. Any symptoms or signs that were visualized were stated by the patient may be included in this note.    Patient ID: Isidoro Singh is a 55 y.o. male.    Chief Complaint:  follow up    HPI    55 years old male with history of ulcerative colitis.  Seen me previously but was lost to follow-up upper 2020 reestablished this year in March2024  He had ulcerative colitis flare with iron loss status post transfusion packed red blood cell and IV iron in hospital.  He was referred to Hematology for IV iron therapy.  History of thrombocytopenia suspect ITP under control.       Review of Systems   Constitutional:  Negative for appetite  change and unexpected weight change.   HENT:  Positive for mouth sores.    Eyes:  Negative for visual disturbance.   Respiratory:  Negative for cough and shortness of breath.    Cardiovascular:  Negative for chest pain.   Gastrointestinal:  Positive for diarrhea. Negative for abdominal pain.   Genitourinary:  Negative for frequency.   Musculoskeletal:  Negative for back pain.   Skin:  Negative for rash.   Neurological:  Negative for headaches.   Hematological:  Negative for adenopathy.   Psychiatric/Behavioral:  The patient is not nervous/anxious.              Past Medical History:   Diagnosis Date    Colon polyp     Diabetes mellitus     Hypothyroid 07/05/2016    Sleep apnea     Thrombocytopenia     Ulcerative colitis      Past Surgical History:   Procedure Laterality Date    COLONOSCOPY N/A 12/04/2020    Procedure: COLONOSCOPY;  Surgeon: Ventura Warren MD;  Location: [facility];  Service: Endoscopy;  Laterality: N/A;    COLONOSCOPY N/A 07/19/2021    Procedure: COLONOSCOPY;  Surgeon: Clarence Ogden MD;  Location: [facility];  Service: Endoscopy;  Laterality: N/A;    COLONOSCOPY N/A 11/10/2022    Procedure: COLONOSCOPY;  Surgeon: Kath Hernandez MD;  Location: [facility];  Service: Endoscopy;  Laterality: N/A;    COLONOSCOPY N/A 2/29/2024    Procedure: COLONOSCOPY;  Surgeon: Kath Hernandez MD;  Location: [facility];  Service: Endoscopy;  Laterality: N/A;    COLONOSCOPY N/A 5/23/2025    Procedure: COLONOSCOPY;  Surgeon: Kath Hernandez MD;  Location: [facility];  Service: Endoscopy;  Laterality: N/A;    FOOT SURGERY      left   cyst removed    HAND SURGERY      right   tendon repair     Family History   Problem Relation Name Age of Onset    Ulcerative colitis Mother          in 70s    Colon cancer Neg Hx      Colon polyps Neg Hx      Crohn's disease Neg Hx        Social History     Socioeconomic History    Marital status:    Tobacco Use    Smoking status: Former     Current packs/day: 0.00      Average packs/day: 1 pack/day for 30.0 years (30.0 ttl pk-yrs)     Types: Cigarettes     Start date:      Quit date: 2013     Years since quittin.5     Passive exposure: Past    Smokeless tobacco: Former     Types: Snuff     Quit date:    Vaping Use    Vaping status: Former    Quit date: 2013   Substance and Sexual Activity    Alcohol use: Yes     Alcohol/week: 4.0 standard drinks of alcohol     Types: 4 Shots of liquor per week     Comment: occasional    Drug use: No    Sexual activity: Yes     Social Drivers of Health     Financial Resource Strain: Low Risk  (3/21/2025)    Overall Financial Resource Strain (CARDIA)     Difficulty of Paying Living Expenses: Not hard at all   Food Insecurity: No Food Insecurity (3/21/2025)    Hunger Vital Sign     Worried About Running Out of Food in the Last Year: Never true     Ran Out of Food in the Last Year: Never true   Transportation Needs: No Transportation Needs (3/21/2025)    PRAPARE - Transportation     Lack of Transportation (Medical): No     Lack of Transportation (Non-Medical): No   Physical Activity: Insufficiently Active (3/21/2025)    Exercise Vital Sign     Days of Exercise per Week: 2 days     Minutes of Exercise per Session: 30 min   Stress: No Stress Concern Present (3/21/2025)    [employer] - Occupational Stress Questionnaire     Feeling of Stress : Only a little   Housing Stability: Low Risk  (3/21/2025)    Housing Stability Vital Sign     Unable to Pay for Housing in the Last Year: No     Number of Times Moved in the Last Year: 0     Homeless in the Last Year: No     Review of patient's allergies indicates:  No Known Allergies    Current Outpatient Medications:     citalopram (CELEXA) 40 MG tablet, Take 1 tablet (40 mg total) by mouth once daily., Disp: 90 tablet, Rfl: 1    ergocalciferol (ERGOCALCIFEROL) 50,000 unit Cap, Take 1 capsule (50,000 Units total) by mouth every 7 days., Disp: 12 capsule, Rfl: 3     folic acid (FOLVITE) 1 MG tablet, Take 1 tablet (1 mg total) by mouth once daily., Disp: 90 tablet, Rfl: 3    levothyroxine (SYNTHROID) 200 MCG tablet, Take 1 tablet (200 mcg total) by mouth before breakfast., Disp: 90 tablet, Rfl: 3    mesalamine (CANASA) 1000 MG Supp, Place 1 suppository (1,000 mg total) rectally nightly., Disp: 30 suppository, Rfl: 11    omega-3 acid ethyl esters (LOVAZA) 1 gram capsule, Take 1 capsule (1 g total) by mouth 2 (two) times daily., Disp: 180 capsule, Rfl: 3    pneumoc 20-nora conj-dip cr,PF, (PREVNAR 20, PF,) 0.5 mL Syrg injection, , Disp: , Rfl:     sildenafiL (VIAGRA) 100 MG tablet, Take 1 tablet (100 mg total) by mouth daily as needed for Erectile Dysfunction., Disp: 10 tablet, Rfl: 6    testosterone cypionate (DEPOTESTOTERONE CYPIONATE) 200 mg/mL injection, Inject 100 mg into the muscle every 14 (fourteen) days., Disp: , Rfl:     Physical Exam    VITAL SIGNS:  as above   GENERAL: appears well-built, well-nourished.  No anxiety, no agitation, and in no distress.  Patient is awake, alert, oriented and cooperative.  HEENT:  Showed no congestion. Trachea is central no obvious icterus or pallor noted no hoarseness. no obvious JVD   NECK:  Supple.  No JVD. No obvious cervical submental or supraclavicular adenopathy.  RS:the visualized portion of  Chest expands well. chest appears symmetric, no audible wheezes.  No dyspnea recognized  ABDOMEN:  abdomen appears undistended.  EXTREMITIES:  Without edema.  NEUROLOGICAL:  The patient is appropriate, higher functions are normal.  No  obvious neurological deficits.  normal judgement normal thought content  No confusion, no speech impediment. Cranial nerves are intact and show no deficit. No gross motor deficits noted   SKIN MUSCULOSKELETAL: no joint or skeletal deformity, no clubbing of nails.  No visible rash ecchymosis or petechiae     Lab Results   Component Value Date    WBC 5.25 06/27/2025    HGB 17.4 06/27/2025    HCT 52.9 06/27/2025     MCV 97 06/27/2025     06/27/2025       BMP  Lab Results   Component Value Date     06/27/2025    K 5.1 06/27/2025     06/27/2025    CO2 27 06/27/2025    BUN 14 06/27/2025    CREATININE 1.1 06/27/2025    CALCIUM 9.6 06/27/2025    ANIONGAP 9 06/27/2025    ESTGFRAFRICA 96 11/09/2021    EGFRNONAA 83 11/09/2021     Lab Results   Component Value Date    IRON 68 06/27/2025    TIBC 413 06/27/2025    FERRITIN 206.2 06/27/2025         Patient Active Problem List   Diagnosis    Thrombocytopenia    Fatty liver    Hypothyroidism (acquired)    Thyroid disease    Abnormal thyroid blood test    Goiter    NAFLD (nonalcoholic fatty liver disease)    Obesity (BMI 35.0-39.9 without comorbidity)    Obstructive sleep apnea    Hashimoto's thyroiditis    Nodular thyroid disease    Dysmetabolic syndrome    Prediabetes    Hypovitaminosis D    Hyperlipidemia    Hypertriglyceridemia    History of colon polyps    Ulcerative pancolitis    Anemia    Diabetes mellitus    YESENIA (iron deficiency anemia)    B12 deficiency        Assessment and Plan      Ulcertive colitis: on remicade q 2 weeks and mtx weekly with DR Hernandez   No falre ups recently    Iron deficiency anemia/ulcerative colitis status post infusional iron in February 24 ferritin doing good but declining steadily again therefore repeated with good response  See me 3 month after with CBC CMP iron studies  b12 deficiency slightly high since replacing cut back to 3 times a week    B12 def: start SL b12 and recehck  ITP by history :stable and normal platelet counts    Dyslipidemia; continue medical management follow-up with primary care dietary compliance    ANTONY:  Continue follow-up primary wear CPAP regularly    Diabetes mellitus:  Continue follow-up PCP monitoring hemoglobin A1c regularly diet restrictions and good routine exercise regimen recommended    NAFLD:  explained to patient fat restriction alcohol restriction and protecting liver from progressing   Hypothyroid: on  synthyroid 200 mcg daily    BMI of 35 pt aware, discussed life style changes    MDM includes  :    - Acute or chronic illness or injury that poses a threat to life or bodily function  - Independent review and explanation of 3+ results from unique tests  - Discussion of management and ordering 3+ unique tests  - Extensive discussion of treatment and management  - Prescription drug management  - Drug therapy requiring intensive monitoring for toxicity

## 2025-07-11 ENCOUNTER — INFUSION (OUTPATIENT)
Dept: INFUSION THERAPY | Facility: HOSPITAL | Age: 55
End: 2025-07-11
Attending: INTERNAL MEDICINE
Payer: OTHER GOVERNMENT

## 2025-07-11 VITALS
OXYGEN SATURATION: 94 % | SYSTOLIC BLOOD PRESSURE: 123 MMHG | BODY MASS INDEX: 42.54 KG/M2 | WEIGHT: 303.88 LBS | RESPIRATION RATE: 18 BRPM | HEART RATE: 77 BPM | TEMPERATURE: 98 F | DIASTOLIC BLOOD PRESSURE: 77 MMHG | HEIGHT: 71 IN

## 2025-07-11 DIAGNOSIS — K51.00 ULCERATIVE PANCOLITIS: Primary | ICD-10-CM

## 2025-07-11 LAB
ABSOLUTE EOSINOPHIL (SMH): 0.23 K/UL
ABSOLUTE MONOCYTE (SMH): 0.74 K/UL (ref 0.3–1)
ABSOLUTE NEUTROPHIL COUNT (SMH): 4.3 K/UL (ref 1.8–7.7)
ALBUMIN SERPL-MCNC: 4.3 G/DL (ref 3.5–5.2)
ALP SERPL-CCNC: 54 UNIT/L (ref 55–135)
ALT SERPL-CCNC: 64 UNIT/L (ref 10–44)
ANION GAP (SMH): 6 MMOL/L (ref 8–16)
AST SERPL-CCNC: 37 UNIT/L (ref 10–40)
BASOPHILS # BLD AUTO: 0.09 K/UL
BASOPHILS NFR BLD AUTO: 1.2 %
BILIRUB SERPL-MCNC: 0.4 MG/DL (ref 0.1–1)
BUN SERPL-MCNC: 14 MG/DL (ref 6–20)
CALCIUM SERPL-MCNC: 9.3 MG/DL (ref 8.7–10.5)
CHLORIDE SERPL-SCNC: 103 MMOL/L (ref 95–110)
CO2 SERPL-SCNC: 25 MMOL/L (ref 23–29)
CREAT SERPL-MCNC: 1 MG/DL (ref 0.5–1.4)
ERYTHROCYTE [DISTWIDTH] IN BLOOD BY AUTOMATED COUNT: 12.5 % (ref 11.5–14.5)
GFR SERPLBLD CREATININE-BSD FMLA CKD-EPI: >60 ML/MIN/1.73/M2
GLUCOSE SERPL-MCNC: 123 MG/DL (ref 70–110)
HCT VFR BLD AUTO: 49 % (ref 40–54)
HGB BLD-MCNC: 17 GM/DL (ref 14–18)
IMM GRANULOCYTES # BLD AUTO: 0.08 K/UL (ref 0–0.04)
IMM GRANULOCYTES NFR BLD AUTO: 1.1 % (ref 0–0.5)
LYMPHOCYTES # BLD AUTO: 2.04 K/UL (ref 1–4.8)
MCH RBC QN AUTO: 32.6 PG (ref 27–31)
MCHC RBC AUTO-ENTMCNC: 34.7 G/DL (ref 32–36)
MCV RBC AUTO: 94 FL (ref 82–98)
NUCLEATED RBC (/100WBC) (SMH): 0 /100 WBC
PLATELET # BLD AUTO: 217 K/UL (ref 150–450)
PMV BLD AUTO: 11.1 FL (ref 9.2–12.9)
POTASSIUM SERPL-SCNC: 4.2 MMOL/L (ref 3.5–5.1)
PROT SERPL-MCNC: 7.9 GM/DL (ref 6–8.4)
RBC # BLD AUTO: 5.21 M/UL (ref 4.6–6.2)
RELATIVE EOSINOPHIL (SMH): 3.1 % (ref 0–8)
RELATIVE LYMPHOCYTE (SMH): 27.3 % (ref 18–48)
RELATIVE MONOCYTE (SMH): 9.9 % (ref 4–15)
RELATIVE NEUTROPHIL (SMH): 57.4 % (ref 38–73)
SODIUM SERPL-SCNC: 134 MMOL/L (ref 136–145)
WBC # BLD AUTO: 7.48 K/UL (ref 3.9–12.7)

## 2025-07-11 PROCEDURE — 96413 CHEMO IV INFUSION 1 HR: CPT

## 2025-07-11 PROCEDURE — 84075 ASSAY ALKALINE PHOSPHATASE: CPT

## 2025-07-11 PROCEDURE — 96415 CHEMO IV INFUSION ADDL HR: CPT

## 2025-07-11 PROCEDURE — 85025 COMPLETE CBC W/AUTO DIFF WBC: CPT

## 2025-07-11 PROCEDURE — 25000003 PHARM REV CODE 250

## 2025-07-11 PROCEDURE — 96375 TX/PRO/DX INJ NEW DRUG ADDON: CPT

## 2025-07-11 PROCEDURE — 63600175 PHARM REV CODE 636 W HCPCS

## 2025-07-11 PROCEDURE — 96367 TX/PROPH/DG ADDL SEQ IV INF: CPT

## 2025-07-11 RX ORDER — ACETAMINOPHEN 325 MG/1
650 TABLET ORAL
OUTPATIENT
Start: 2025-09-05

## 2025-07-11 RX ORDER — HEPARIN 100 UNIT/ML
500 SYRINGE INTRAVENOUS
OUTPATIENT
Start: 2025-09-05

## 2025-07-11 RX ORDER — SODIUM CHLORIDE 0.9 % (FLUSH) 0.9 %
10 SYRINGE (ML) INJECTION
Status: DISCONTINUED | OUTPATIENT
Start: 2025-07-11 | End: 2025-07-11 | Stop reason: HOSPADM

## 2025-07-11 RX ORDER — EPINEPHRINE 0.3 MG/.3ML
0.3 INJECTION SUBCUTANEOUS ONCE AS NEEDED
OUTPATIENT
Start: 2025-09-05

## 2025-07-11 RX ORDER — DIPHENHYDRAMINE HYDROCHLORIDE 50 MG/ML
25 INJECTION, SOLUTION INTRAMUSCULAR; INTRAVENOUS
OUTPATIENT
Start: 2025-09-05

## 2025-07-11 RX ORDER — IPRATROPIUM BROMIDE AND ALBUTEROL SULFATE 2.5; .5 MG/3ML; MG/3ML
3 SOLUTION RESPIRATORY (INHALATION)
OUTPATIENT
Start: 2025-09-05

## 2025-07-11 RX ORDER — EPINEPHRINE 0.3 MG/.3ML
0.3 INJECTION SUBCUTANEOUS ONCE AS NEEDED
Status: DISCONTINUED | OUTPATIENT
Start: 2025-07-11 | End: 2025-07-11 | Stop reason: HOSPADM

## 2025-07-11 RX ORDER — DIPHENHYDRAMINE HYDROCHLORIDE 50 MG/ML
50 INJECTION, SOLUTION INTRAMUSCULAR; INTRAVENOUS ONCE AS NEEDED
OUTPATIENT
Start: 2025-09-05

## 2025-07-11 RX ORDER — DIPHENHYDRAMINE HYDROCHLORIDE 50 MG/ML
50 INJECTION, SOLUTION INTRAMUSCULAR; INTRAVENOUS ONCE AS NEEDED
Status: DISCONTINUED | OUTPATIENT
Start: 2025-07-11 | End: 2025-07-11 | Stop reason: HOSPADM

## 2025-07-11 RX ORDER — ACETAMINOPHEN 325 MG/1
650 TABLET ORAL
Status: COMPLETED | OUTPATIENT
Start: 2025-07-11 | End: 2025-07-11

## 2025-07-11 RX ORDER — METHYLPREDNISOLONE SOD SUCC 125 MG
40 VIAL (EA) INJECTION
OUTPATIENT
Start: 2025-09-05

## 2025-07-11 RX ORDER — SODIUM CHLORIDE 0.9 % (FLUSH) 0.9 %
10 SYRINGE (ML) INJECTION
OUTPATIENT
Start: 2025-09-05

## 2025-07-11 RX ADMIN — METHYLPREDNISOLONE SODIUM SUCCINATE 40 MG: 40 INJECTION, POWDER, FOR SOLUTION INTRAMUSCULAR; INTRAVENOUS at 01:07

## 2025-07-11 RX ADMIN — DIPHENHYDRAMINE HYDROCHLORIDE 25 MG: 50 INJECTION, SOLUTION INTRAMUSCULAR; INTRAVENOUS at 01:07

## 2025-07-11 RX ADMIN — ACETAMINOPHEN 650 MG: 325 TABLET ORAL at 01:07

## 2025-07-11 RX ADMIN — SODIUM CHLORIDE: 9 INJECTION, SOLUTION INTRAVENOUS at 01:07

## 2025-07-11 RX ADMIN — SODIUM CHLORIDE 1300 MG: 9 INJECTION, SOLUTION INTRAVENOUS at 01:07

## 2025-07-11 NOTE — PLAN OF CARE
Problem: Fatigue  Goal: Improved Activity Tolerance  Outcome: Progressing  Intervention: Promote Improved Energy  Flowsheets (Taken 7/11/2025 1251)  Fatigue Management:   fatigue-related activity identified   paced activity encouraged   frequent rest breaks encouraged  Sleep/Rest Enhancement:   noise level reduced   relaxation techniques promoted   regular sleep/rest pattern promoted  Activity Management:   Ambulated -L4   Up in chair - L3  Environmental Support:   calm environment promoted   distractions minimized   rest periods encouraged

## 2025-08-28 ENCOUNTER — PATIENT MESSAGE (OUTPATIENT)
Dept: GASTROENTEROLOGY | Facility: CLINIC | Age: 55
End: 2025-08-28
Payer: OTHER GOVERNMENT

## 2025-08-28 DIAGNOSIS — K51.011 ULCERATIVE PANCOLITIS WITH RECTAL BLEEDING: Primary | ICD-10-CM

## 2025-08-29 ENCOUNTER — TELEPHONE (OUTPATIENT)
Dept: GASTROENTEROLOGY | Facility: CLINIC | Age: 55
End: 2025-08-29
Payer: OTHER GOVERNMENT

## 2025-09-03 ENCOUNTER — LAB VISIT (OUTPATIENT)
Dept: LAB | Facility: HOSPITAL | Age: 55
End: 2025-09-03
Payer: OTHER GOVERNMENT

## 2025-09-03 DIAGNOSIS — K51.011 ULCERATIVE PANCOLITIS WITH RECTAL BLEEDING: ICD-10-CM

## 2025-09-03 PROCEDURE — 87209 SMEAR COMPLEX STAIN: CPT

## 2025-09-03 PROCEDURE — 87329 GIARDIA AG IA: CPT | Mod: 59

## 2025-09-03 PROCEDURE — 87045 FECES CULTURE AEROBIC BACT: CPT

## 2025-09-03 PROCEDURE — 87328 CRYPTOSPORIDIUM AG IA: CPT | Mod: 59

## 2025-09-03 PROCEDURE — 83993 ASSAY FOR CALPROTECTIN FECAL: CPT

## 2025-09-04 LAB
CRYPTOSP AG SPEC QL: NEGATIVE
G LAMBLIA AG STL QL IA: NEGATIVE

## 2025-09-05 ENCOUNTER — INFUSION (OUTPATIENT)
Dept: INFUSION THERAPY | Facility: HOSPITAL | Age: 55
End: 2025-09-05
Attending: INTERNAL MEDICINE
Payer: OTHER GOVERNMENT

## 2025-09-05 VITALS
WEIGHT: 294.63 LBS | OXYGEN SATURATION: 96 % | RESPIRATION RATE: 18 BRPM | SYSTOLIC BLOOD PRESSURE: 126 MMHG | TEMPERATURE: 98 F | BODY MASS INDEX: 41.25 KG/M2 | HEART RATE: 78 BPM | HEIGHT: 71 IN | DIASTOLIC BLOOD PRESSURE: 80 MMHG

## 2025-09-05 DIAGNOSIS — K51.00 ULCERATIVE PANCOLITIS: Primary | ICD-10-CM

## 2025-09-05 LAB
ABSOLUTE EOSINOPHIL (SMH): 0.62 K/UL
ABSOLUTE MONOCYTE (SMH): 0.62 K/UL (ref 0.3–1)
ABSOLUTE NEUTROPHIL COUNT (SMH): 4.4 K/UL (ref 1.8–7.7)
ALBUMIN SERPL-MCNC: 4.3 G/DL (ref 3.5–5.2)
ALP SERPL-CCNC: 56 UNIT/L (ref 55–135)
ALT SERPL-CCNC: 80 UNIT/L (ref 10–44)
ANION GAP (SMH): 4 MMOL/L (ref 8–16)
AST SERPL-CCNC: 32 UNIT/L (ref 10–40)
BACTERIA STL CULT: NORMAL
BASOPHILS # BLD AUTO: 0.09 K/UL
BASOPHILS NFR BLD AUTO: 1.1 %
BILIRUB SERPL-MCNC: 0.5 MG/DL (ref 0.1–1)
BUN SERPL-MCNC: 16 MG/DL (ref 6–20)
CALCIUM SERPL-MCNC: 9.5 MG/DL (ref 8.7–10.5)
CHLORIDE SERPL-SCNC: 104 MMOL/L (ref 95–110)
CO2 SERPL-SCNC: 30 MMOL/L (ref 23–29)
CREAT SERPL-MCNC: 1 MG/DL (ref 0.5–1.4)
ERYTHROCYTE [DISTWIDTH] IN BLOOD BY AUTOMATED COUNT: 14.1 % (ref 11.5–14.5)
GFR SERPLBLD CREATININE-BSD FMLA CKD-EPI: >60 ML/MIN/1.73/M2
GLUCOSE SERPL-MCNC: 108 MG/DL (ref 70–110)
HCT VFR BLD AUTO: 47.4 % (ref 40–54)
HGB BLD-MCNC: 15.5 GM/DL (ref 14–18)
IMM GRANULOCYTES # BLD AUTO: 0.07 K/UL (ref 0–0.04)
IMM GRANULOCYTES NFR BLD AUTO: 0.9 % (ref 0–0.5)
LYMPHOCYTES # BLD AUTO: 2.32 K/UL (ref 1–4.8)
MCH RBC QN AUTO: 32.2 PG (ref 27–31)
MCHC RBC AUTO-ENTMCNC: 32.7 G/DL (ref 32–36)
MCV RBC AUTO: 98 FL (ref 82–98)
NUCLEATED RBC (/100WBC) (SMH): 0 /100 WBC
PLATELET # BLD AUTO: 280 K/UL (ref 150–450)
PMV BLD AUTO: 10.5 FL (ref 9.2–12.9)
POTASSIUM SERPL-SCNC: 4.9 MMOL/L (ref 3.5–5.1)
PROT SERPL-MCNC: 7.6 GM/DL (ref 6–8.4)
RBC # BLD AUTO: 4.82 M/UL (ref 4.6–6.2)
RELATIVE EOSINOPHIL (SMH): 7.6 % (ref 0–8)
RELATIVE LYMPHOCYTE (SMH): 28.5 % (ref 18–48)
RELATIVE MONOCYTE (SMH): 7.6 % (ref 4–15)
RELATIVE NEUTROPHIL (SMH): 54.3 % (ref 38–73)
SODIUM SERPL-SCNC: 138 MMOL/L (ref 136–145)
WBC # BLD AUTO: 8.13 K/UL (ref 3.9–12.7)

## 2025-09-05 PROCEDURE — 96375 TX/PRO/DX INJ NEW DRUG ADDON: CPT

## 2025-09-05 PROCEDURE — 36415 COLL VENOUS BLD VENIPUNCTURE: CPT

## 2025-09-05 PROCEDURE — 85025 COMPLETE CBC W/AUTO DIFF WBC: CPT

## 2025-09-05 PROCEDURE — A4216 STERILE WATER/SALINE, 10 ML: HCPCS

## 2025-09-05 PROCEDURE — 96413 CHEMO IV INFUSION 1 HR: CPT

## 2025-09-05 PROCEDURE — 63600175 PHARM REV CODE 636 W HCPCS

## 2025-09-05 PROCEDURE — 96367 TX/PROPH/DG ADDL SEQ IV INF: CPT

## 2025-09-05 PROCEDURE — 80053 COMPREHEN METABOLIC PANEL: CPT

## 2025-09-05 PROCEDURE — 96415 CHEMO IV INFUSION ADDL HR: CPT

## 2025-09-05 PROCEDURE — 25000003 PHARM REV CODE 250

## 2025-09-05 RX ORDER — EPINEPHRINE 0.3 MG/.3ML
0.3 INJECTION SUBCUTANEOUS ONCE AS NEEDED
OUTPATIENT
Start: 2025-09-05 | End: 2037-02-01

## 2025-09-05 RX ORDER — IPRATROPIUM BROMIDE AND ALBUTEROL SULFATE 2.5; .5 MG/3ML; MG/3ML
3 SOLUTION RESPIRATORY (INHALATION)
OUTPATIENT
Start: 2025-09-05 | End: 2025-09-05

## 2025-09-05 RX ORDER — ACETAMINOPHEN 325 MG/1
650 TABLET ORAL
Status: COMPLETED | OUTPATIENT
Start: 2025-09-05 | End: 2025-09-05

## 2025-09-05 RX ORDER — EPINEPHRINE 0.3 MG/.3ML
0.3 INJECTION SUBCUTANEOUS ONCE AS NEEDED
Status: DISCONTINUED | OUTPATIENT
Start: 2025-09-05 | End: 2025-09-05 | Stop reason: HOSPADM

## 2025-09-05 RX ORDER — ACETAMINOPHEN 325 MG/1
650 TABLET ORAL
OUTPATIENT
Start: 2025-09-05 | End: 2025-09-05

## 2025-09-05 RX ORDER — SODIUM CHLORIDE 0.9 % (FLUSH) 0.9 %
10 SYRINGE (ML) INJECTION
Status: DISCONTINUED | OUTPATIENT
Start: 2025-09-05 | End: 2025-09-05 | Stop reason: HOSPADM

## 2025-09-05 RX ORDER — METHYLPREDNISOLONE SOD SUCC 125 MG
40 VIAL (EA) INJECTION
OUTPATIENT
Start: 2025-09-05 | End: 2025-09-05

## 2025-09-05 RX ORDER — SODIUM CHLORIDE 0.9 % (FLUSH) 0.9 %
10 SYRINGE (ML) INJECTION
OUTPATIENT
Start: 2025-09-05

## 2025-09-05 RX ORDER — DIPHENHYDRAMINE HYDROCHLORIDE 50 MG/ML
25 INJECTION, SOLUTION INTRAMUSCULAR; INTRAVENOUS
OUTPATIENT
Start: 2025-09-05 | End: 2025-09-05

## 2025-09-05 RX ORDER — HEPARIN 100 UNIT/ML
500 SYRINGE INTRAVENOUS
OUTPATIENT
Start: 2025-09-05

## 2025-09-05 RX ORDER — DIPHENHYDRAMINE HYDROCHLORIDE 50 MG/ML
50 INJECTION, SOLUTION INTRAMUSCULAR; INTRAVENOUS ONCE AS NEEDED
OUTPATIENT
Start: 2025-09-05 | End: 2037-02-01

## 2025-09-05 RX ORDER — METHYLPREDNISOLONE SOD SUCC 125 MG
40 VIAL (EA) INJECTION
Status: COMPLETED | OUTPATIENT
Start: 2025-09-05 | End: 2025-09-05

## 2025-09-05 RX ADMIN — SODIUM CHLORIDE: 9 INJECTION, SOLUTION INTRAVENOUS at 01:09

## 2025-09-05 RX ADMIN — ACETAMINOPHEN 650 MG: 325 TABLET ORAL at 01:09

## 2025-09-05 RX ADMIN — METHYLPREDNISOLONE SODIUM SUCCINATE 40 MG: 125 INJECTION INTRAMUSCULAR; INTRAVENOUS at 01:09

## 2025-09-05 RX ADMIN — DIPHENHYDRAMINE HYDROCHLORIDE 25 MG: 50 INJECTION INTRAMUSCULAR; INTRAVENOUS at 01:09

## 2025-09-05 RX ADMIN — SODIUM CHLORIDE, PRESERVATIVE FREE 10 ML: 5 INJECTION INTRAVENOUS at 01:09

## 2025-09-05 RX ADMIN — SODIUM CHLORIDE 1300 MG: 9 INJECTION, SOLUTION INTRAVENOUS at 02:09

## 2025-09-06 LAB — CALPROTECTIN STL-MCNT: 2175 MCG/G
